# Patient Record
Sex: FEMALE | Race: BLACK OR AFRICAN AMERICAN | NOT HISPANIC OR LATINO
[De-identification: names, ages, dates, MRNs, and addresses within clinical notes are randomized per-mention and may not be internally consistent; named-entity substitution may affect disease eponyms.]

---

## 2017-02-23 ENCOUNTER — RESULT REVIEW (OUTPATIENT)
Age: 47
End: 2017-02-23

## 2017-02-24 ENCOUNTER — APPOINTMENT (OUTPATIENT)
Dept: DERMATOLOGY | Facility: CLINIC | Age: 47
End: 2017-02-24

## 2017-03-03 ENCOUNTER — APPOINTMENT (OUTPATIENT)
Dept: DERMATOLOGY | Facility: CLINIC | Age: 47
End: 2017-03-03

## 2017-03-10 ENCOUNTER — APPOINTMENT (OUTPATIENT)
Dept: INTERNAL MEDICINE | Facility: CLINIC | Age: 47
End: 2017-03-10

## 2017-04-03 ENCOUNTER — APPOINTMENT (OUTPATIENT)
Dept: DERMATOLOGY | Facility: CLINIC | Age: 47
End: 2017-04-03

## 2017-04-05 ENCOUNTER — LABORATORY RESULT (OUTPATIENT)
Age: 47
End: 2017-04-05

## 2017-04-06 ENCOUNTER — INPATIENT (INPATIENT)
Facility: HOSPITAL | Age: 47
LOS: 10 days | Discharge: HOME | End: 2017-04-17
Attending: HOSPITALIST | Admitting: HOSPITALIST
Payer: COMMERCIAL

## 2017-04-06 ENCOUNTER — APPOINTMENT (OUTPATIENT)
Dept: DERMATOLOGY | Facility: CLINIC | Age: 47
End: 2017-04-06

## 2017-04-06 ENCOUNTER — LABORATORY RESULT (OUTPATIENT)
Age: 47
End: 2017-04-06

## 2017-04-06 VITALS
RESPIRATION RATE: 18 BRPM | TEMPERATURE: 98 F | SYSTOLIC BLOOD PRESSURE: 125 MMHG | DIASTOLIC BLOOD PRESSURE: 85 MMHG | OXYGEN SATURATION: 97 % | HEART RATE: 100 BPM

## 2017-04-06 VITALS
BODY MASS INDEX: 31.82 KG/M2 | DIASTOLIC BLOOD PRESSURE: 80 MMHG | SYSTOLIC BLOOD PRESSURE: 122 MMHG | WEIGHT: 198 LBS | HEIGHT: 66 IN

## 2017-04-06 DIAGNOSIS — D72.1 EOSINOPHILIA: ICD-10-CM

## 2017-04-06 DIAGNOSIS — R45.851 SUICIDAL IDEATIONS: ICD-10-CM

## 2017-04-06 DIAGNOSIS — R21 RASH AND OTHER NONSPECIFIC SKIN ERUPTION: ICD-10-CM

## 2017-04-06 DIAGNOSIS — F41.9 ANXIETY DISORDER, UNSPECIFIED: ICD-10-CM

## 2017-04-06 DIAGNOSIS — R65.10 SYSTEMIC INFLAMMATORY RESPONSE SYNDROME (SIRS) OF NON-INFECTIOUS ORIGIN WITHOUT ACUTE ORGAN DYSFUNCTION: ICD-10-CM

## 2017-04-06 DIAGNOSIS — D25.9 LEIOMYOMA OF UTERUS, UNSPECIFIED: Chronic | ICD-10-CM

## 2017-04-06 DIAGNOSIS — D50.9 IRON DEFICIENCY ANEMIA, UNSPECIFIED: ICD-10-CM

## 2017-04-06 DIAGNOSIS — F33.2 MAJOR DEPRESSIVE DISORDER, RECURRENT SEVERE WITHOUT PSYCHOTIC FEATURES: ICD-10-CM

## 2017-04-06 DIAGNOSIS — R45.89 OTHER SYMPTOMS AND SIGNS INVOLVING EMOTIONAL STATE: ICD-10-CM

## 2017-04-06 LAB
ALBUMIN SERPL ELPH-MCNC: 3.7 G/DL — SIGNIFICANT CHANGE UP (ref 3.3–5)
ALP SERPL-CCNC: 87 U/L — SIGNIFICANT CHANGE UP (ref 40–120)
ALT FLD-CCNC: 20 U/L — SIGNIFICANT CHANGE UP (ref 4–33)
ANISOCYTOSIS BLD QL: SLIGHT — SIGNIFICANT CHANGE UP
AST SERPL-CCNC: 45 U/L — HIGH (ref 4–32)
BASE EXCESS BLDV CALC-SCNC: 3 MMOL/L — SIGNIFICANT CHANGE UP
BASOPHILS NFR SPEC: 0 % — SIGNIFICANT CHANGE UP (ref 0–2)
BILIRUB SERPL-MCNC: 0.4 MG/DL — SIGNIFICANT CHANGE UP (ref 0.2–1.2)
BLOOD GAS VENOUS - CREATININE: 0.84 MG/DL — SIGNIFICANT CHANGE UP (ref 0.5–1.3)
BUN SERPL-MCNC: 7 MG/DL — SIGNIFICANT CHANGE UP (ref 7–23)
CALCIUM SERPL-MCNC: 9 MG/DL — SIGNIFICANT CHANGE UP (ref 8.4–10.5)
CHLORIDE BLDV-SCNC: 107 MMOL/L — SIGNIFICANT CHANGE UP (ref 96–108)
CHLORIDE SERPL-SCNC: 102 MMOL/L — SIGNIFICANT CHANGE UP (ref 98–107)
CO2 SERPL-SCNC: 21 MMOL/L — LOW (ref 22–31)
CREAT SERPL-MCNC: 0.85 MG/DL — SIGNIFICANT CHANGE UP (ref 0.5–1.3)
EOSINOPHIL NFR FLD: 97 % — HIGH (ref 0–6)
GAS PNL BLDV: 137 MMOL/L — SIGNIFICANT CHANGE UP (ref 136–146)
GLUCOSE BLDV-MCNC: 91 — SIGNIFICANT CHANGE UP (ref 70–99)
GLUCOSE SERPL-MCNC: 82 MG/DL — SIGNIFICANT CHANGE UP (ref 70–99)
HCO3 BLDV-SCNC: 25 MMOL/L — SIGNIFICANT CHANGE UP (ref 20–27)
HCT VFR BLD CALC: 35.3 % — SIGNIFICANT CHANGE UP (ref 34.5–45)
HCT VFR BLDV CALC: 39.3 % — SIGNIFICANT CHANGE UP (ref 34.5–45)
HGB BLD-MCNC: 11.1 G/DL — LOW (ref 11.5–15.5)
HGB BLDV-MCNC: 12.8 G/DL — SIGNIFICANT CHANGE UP (ref 11.5–15.5)
LACTATE BLDV-MCNC: 1.5 MMOL/L — SIGNIFICANT CHANGE UP (ref 0.5–2)
LYMPHOCYTES NFR SPEC AUTO: 2 % — LOW (ref 13–44)
MANUAL SMEAR VERIFICATION: SIGNIFICANT CHANGE UP
MCHC RBC-ENTMCNC: 23.8 PG — LOW (ref 27–34)
MCHC RBC-ENTMCNC: 31.4 % — LOW (ref 32–36)
MCV RBC AUTO: 75.6 FL — LOW (ref 80–100)
MONOCYTES NFR BLD: 1 % — LOW (ref 2–9)
NEUTROPHIL AB SER-ACNC: 0 % — LOW (ref 43–77)
PCO2 BLDV: 47 MMHG — SIGNIFICANT CHANGE UP (ref 41–51)
PH BLDV: 7.38 PH — SIGNIFICANT CHANGE UP (ref 7.32–7.43)
PLATELET # BLD AUTO: 266 K/UL — SIGNIFICANT CHANGE UP (ref 150–400)
PLATELET COUNT - ESTIMATE: NORMAL — SIGNIFICANT CHANGE UP
PMV BLD: SIGNIFICANT CHANGE UP FL (ref 7–13)
PO2 BLDV: 30 MMHG — LOW (ref 35–40)
POTASSIUM BLDV-SCNC: 3.1 MMOL/L — LOW (ref 3.4–4.5)
POTASSIUM SERPL-MCNC: 3.5 MMOL/L — SIGNIFICANT CHANGE UP (ref 3.5–5.3)
POTASSIUM SERPL-SCNC: 3.5 MMOL/L — SIGNIFICANT CHANGE UP (ref 3.5–5.3)
PROT SERPL-MCNC: 8.6 G/DL — HIGH (ref 6–8.3)
RBC # BLD: 4.67 M/UL — SIGNIFICANT CHANGE UP (ref 3.8–5.2)
RBC # FLD: 18.4 % — HIGH (ref 10.3–14.5)
SAO2 % BLDV: 43.3 % — LOW (ref 60–85)
SODIUM SERPL-SCNC: 139 MMOL/L — SIGNIFICANT CHANGE UP (ref 135–145)
WBC # BLD: 28.15 K/UL — HIGH (ref 3.8–10.5)
WBC # FLD AUTO: 28.15 K/UL — HIGH (ref 3.8–10.5)

## 2017-04-06 PROCEDURE — 90792 PSYCH DIAG EVAL W/MED SRVCS: CPT

## 2017-04-06 PROCEDURE — 99223 1ST HOSP IP/OBS HIGH 75: CPT | Mod: GC

## 2017-04-06 RX ORDER — ENOXAPARIN SODIUM 100 MG/ML
40 INJECTION SUBCUTANEOUS EVERY 24 HOURS
Qty: 0 | Refills: 0 | Status: DISCONTINUED | OUTPATIENT
Start: 2017-04-06 | End: 2017-04-17

## 2017-04-06 RX ORDER — SODIUM CHLORIDE 9 MG/ML
1000 INJECTION INTRAMUSCULAR; INTRAVENOUS; SUBCUTANEOUS ONCE
Qty: 0 | Refills: 0 | Status: COMPLETED | OUTPATIENT
Start: 2017-04-06 | End: 2017-04-06

## 2017-04-06 RX ORDER — ACETAMINOPHEN 500 MG
650 TABLET ORAL EVERY 6 HOURS
Qty: 0 | Refills: 0 | Status: DISCONTINUED | OUTPATIENT
Start: 2017-04-06 | End: 2017-04-07

## 2017-04-06 RX ORDER — SODIUM CHLORIDE 9 MG/ML
1000 INJECTION INTRAMUSCULAR; INTRAVENOUS; SUBCUTANEOUS
Qty: 0 | Refills: 0 | Status: DISCONTINUED | OUTPATIENT
Start: 2017-04-06 | End: 2017-04-08

## 2017-04-06 RX ORDER — MUPIROCIN 20 MG/G
1 OINTMENT TOPICAL EVERY 8 HOURS
Qty: 0 | Refills: 0 | Status: DISCONTINUED | OUTPATIENT
Start: 2017-04-06 | End: 2017-04-17

## 2017-04-06 RX ORDER — LIDOCAINE 4 G/100G
15 CREAM TOPICAL
Qty: 0 | Refills: 0 | Status: DISCONTINUED | OUTPATIENT
Start: 2017-04-06 | End: 2017-04-17

## 2017-04-06 RX ORDER — MORPHINE SULFATE 50 MG/1
4 CAPSULE, EXTENDED RELEASE ORAL ONCE
Qty: 0 | Refills: 0 | Status: DISCONTINUED | OUTPATIENT
Start: 2017-04-06 | End: 2017-04-06

## 2017-04-06 RX ADMIN — SODIUM CHLORIDE 75 MILLILITER(S): 9 INJECTION INTRAMUSCULAR; INTRAVENOUS; SUBCUTANEOUS at 22:35

## 2017-04-06 RX ADMIN — MORPHINE SULFATE 4 MILLIGRAM(S): 50 CAPSULE, EXTENDED RELEASE ORAL at 19:46

## 2017-04-06 RX ADMIN — MORPHINE SULFATE 4 MILLIGRAM(S): 50 CAPSULE, EXTENDED RELEASE ORAL at 20:15

## 2017-04-06 RX ADMIN — ENOXAPARIN SODIUM 40 MILLIGRAM(S): 100 INJECTION SUBCUTANEOUS at 22:35

## 2017-04-06 RX ADMIN — Medication 40 MILLIGRAM(S): at 19:46

## 2017-04-06 RX ADMIN — SODIUM CHLORIDE 1000 MILLILITER(S): 9 INJECTION INTRAMUSCULAR; INTRAVENOUS; SUBCUTANEOUS at 19:46

## 2017-04-06 NOTE — H&P ADULT. - PROBLEM SELECTOR PLAN 2
-Constant observation  -Psychiatry consult  -Anxiolytics PRN Chronic rash of unclear etiology.  Recent punch biopsy reveal intersitial dermatitis w/ high eosinophils.   -Per dermatology, start prednisone, mupirocin  -Lidocaine swish & swallow for oral cavity pain  -C3, C4, CRP, KHADRA, EMILY, IgE sent by outpatient dermatology Chronic panful rash of unclear etiology.  Recent punch biopsy reveal intersitial dermatitis w/ high eosinophils.   -Per dermatology, start prednisone, mupirocin, broad DDx  -Lidocaine swish & swallow for oral cavity pain  -C3, C4, CRP, KHADRA, EMILY, IgE sent by outpatient dermatology

## 2017-04-06 NOTE — ED PROVIDER NOTE - PROGRESS NOTE DETAILS
Initial laboratory results with leukocytosis 2/2 steroids. Will admit to medicine. Discussed with Dr. Bentley who accepted pt. Also called psychiatry who will come and see patient. Derm skin recs:  1mg/kg prednisone daily  [place] vasoline over extremities  Muporcin to open wounds

## 2017-04-06 NOTE — H&P ADULT. - OCCUPATION
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

## 2017-04-06 NOTE — ED BEHAVIORAL HEALTH ASSESSMENT NOTE - RISK ASSESSMENT
Protective factors include no suicide attempts, no violence history, no access to guns, no global insomnia, no substance abuse, no active suicidal ideation, no homicidal ideation, hopefulness for future. Risk factors include depressed mood, passive SI, hopelessness, serious medical condition, many losses in the past 4 years, social isolation.

## 2017-04-06 NOTE — PATIENT PROFILE ADULT. - PRO MENTAL HEALTH SX RECENT
feeling down/suicidal ideation/sad/patient states suicidal ideation came from not being able to take the pain

## 2017-04-06 NOTE — ED ADULT NURSE NOTE - CHIEF COMPLAINT QUOTE
From the Dermatologist office ( [facility] [employer])  for R/O flare of Rey Wilson's symptoms started since August interm. on Antibx, c/o sarah. legs rash/swelling lips. sent for Psych eval. for Depression./SI due her pain./pt also have cold like symptoms.

## 2017-04-06 NOTE — H&P ADULT. - PROBLEM SELECTOR PLAN 3
WBC of 28.15 which is 97% eosinophils, likely related to rash but ddx includes drug reactions, parasitic infections, vasculitis processes  -C3, C4, CRP, KHADRA, EMILY, IgE sent by outpatient dermatology  -Trend CBC Hypereosinophilia of unclear etiology. Ddx includes hypereosinophilia syndrome, hematological malignancy,  rheumatological process, parasitic infection.  Though Strongyloides has been negative in past, this does not r/o parasitic infections.  -C3, C4, CRP, KHADRA, EMILY, IgE sent by outpatient dermatology  -Hematology consult re: myeloproliferative process  -Stool ova & parasites  -VitB12, may be elevated in some hematological disorders  -CE, to assess for myocardial injury Hypereosinophilia of unclear etiology. Broad Ddx includes hypereosinophilia syndrome, hematological malignancy,  rheumatological process, parasitic infection.  Though Strongyloides has been negative in past, this does not r/o parasitic infections.  -C3, C4, CRP, KHADRA, EMILY, IgE sent by outpatient dermatology  -Hematology consult re: myeloproliferative process  -Stool ova & parasites  -VitB12, may be elevated in some hematological disorders  -CE, to assess for myocardial injury Hypereosinophilia of unclear etiology. Broad Ddx includes hypereosinophilia syndrome, hematological malignancy,  rheumatological process, parasitic infection.  Though Strongyloides has been negative in past, this does not r/o parasitic infections.  -C3, C4, CRP, KHADRA, EMILY, IgE sent by outpatient dermatology  -Hematology consult re: myeloproliferative process  -Stool ova & parasites  -VitB12, may be elevated in some hematological disorders  -CE, to assess for myocardial injury  -consider Rheumatology c/s

## 2017-04-06 NOTE — H&P ADULT. - PROBLEM SELECTOR PLAN 1
Chronic rash of unclear etiology.  Recent punch biopsy reveal intersitial dermatitis w/ high eosinophils.  -Per dermatology, prednisone, mupirocin, vasoline  -Lidocaine swish & swallow for oral cavity pain Chronic rash of unclear etiology.  Recent punch biopsy reveal intersitial dermatitis w/ high eosinophils. Could be related to a hypereosinophilia syndrome, rheumatological process or parasitic infection though Strongyloides has been negative in the past.  -Per dermatology, prednisone, mupirocin, vasoline  -Lidocaine swish & swallow for oral cavity pain  -C3, C4, CRP, KHADRA, MEILY, IgE sent by outpatient dermatology Meets SIRS criteria given tachycardia & leukocytosis. Leukocytosis is primarily eosinophils. Suspect inflammatory process related to ongoing rash & high eosinophils.  Pt reporting subjective fevers, was on amoxicillin as outpatient but no documented fever in hospital, or bandemia or PMNs to suggest bacterial process.   -Monitor off abx  -Work up for hypereosinophilia as below Meets SIRS criteria given tachycardia & leukocytosis. Leukocytosis is primarily eosinophils. Suspect inflammatory process related to ongoing rash & high eosinophils.  Pt reporting subjective fevers, was on amoxicillin as outpatient but no documented fever in hospital, or bandemia or PMNs to suggest bacterial process.   -Monitor off abx  -Work up for hypereosinophilia as below  -BCx if spikes fever

## 2017-04-06 NOTE — ED PROVIDER NOTE - OBJECTIVE STATEMENT
46F w/ hx of kidney stones and herniated lumbar discs presents with diffuse maculopapular rash and suicidal ideation. Pt has had a persistent confluent rash across her legs, arms, and oral mucosa since August of last year. Pt is being followed by house dermatology for condition and was seen in clinic today for further testing. Initial concern was for verdugo steven, however dermatology now believes this is a different condition. Skin biopies were taken today in clinic. Sores in oral mucosa are so painful that pt has not been able to eat or drink for several days. pt is in severe pain all over body. Pt expressed to dermatologist desire to harm herself and fear of being home alone, so MD recommended coming to the ED for further eval. Derm is recommending 1mg/kg steroids for 5 days while inpatient for psychiatric eval.

## 2017-04-06 NOTE — H&P ADULT. - ASSESSMENT
46F hx nephrolithiasis herniated lumbar discs w/ chronic rash since 2016 of unclear etiology, also w/ oral lesions, sent from dermatology office after endorsing SI 46F hx nephrolithiasis, herniated lumbar discs p/w chronic diffuse rash, now w/ facial rash & oral lesions, sent from dermatology office after endorsing SI, found to leukocytosis (WBC 28) & hypereosinophilia (97%) 45yo F hx nephrolithiasis, herniated lumbar discs p/w chronic diffuse rash, now w/ facial rash & oral lesions, sent from dermatology office after endorsing SI, found to leukocytosis (WBC 28) & hypereosinophilia (97%); Satisfied criteria for SIRS;

## 2017-04-06 NOTE — H&P ADULT. - MUSCULOSKELETAL
details… detailed exam no joint erythema/ROM intact/no joint swelling/no calf tenderness/no joint warmth/normal strength

## 2017-04-06 NOTE — H&P ADULT. - PROBLEM SELECTOR PLAN 5
Hgb 11.1 w/ MCV of 75.6. Elevated RDW suggest Fe defiicnecy anemia. Pt w/ significant FHx of colon CA  -Iron studies in AM  -Should have GI eval as outpatient Hgb 11.1 w/ MCV of 75.6. Thalassemia vs  Fe defiicnecy anemia. Pt w/ significant FHx of colon CA  -Iron studies in AM  -outpt f/u with GI

## 2017-04-06 NOTE — H&P ADULT. - FAMILY HISTORY
Sibling  Still living? Unknown  Family history of colon cancer, Age at diagnosis: Age Unknown  Family history of cerebrovascular accident (CVA), Age at diagnosis: Age Unknown     Father  Still living? Unknown  Family history of cerebrovascular accident (CVA), Age at diagnosis: Age Unknown Sibling  Still living? No  Family history of colon cancer, Age at diagnosis: Age Unknown  Family history of cerebrovascular accident (CVA), Age at diagnosis: Age Unknown     Father  Still living? No  Family history of cerebrovascular accident (CVA), Age at diagnosis: Age Unknown     Mother  Still living? Unknown  Family history of aneurysm, Age at diagnosis: Age Unknown

## 2017-04-06 NOTE — ED PROVIDER NOTE - ATTENDING CONTRIBUTION TO CARE
CHARLI Attending Note - Dr. Del Cid  46F w/ hx of kidney stones and herniated lumbar discs presents with diffuse maculopapular rash and suicidal ideation for several weeks followed by dermatology.  Rash now affects oral mucosa and lips.  PE: pt is alert and oriented, perrl, ent lips are red and swollen.  airway is patent, neck supple. no lymphadenopathy or thyroid enlargement, No JVD.  Chest clear to P&A, Heart- reg rhythm without murmur, rubs or gallops, radial pulses equal bilaterally.  Abd is soft, non-tender, Bowel sounds are active. no mass or organomegaly. : No CVA tenderness. Neuro:  Pt alert and oriented x 3. Perrl    Distal neurosensory is intact. Motor function is 5/5 strength bilaterally.  No focal deficits. Extremities:  No edema.  Skin: macular papular rash

## 2017-04-06 NOTE — ED BEHAVIORAL HEALTH ASSESSMENT NOTE - HPI (INCLUDE ILLNESS QUALITY, SEVERITY, DURATION, TIMING, CONTEXT, MODIFYING FACTORS, ASSOCIATED SIGNS AND SYMPTOMS)
47 y/o single [employer] American female, domiciled alone, disabled since January, 2 adult children, history of Depressive Disorder, no prior psychiatric hospitalizations, took Zoloft 200mg daily for depression ~10 years ago, no suicide attempts, no violence, no legal issues, no drugs, no ETOH, no DTs, PMH of rash of unknown origin, BIB EMS for evaluation of rash and psychiatric consult requested for suicidal statement.    In ED, patient is with a 1:1 for suicide precautions. She is acutely depressed, tearful, anxious and overwhelmed & states she is passively suicidal. She states she has been feeling like life may not be worth living and thinks there may be no hope that she will ever recover from her medical condition. She reports she is in severe pain due to this rash, which includes sores inside of her mouth & her throat, "I have trouble eating or even swallowing". Patient states that the rash began in 2016 after patient attended a  in [place] for the death of her beloved sister whom she felt "was just like a mother to me". Patient reports that on return from [place], she had a small rash, which over the past several months has spread to her entire body and has now caused swelling/ulceration. Patient reports this, along with coping with the death of her 2 sisters (2016), the death of her father & fiancee 4 years ago, has led to her being very sad, isolative & she is no longer able to function. Patient states she has no one to talk to about her grief & is very lonely. She does have contact with her 2 children but does not confide in them much.   Patient reports she has had other depressive episodes in the past which responded well to Zoloft. She states she took it for years with good effect but stopped it around 10 years ago for unclear reasons. Patient denies any active plan to hurt herself, has never had intent nor plan nor motivation. She denies any manic sx by history and denies any psychotic sx (nor does she present with any). Patient denies any drug use, ETOH use. She denies any homicidality.

## 2017-04-06 NOTE — H&P ADULT. - SKIN COMMENTS
Facial erythema & swelling, not sparing nasolabial folds, diffuse maculopapular rash, areas of hyperpigmentation of LE Facial erythema & swelling, not sparing nasolabial folds, whole -body diffuse maculopapular rash, scattered whole-body areas of hyperpigmentation of LE

## 2017-04-06 NOTE — ED BEHAVIORAL HEALTH ASSESSMENT NOTE - SUMMARY
45 y/o single [place] female, domiciled alone, disabled since January, 2 adult children, history of Depressive Disorder, no prior psychiatric hospitalizations, took Zoloft 200mg daily for depression ~10 years ago, no suicide attempts, no violence, no legal issues, no drugs, no ETOH, no DTs, PMH of rash of unknown origin, BIB EMS for evaluation of rash and psychiatric consult requested for suicidal statement.  In ED, patient is acutely depressed, passively suicidal, feeling hopeless & overwhelmed. She is tearful, dysphoric and will benefit from a medical hospitalization for further workup of skin rash, as this is one of her primary stressors (pain, fear of never recovering). While on the inpatient unit, recommend 1:1 given pt's suicidality and marked emotionality. 47 y/o single [place] female, domiciled alone, disabled since January, 2 adult children, history of Depressive Disorder, no prior psychiatric hospitalizations, took Zoloft 200mg daily for depression ~10 years ago, no suicide attempts, no violence, no legal issues, no drugs, no ETOH, no DTs, PMH of rash of unknown origin, BIB EMS for evaluation of rash and psychiatric consult requested for suicidal statement.  In ED, patient is acutely depressed, passively suicidal, feeling hopeless & overwhelmed; . She is tearful, dysphoric and will benefit from a medical hospitalization for further workup of skin rash, as this is one of her primary stressors (pain, fear of never recovering). While on the inpatient unit, recommend 1:1 given pt's suicidality and marked emotionality.

## 2017-04-06 NOTE — ED ADULT NURSE NOTE - OBJECTIVE STATEMENT
Patient received into room 7 AA&Ox3 sent from dermatologists' office for r/o Rey Steve's syndrome, that have progressively worsened since August, and suicidal ideation - relating to pain. VSS on RA. Patient denies chest pain, N/V, SOB, fever, chills. Patient ambulatory at baseline. Healing scabs noted b/l LE & UE - pt. states that biopsies were performed to areas with scabs. 20g PIV in place to left AC, labs drawn, medications administered, 1L bolus NS given. Patient on constant observation for suicidal ideation - will continue to monitor.

## 2017-04-06 NOTE — H&P ADULT. - HISTORY OF PRESENT ILLNESS
46F hx nephrolithiasis herniated lumbar discs, p/w diffuse body rash & SI.  Pt was at a dermatology appt earlier today for evaluation of a chronic rash.  During the visit, she expressed thoughts of self harm & not feeling safe to go home & was brought to the ED for mental health eval.  She has had a diffuse body rash consisting of of painful & itchy lesions since summer 2016 after a trip to [place]. During that stay, she said she had been bitten by insects including horseflies.  She has had skin biopsies in the past. Initial biopsies reportedly showed changes c/w urticaria but repeat biopsy showed perivascular reaction & intersitial dermatitis w/ eosinophils concerning for hypereosinophilia syndrome, drug or arthropod reactions. She recently developed facial erythema & swelling as well as oral lesions that has prevented her from eating & drinking normally.  Repeat skin biopsies were taken in derm office today & she was started on prednisone.    ED course: T 98.1, , /85, RR 18, O2 97%. Labs significant for WBC 28.15, eosinophils 97%, Hgb 11.1, MCV 75.6. Received 46F hx nephrolithiasis, herniated lumbar discs, p/w diffuse body rash & SI.  Pt was at a dermatology appt earlier today for evaluation of a chronic rash.  During the visit, she expressed thoughts of self harm & not feeling safe to go home & was brought to the ED for further eval.  She has had a diffuse body rash of chest, abd, extremities consisting of red, itchy & painful lesions since summer 2016 after a trip to [place]. During that stay, she said she had been bitten by insects.  The rash has self-resolved in the past before but would return in a few day. She recently developed facial rash, swelling as well as oral lesions on her lips. Pt now also endorsing diplopia. She has eval from multiple dermatologists & does not have established diagnosis, but urticaria, parasitic infections & SLE have been considered.  Strongyloides has been negative. Initial biopsies reportedly showed changes c/w urticaria but repeat biopsy on 2/2017 showed perivascular reaction & intersitial dermatitis w/ eosinophils concerning for hypereosinophilia syndrome, drug or arthropod reactions.  Repeat skin biopsies were taken in derm office today & she was started on prednisone. 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     ED course: T 98.1, , /85, RR 18, O2 97%. Labs significant for WBC 28.15, eosinophils 97%, Hgb 11.1, MCV 75.6. Received 46F hx nephrolithiasis, herniated lumbar discs, p/w diffuse body rash & SI.  Pt was at a dermatology appt earlier today for evaluation of a chronic rash.  During the visit, she expressed thoughts of self harm & not feeling safe to go home & was brought to the ED for further eval.  She has had a diffuse body rash of chest, abd, extremities consisting of red, itchy & painful lesions since summer 2016 that started after a trip to [place]. During that stay, she said she had been bitten by insects.  The rash has self-resolved in the past before but would return in a few days, worse than before. She recently developed facial rash & swelling as well as oral lesions on her lips. Pt now also reporting subjective fevers, diplopia, b/l ankle & foot swelling. She denies any HA, CP, cough, abd pain, N, V, changes in BM or urination.  She has been feeling overwhelmed & passively suicidal b/c of the stress of dealing w/ the rash & its associated symptoms & recently losing her job.  She was previously employed as RN at [employer]. 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     She has eval from multiple dermatologists & does not have established diagnosis, but urticaria, parasitic infections & SLE have been considered.  Strongyloides has been negative. 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 arthropod reactions.  Repeat skin biopsies were taken in derm office today & she was started on prednisone. She was recently prescribed amoxicillin x 7 days (started on 4/1) & given diflucan x1 by an outside doctor due to her throat pain & b/c of some purulent drainage from some of her skin lesion.     ED course: T 98.1, , /85, RR 18, O2 97%. Labs significant for WBC 28.15, eosinophils 97%, Hgb 11.1, MCV 75.6. Received prednisone 40mg & morphine. 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  Pt was at a dermatology appt earlier today for evaluation of a chronic rash.  During the visit, she expressed thoughts of self harm & not feeling safe to go home & was brought to the ED for further eval.  She has had a diffuse body rash of chest, abd, extremities consisting of red, itchy & painful lesions since summer 2016 that started after a trip to [place]. During that stay, she said she had been bitten by insects.  The rash has self-resolved in the past before but would return in a few days, worse than before. She recently developed facial rash & swelling as well as oral lesions on her lips. Pt now also reporting subjective fevers, diplopia, b/l ankle & foot swelling. She denies any HA, CP, cough, abd pain, N, V, changes in BM or urination.  She has been feeling overwhelmed & passively suicidal b/c of the stress of dealing w/ the rash & its associated symptoms & recently losing her job.  She was previously employed as RN at [employer]. 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     She has eval from multiple dermatologists & does not have established diagnosis, but urticaria, parasitic infections & SLE have been considered.  Strongyloides has been negative. 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 arthropod reactions.  Repeat skin biopsies were taken in derm office today & she was started on prednisone. She was recently prescribed amoxicillin x 7 days (started on 4/1) & given diflucan x1 by an outside doctor due to her throat pain & b/c of some purulent drainage from some of her skin lesion.     ED course: T 98.1, , /85, RR 18, O2 97%. Labs significant for WBC 28.15, eosinophils 97%, Hgb 11.1, MCV 75.6. Received prednisone 40mg & morphine.

## 2017-04-06 NOTE — PATIENT PROFILE ADULT. - NS TRANSFER PATIENT BELONGINGS
Wrist Watch/Other belongings/2 pairs of earrings, 3 necklaces, 2 bracelets 1 gold and 1 copper, silver pair of scissors taken from patient/Cell Phone/PDA (specify)/Clothing

## 2017-04-06 NOTE — ED ADULT TRIAGE NOTE - CHIEF COMPLAINT QUOTE
From the Dermatologist office ( [facility] [employer])  for R/O flare of Rey Wilson's symptoms started since August interm. on Antibx, c/o sarah. legs rash/swelling lips. sent for Psych eval. for Depression./SI due her pain./pt also have cold like symptoms.

## 2017-04-06 NOTE — ED BEHAVIORAL HEALTH ASSESSMENT NOTE - REASON FOR REFERRAL
BIB EMS for severe skin rash of unknown origin; reported suicidality in ED & psychiatry was consulted

## 2017-04-06 NOTE — H&P ADULT. - PROBLEM SELECTOR PLAN 4
Hgb 11.1 w/ MCV of 75.6  -Iron studies in AM Pt under significant stress due to morbidity of rash & loss of job.   -Constant observation  -Psychiatry consult in AM  -Anxiolytics PRN

## 2017-04-06 NOTE — H&P ADULT. - NEGATIVE GASTROINTESTINAL SYMPTOMS
no abdominal pain/no change in bowel habits/no nausea/no vomiting no abdominal pain/no change in bowel habits/no diarrhea/no nausea/no vomiting

## 2017-04-06 NOTE — H&P ADULT. - LYMPHATIC
posterior cervical L/supraclavicular R/anterior cervical L/supraclavicular L/posterior cervical R/anterior cervical R

## 2017-04-06 NOTE — ED BEHAVIORAL HEALTH ASSESSMENT NOTE - OTHER PAST PSYCHIATRIC HISTORY (INCLUDE DETAILS REGARDING ONSET, COURSE OF ILLNESS, INPATIENT/OUTPATIENT TREATMENT)
History of taking Zoloft 15 years ago with good effect. No prior psychiatric admissions. No suicide attempts. No violence hx.

## 2017-04-06 NOTE — H&P ADULT. - NEGATIVE OPHTHALMOLOGIC SYMPTOMS
no blurred vision R/no blurred vision L no blurred vision L/no photophobia/no blurred vision R/no diplopia

## 2017-04-06 NOTE — ED PROVIDER NOTE - MEDICAL DECISION MAKING DETAILS
46F w/ hx of diffuse rash, severe pain, and suicidal ideation. CBC/CMP/VBG. Admit to medicine with psychiatric evaluation. Will give dose of prednisone, IVF, and morphine.

## 2017-04-07 LAB
APPEARANCE UR: CLEAR — SIGNIFICANT CHANGE UP
BILIRUB UR-MCNC: NEGATIVE — SIGNIFICANT CHANGE UP
BLOOD UR QL VISUAL: NEGATIVE — SIGNIFICANT CHANGE UP
COLOR SPEC: SIGNIFICANT CHANGE UP
GLUCOSE UR-MCNC: NEGATIVE — SIGNIFICANT CHANGE UP
KETONES UR-MCNC: SIGNIFICANT CHANGE UP
LEUKOCYTE ESTERASE UR-ACNC: NEGATIVE — SIGNIFICANT CHANGE UP
MUCOUS THREADS # UR AUTO: SIGNIFICANT CHANGE UP
NITRITE UR-MCNC: NEGATIVE — SIGNIFICANT CHANGE UP
PH UR: 6.5 — SIGNIFICANT CHANGE UP (ref 4.6–8)
PROT UR-MCNC: NEGATIVE — SIGNIFICANT CHANGE UP
RBC CASTS # UR COMP ASSIST: SIGNIFICANT CHANGE UP (ref 0–?)
SP GR SPEC: 1.01 — SIGNIFICANT CHANGE UP (ref 1–1.03)
SQUAMOUS # UR AUTO: SIGNIFICANT CHANGE UP
UROBILINOGEN FLD QL: NORMAL E.U. — SIGNIFICANT CHANGE UP (ref 0.1–0.2)
WBC UR QL: SIGNIFICANT CHANGE UP (ref 0–?)

## 2017-04-07 PROCEDURE — 99233 SBSQ HOSP IP/OBS HIGH 50: CPT | Mod: GC

## 2017-04-07 PROCEDURE — 99254 IP/OBS CNSLTJ NEW/EST MOD 60: CPT | Mod: GC

## 2017-04-07 PROCEDURE — 71010: CPT | Mod: 26

## 2017-04-07 PROCEDURE — 99233 SBSQ HOSP IP/OBS HIGH 50: CPT

## 2017-04-07 PROCEDURE — 99223 1ST HOSP IP/OBS HIGH 75: CPT | Mod: GC

## 2017-04-07 RX ORDER — FLUCONAZOLE 150 MG/1
200 TABLET ORAL DAILY
Qty: 0 | Refills: 0 | Status: DISCONTINUED | OUTPATIENT
Start: 2017-04-07 | End: 2017-04-17

## 2017-04-07 RX ORDER — HYDROXYZINE HCL 10 MG
25 TABLET ORAL
Qty: 0 | Refills: 0 | Status: DISCONTINUED | OUTPATIENT
Start: 2017-04-07 | End: 2017-04-17

## 2017-04-07 RX ORDER — MORPHINE SULFATE 50 MG/1
4 CAPSULE, EXTENDED RELEASE ORAL ONCE
Qty: 0 | Refills: 0 | Status: DISCONTINUED | OUTPATIENT
Start: 2017-04-07 | End: 2017-04-07

## 2017-04-07 RX ORDER — ACETAMINOPHEN 500 MG
650 TABLET ORAL EVERY 6 HOURS
Qty: 0 | Refills: 0 | Status: DISCONTINUED | OUTPATIENT
Start: 2017-04-07 | End: 2017-04-17

## 2017-04-07 RX ORDER — DIPHENHYDRAMINE HCL 50 MG
25 CAPSULE ORAL EVERY 4 HOURS
Qty: 0 | Refills: 0 | Status: DISCONTINUED | OUTPATIENT
Start: 2017-04-07 | End: 2017-04-17

## 2017-04-07 RX ORDER — SERTRALINE 25 MG/1
25 TABLET, FILM COATED ORAL DAILY
Qty: 0 | Refills: 0 | Status: DISCONTINUED | OUTPATIENT
Start: 2017-04-07 | End: 2017-04-17

## 2017-04-07 RX ADMIN — FLUCONAZOLE 200 MILLIGRAM(S): 150 TABLET ORAL at 19:47

## 2017-04-07 RX ADMIN — MORPHINE SULFATE 4 MILLIGRAM(S): 50 CAPSULE, EXTENDED RELEASE ORAL at 04:41

## 2017-04-07 RX ADMIN — Medication 40 MILLIGRAM(S): at 06:01

## 2017-04-07 RX ADMIN — Medication 25 MILLIGRAM(S): at 13:51

## 2017-04-07 RX ADMIN — MORPHINE SULFATE 4 MILLIGRAM(S): 50 CAPSULE, EXTENDED RELEASE ORAL at 05:20

## 2017-04-07 RX ADMIN — ENOXAPARIN SODIUM 40 MILLIGRAM(S): 100 INJECTION SUBCUTANEOUS at 22:01

## 2017-04-07 RX ADMIN — SERTRALINE 25 MILLIGRAM(S): 25 TABLET, FILM COATED ORAL at 19:47

## 2017-04-08 LAB
ALBUMIN SERPL ELPH-MCNC: 3.1 G/DL — LOW (ref 3.3–5)
ALP SERPL-CCNC: 65 U/L — SIGNIFICANT CHANGE UP (ref 40–120)
ALT FLD-CCNC: 13 U/L — SIGNIFICANT CHANGE UP (ref 4–33)
AST SERPL-CCNC: 25 U/L — SIGNIFICANT CHANGE UP (ref 4–32)
BASOPHILS NFR SPEC: 0 % — SIGNIFICANT CHANGE UP (ref 0–2)
BILIRUB SERPL-MCNC: 0.2 MG/DL — SIGNIFICANT CHANGE UP (ref 0.2–1.2)
BUN SERPL-MCNC: 9 MG/DL — SIGNIFICANT CHANGE UP (ref 7–23)
CALCIUM SERPL-MCNC: 8.5 MG/DL — SIGNIFICANT CHANGE UP (ref 8.4–10.5)
CHLORIDE SERPL-SCNC: 108 MMOL/L — HIGH (ref 98–107)
CK MB BLD-MCNC: 1.33 NG/ML — SIGNIFICANT CHANGE UP (ref 1–4.7)
CK SERPL-CCNC: 76 U/L — SIGNIFICANT CHANGE UP (ref 25–170)
CO2 SERPL-SCNC: 21 MMOL/L — LOW (ref 22–31)
CREAT SERPL-MCNC: 0.79 MG/DL — SIGNIFICANT CHANGE UP (ref 0.5–1.3)
EOSINOPHIL NFR FLD: 89.3 % — HIGH (ref 0–6)
GLUCOSE SERPL-MCNC: 105 MG/DL — HIGH (ref 70–99)
HCG SERPL-ACNC: < 5 MIU/ML — SIGNIFICANT CHANGE UP
HCG UR-SCNC: NEGATIVE — SIGNIFICANT CHANGE UP
HCT VFR BLD CALC: 31.8 % — LOW (ref 34.5–45)
HGB BLD-MCNC: 9.8 G/DL — LOW (ref 11.5–15.5)
HIV1 AG SER QL: SIGNIFICANT CHANGE UP
HIV1+2 AB SPEC QL: SIGNIFICANT CHANGE UP
IGA FLD-MCNC: 458 MG/DL — HIGH (ref 70–400)
IGE SERPL-ACNC: 33.83 IU/ML — SIGNIFICANT CHANGE UP (ref 0–100)
IGG FLD-MCNC: 2089 MG/DL — HIGH (ref 700–1600)
IGM SERPL-MCNC: 90 MG/DL — SIGNIFICANT CHANGE UP (ref 40–230)
LYMPHOCYTES NFR SPEC AUTO: 6.8 % — LOW (ref 13–44)
MAGNESIUM SERPL-MCNC: 2 MG/DL — SIGNIFICANT CHANGE UP (ref 1.6–2.6)
MCHC RBC-ENTMCNC: 23.5 PG — LOW (ref 27–34)
MCHC RBC-ENTMCNC: 30.8 % — LOW (ref 32–36)
MCV RBC AUTO: 76.3 FL — LOW (ref 80–100)
MONOCYTES NFR BLD: 1 % — LOW (ref 2–9)
NEUTROPHIL AB SER-ACNC: 1.9 % — LOW (ref 43–77)
PHOSPHATE SERPL-MCNC: 3.2 MG/DL — SIGNIFICANT CHANGE UP (ref 2.5–4.5)
PLATELET # BLD AUTO: 245 K/UL — SIGNIFICANT CHANGE UP (ref 150–400)
PMV BLD: SIGNIFICANT CHANGE UP FL (ref 7–13)
POTASSIUM SERPL-MCNC: 4.1 MMOL/L — SIGNIFICANT CHANGE UP (ref 3.5–5.3)
POTASSIUM SERPL-SCNC: 4.1 MMOL/L — SIGNIFICANT CHANGE UP (ref 3.5–5.3)
PROT SERPL-MCNC: 7.1 G/DL — SIGNIFICANT CHANGE UP (ref 6–8.3)
RBC # BLD: 4.17 M/UL — SIGNIFICANT CHANGE UP (ref 3.8–5.2)
RBC # FLD: 19 % — HIGH (ref 10.3–14.5)
SODIUM SERPL-SCNC: 144 MMOL/L — SIGNIFICANT CHANGE UP (ref 135–145)
SP GR UR: 1.01 — SIGNIFICANT CHANGE UP (ref 1–1.03)
TROPONIN T SERPL-MCNC: < 0.06 NG/ML — SIGNIFICANT CHANGE UP (ref 0–0.06)
TROPONIN T SERPL-MCNC: < 0.06 NG/ML — SIGNIFICANT CHANGE UP (ref 0–0.06)
VARIANT LYMPHS # BLD: 1 % — SIGNIFICANT CHANGE UP
VIT B12 SERPL-MCNC: 719 PG/ML — SIGNIFICANT CHANGE UP (ref 200–900)
WBC # BLD: 24.6 K/UL — HIGH (ref 3.8–10.5)
WBC # FLD AUTO: 24.6 K/UL — HIGH (ref 3.8–10.5)

## 2017-04-08 PROCEDURE — 74177 CT ABD & PELVIS W/CONTRAST: CPT | Mod: 26

## 2017-04-08 PROCEDURE — 99223 1ST HOSP IP/OBS HIGH 75: CPT | Mod: GC

## 2017-04-08 PROCEDURE — 99233 SBSQ HOSP IP/OBS HIGH 50: CPT | Mod: GC

## 2017-04-08 PROCEDURE — 93306 TTE W/DOPPLER COMPLETE: CPT | Mod: 26

## 2017-04-08 PROCEDURE — 71260 CT THORAX DX C+: CPT | Mod: 26

## 2017-04-08 PROCEDURE — 93010 ELECTROCARDIOGRAM REPORT: CPT

## 2017-04-08 RX ORDER — SIMETHICONE 80 MG/1
80 TABLET, CHEWABLE ORAL ONCE
Qty: 0 | Refills: 0 | Status: COMPLETED | OUTPATIENT
Start: 2017-04-08 | End: 2017-04-08

## 2017-04-08 RX ORDER — MORPHINE SULFATE 50 MG/1
2 CAPSULE, EXTENDED RELEASE ORAL ONCE
Qty: 0 | Refills: 0 | Status: DISCONTINUED | OUTPATIENT
Start: 2017-04-08 | End: 2017-04-08

## 2017-04-08 RX ADMIN — SERTRALINE 25 MILLIGRAM(S): 25 TABLET, FILM COATED ORAL at 11:50

## 2017-04-08 RX ADMIN — MUPIROCIN 1 APPLICATION(S): 20 OINTMENT TOPICAL at 14:59

## 2017-04-08 RX ADMIN — MORPHINE SULFATE 2 MILLIGRAM(S): 50 CAPSULE, EXTENDED RELEASE ORAL at 11:00

## 2017-04-08 RX ADMIN — MUPIROCIN 1 APPLICATION(S): 20 OINTMENT TOPICAL at 21:31

## 2017-04-08 RX ADMIN — SIMETHICONE 80 MILLIGRAM(S): 80 TABLET, CHEWABLE ORAL at 10:42

## 2017-04-08 RX ADMIN — Medication 25 MILLIGRAM(S): at 09:01

## 2017-04-08 RX ADMIN — FLUCONAZOLE 200 MILLIGRAM(S): 150 TABLET ORAL at 11:50

## 2017-04-08 RX ADMIN — Medication 40 MILLIGRAM(S): at 05:24

## 2017-04-08 RX ADMIN — MORPHINE SULFATE 2 MILLIGRAM(S): 50 CAPSULE, EXTENDED RELEASE ORAL at 10:42

## 2017-04-09 LAB
APTT BLD: 28.5 SEC — SIGNIFICANT CHANGE UP (ref 27.5–37.4)
BASOPHILS NFR SPEC: 0 % — SIGNIFICANT CHANGE UP (ref 0–2)
BUN SERPL-MCNC: 8 MG/DL — SIGNIFICANT CHANGE UP (ref 7–23)
CALCIUM SERPL-MCNC: 8.9 MG/DL — SIGNIFICANT CHANGE UP (ref 8.4–10.5)
CHLORIDE SERPL-SCNC: 109 MMOL/L — HIGH (ref 98–107)
CO2 SERPL-SCNC: 21 MMOL/L — LOW (ref 22–31)
CREAT SERPL-MCNC: 0.89 MG/DL — SIGNIFICANT CHANGE UP (ref 0.5–1.3)
EOSINOPHIL NFR FLD: 90 % — HIGH (ref 0–6)
GLUCOSE SERPL-MCNC: 89 MG/DL — SIGNIFICANT CHANGE UP (ref 70–99)
HCT VFR BLD CALC: 33.4 % — LOW (ref 34.5–45)
HGB BLD-MCNC: 10.5 G/DL — LOW (ref 11.5–15.5)
INR BLD: 0.96 — SIGNIFICANT CHANGE UP (ref 0.88–1.17)
LYMPHOCYTES NFR SPEC AUTO: 3 % — LOW (ref 13–44)
MAGNESIUM SERPL-MCNC: 2.1 MG/DL — SIGNIFICANT CHANGE UP (ref 1.6–2.6)
MANUAL SMEAR VERIFICATION: SIGNIFICANT CHANGE UP
MCHC RBC-ENTMCNC: 24.1 PG — LOW (ref 27–34)
MCHC RBC-ENTMCNC: 31.4 % — LOW (ref 32–36)
MCV RBC AUTO: 76.6 FL — LOW (ref 80–100)
MONOCYTES NFR BLD: 1 % — LOW (ref 2–9)
NEUTROPHIL AB SER-ACNC: 5 % — LOW (ref 43–77)
NEUTS BAND # BLD: 1 % — SIGNIFICANT CHANGE UP (ref 0–6)
PHOSPHATE SERPL-MCNC: 3.4 MG/DL — SIGNIFICANT CHANGE UP (ref 2.5–4.5)
PLATELET # BLD AUTO: 244 K/UL — SIGNIFICANT CHANGE UP (ref 150–400)
PMV BLD: SIGNIFICANT CHANGE UP FL (ref 7–13)
POTASSIUM SERPL-MCNC: 3.8 MMOL/L — SIGNIFICANT CHANGE UP (ref 3.5–5.3)
POTASSIUM SERPL-SCNC: 3.8 MMOL/L — SIGNIFICANT CHANGE UP (ref 3.5–5.3)
PROTHROM AB SERPL-ACNC: 10.7 SEC — SIGNIFICANT CHANGE UP (ref 9.8–13.1)
RBC # BLD: 4.36 M/UL — SIGNIFICANT CHANGE UP (ref 3.8–5.2)
RBC # FLD: 19.2 % — HIGH (ref 10.3–14.5)
SODIUM SERPL-SCNC: 143 MMOL/L — SIGNIFICANT CHANGE UP (ref 135–145)
THROMBIN TIME: 29.1 SEC — HIGH (ref 17–26)
WBC # BLD: 23.23 K/UL — HIGH (ref 3.8–10.5)
WBC # FLD AUTO: 23.23 K/UL — HIGH (ref 3.8–10.5)

## 2017-04-09 PROCEDURE — 99233 SBSQ HOSP IP/OBS HIGH 50: CPT | Mod: GC

## 2017-04-09 PROCEDURE — 70553 MRI BRAIN STEM W/O & W/DYE: CPT | Mod: 26

## 2017-04-09 RX ORDER — OXYCODONE HYDROCHLORIDE 5 MG/1
10 TABLET ORAL EVERY 4 HOURS
Qty: 0 | Refills: 0 | Status: DISCONTINUED | OUTPATIENT
Start: 2017-04-09 | End: 2017-04-16

## 2017-04-09 RX ORDER — SERTRALINE 25 MG/1
25 TABLET, FILM COATED ORAL ONCE
Qty: 0 | Refills: 0 | Status: DISCONTINUED | OUTPATIENT
Start: 2017-04-09 | End: 2017-04-09

## 2017-04-09 RX ORDER — DIPHENHYDRAMINE HYDROCHLORIDE AND LIDOCAINE HYDROCHLORIDE AND ALUMINUM HYDROXIDE AND MAGNESIUM HYDRO
15 KIT EVERY 8 HOURS
Qty: 0 | Refills: 0 | Status: DISCONTINUED | OUTPATIENT
Start: 2017-04-09 | End: 2017-04-17

## 2017-04-09 RX ORDER — KETOROLAC TROMETHAMINE 30 MG/ML
30 SYRINGE (ML) INJECTION ONCE
Qty: 0 | Refills: 0 | Status: DISCONTINUED | OUTPATIENT
Start: 2017-04-09 | End: 2017-04-09

## 2017-04-09 RX ADMIN — MUPIROCIN 1 APPLICATION(S): 20 OINTMENT TOPICAL at 06:34

## 2017-04-09 RX ADMIN — Medication 25 MILLIGRAM(S): at 13:24

## 2017-04-09 RX ADMIN — MUPIROCIN 1 APPLICATION(S): 20 OINTMENT TOPICAL at 13:21

## 2017-04-09 RX ADMIN — Medication 30 MILLIGRAM(S): at 09:17

## 2017-04-09 RX ADMIN — SERTRALINE 25 MILLIGRAM(S): 25 TABLET, FILM COATED ORAL at 06:38

## 2017-04-09 RX ADMIN — OXYCODONE HYDROCHLORIDE 10 MILLIGRAM(S): 5 TABLET ORAL at 23:00

## 2017-04-09 RX ADMIN — Medication 30 MILLIGRAM(S): at 07:51

## 2017-04-09 RX ADMIN — Medication 40 MILLIGRAM(S): at 06:32

## 2017-04-09 RX ADMIN — DIPHENHYDRAMINE HYDROCHLORIDE AND LIDOCAINE HYDROCHLORIDE AND ALUMINUM HYDROXIDE AND MAGNESIUM HYDRO 15 MILLILITER(S): KIT at 13:21

## 2017-04-09 RX ADMIN — OXYCODONE HYDROCHLORIDE 10 MILLIGRAM(S): 5 TABLET ORAL at 22:46

## 2017-04-09 RX ADMIN — MUPIROCIN 1 APPLICATION(S): 20 OINTMENT TOPICAL at 21:31

## 2017-04-09 RX ADMIN — DIPHENHYDRAMINE HYDROCHLORIDE AND LIDOCAINE HYDROCHLORIDE AND ALUMINUM HYDROXIDE AND MAGNESIUM HYDRO 15 MILLILITER(S): KIT at 21:29

## 2017-04-09 RX ADMIN — FLUCONAZOLE 200 MILLIGRAM(S): 150 TABLET ORAL at 13:22

## 2017-04-09 NOTE — PHYSICAL THERAPY INITIAL EVALUATION ADULT - ADDITIONAL COMMENTS
Pt lives in private house with 3 steps to enter, 10 steps to basement, ambulates independently without assistive devices.

## 2017-04-09 NOTE — PHYSICAL THERAPY INITIAL EVALUATION ADULT - PERTINENT HX OF CURRENT PROBLEM, REHAB EVAL
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  Pt was at a dermatology appt earlier today for evaluation of a chronic rash.  During the visit, she expressed thoughts of self harm & not feeling safe to go home & was brought to the ED for further eval.

## 2017-04-10 LAB
AT III ACT/NOR PPP CHRO: 95 % — SIGNIFICANT CHANGE UP (ref 76–140)
BUN SERPL-MCNC: 11 MG/DL — SIGNIFICANT CHANGE UP (ref 7–23)
CALCIUM SERPL-MCNC: 8.5 MG/DL — SIGNIFICANT CHANGE UP (ref 8.4–10.5)
CHLORIDE SERPL-SCNC: 107 MMOL/L — SIGNIFICANT CHANGE UP (ref 98–107)
CO2 SERPL-SCNC: 25 MMOL/L — SIGNIFICANT CHANGE UP (ref 22–31)
CREAT SERPL-MCNC: 0.83 MG/DL — SIGNIFICANT CHANGE UP (ref 0.5–1.3)
DRVVT CONFIRM: 28.6 SEC — SIGNIFICANT CHANGE UP (ref 27–41)
DRVVT INTERPRETATION.: NEGATIVE — SIGNIFICANT CHANGE UP
DRVVT SCREEN TO CONFIRM RATIO: 1.02 — SIGNIFICANT CHANGE UP (ref 0–1.2)
GLUCOSE SERPL-MCNC: 97 MG/DL — SIGNIFICANT CHANGE UP (ref 70–99)
HCT VFR BLD CALC: 32.3 % — LOW (ref 34.5–45)
HGB BLD-MCNC: 10.1 G/DL — LOW (ref 11.5–15.5)
MAGNESIUM SERPL-MCNC: 2.1 MG/DL — SIGNIFICANT CHANGE UP (ref 1.6–2.6)
MANUAL SMEAR VERIFICATION: SIGNIFICANT CHANGE UP
MCHC RBC-ENTMCNC: 24 PG — LOW (ref 27–34)
MCHC RBC-ENTMCNC: 31.3 % — LOW (ref 32–36)
MCV RBC AUTO: 76.7 FL — LOW (ref 80–100)
NORMALIZED SCT PPP-RTO: 0.79 — LOW (ref 0.81–1.17)
NORMALIZED SCT PPP-RTO: 35.5 SEC — SIGNIFICANT CHANGE UP (ref 33.7–49.5)
NORMALIZED SCT PPP-RTO: NEGATIVE — SIGNIFICANT CHANGE UP
PHOSPHATE SERPL-MCNC: 3.9 MG/DL — SIGNIFICANT CHANGE UP (ref 2.5–4.5)
PLATELET # BLD AUTO: 225 K/UL — SIGNIFICANT CHANGE UP (ref 150–400)
PMV BLD: SIGNIFICANT CHANGE UP FL (ref 7–13)
POTASSIUM SERPL-MCNC: 3.8 MMOL/L — SIGNIFICANT CHANGE UP (ref 3.5–5.3)
POTASSIUM SERPL-SCNC: 3.8 MMOL/L — SIGNIFICANT CHANGE UP (ref 3.5–5.3)
PROT C ACT/NOR PPP: 84 % — SIGNIFICANT CHANGE UP (ref 74–150)
PROT S FREE AG PPP IA-ACNC: 34.2 % — LOW (ref 60–131)
PROT UR-MCNC: 37.1 MG/DL — SIGNIFICANT CHANGE UP
RBC # BLD: 4.21 M/UL — SIGNIFICANT CHANGE UP (ref 3.8–5.2)
RBC # FLD: 19.3 % — HIGH (ref 10.3–14.5)
SODIUM SERPL-SCNC: 143 MMOL/L — SIGNIFICANT CHANGE UP (ref 135–145)
TRYPTASE SERPL-MCNC: 7.2 NG/ML — SIGNIFICANT CHANGE UP
WBC # BLD: 23.17 K/UL — HIGH (ref 3.8–10.5)
WBC # FLD AUTO: 23.17 K/UL — HIGH (ref 3.8–10.5)

## 2017-04-10 PROCEDURE — 84166 PROTEIN E-PHORESIS/URINE/CSF: CPT | Mod: 26

## 2017-04-10 PROCEDURE — 99233 SBSQ HOSP IP/OBS HIGH 50: CPT | Mod: GC

## 2017-04-10 PROCEDURE — 93880 EXTRACRANIAL BILAT STUDY: CPT | Mod: 26

## 2017-04-10 PROCEDURE — 99232 SBSQ HOSP IP/OBS MODERATE 35: CPT

## 2017-04-10 RX ORDER — OXYCODONE HYDROCHLORIDE 5 MG/1
10 TABLET ORAL ONCE
Qty: 0 | Refills: 0 | Status: DISCONTINUED | OUTPATIENT
Start: 2017-04-10 | End: 2017-04-10

## 2017-04-10 RX ORDER — OXYCODONE HYDROCHLORIDE 5 MG/1
10 TABLET ORAL ONCE
Qty: 0 | Refills: 0 | Status: DISCONTINUED | OUTPATIENT
Start: 2017-04-11 | End: 2017-04-14

## 2017-04-10 RX ADMIN — SERTRALINE 25 MILLIGRAM(S): 25 TABLET, FILM COATED ORAL at 06:19

## 2017-04-10 RX ADMIN — DIPHENHYDRAMINE HYDROCHLORIDE AND LIDOCAINE HYDROCHLORIDE AND ALUMINUM HYDROXIDE AND MAGNESIUM HYDRO 15 MILLILITER(S): KIT at 23:06

## 2017-04-10 RX ADMIN — FLUCONAZOLE 200 MILLIGRAM(S): 150 TABLET ORAL at 12:40

## 2017-04-10 RX ADMIN — DIPHENHYDRAMINE HYDROCHLORIDE AND LIDOCAINE HYDROCHLORIDE AND ALUMINUM HYDROXIDE AND MAGNESIUM HYDRO 15 MILLILITER(S): KIT at 06:17

## 2017-04-10 RX ADMIN — MUPIROCIN 1 APPLICATION(S): 20 OINTMENT TOPICAL at 13:07

## 2017-04-10 RX ADMIN — DIPHENHYDRAMINE HYDROCHLORIDE AND LIDOCAINE HYDROCHLORIDE AND ALUMINUM HYDROXIDE AND MAGNESIUM HYDRO 15 MILLILITER(S): KIT at 13:06

## 2017-04-10 RX ADMIN — MUPIROCIN 1 APPLICATION(S): 20 OINTMENT TOPICAL at 23:06

## 2017-04-10 RX ADMIN — Medication 40 MILLIGRAM(S): at 06:18

## 2017-04-10 RX ADMIN — MUPIROCIN 1 APPLICATION(S): 20 OINTMENT TOPICAL at 06:18

## 2017-04-11 ENCOUNTER — OUTPATIENT (OUTPATIENT)
Dept: OUTPATIENT SERVICES | Facility: HOSPITAL | Age: 47
LOS: 1 days | End: 2017-04-11
Payer: COMMERCIAL

## 2017-04-11 ENCOUNTER — LABORATORY RESULT (OUTPATIENT)
Age: 47
End: 2017-04-11

## 2017-04-11 DIAGNOSIS — D25.9 LEIOMYOMA OF UTERUS, UNSPECIFIED: Chronic | ICD-10-CM

## 2017-04-11 LAB
ALBUMIN SERPL ELPH-MCNC: 3.3 G/DL — SIGNIFICANT CHANGE UP (ref 3.3–5)
ALBUMIN SERPL ELPH-MCNC: 3.6 G/DL
ALP BLD-CCNC: 85 U/L
ALP SERPL-CCNC: 67 U/L — SIGNIFICANT CHANGE UP (ref 40–120)
ALT FLD-CCNC: 16 U/L — SIGNIFICANT CHANGE UP (ref 4–33)
ALT SERPL-CCNC: 19 U/L
ANA SER IF-ACNC: NEGATIVE
ANION GAP SERPL CALC-SCNC: 14 MMOL/L
AST SERPL-CCNC: 25 U/L — SIGNIFICANT CHANGE UP (ref 4–32)
AST SERPL-CCNC: 39 U/L
BASOPHILS # BLD AUTO: 0 K/UL
BASOPHILS NFR BLD AUTO: 0 %
BILIRUB SERPL-MCNC: 0.3 MG/DL
BILIRUB SERPL-MCNC: 0.3 MG/DL — SIGNIFICANT CHANGE UP (ref 0.2–1.2)
BUN SERPL-MCNC: 12 MG/DL — SIGNIFICANT CHANGE UP (ref 7–23)
BUN SERPL-MCNC: 5 MG/DL
C3 SERPL-MCNC: 90 MG/DL
C4 SERPL-MCNC: 7 MG/DL
CALCIUM SERPL-MCNC: 8.4 MG/DL — SIGNIFICANT CHANGE UP (ref 8.4–10.5)
CALCIUM SERPL-MCNC: 9 MG/DL
CHLORIDE SERPL-SCNC: 100 MMOL/L
CHLORIDE SERPL-SCNC: 105 MMOL/L — SIGNIFICANT CHANGE UP (ref 98–107)
CO2 SERPL-SCNC: 25 MMOL/L
CO2 SERPL-SCNC: 25 MMOL/L — SIGNIFICANT CHANGE UP (ref 22–31)
CREAT SERPL-MCNC: 0.8 MG/DL — SIGNIFICANT CHANGE UP (ref 0.5–1.3)
CREAT SERPL-MCNC: 0.9 MG/DL
CRP SERPL-MCNC: 0.23 MG/DL
ENA RNP AB SER IA-ACNC: 0.2 AL
ENA SM AB SER IA-ACNC: <0.2 AL
EOSINOPHIL # BLD AUTO: 20.85 K/UL
EOSINOPHIL NFR BLD AUTO: 87 %
FERRITIN SERPL-MCNC: 104.7 NG/ML — SIGNIFICANT CHANGE UP (ref 15–150)
GLUCOSE SERPL-MCNC: 101 MG/DL
GLUCOSE SERPL-MCNC: 98 MG/DL — SIGNIFICANT CHANGE UP (ref 70–99)
HCT VFR BLD CALC: 32.9 % — LOW (ref 34.5–45)
HCT VFR BLD CALC: 36.8 %
HGB BLD-MCNC: 10.5 G/DL — LOW (ref 11.5–15.5)
HGB BLD-MCNC: 11.5 G/DL
IGE SER-MCNC: 42 IU/ML
LYMPHOCYTES # BLD AUTO: 1.68 K/UL
LYMPHOCYTES NFR BLD AUTO: 7 %
MAGNESIUM SERPL-MCNC: 2.1 MG/DL — SIGNIFICANT CHANGE UP (ref 1.6–2.6)
MAN DIFF?: NORMAL
MCHC RBC-ENTMCNC: 23.8 PG
MCHC RBC-ENTMCNC: 24.1 PG — LOW (ref 27–34)
MCHC RBC-ENTMCNC: 31.3 GM/DL
MCHC RBC-ENTMCNC: 31.9 % — LOW (ref 32–36)
MCV RBC AUTO: 75.5 FL — LOW (ref 80–100)
MCV RBC AUTO: 76.2 FL
MONOCYTES # BLD AUTO: 1.2 K/UL
MONOCYTES NFR BLD AUTO: 5 %
MYELOPEROXIDASE AB SER-ACNC: <5 UNITS — SIGNIFICANT CHANGE UP
NEUTROPHILS # BLD AUTO: 0.24 K/UL
NEUTROPHILS NFR BLD AUTO: 1 %
PHOSPHATE SERPL-MCNC: 3.7 MG/DL — SIGNIFICANT CHANGE UP (ref 2.5–4.5)
PLATELET # BLD AUTO: 228 K/UL — SIGNIFICANT CHANGE UP (ref 150–400)
PLATELET # BLD AUTO: 233 K/UL
PMV BLD: SIGNIFICANT CHANGE UP FL (ref 7–13)
POTASSIUM SERPL-MCNC: 3.8 MMOL/L — SIGNIFICANT CHANGE UP (ref 3.5–5.3)
POTASSIUM SERPL-SCNC: 3.7 MMOL/L
POTASSIUM SERPL-SCNC: 3.8 MMOL/L — SIGNIFICANT CHANGE UP (ref 3.5–5.3)
PROT SERPL-MCNC: 7.6 G/DL — SIGNIFICANT CHANGE UP (ref 6–8.3)
PROT SERPL-MCNC: 8.1 G/DL
PROTEINASE3 AB FLD-ACNC: <5 UNITS — SIGNIFICANT CHANGE UP
RBC # BLD: 4.36 M/UL — SIGNIFICANT CHANGE UP (ref 3.8–5.2)
RBC # BLD: 4.83 M/UL
RBC # FLD: 18.4 %
RBC # FLD: 19 % — HIGH (ref 10.3–14.5)
SODIUM SERPL-SCNC: 139 MMOL/L
SODIUM SERPL-SCNC: 142 MMOL/L — SIGNIFICANT CHANGE UP (ref 135–145)
WBC # BLD: 23.83 K/UL — HIGH (ref 3.8–10.5)
WBC # FLD AUTO: 23.83 K/UL — HIGH (ref 3.8–10.5)
WBC # FLD AUTO: 23.97 K/UL

## 2017-04-11 PROCEDURE — 99233 SBSQ HOSP IP/OBS HIGH 50: CPT | Mod: GC

## 2017-04-11 PROCEDURE — 81405 MOPATH PROCEDURE LEVEL 6: CPT

## 2017-04-11 PROCEDURE — 81210 BRAF GENE: CPT

## 2017-04-11 PROCEDURE — 81403 MOPATH PROCEDURE LEVEL 4: CPT

## 2017-04-11 PROCEDURE — 81246 FLT3 GENE ANALYSIS: CPT

## 2017-04-11 PROCEDURE — 81275 KRAS GENE VARIANTS EXON 2: CPT

## 2017-04-11 PROCEDURE — 81270 JAK2 GENE: CPT

## 2017-04-11 PROCEDURE — 81272 KIT GENE TARGETED SEQ ANALYS: CPT

## 2017-04-11 PROCEDURE — 81219 CALR GENE COM VARIANTS: CPT

## 2017-04-11 PROCEDURE — 81310 NPM1 GENE: CPT

## 2017-04-11 PROCEDURE — 84165 PROTEIN E-PHORESIS SERUM: CPT | Mod: 26

## 2017-04-11 PROCEDURE — 81170 ABL1 GENE: CPT

## 2017-04-11 PROCEDURE — 81402 MOPATH PROCEDURE LEVEL 3: CPT

## 2017-04-11 PROCEDURE — 81311 NRAS GENE VARIANTS EXON 2&3: CPT

## 2017-04-11 PROCEDURE — 81276 KRAS GENE ADDL VARIANTS: CPT

## 2017-04-11 PROCEDURE — 99221 1ST HOSP IP/OBS SF/LOW 40: CPT | Mod: GC

## 2017-04-11 PROCEDURE — 88189 FLOWCYTOMETRY/READ 16 & >: CPT

## 2017-04-11 RX ORDER — LIDOCAINE HCL 20 MG/ML
10 VIAL (ML) INJECTION ONCE
Qty: 0 | Refills: 0 | Status: DISCONTINUED | OUTPATIENT
Start: 2017-04-11 | End: 2017-04-14

## 2017-04-11 RX ORDER — HEPARIN SODIUM 5000 [USP'U]/ML
5000 INJECTION INTRAVENOUS; SUBCUTANEOUS ONCE
Qty: 0 | Refills: 0 | Status: DISCONTINUED | OUTPATIENT
Start: 2017-04-11 | End: 2017-04-12

## 2017-04-11 RX ORDER — OXYCODONE HYDROCHLORIDE 5 MG/1
10 TABLET ORAL ONCE
Qty: 0 | Refills: 0 | Status: DISCONTINUED | OUTPATIENT
Start: 2017-04-11 | End: 2017-04-11

## 2017-04-11 RX ADMIN — DIPHENHYDRAMINE HYDROCHLORIDE AND LIDOCAINE HYDROCHLORIDE AND ALUMINUM HYDROXIDE AND MAGNESIUM HYDRO 15 MILLILITER(S): KIT at 06:11

## 2017-04-11 RX ADMIN — SERTRALINE 25 MILLIGRAM(S): 25 TABLET, FILM COATED ORAL at 06:17

## 2017-04-11 RX ADMIN — OXYCODONE HYDROCHLORIDE 10 MILLIGRAM(S): 5 TABLET ORAL at 19:01

## 2017-04-11 RX ADMIN — OXYCODONE HYDROCHLORIDE 10 MILLIGRAM(S): 5 TABLET ORAL at 17:42

## 2017-04-11 RX ADMIN — DIPHENHYDRAMINE HYDROCHLORIDE AND LIDOCAINE HYDROCHLORIDE AND ALUMINUM HYDROXIDE AND MAGNESIUM HYDRO 15 MILLILITER(S): KIT at 22:42

## 2017-04-11 RX ADMIN — MUPIROCIN 1 APPLICATION(S): 20 OINTMENT TOPICAL at 22:43

## 2017-04-11 RX ADMIN — OXYCODONE HYDROCHLORIDE 10 MILLIGRAM(S): 5 TABLET ORAL at 18:46

## 2017-04-11 RX ADMIN — MUPIROCIN 1 APPLICATION(S): 20 OINTMENT TOPICAL at 13:18

## 2017-04-11 RX ADMIN — OXYCODONE HYDROCHLORIDE 10 MILLIGRAM(S): 5 TABLET ORAL at 09:40

## 2017-04-11 RX ADMIN — Medication 0.5 MILLIGRAM(S): at 17:42

## 2017-04-11 RX ADMIN — OXYCODONE HYDROCHLORIDE 10 MILLIGRAM(S): 5 TABLET ORAL at 19:25

## 2017-04-11 RX ADMIN — FLUCONAZOLE 200 MILLIGRAM(S): 150 TABLET ORAL at 12:46

## 2017-04-11 RX ADMIN — MUPIROCIN 1 APPLICATION(S): 20 OINTMENT TOPICAL at 06:11

## 2017-04-11 RX ADMIN — Medication 40 MILLIGRAM(S): at 06:11

## 2017-04-11 RX ADMIN — DIPHENHYDRAMINE HYDROCHLORIDE AND LIDOCAINE HYDROCHLORIDE AND ALUMINUM HYDROXIDE AND MAGNESIUM HYDRO 15 MILLILITER(S): KIT at 13:18

## 2017-04-11 RX ADMIN — OXYCODONE HYDROCHLORIDE 10 MILLIGRAM(S): 5 TABLET ORAL at 08:55

## 2017-04-12 LAB
BUN SERPL-MCNC: 15 MG/DL — SIGNIFICANT CHANGE UP (ref 7–23)
C-ANCA SER-ACNC: NEGATIVE — SIGNIFICANT CHANGE UP
C-ANCA SER-ACNC: NEGATIVE — SIGNIFICANT CHANGE UP
CALCIUM SERPL-MCNC: 8.9 MG/DL — SIGNIFICANT CHANGE UP (ref 8.4–10.5)
CARDIOLIPIN IGA SER-MCNC: SIGNIFICANT CHANGE UP
CHLORIDE SERPL-SCNC: 106 MMOL/L — SIGNIFICANT CHANGE UP (ref 98–107)
CO2 SERPL-SCNC: 23 MMOL/L — SIGNIFICANT CHANGE UP (ref 22–31)
CREAT SERPL-MCNC: 0.97 MG/DL — SIGNIFICANT CHANGE UP (ref 0.5–1.3)
GAS PNL BLDMV: SIGNIFICANT CHANGE UP
GAS PNL BLDMV: SIGNIFICANT CHANGE UP
GLUCOSE SERPL-MCNC: 95 MG/DL — SIGNIFICANT CHANGE UP (ref 70–99)
HCT VFR BLD CALC: 34.8 % — SIGNIFICANT CHANGE UP (ref 34.5–45)
HGB BLD-MCNC: 10.9 G/DL — LOW (ref 11.5–15.5)
MAGNESIUM SERPL-MCNC: 2.2 MG/DL — SIGNIFICANT CHANGE UP (ref 1.6–2.6)
MANUAL SMEAR VERIFICATION: SIGNIFICANT CHANGE UP
MCHC RBC-ENTMCNC: 24.1 PG — LOW (ref 27–34)
MCHC RBC-ENTMCNC: 31.3 % — LOW (ref 32–36)
MCV RBC AUTO: 76.8 FL — LOW (ref 80–100)
P-ANCA SER-ACNC: NEGATIVE — SIGNIFICANT CHANGE UP
P-ANCA SER-ACNC: NEGATIVE — SIGNIFICANT CHANGE UP
PHOSPHATE SERPL-MCNC: 4.2 MG/DL — SIGNIFICANT CHANGE UP (ref 2.5–4.5)
PLATELET # BLD AUTO: 275 K/UL — SIGNIFICANT CHANGE UP (ref 150–400)
PMV BLD: SIGNIFICANT CHANGE UP FL (ref 7–13)
POTASSIUM SERPL-MCNC: 4.1 MMOL/L — SIGNIFICANT CHANGE UP (ref 3.5–5.3)
POTASSIUM SERPL-SCNC: 4.1 MMOL/L — SIGNIFICANT CHANGE UP (ref 3.5–5.3)
RBC # BLD: 4.53 M/UL — SIGNIFICANT CHANGE UP (ref 3.8–5.2)
RBC # FLD: 19.5 % — HIGH (ref 10.3–14.5)
SODIUM SERPL-SCNC: 144 MMOL/L — SIGNIFICANT CHANGE UP (ref 135–145)
WBC # BLD: 25.79 K/UL — HIGH (ref 3.8–10.5)
WBC # FLD AUTO: 25.79 K/UL — HIGH (ref 3.8–10.5)

## 2017-04-12 PROCEDURE — 99232 SBSQ HOSP IP/OBS MODERATE 35: CPT

## 2017-04-12 PROCEDURE — 99233 SBSQ HOSP IP/OBS HIGH 50: CPT | Mod: GC

## 2017-04-12 PROCEDURE — 99222 1ST HOSP IP/OBS MODERATE 55: CPT

## 2017-04-12 RX ORDER — HYDROXYUREA 500 MG/1
500 CAPSULE ORAL DAILY
Qty: 0 | Refills: 0 | Status: DISCONTINUED | OUTPATIENT
Start: 2017-04-12 | End: 2017-04-12

## 2017-04-12 RX ORDER — HYDROXYUREA 500 MG/1
500 CAPSULE ORAL
Qty: 0 | Refills: 0 | Status: DISCONTINUED | OUTPATIENT
Start: 2017-04-12 | End: 2017-04-14

## 2017-04-12 RX ADMIN — HYDROXYUREA 500 MILLIGRAM(S): 500 CAPSULE ORAL at 13:22

## 2017-04-12 RX ADMIN — DIPHENHYDRAMINE HYDROCHLORIDE AND LIDOCAINE HYDROCHLORIDE AND ALUMINUM HYDROXIDE AND MAGNESIUM HYDRO 15 MILLILITER(S): KIT at 13:21

## 2017-04-12 RX ADMIN — HYDROXYUREA 500 MILLIGRAM(S): 500 CAPSULE ORAL at 17:54

## 2017-04-12 RX ADMIN — SERTRALINE 25 MILLIGRAM(S): 25 TABLET, FILM COATED ORAL at 06:37

## 2017-04-12 RX ADMIN — FLUCONAZOLE 200 MILLIGRAM(S): 150 TABLET ORAL at 13:22

## 2017-04-12 RX ADMIN — DIPHENHYDRAMINE HYDROCHLORIDE AND LIDOCAINE HYDROCHLORIDE AND ALUMINUM HYDROXIDE AND MAGNESIUM HYDRO 15 MILLILITER(S): KIT at 06:38

## 2017-04-12 RX ADMIN — OXYCODONE HYDROCHLORIDE 10 MILLIGRAM(S): 5 TABLET ORAL at 22:27

## 2017-04-12 RX ADMIN — MUPIROCIN 1 APPLICATION(S): 20 OINTMENT TOPICAL at 21:05

## 2017-04-12 RX ADMIN — MUPIROCIN 1 APPLICATION(S): 20 OINTMENT TOPICAL at 13:21

## 2017-04-12 RX ADMIN — Medication 25 MILLIGRAM(S): at 17:57

## 2017-04-12 RX ADMIN — OXYCODONE HYDROCHLORIDE 10 MILLIGRAM(S): 5 TABLET ORAL at 23:00

## 2017-04-12 RX ADMIN — MUPIROCIN 1 APPLICATION(S): 20 OINTMENT TOPICAL at 06:38

## 2017-04-12 RX ADMIN — OXYCODONE HYDROCHLORIDE 10 MILLIGRAM(S): 5 TABLET ORAL at 07:10

## 2017-04-12 RX ADMIN — Medication 40 MILLIGRAM(S): at 06:38

## 2017-04-12 RX ADMIN — DIPHENHYDRAMINE HYDROCHLORIDE AND LIDOCAINE HYDROCHLORIDE AND ALUMINUM HYDROXIDE AND MAGNESIUM HYDRO 15 MILLILITER(S): KIT at 21:06

## 2017-04-12 RX ADMIN — OXYCODONE HYDROCHLORIDE 10 MILLIGRAM(S): 5 TABLET ORAL at 06:38

## 2017-04-13 ENCOUNTER — APPOINTMENT (OUTPATIENT)
Dept: DERMATOLOGY | Facility: CLINIC | Age: 47
End: 2017-04-13

## 2017-04-13 LAB
B2 GLYCOPROT1 AB SER QL: NEGATIVE — SIGNIFICANT CHANGE UP
BASOPHILS NFR SPEC: 0.8 % — SIGNIFICANT CHANGE UP (ref 0–2)
BUN SERPL-MCNC: 16 MG/DL — SIGNIFICANT CHANGE UP (ref 7–23)
CALCIUM SERPL-MCNC: 9.2 MG/DL — SIGNIFICANT CHANGE UP (ref 8.4–10.5)
CHLORIDE SERPL-SCNC: 105 MMOL/L — SIGNIFICANT CHANGE UP (ref 98–107)
CO2 SERPL-SCNC: 24 MMOL/L — SIGNIFICANT CHANGE UP (ref 22–31)
CREAT SERPL-MCNC: 0.96 MG/DL — SIGNIFICANT CHANGE UP (ref 0.5–1.3)
DSDNA AB FLD-ACNC: 0.2 ZZ — SIGNIFICANT CHANGE UP
ENA SS-A AB FLD IA-ACNC: >8 ZZ — HIGH
EOSINOPHIL NFR FLD: 86.1 % — HIGH (ref 0–6)
GLUCOSE SERPL-MCNC: 101 MG/DL — HIGH (ref 70–99)
HCT VFR BLD CALC: 35.5 % — SIGNIFICANT CHANGE UP (ref 34.5–45)
HGB BLD-MCNC: 11.2 G/DL — LOW (ref 11.5–15.5)
LYMPHOCYTES NFR SPEC AUTO: 7.4 % — LOW (ref 13–44)
MCHC RBC-ENTMCNC: 24.1 PG — LOW (ref 27–34)
MCHC RBC-ENTMCNC: 31.5 % — LOW (ref 32–36)
MCV RBC AUTO: 76.5 FL — LOW (ref 80–100)
MONOCYTES NFR BLD: 0.8 % — LOW (ref 2–9)
NEUTROPHIL AB SER-ACNC: 3.3 % — LOW (ref 43–77)
PLATELET # BLD AUTO: 253 K/UL — SIGNIFICANT CHANGE UP (ref 150–400)
PMV BLD: SIGNIFICANT CHANGE UP FL (ref 7–13)
POTASSIUM SERPL-MCNC: 4.1 MMOL/L — SIGNIFICANT CHANGE UP (ref 3.5–5.3)
POTASSIUM SERPL-SCNC: 4.1 MMOL/L — SIGNIFICANT CHANGE UP (ref 3.5–5.3)
RBC # BLD: 4.64 M/UL — SIGNIFICANT CHANGE UP (ref 3.8–5.2)
RBC # FLD: 19.6 % — HIGH (ref 10.3–14.5)
SODIUM SERPL-SCNC: 143 MMOL/L — SIGNIFICANT CHANGE UP (ref 135–145)
VARIANT LYMPHS # BLD: 1.6 % — SIGNIFICANT CHANGE UP
WBC # BLD: 27.51 K/UL — HIGH (ref 3.8–10.5)
WBC # FLD AUTO: 27.51 K/UL — HIGH (ref 3.8–10.5)

## 2017-04-13 PROCEDURE — 99233 SBSQ HOSP IP/OBS HIGH 50: CPT | Mod: GC

## 2017-04-13 RX ADMIN — MUPIROCIN 1 APPLICATION(S): 20 OINTMENT TOPICAL at 14:30

## 2017-04-13 RX ADMIN — FLUCONAZOLE 200 MILLIGRAM(S): 150 TABLET ORAL at 12:41

## 2017-04-13 RX ADMIN — OXYCODONE HYDROCHLORIDE 10 MILLIGRAM(S): 5 TABLET ORAL at 22:30

## 2017-04-13 RX ADMIN — OXYCODONE HYDROCHLORIDE 10 MILLIGRAM(S): 5 TABLET ORAL at 06:22

## 2017-04-13 RX ADMIN — DIPHENHYDRAMINE HYDROCHLORIDE AND LIDOCAINE HYDROCHLORIDE AND ALUMINUM HYDROXIDE AND MAGNESIUM HYDRO 15 MILLILITER(S): KIT at 22:02

## 2017-04-13 RX ADMIN — OXYCODONE HYDROCHLORIDE 10 MILLIGRAM(S): 5 TABLET ORAL at 06:50

## 2017-04-13 RX ADMIN — SERTRALINE 25 MILLIGRAM(S): 25 TABLET, FILM COATED ORAL at 06:13

## 2017-04-13 RX ADMIN — HYDROXYUREA 500 MILLIGRAM(S): 500 CAPSULE ORAL at 06:13

## 2017-04-13 RX ADMIN — OXYCODONE HYDROCHLORIDE 10 MILLIGRAM(S): 5 TABLET ORAL at 22:07

## 2017-04-13 RX ADMIN — Medication 40 MILLIGRAM(S): at 06:13

## 2017-04-13 RX ADMIN — DIPHENHYDRAMINE HYDROCHLORIDE AND LIDOCAINE HYDROCHLORIDE AND ALUMINUM HYDROXIDE AND MAGNESIUM HYDRO 15 MILLILITER(S): KIT at 14:30

## 2017-04-13 RX ADMIN — MUPIROCIN 1 APPLICATION(S): 20 OINTMENT TOPICAL at 06:12

## 2017-04-13 RX ADMIN — MUPIROCIN 1 APPLICATION(S): 20 OINTMENT TOPICAL at 22:02

## 2017-04-13 RX ADMIN — HYDROXYUREA 500 MILLIGRAM(S): 500 CAPSULE ORAL at 18:01

## 2017-04-13 RX ADMIN — DIPHENHYDRAMINE HYDROCHLORIDE AND LIDOCAINE HYDROCHLORIDE AND ALUMINUM HYDROXIDE AND MAGNESIUM HYDRO 15 MILLILITER(S): KIT at 06:12

## 2017-04-14 LAB
ANISOCYTOSIS BLD QL: SLIGHT — SIGNIFICANT CHANGE UP
CARDIOLIPIN IGM SER-MCNC: 45.96 GPL — HIGH (ref 0–23)
CARDIOLIPIN IGM SER-MCNC: 45.96 GPL — HIGH (ref 0–23)
CARDIOLIPIN IGM SER-MCNC: 5.36 MPL — SIGNIFICANT CHANGE UP (ref 0–11)
CARDIOLIPIN IGM SER-MCNC: 5.36 MPL — SIGNIFICANT CHANGE UP (ref 0–11)
ELLIPTOCYTES BLD QL SMEAR: SLIGHT — SIGNIFICANT CHANGE UP
GIANT PLATELETS BLD QL SMEAR: PRESENT — SIGNIFICANT CHANGE UP
HCT VFR BLD CALC: 33.9 % — LOW (ref 34.5–45)
HGB BLD-MCNC: 10.6 G/DL — LOW (ref 11.5–15.5)
HYPOCHROMIA BLD QL: SLIGHT — SIGNIFICANT CHANGE UP
MACROCYTES BLD QL: SLIGHT — SIGNIFICANT CHANGE UP
MANUAL SMEAR VERIFICATION: SIGNIFICANT CHANGE UP
MCHC RBC-ENTMCNC: 23.8 PG — LOW (ref 27–34)
MCHC RBC-ENTMCNC: 31.3 % — LOW (ref 32–36)
MCV RBC AUTO: 76.2 FL — LOW (ref 80–100)
PLATELET # BLD AUTO: 281 K/UL — SIGNIFICANT CHANGE UP (ref 150–400)
PLATELET COUNT - ESTIMATE: NORMAL — SIGNIFICANT CHANGE UP
PMV BLD: SIGNIFICANT CHANGE UP FL (ref 7–13)
POIKILOCYTOSIS BLD QL AUTO: SLIGHT — SIGNIFICANT CHANGE UP
POLYCHROMASIA BLD QL SMEAR: SLIGHT — SIGNIFICANT CHANGE UP
RBC # BLD: 4.45 M/UL — SIGNIFICANT CHANGE UP (ref 3.8–5.2)
RBC # FLD: 19.4 % — HIGH (ref 10.3–14.5)
SCHISTOCYTES BLD QL AUTO: SLIGHT — SIGNIFICANT CHANGE UP
TARGETS BLD QL SMEAR: SLIGHT — SIGNIFICANT CHANGE UP
WBC # BLD: 30.04 K/UL — HIGH (ref 3.8–10.5)
WBC # FLD AUTO: 30.04 K/UL — HIGH (ref 3.8–10.5)

## 2017-04-14 PROCEDURE — 99233 SBSQ HOSP IP/OBS HIGH 50: CPT | Mod: GC

## 2017-04-14 PROCEDURE — 99232 SBSQ HOSP IP/OBS MODERATE 35: CPT | Mod: GC

## 2017-04-14 PROCEDURE — 86334 IMMUNOFIX E-PHORESIS SERUM: CPT | Mod: 26

## 2017-04-14 PROCEDURE — 99232 SBSQ HOSP IP/OBS MODERATE 35: CPT

## 2017-04-14 RX ORDER — PANTOPRAZOLE SODIUM 20 MG/1
40 TABLET, DELAYED RELEASE ORAL
Qty: 0 | Refills: 0 | Status: DISCONTINUED | OUTPATIENT
Start: 2017-04-14 | End: 2017-04-17

## 2017-04-14 RX ORDER — HYDROXYUREA 500 MG/1
1000 CAPSULE ORAL
Qty: 0 | Refills: 0 | Status: DISCONTINUED | OUTPATIENT
Start: 2017-04-14 | End: 2017-04-17

## 2017-04-14 RX ADMIN — FLUCONAZOLE 200 MILLIGRAM(S): 150 TABLET ORAL at 13:01

## 2017-04-14 RX ADMIN — DIPHENHYDRAMINE HYDROCHLORIDE AND LIDOCAINE HYDROCHLORIDE AND ALUMINUM HYDROXIDE AND MAGNESIUM HYDRO 15 MILLILITER(S): KIT at 13:02

## 2017-04-14 RX ADMIN — DIPHENHYDRAMINE HYDROCHLORIDE AND LIDOCAINE HYDROCHLORIDE AND ALUMINUM HYDROXIDE AND MAGNESIUM HYDRO 15 MILLILITER(S): KIT at 05:59

## 2017-04-14 RX ADMIN — HYDROXYUREA 1000 MILLIGRAM(S): 500 CAPSULE ORAL at 18:06

## 2017-04-14 RX ADMIN — HYDROXYUREA 500 MILLIGRAM(S): 500 CAPSULE ORAL at 05:59

## 2017-04-14 RX ADMIN — Medication 40 MILLIGRAM(S): at 05:59

## 2017-04-14 RX ADMIN — DIPHENHYDRAMINE HYDROCHLORIDE AND LIDOCAINE HYDROCHLORIDE AND ALUMINUM HYDROXIDE AND MAGNESIUM HYDRO 15 MILLILITER(S): KIT at 21:45

## 2017-04-14 RX ADMIN — MUPIROCIN 1 APPLICATION(S): 20 OINTMENT TOPICAL at 05:58

## 2017-04-14 RX ADMIN — MUPIROCIN 1 APPLICATION(S): 20 OINTMENT TOPICAL at 21:45

## 2017-04-14 RX ADMIN — MUPIROCIN 1 APPLICATION(S): 20 OINTMENT TOPICAL at 13:02

## 2017-04-14 RX ADMIN — SERTRALINE 25 MILLIGRAM(S): 25 TABLET, FILM COATED ORAL at 05:59

## 2017-04-14 RX ADMIN — PANTOPRAZOLE SODIUM 40 MILLIGRAM(S): 20 TABLET, DELAYED RELEASE ORAL at 18:06

## 2017-04-15 LAB
BUN SERPL-MCNC: 19 MG/DL — SIGNIFICANT CHANGE UP (ref 7–23)
C3 SERPL-MCNC: 86.7 MG/DL — LOW (ref 90–180)
C4 SERPL-MCNC: 6.2 MG/DL — LOW (ref 10–40)
CALCIUM SERPL-MCNC: 8.7 MG/DL — SIGNIFICANT CHANGE UP (ref 8.4–10.5)
CHLORIDE SERPL-SCNC: 104 MMOL/L — SIGNIFICANT CHANGE UP (ref 98–107)
CO2 SERPL-SCNC: 21 MMOL/L — LOW (ref 22–31)
CREAT SERPL-MCNC: 0.79 MG/DL — SIGNIFICANT CHANGE UP (ref 0.5–1.3)
DSDNA AB SER-ACNC: 45 IU/ML — HIGH
ENA RNP IGG SER-ACNC: 0.3 ZZ — SIGNIFICANT CHANGE UP
ENA SM AB TITR SER: < 0.2 ZZ — SIGNIFICANT CHANGE UP
GLUCOSE SERPL-MCNC: 96 MG/DL — SIGNIFICANT CHANGE UP (ref 70–99)
HCT VFR BLD CALC: 35.7 % — SIGNIFICANT CHANGE UP (ref 34.5–45)
HGB BLD-MCNC: 11.3 G/DL — LOW (ref 11.5–15.5)
MANUAL SMEAR VERIFICATION: SIGNIFICANT CHANGE UP
MCHC RBC-ENTMCNC: 24.2 PG — LOW (ref 27–34)
MCHC RBC-ENTMCNC: 31.7 % — LOW (ref 32–36)
MCV RBC AUTO: 76.4 FL — LOW (ref 80–100)
PLATELET # BLD AUTO: 244 K/UL — SIGNIFICANT CHANGE UP (ref 150–400)
PMV BLD: SIGNIFICANT CHANGE UP FL (ref 7–13)
POTASSIUM SERPL-MCNC: 4.3 MMOL/L — SIGNIFICANT CHANGE UP (ref 3.5–5.3)
POTASSIUM SERPL-SCNC: 4.3 MMOL/L — SIGNIFICANT CHANGE UP (ref 3.5–5.3)
RBC # BLD: 4.67 M/UL — SIGNIFICANT CHANGE UP (ref 3.8–5.2)
RBC # FLD: 19.6 % — HIGH (ref 10.3–14.5)
SODIUM SERPL-SCNC: 139 MMOL/L — SIGNIFICANT CHANGE UP (ref 135–145)
WBC # BLD: 31.36 K/UL — HIGH (ref 3.8–10.5)
WBC # FLD AUTO: 31.36 K/UL — HIGH (ref 3.8–10.5)

## 2017-04-15 PROCEDURE — 99233 SBSQ HOSP IP/OBS HIGH 50: CPT

## 2017-04-15 RX ADMIN — Medication 40 MILLIGRAM(S): at 05:46

## 2017-04-15 RX ADMIN — Medication 25 MILLIGRAM(S): at 05:46

## 2017-04-15 RX ADMIN — DIPHENHYDRAMINE HYDROCHLORIDE AND LIDOCAINE HYDROCHLORIDE AND ALUMINUM HYDROXIDE AND MAGNESIUM HYDRO 15 MILLILITER(S): KIT at 13:23

## 2017-04-15 RX ADMIN — OXYCODONE HYDROCHLORIDE 10 MILLIGRAM(S): 5 TABLET ORAL at 23:00

## 2017-04-15 RX ADMIN — OXYCODONE HYDROCHLORIDE 10 MILLIGRAM(S): 5 TABLET ORAL at 06:08

## 2017-04-15 RX ADMIN — FLUCONAZOLE 200 MILLIGRAM(S): 150 TABLET ORAL at 13:22

## 2017-04-15 RX ADMIN — HYDROXYUREA 1000 MILLIGRAM(S): 500 CAPSULE ORAL at 17:37

## 2017-04-15 RX ADMIN — OXYCODONE HYDROCHLORIDE 10 MILLIGRAM(S): 5 TABLET ORAL at 23:30

## 2017-04-15 RX ADMIN — Medication 25 MILLIGRAM(S): at 05:50

## 2017-04-15 RX ADMIN — MUPIROCIN 1 APPLICATION(S): 20 OINTMENT TOPICAL at 05:50

## 2017-04-15 RX ADMIN — HYDROXYUREA 1000 MILLIGRAM(S): 500 CAPSULE ORAL at 05:53

## 2017-04-15 RX ADMIN — DIPHENHYDRAMINE HYDROCHLORIDE AND LIDOCAINE HYDROCHLORIDE AND ALUMINUM HYDROXIDE AND MAGNESIUM HYDRO 15 MILLILITER(S): KIT at 05:50

## 2017-04-15 RX ADMIN — PANTOPRAZOLE SODIUM 40 MILLIGRAM(S): 20 TABLET, DELAYED RELEASE ORAL at 06:07

## 2017-04-15 RX ADMIN — SERTRALINE 25 MILLIGRAM(S): 25 TABLET, FILM COATED ORAL at 05:57

## 2017-04-15 RX ADMIN — DIPHENHYDRAMINE HYDROCHLORIDE AND LIDOCAINE HYDROCHLORIDE AND ALUMINUM HYDROXIDE AND MAGNESIUM HYDRO 15 MILLILITER(S): KIT at 21:47

## 2017-04-15 RX ADMIN — MUPIROCIN 1 APPLICATION(S): 20 OINTMENT TOPICAL at 21:47

## 2017-04-15 RX ADMIN — MUPIROCIN 1 APPLICATION(S): 20 OINTMENT TOPICAL at 13:23

## 2017-04-15 RX ADMIN — OXYCODONE HYDROCHLORIDE 10 MILLIGRAM(S): 5 TABLET ORAL at 06:38

## 2017-04-16 LAB
BUN SERPL-MCNC: 17 MG/DL — SIGNIFICANT CHANGE UP (ref 7–23)
CALCIUM SERPL-MCNC: 8.8 MG/DL — SIGNIFICANT CHANGE UP (ref 8.4–10.5)
CHLORIDE SERPL-SCNC: 104 MMOL/L — SIGNIFICANT CHANGE UP (ref 98–107)
CO2 SERPL-SCNC: 22 MMOL/L — SIGNIFICANT CHANGE UP (ref 22–31)
CREAT SERPL-MCNC: 0.81 MG/DL — SIGNIFICANT CHANGE UP (ref 0.5–1.3)
GLUCOSE SERPL-MCNC: 81 MG/DL — SIGNIFICANT CHANGE UP (ref 70–99)
HCT VFR BLD CALC: 34.3 % — LOW (ref 34.5–45)
HGB BLD-MCNC: 10.7 G/DL — LOW (ref 11.5–15.5)
IRON SATN MFR SERPL: 282 UG/DL — SIGNIFICANT CHANGE UP (ref 140–530)
IRON SATN MFR SERPL: 47 UG/DL — SIGNIFICANT CHANGE UP (ref 30–160)
MAGNESIUM SERPL-MCNC: 2.2 MG/DL — SIGNIFICANT CHANGE UP (ref 1.6–2.6)
MANUAL SMEAR VERIFICATION: SIGNIFICANT CHANGE UP
MCHC RBC-ENTMCNC: 23.9 PG — LOW (ref 27–34)
MCHC RBC-ENTMCNC: 31.2 % — LOW (ref 32–36)
MCV RBC AUTO: 76.7 FL — LOW (ref 80–100)
PHOSPHATE SERPL-MCNC: 3.9 MG/DL — SIGNIFICANT CHANGE UP (ref 2.5–4.5)
PLATELET # BLD AUTO: 245 K/UL — SIGNIFICANT CHANGE UP (ref 150–400)
PMV BLD: SIGNIFICANT CHANGE UP FL (ref 7–13)
POTASSIUM SERPL-MCNC: 3.6 MMOL/L — SIGNIFICANT CHANGE UP (ref 3.5–5.3)
POTASSIUM SERPL-SCNC: 3.6 MMOL/L — SIGNIFICANT CHANGE UP (ref 3.5–5.3)
RBC # BLD: 4.47 M/UL — SIGNIFICANT CHANGE UP (ref 3.8–5.2)
RBC # FLD: 19.7 % — HIGH (ref 10.3–14.5)
SODIUM SERPL-SCNC: 140 MMOL/L — SIGNIFICANT CHANGE UP (ref 135–145)
UIBC SERPL-MCNC: 235 UG/DL — SIGNIFICANT CHANGE UP (ref 110–370)
WBC # BLD: 32.78 K/UL — HIGH (ref 3.8–10.5)
WBC # FLD AUTO: 32.78 K/UL — HIGH (ref 3.8–10.5)

## 2017-04-16 PROCEDURE — 99233 SBSQ HOSP IP/OBS HIGH 50: CPT

## 2017-04-16 RX ORDER — OXYCODONE HYDROCHLORIDE 5 MG/1
10 TABLET ORAL EVERY 4 HOURS
Qty: 0 | Refills: 0 | Status: DISCONTINUED | OUTPATIENT
Start: 2017-04-16 | End: 2017-04-17

## 2017-04-16 RX ADMIN — HYDROXYUREA 1000 MILLIGRAM(S): 500 CAPSULE ORAL at 17:17

## 2017-04-16 RX ADMIN — MUPIROCIN 1 APPLICATION(S): 20 OINTMENT TOPICAL at 13:16

## 2017-04-16 RX ADMIN — FLUCONAZOLE 200 MILLIGRAM(S): 150 TABLET ORAL at 13:16

## 2017-04-16 RX ADMIN — MUPIROCIN 1 APPLICATION(S): 20 OINTMENT TOPICAL at 06:18

## 2017-04-16 RX ADMIN — DIPHENHYDRAMINE HYDROCHLORIDE AND LIDOCAINE HYDROCHLORIDE AND ALUMINUM HYDROXIDE AND MAGNESIUM HYDRO 15 MILLILITER(S): KIT at 06:18

## 2017-04-16 RX ADMIN — Medication 40 MILLIGRAM(S): at 06:17

## 2017-04-16 RX ADMIN — DIPHENHYDRAMINE HYDROCHLORIDE AND LIDOCAINE HYDROCHLORIDE AND ALUMINUM HYDROXIDE AND MAGNESIUM HYDRO 15 MILLILITER(S): KIT at 21:46

## 2017-04-16 RX ADMIN — MUPIROCIN 1 APPLICATION(S): 20 OINTMENT TOPICAL at 21:46

## 2017-04-16 RX ADMIN — PANTOPRAZOLE SODIUM 40 MILLIGRAM(S): 20 TABLET, DELAYED RELEASE ORAL at 06:17

## 2017-04-16 RX ADMIN — DIPHENHYDRAMINE HYDROCHLORIDE AND LIDOCAINE HYDROCHLORIDE AND ALUMINUM HYDROXIDE AND MAGNESIUM HYDRO 15 MILLILITER(S): KIT at 13:16

## 2017-04-16 RX ADMIN — HYDROXYUREA 1000 MILLIGRAM(S): 500 CAPSULE ORAL at 06:18

## 2017-04-16 RX ADMIN — SERTRALINE 25 MILLIGRAM(S): 25 TABLET, FILM COATED ORAL at 06:23

## 2017-04-17 ENCOUNTER — TRANSCRIPTION ENCOUNTER (OUTPATIENT)
Age: 47
End: 2017-04-17

## 2017-04-17 VITALS — WEIGHT: 199.96 LBS

## 2017-04-17 PROBLEM — D25.9 LEIOMYOMA OF UTERUS, UNSPECIFIED: Chronic | Status: ACTIVE | Noted: 2017-04-06

## 2017-04-17 LAB
BASOPHILS NFR SPEC: 4.6 % — HIGH (ref 0–2)
BUN SERPL-MCNC: 16 MG/DL — SIGNIFICANT CHANGE UP (ref 7–23)
CALCIUM SERPL-MCNC: 8.6 MG/DL — SIGNIFICANT CHANGE UP (ref 8.4–10.5)
CHLORIDE SERPL-SCNC: 105 MMOL/L — SIGNIFICANT CHANGE UP (ref 98–107)
CO2 SERPL-SCNC: 22 MMOL/L — SIGNIFICANT CHANGE UP (ref 22–31)
CREAT SERPL-MCNC: 0.8 MG/DL — SIGNIFICANT CHANGE UP (ref 0.5–1.3)
EOSINOPHIL NFR FLD: 76.1 % — HIGH (ref 0–6)
GLUCOSE SERPL-MCNC: 96 MG/DL — SIGNIFICANT CHANGE UP (ref 70–99)
HCT VFR BLD CALC: 34.4 % — LOW (ref 34.5–45)
HGB BLD-MCNC: 10.8 G/DL — LOW (ref 11.5–15.5)
LYMPHOCYTES NFR SPEC AUTO: 11.9 % — LOW (ref 13–44)
MAGNESIUM SERPL-MCNC: 2.2 MG/DL — SIGNIFICANT CHANGE UP (ref 1.6–2.6)
MCHC RBC-ENTMCNC: 24.2 PG — LOW (ref 27–34)
MCHC RBC-ENTMCNC: 31.4 % — LOW (ref 32–36)
MCV RBC AUTO: 77.1 FL — LOW (ref 80–100)
MONOCYTES NFR BLD: 2.8 % — SIGNIFICANT CHANGE UP (ref 2–9)
NEUTROPHIL AB SER-ACNC: 4.6 % — LOW (ref 43–77)
PHOSPHATE SERPL-MCNC: 3.4 MG/DL — SIGNIFICANT CHANGE UP (ref 2.5–4.5)
PLATELET # BLD AUTO: 264 K/UL — SIGNIFICANT CHANGE UP (ref 150–400)
PMV BLD: SIGNIFICANT CHANGE UP FL (ref 7–13)
POTASSIUM SERPL-MCNC: 3.9 MMOL/L — SIGNIFICANT CHANGE UP (ref 3.5–5.3)
POTASSIUM SERPL-SCNC: 3.9 MMOL/L — SIGNIFICANT CHANGE UP (ref 3.5–5.3)
RBC # BLD: 4.46 M/UL — SIGNIFICANT CHANGE UP (ref 3.8–5.2)
RBC # FLD: 19.6 % — HIGH (ref 10.3–14.5)
SODIUM SERPL-SCNC: 141 MMOL/L — SIGNIFICANT CHANGE UP (ref 135–145)
WBC # BLD: 32.95 K/UL — HIGH (ref 3.8–10.5)
WBC # FLD AUTO: 32.95 K/UL — HIGH (ref 3.8–10.5)

## 2017-04-17 PROCEDURE — 99239 HOSP IP/OBS DSCHRG MGMT >30: CPT

## 2017-04-17 RX ORDER — HYDROXYUREA 500 MG/1
1500 CAPSULE ORAL
Qty: 0 | Refills: 0 | Status: DISCONTINUED | OUTPATIENT
Start: 2017-04-17 | End: 2017-04-17

## 2017-04-17 RX ORDER — DIPHENHYDRAMINE HCL 50 MG
1 CAPSULE ORAL
Qty: 180 | Refills: 0 | OUTPATIENT
Start: 2017-04-17 | End: 2017-05-17

## 2017-04-17 RX ORDER — AMOXICILLIN 250 MG/5ML
1 SUSPENSION, RECONSTITUTED, ORAL (ML) ORAL
Qty: 0 | Refills: 0 | COMMUNITY

## 2017-04-17 RX ORDER — HYDROXYUREA 500 MG/1
3 CAPSULE ORAL
Qty: 180 | Refills: 0 | OUTPATIENT
Start: 2017-04-17 | End: 2017-05-17

## 2017-04-17 RX ORDER — DIPHENHYDRAMINE HYDROCHLORIDE AND LIDOCAINE HYDROCHLORIDE AND ALUMINUM HYDROXIDE AND MAGNESIUM HYDRO
15 KIT
Qty: 1 | Refills: 0 | OUTPATIENT
Start: 2017-04-17 | End: 2017-05-17

## 2017-04-17 RX ORDER — PANTOPRAZOLE SODIUM 20 MG/1
1 TABLET, DELAYED RELEASE ORAL
Qty: 30 | Refills: 0 | OUTPATIENT
Start: 2017-04-17 | End: 2017-05-17

## 2017-04-17 RX ORDER — MUPIROCIN 20 MG/G
1 OINTMENT TOPICAL
Qty: 1 | Refills: 0 | OUTPATIENT
Start: 2017-04-17

## 2017-04-17 RX ORDER — SERTRALINE 25 MG/1
1 TABLET, FILM COATED ORAL
Qty: 30 | Refills: 0 | OUTPATIENT
Start: 2017-04-17 | End: 2017-05-17

## 2017-04-17 RX ADMIN — MUPIROCIN 1 APPLICATION(S): 20 OINTMENT TOPICAL at 06:02

## 2017-04-17 RX ADMIN — SERTRALINE 25 MILLIGRAM(S): 25 TABLET, FILM COATED ORAL at 06:09

## 2017-04-17 RX ADMIN — OXYCODONE HYDROCHLORIDE 10 MILLIGRAM(S): 5 TABLET ORAL at 06:10

## 2017-04-17 RX ADMIN — FLUCONAZOLE 200 MILLIGRAM(S): 150 TABLET ORAL at 13:34

## 2017-04-17 RX ADMIN — OXYCODONE HYDROCHLORIDE 10 MILLIGRAM(S): 5 TABLET ORAL at 06:40

## 2017-04-17 RX ADMIN — DIPHENHYDRAMINE HYDROCHLORIDE AND LIDOCAINE HYDROCHLORIDE AND ALUMINUM HYDROXIDE AND MAGNESIUM HYDRO 15 MILLILITER(S): KIT at 06:02

## 2017-04-17 RX ADMIN — DIPHENHYDRAMINE HYDROCHLORIDE AND LIDOCAINE HYDROCHLORIDE AND ALUMINUM HYDROXIDE AND MAGNESIUM HYDRO 15 MILLILITER(S): KIT at 13:34

## 2017-04-17 RX ADMIN — MUPIROCIN 1 APPLICATION(S): 20 OINTMENT TOPICAL at 13:35

## 2017-04-17 RX ADMIN — HYDROXYUREA 1000 MILLIGRAM(S): 500 CAPSULE ORAL at 06:03

## 2017-04-17 RX ADMIN — Medication 40 MILLIGRAM(S): at 06:03

## 2017-04-17 RX ADMIN — Medication 25 MILLIGRAM(S): at 06:02

## 2017-04-17 RX ADMIN — PANTOPRAZOLE SODIUM 40 MILLIGRAM(S): 20 TABLET, DELAYED RELEASE ORAL at 06:09

## 2017-04-17 NOTE — DISCHARGE NOTE ADULT - MEDICATION SUMMARY - MEDICATIONS TO STOP TAKING
I will STOP taking the medications listed below when I get home from the hospital:    amoxicillin 500 mg oral capsule  -- 1 cap(s) by mouth 2 times a day

## 2017-04-17 NOTE — DISCHARGE NOTE ADULT - PATIENT PORTAL LINK FT
“You can access the [facility] Patient Portal, offered by [facility], by registering with the following website: [email or web address]”

## 2017-04-17 NOTE — DIETITIAN INITIAL EVALUATION ADULT. - PROBLEM SELECTOR PLAN 4
Pt under significant stress due to morbidity of rash & loss of job.   -Constant observation  -Psychiatry consult in AM  -Anxiolytics PRN

## 2017-04-17 NOTE — DIETITIAN INITIAL EVALUATION ADULT. - PROBLEM SELECTOR PLAN 1
Meets SIRS criteria given tachycardia & leukocytosis. Leukocytosis is primarily eosinophils. Suspect inflammatory process related to ongoing rash & high eosinophils.  Pt reporting subjective fevers, was on amoxicillin as outpatient but no documented fever in hospital, or bandemia or PMNs to suggest bacterial process.   -Monitor off abx  -Work up for hypereosinophilia as below  -BCx if spikes fever

## 2017-04-17 NOTE — DISCHARGE NOTE ADULT - PROVIDER TOKENS
TOKEN:'[id number]',TOKEN:'[id number]' TOKEN:'[id number]:'[id number]',FREE:[LAST:[Ophthalmology],PHONE:[phone]],FAX:[(   )    -]],TOKEN:'[id number]'

## 2017-04-17 NOTE — DISCHARGE NOTE ADULT - MEDICATION SUMMARY - MEDICATIONS TO TAKE
I will START or STAY ON the medications listed below when I get home from the hospital:    predniSONE 10 mg oral tablet  -- 1 tab(s) by mouth once a day  -- It is very important that you take or use this exactly as directed.  Do not skip doses or discontinue unless directed by your doctor.  Obtain medical advice before taking any non-prescription drugs as some may affect the action of this medication.  Take with food or milk.    -- Indication: For Rash    predniSONE 10 mg oral tablet  -- Take 4 tablets once a day for 7 days (see taper instructions).  -- It is very important that you take or use this exactly as directed.  Do not skip doses or discontinue unless directed by your doctor.  Obtain medical advice before taking any non-prescription drugs as some may affect the action of this medication.  Take with food or milk.    4 tab(s) orally once a day x 7 days 3 tab(s) orally once a day x 7 days 2 tab(s) orally once a day x 7 days 1 tab(s) orally once a day x 7 days  -- Indication: For Rash    Tylenol 325 mg oral capsule  -- 1 cap(s) by mouth 4 times a day, As Needed  -- Indication: For pain    sertraline 25 mg oral tablet  -- 1 tab(s) by mouth once a day  -- Indication: For depression    diphenhydrAMINE 25 mg oral capsule  -- 1 cap(s) by mouth every 4 hours, As needed, Rash and/or Itching  -- Indication: For Rash    hydroxyurea 500 mg oral capsule  -- 3 cap(s) by mouth 2 times a day  -- Indication: For Eosinophilia    mupirocin 2% topical ointment  -- 1 application on skin every 8 hours  -- Indication: For Rash    triamcinolone 0.1% topical ointment  -- 1 application on skin 3 times a day, As needed, Itch  -- Indication: For Rash    guaiFENesin 100 mg/5 mL oral liquid  -- 10 milliliter(s) by mouth every 6 hours, As needed, Cough  -- Indication: For congestion    pantoprazole 40 mg oral delayed release tablet  -- 1 tab(s) by mouth once a day (before a meal)  -- Indication: For Ulcer prophylaxis while on steriods I will START or STAY ON the medications listed below when I get home from the hospital:    predniSONE 10 mg oral tablet  -- Take 4 tablets once a day for 7 days (see taper instructions).  -- It is very important that you take or use this exactly as directed.  Do not skip doses or discontinue unless directed by your doctor.  Obtain medical advice before taking any non-prescription drugs as some may affect the action of this medication.  Take with food or milk.    4 tab(s) orally once a day x 7 days 3 tab(s) orally once a day x 7 days 2 tab(s) orally once a day x 7 days. Then continue with 1 tablet daily.   -- Indication: For Rash    Tylenol 325 mg oral capsule  -- 1 cap(s) by mouth 4 times a day, As Needed  -- Indication: For pain    sertraline 25 mg oral tablet  -- 1 tab(s) by mouth once a day  -- Indication: For depression    diphenhydrAMINE 25 mg oral capsule  -- 1 cap(s) by mouth every 4 hours, As needed, Rash and/or Itching  -- Indication: For Rash    hydroxyurea 500 mg oral capsule  -- 3 cap(s) by mouth 2 times a day  -- Indication: For Eosinophilia    mupirocin 2% topical ointment  -- 1 application on skin every 8 hours  -- Indication: For Rash    triamcinolone 0.1% topical ointment  -- 1 application on skin 3 times a day, As needed, Itch  -- Indication: For Rash    guaiFENesin 100 mg/5 mL oral liquid  -- 10 milliliter(s) by mouth every 6 hours, As needed, Cough  -- Indication: For congestion    aluminum hydroxide/diphenhydrAMINE/lidocaine/MgOH/simethicone mucous membrane suspension  -- 15 milliliter(s) swish and spit, every 8 hours as needed for mouth/throat pain  -- Indication: For Mouth/throat pain    pantoprazole 40 mg oral delayed release tablet  -- 1 tab(s) by mouth once a day (before a meal)  -- Indication: For Ulcer prophylaxis while on steriods

## 2017-04-17 NOTE — DISCHARGE NOTE ADULT - CARE PROVIDERS DIRECT ADDRESSES
,[email or web address]_Unknown ,[email or web address],DirectAddress_Unknown,[email or web address],DirectAddress_Unknown

## 2017-04-17 NOTE — DISCHARGE NOTE ADULT - HOSPITAL COURSE
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  Pt was at a dermatology appt earlier today for evaluation of a chronic rash.  During the visit, she expressed thoughts of self harm & not feeling safe to go home & was brought to the ED for further eval.  She has had a diffuse body rash of chest, abd, extremities consisting of red, itchy & painful lesions since summer 2016 that started after a trip to [place]. During that stay, she said she had been bitten by insects.  The rash has self-resolved in the past before but would return in a few days, worse than before. She recently developed facial rash & swelling as well as oral lesions on her lips. Pt now also reporting subjective fevers, diplopia, b/l ankle & foot swelling. She denies any HA, CP, cough, abd pain, N, V, changes in BM or urination.  She has been feeling overwhelmed & passively suicidal b/c of the stress of dealing w/ the rash & its associated symptoms & recently losing her job.  She was previously employed as RN at [employer]. 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     She has eval from multiple dermatologists & does not have established diagnosis, but urticaria, parasitic infections & SLE have been considered.  Strongyloides has been negative. 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 arthropod reactions.  Repeat skin biopsies were taken in derm office today & she was started on prednisone. She was recently prescribed amoxicillin x 7 days (started on 4/1) & given diflucan x1 by an outside doctor due to her throat pain & b/c of some purulent drainage from some of her skin lesion.     ED course: T 98.1, , /85, RR 18, O2 97%. Labs significant for WBC 28.15, eosinophils 97%, Hgb 11.1, MCV 75.6. Received prednisone 40mg & morphine.     Patient was admitted, started on prednisone 40 mg daily for her rash. Also started on atarax/benadryl as needed.  Hematology was consulted for WBC 28 w/ 98% eosinophils, bone marrow biopsy was done (results not available at the time of discharge).  Patient was started on hydroxyurea 1000 bid, which was increased to 1500 bid as her WBC was still elevated.  She will follow up with hematology as an outpatient.      She was also evaluated by rheumatology.  Had + SSA which was positive, C3/C4 decreased, DsDNA borderline at 45.  P-ANCA, C-ANCA negative.  She will follow up with rheumatology as an outpatient for further workup.    She was also evaluated by opthamology for complaint of blurry vision in her right eye.  Was found to have cotton wool spots off both optic nerves.  Had MRI brain which showed microvascular changes, neurology was consulted and said likely 2/2 patient's history of migraines.  Also had carotid dopplers which were negative and TTE which showed no thrombus.  She will follow up with opthamology as an outpatient.    Dermatology also saw patient, recommended prednisone 40 mg daily. Had skin biopsy done as an outpatient (results not in [place] - written in dermatology note) -findings appear to be two processes - dermatitis favoring lupus erythematosus, as well as dermatitis with eosinophilic infiltrate.  Punch biopsy of R arm showing interface dermatitis with prominent dermal eosinophils.  2nd punch biopsy of right arm suggestive of lupus erythematosus w/ granular linear signals of igA and IgM and patchy granular signal of C3 at basement membrane.  IgG negative.  Per dermatology, will taper steriods over the next few weeks. 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  Pt was at a dermatology appt earlier today for evaluation of a chronic rash.  During the visit, she expressed thoughts of self harm & not feeling safe to go home & was brought to the ED for further eval.  She has had a diffuse body rash of chest, abd, extremities consisting of red, itchy & painful lesions since summer 2016 that started after a trip to [place]. During that stay, she said she had been bitten by insects.  The rash has self-resolved in the past before but would return in a few days, worse than before. She recently developed facial rash & swelling as well as oral lesions on her lips. Pt now also reporting subjective fevers, diplopia, b/l ankle & foot swelling. She denies any HA, CP, cough, abd pain, N, V, changes in BM or urination.  She has been feeling overwhelmed & passively suicidal b/c of the stress of dealing w/ the rash & its associated symptoms & recently losing her job.  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     She has eval from multiple dermatologists & does not have established diagnosis, but urticaria, parasitic infections & SLE have been considered.  Strongyloides has been negative. 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 arthropod reactions.  Repeat skin biopsies were taken in derm office today & she was started on prednisone. She was recently prescribed amoxicillin x 7 days (started on 4/1) & given diflucan x1 by an outside doctor due to her throat pain & b/c of some purulent drainage from some of her skin lesion.     ED course: T 98.1, , /85, RR 18, O2 97%. Labs significant for WBC 28.15, eosinophils 97%, Hgb 11.1, MCV 75.6. Received prednisone 40mg & morphine.     Patient was admitted, started on prednisone 40 mg daily for her rash. Also started on atarax/benadryl as needed.  Hematology was consulted for WBC 28 w/ 98% eosinophils, bone marrow biopsy was done (results not available at the time of discharge).  Patient was started on hydroxyurea 1000 bid, which was increased to 1500 bid as her WBC was still elevated.  She will follow up with hematology as an outpatient.      Psychiatry saw patient for passive SI, patient was started on zoloft 25 mg daily.  No need for a 1:1 as was not having any SI since admission.  Can follow up as outpatient.      She was also evaluated by rheumatology.  Had + SSA which was positive, C3/C4 decreased, DsDNA borderline at 45.  P-ANCA, C-ANCA negative.  She will follow up with rheumatology as an outpatient for further workup.    She was also evaluated by opthamology for complaint of blurry vision in her right eye.  Was found to have cotton wool spots off both optic nerves.  Had MRI brain which showed microvascular changes, neurology was consulted and said likely 2/2 patient's history of migraines.  Also had carotid dopplers which were negative and TTE which showed no thrombus.  She will follow up with opthamology as an outpatient.    Dermatology also saw patient, recommended prednisone 40 mg daily. Had skin biopsy done as an outpatient (results not in [place] - written in dermatology note) -findings appear to be two processes - dermatitis favoring lupus erythematosus, as well as dermatitis with eosinophilic infiltrate.  Punch biopsy of R arm showing interface dermatitis with prominent dermal eosinophils.  2nd punch biopsy of right arm suggestive of lupus erythematosus w/ granular linear signals of igA and IgM and patchy granular signal of C3 at basement membrane.  IgG negative.  Per dermatology, will taper steriods over the next few weeks.     Patient was discharged 4/17/17 - will follow up with derm, heme-onc, rheum, optho, and psych. 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  Pt was at a dermatology appt earlier today for evaluation of a chronic rash.  During the visit, she expressed thoughts of self harm & not feeling safe to go home & was brought to the ED for further eval.  She has had a diffuse body rash of chest, abd, extremities consisting of red, itchy & painful lesions since summer 2016 that started after a trip to [place]. During that stay, she said she had been bitten by insects.  The rash has self-resolved in the past before but would return in a few days, worse than before. She recently developed facial rash & swelling as well as oral lesions on her lips. Pt now also reporting subjective fevers, diplopia, b/l ankle & foot swelling. She denies any HA, CP, cough, abd pain, N, V, changes in BM or urination.  She has been feeling overwhelmed & passively suicidal b/c of the stress of dealing w/ the rash & its associated symptoms & recently losing her job.  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     She has eval from multiple dermatologists & does not have established diagnosis, but urticaria, parasitic infections & SLE have been considered.  Strongyloides has been negative. 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 arthropod reactions.  Repeat skin biopsies were taken in derm office today & she was started on prednisone. She was recently prescribed amoxicillin x 7 days (started on 4/1) & given diflucan x1 by an outside doctor due to her throat pain & b/c of some purulent drainage from some of her skin lesion.     ED course: T 98.1, , /85, RR 18, O2 97%. Labs significant for WBC 28.15, eosinophils 97%, Hgb 11.1, MCV 75.6. Received prednisone 40mg & morphine.     Patient was admitted, started on prednisone 40 mg daily for her rash. Also started on atarax/benadryl as needed.  Hematology was consulted for WBC 28 w/ 98% eosinophils, bone marrow biopsy was done (results not available at the time of discharge).  Patient was started on hydroxyurea 1000 bid, which was increased to 1500 bid as her WBC was still elevated.  She will follow up with hematology as an outpatient.      Psychiatry saw patient for passive SI, patient was started on zoloft 25 mg daily.  No need for a 1:1 as was not having any SI since admission.  Can follow up as outpatient.      She was also evaluated by rheumatology.  Had + SSA which was positive, C3/C4 decreased, DsDNA borderline at 45.  P-ANCA, C-ANCA negative.  She will follow up with rheumatology as an outpatient for further workup.    She was also evaluated by opthamology for complaint of blurry vision in her right eye.  Was found to have cotton wool spots off both optic nerves.  Had MRI brain which showed microvascular changes, neurology was consulted and said likely 2/2 patient's history of migraines.  Also had carotid dopplers which were negative and TTE which showed no thrombus.  She will follow up with opthamology as an outpatient.    Dermatology also saw patient, recommended prednisone 40 mg daily. Had skin biopsy done as an outpatient (results not in [place] - written in dermatology note) -findings appear to be two processes - dermatitis favoring lupus erythematosus, as well as dermatitis with eosinophilic infiltrate.  Punch biopsy of R arm showing interface dermatitis with prominent dermal eosinophils.  2nd punch biopsy of right arm suggestive of lupus erythematosus w/ granular linear signals of igA and IgM and patchy granular signal of C3 at basement membrane.  IgG negative.  Per dermatology, will taper steriods over the next few weeks - will continue at 10 mg daily until she follows up with heme/rheum/derm.     Patient was discharged 4/17/17 - will follow up with derm, heme-onc, rheum, optho, and psych. 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  Pt was at a dermatology appt earlier today for evaluation of a chronic rash.  During the visit, she expressed thoughts of self harm & not feeling safe to go home & was brought to the ED for further eval.  She has had a diffuse body rash of chest, abd, extremities consisting of red, itchy & painful lesions since summer 2016 that started after a trip to [place]. During that stay, she said she had been bitten by insects.  The rash has self-resolved in the past before but would return in a few days, worse than before. She recently developed facial rash & swelling as well as oral lesions on her lips. Pt now also reporting subjective fevers, diplopia, b/l ankle & foot swelling. She denies any HA, CP, cough, abd pain, N, V, changes in BM or urination.  She has been feeling overwhelmed & passively suicidal b/c of the stress of dealing w/ the rash & its associated symptoms & recently losing her job.  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     She has eval from multiple dermatologists & does not have established diagnosis, but urticaria, parasitic infections & SLE have been considered.  Strongyloides has been negative. 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 arthropod reactions.  Repeat skin biopsies were taken in derm office today & she was started on prednisone. She was recently prescribed amoxicillin x 7 days (started on 4/1) & given diflucan x1 by an outside doctor due to her throat pain & b/c of some purulent drainage from some of her skin lesion.     ED course: T 98.1, , /85, RR 18, O2 97%. Labs significant for WBC 28.15, eosinophils 97%, Hgb 11.1, MCV 75.6. Received prednisone 40mg & morphine.     Patient was admitted, started on prednisone 40 mg daily for her rash. Also started on atarax/benadryl as needed.  Hematology was consulted for WBC 28 w/ 98% eosinophils, bone marrow biopsy was done (results not available at the time of discharge).  Patient was started on hydroxyurea 1000 bid, which was increased to 1500 bid as her WBC was still elevated.  She will follow up with hematology as an outpatient.      Psychiatry saw patient for passive SI, patient was started on zoloft 25 mg daily.  No need for a 1:1 as was not having any SI since admission.  Can follow up as outpatient.      She was also evaluated by rheumatology.  Had + SSA which was positive, C3/C4 decreased, DsDNA borderline at 45.  P-ANCA, C-ANCA negative.  She will follow up with rheumatology as an outpatient for further workup.    She was also evaluated by opthamology for complaint of blurry vision in her right eye.  Was found to have cotton wool spots off both optic nerves.  Had MRI brain which showed microvascular changes, neurology was consulted and said likely 2/2 patient's history of migraines.  Also had carotid dopplers which were negative and TTE which showed no thrombus.  She will follow up with opthamology as an outpatient.    Dermatology also saw patient, recommended prednisone 40 mg daily. Had skin biopsy done as an outpatient (results not in [place] - written in dermatology note) -findings appear to be two processes - dermatitis favoring lupus erythematosus, as well as dermatitis with eosinophilic infiltrate.  Punch biopsy of R arm showing interface dermatitis with prominent dermal eosinophils.  2nd punch biopsy of right arm suggestive of lupus erythematosus w/ granular linear signals of igA and IgM and patchy granular signal of C3 at basement membrane.  IgG negative.  Per dermatology, will taper steriods over the next few weeks, taper by 10 mg every week until prednisone 10mg and will continue at 10 mg daily until she follows up with heme/rheum/derm.     Patient was discharged 4/17/17 - will follow up with derm, heme-onc, rheum, optho, and psych.

## 2017-04-17 NOTE — DISCHARGE NOTE ADULT - CARE PROVIDER_API CALL
Elana Knowles), Internal Medicine; Rheumatology  [address] [place] [address]  Phone: [phone]  Fax: [phone]    Micah Doe), Internal Medicine  [address] [place] [address]  Phone: [phone]  Fax: [phone] Elana Knowles), Internal Medicine; Rheumatology  [address] [place] [address]  Phone: [phone]  Fax: [phone]    Micah Doe), Internal Medicine  [address] [place] [address]  Phone: [phone]  Fax: [phone]    Ophthalmology,   Phone: [phone]  Fax: (   )    Inocencio Damian), Dermatology  [address] [place] [address]  Phone: [phone]  Fax: [phone]

## 2017-04-17 NOTE — DIETITIAN INITIAL EVALUATION ADULT. - PROBLEM SELECTOR PLAN 2
Chronic panful rash of unclear etiology.  Recent punch biopsy reveal intersitial dermatitis w/ high eosinophils.   -Per dermatology, start prednisone, mupirocin, broad DDx  -Lidocaine swish & swallow for oral cavity pain  -C3, C4, CRP, KHADRA, EMILY, IgE sent by outpatient dermatology

## 2017-04-17 NOTE — DISCHARGE NOTE ADULT - CARE PLAN
Principal Discharge DX:	Eosinophilia  Goal:	to treat your eosinophilia  Instructions for follow-up, activity and diet:	You were found to have a large amount of eosinophils in your blood.  You were evaluated by hematology who did a bone marrow biopsy.  Please follow up with hematology (phone number listed below). You were started on a medication called hydroxyurea to help decrease your WBC count.  Please continue your medications as prescribed.  Secondary Diagnosis:	Rash  Instructions for follow-up, activity and diet:	You were seen by dermatology who recommended steroids for your rash. Please continue your steriods as prescribed.  Secondary Diagnosis:	Suicidal ideation  Instructions for follow-up, activity and diet:	You were evaluated by psychiatry who started you on zoloft for depression. Principal Discharge DX:	Eosinophilia  Goal:	to treat your eosinophilia  Instructions for follow-up, activity and diet:	You were found to have a large amount of eosinophils in your blood.  You were evaluated by hematology who did a bone marrow biopsy.  Please follow up with hematology (phone number listed below). You were started on a medication called hydroxyurea to help decrease your WBC count.  Please continue your medications as prescribed.  Secondary Diagnosis:	Rash  Instructions for follow-up, activity and diet:	You were seen by dermatology who recommended steroids for your rash.  Please continue your steriods as prescribed.  Secondary Diagnosis:	Suicidal ideation  Instructions for follow-up, activity and diet:	You were evaluated by psychiatry who started you on zoloft for depression.  If you would like to follow up with a psychiatrist, you can call [phone] for an appointment.  Secondary Diagnosis:	Retinal infarct  Instructions for follow-up, activity and diet:	You were found to have retinal infarcts on your eye exam.  Please follow up with opthomology in 1 week.  Phone number listed below. Principal Discharge DX:	Eosinophilia  Goal:	to treat your eosinophilia  Instructions for follow-up, activity and diet:	You were found to have a large amount of eosinophils in your blood.  You were evaluated by hematology who did a bone marrow biopsy.  Please follow up with hematology (phone number listed below). You were started on a medication called hydroxyurea to help decrease your WBC count.  Please continue your medications as prescribed.  Secondary Diagnosis:	Rash  Instructions for follow-up, activity and diet:	You were seen by dermatology who recommended steroids for your rash.  Please continue your steriods as prescribed.  Secondary Diagnosis:	Suicidal ideation  Instructions for follow-up, activity and diet:	You were evaluated by psychiatry who started you on zoloft for depression.  If you would like to follow up with a psychiatrist, you can call [phone] for an appointment.  Secondary Diagnosis:	Retinal infarct  Instructions for follow-up, activity and diet:	You were found to have retinal infarcts on your eye exam.  Please follow up with opthomology in 1 week.  Phone number listed below. Principal Discharge DX:	Eosinophilia  Goal:	to treat your eosinophilia  Instructions for follow-up, activity and diet:	You were found to have a large amount of eosinophils in your blood.  You were evaluated by hematology who did a bone marrow biopsy.  Please follow up with hematology (phone number listed below). You were started on a medication called hydroxyurea to help decrease your WBC count.  Please continue your medications as prescribed.  Secondary Diagnosis:	Rash  Instructions for follow-up, activity and diet:	You were seen by dermatology who recommended steroids for your rash.  Please continue your steriods as prescribed. Please take 4 tablets for 1 week, then 3 tablets for 1 week, then 2 tablets for 1 week, then continue on 1 tablet daily until you follow up with hematology/rheumatology/dermatology.  Secondary Diagnosis:	Suicidal ideation  Instructions for follow-up, activity and diet:	You were evaluated by psychiatry who started you on zoloft for depression.  If you would like to follow up with a psychiatrist, you can call [phone] for an appointment.  Secondary Diagnosis:	Retinal infarct  Instructions for follow-up, activity and diet:	You were found to have retinal infarcts on your eye exam.  Please follow up with opthomology in 1 week.  Phone number listed below. Principal Discharge DX:	Eosinophilia  Goal:	to treat your eosinophilia  Instructions for follow-up, activity and diet:	You were found to have a large amount of eosinophils in your blood.  You were evaluated by hematology who did a bone marrow biopsy.  Please follow up with hematology (phone number listed below). You were started on a medication called hydroxyurea to help decrease your WBC count.  Please continue your medications as prescribed.  Secondary Diagnosis:	Rash  Instructions for follow-up, activity and diet:	You were seen by dermatology who recommended steroids for your rash.  Please continue your steriods as prescribed. Please take 4 tablets for 1 week, then 3 tablets for 1 week, then 2 tablets for 1 week, then continue on 1 tablet daily until you follow up with hematology/rheumatology/dermatology.  Secondary Diagnosis:	Suicidal ideation  Instructions for follow-up, activity and diet:	You were evaluated by psychiatry who started you on zoloft for depression.  If you would like to follow up with a psychiatrist, you can call [phone] for an appointment.  Secondary Diagnosis:	Retinal infarct  Instructions for follow-up, activity and diet:	You were found to have retinal infarcts on your eye exam.  Please follow up with opthomology in 1 week.  Phone number listed below. Principal Discharge DX:	Eosinophilia  Goal:	to treat your eosinophilia  Instructions for follow-up, activity and diet:	You were found to have a large amount of eosinophils in your blood.  You were evaluated by hematology who did a bone marrow biopsy.  Please follow up with hematology (phone number listed below). You were started on a medication called hydroxyurea to help decrease your WBC count.  Please continue your medications as prescribed.  Secondary Diagnosis:	Rash  Instructions for follow-up, activity and diet:	You were seen by dermatology who recommended steroids for your rash.  Please continue your steriods as prescribed. Please take 4 tablets for 1 week, then 3 tablets for 1 week, then 2 tablets for 1 week, then continue on 1 tablet daily until you follow up with hematology/rheumatology/dermatology.  Secondary Diagnosis:	Suicidal ideation  Instructions for follow-up, activity and diet:	You were evaluated by psychiatry who started you on zoloft for depression.  If you would like to follow up with a psychiatrist, you can call [phone] for an appointment.  Secondary Diagnosis:	Retinal infarct  Instructions for follow-up, activity and diet:	You were found to have retinal infarcts on your eye exam.  Please follow up with opthomology in 1 week.  Phone number listed below. Principal Discharge DX:	Eosinophilia  Goal:	to treat your eosinophilia  Instructions for follow-up, activity and diet:	You were found to have a large amount of eosinophils in your blood.  You were evaluated by hematology who did a bone marrow biopsy.  Please follow up with hematology (phone number listed below). You were started on a medication called hydroxyurea to help decrease your WBC count.  Please continue your medications as prescribed.  Secondary Diagnosis:	Rash  Instructions for follow-up, activity and diet:	You were seen by dermatology who recommended steroids for your rash.  Please continue your steriods as prescribed. Please take 4 tablets for 1 week, then 3 tablets for 1 week, then 2 tablets for 1 week, then continue on 1 tablet daily until you follow up with hematology/rheumatology/dermatology.  Secondary Diagnosis:	Suicidal ideation  Instructions for follow-up, activity and diet:	You were evaluated by psychiatry who started you on zoloft for depression.  If you would like to follow up with a psychiatrist, you can call [phone] for an appointment.  Secondary Diagnosis:	Retinal infarct  Instructions for follow-up, activity and diet:	You were found to have retinal infarcts on your eye exam.  Please follow up with opthomology in 1 week.  Phone number listed below. Principal Discharge DX:	Eosinophilia  Goal:	to treat your eosinophilia  Instructions for follow-up, activity and diet:	You were found to have a large amount of eosinophils in your blood.  You were evaluated by hematology who did a bone marrow biopsy.  Please follow up with hematology (phone number listed below). You were started on a medication called hydroxyurea to help decrease your WBC count.  Please continue your medications as prescribed.  Secondary Diagnosis:	Rash  Instructions for follow-up, activity and diet:	You were seen by dermatology who recommended steroids for your rash.  Please continue your steriods as prescribed. Please take 4 tablets for 1 week, then 3 tablets for 1 week, then 2 tablets for 1 week, then continue on 1 tablet daily until you follow up with hematology/rheumatology/dermatology.  Secondary Diagnosis:	Suicidal ideation  Instructions for follow-up, activity and diet:	You were evaluated by psychiatry who started you on zoloft for depression.  If you would like to follow up with a psychiatrist, you can call [phone] for an appointment.  Secondary Diagnosis:	Retinal infarct  Instructions for follow-up, activity and diet:	You were found to have retinal infarcts on your eye exam.  Please follow up with opthomology in 1 week.  Phone number listed below.

## 2017-04-17 NOTE — DISCHARGE NOTE ADULT - ADDITIONAL INSTRUCTIONS
Please follow up with hematology, rheumatology Please follow up with hematology, rheumatology, and dermatology.  Please take your medications as prescribed. Please follow up with hematology, rheumatology, dermatology, and opthomology.  Please also follow up with your PCP.  If you would like, you can follow up with psychiatry as an outpatient (phone number listed above).  Please take your medications as prescribed.

## 2017-04-17 NOTE — DISCHARGE NOTE ADULT - PLAN OF CARE
to treat your eosinophilia You were found to have a large amount of eosinophils in your blood.  You were evaluated by hematology who did a bone marrow biopsy.  Please follow up with hematology (phone number listed below). You were started on a medication called hydroxyurea to help decrease your WBC count.  Please continue your medications as prescribed. You were seen by dermatology who recommended steroids for your rash. Please continue your steriods as prescribed. You were evaluated by psychiatry who started you on zoloft for depression. You were found to have retinal infarcts on your eye exam.  Please follow up with opthomology in 1 week.  Phone number listed below. You were seen by dermatology who recommended steroids for your rash.  Please continue your steriods as prescribed. You were evaluated by psychiatry who started you on zoloft for depression.  If you would like to follow up with a psychiatrist, you can call [phone] for an appointment. You were seen by dermatology who recommended steroids for your rash.  Please continue your steriods as prescribed. Please take 4 tablets for 1 week, then 3 tablets for 1 week, then 2 tablets for 1 week, then continue on 1 tablet daily until you follow up with hematology/rheumatology/dermatology.

## 2017-04-17 NOTE — DIETITIAN INITIAL EVALUATION ADULT. - OTHER INFO
Nutrition assessment completed for length of stay; admitted for rash and suicidal ideation. Met with patient, reports appetite is good but wishes for other variety of menus at the hospital. Food preferences requested obtained and to be fulfilled as available. Denies food allergies, GI distress (nausea/vomiting/constipation/diarrhea), or issues with chewing/swallowing. She denies any significant weight changes but with possible weight gain. Importance of healthful, balanced diet encouraged.

## 2017-04-18 ENCOUNTER — OUTPATIENT (OUTPATIENT)
Dept: OUTPATIENT SERVICES | Facility: HOSPITAL | Age: 47
LOS: 1 days | Discharge: HOME | End: 2017-04-18

## 2017-04-18 DIAGNOSIS — D25.9 LEIOMYOMA OF UTERUS, UNSPECIFIED: Chronic | ICD-10-CM

## 2017-04-18 DIAGNOSIS — D72.1 EOSINOPHILIA: ICD-10-CM

## 2017-04-18 LAB
4/8 RATIO: 2.24 CELLS/UL — SIGNIFICANT CHANGE UP (ref 1.69–2.84)
ABS CD8: 373 CELLS/UL — SIGNIFICANT CHANGE UP (ref 291–576)
ACE SERPL-CCNC: 49 U/L — SIGNIFICANT CHANGE UP (ref 14–82)
ANA TITR SER: NEGATIVE — SIGNIFICANT CHANGE UP
C-ANCA SER-ACNC: NEGATIVE — SIGNIFICANT CHANGE UP
CD16+CD56+ CELLS NFR BLD: 2 % — LOW (ref 6–22)
CD16+CD56+ CELLS NFR SPEC: 30 CELL/UL — LOW (ref 59–453)
CD19 BLASTS SPEC-ACNC: 26 % — HIGH (ref 8–22)
CD19 BLASTS SPEC-ACNC: 493 CELL/UL — HIGH (ref 105–430)
CD3 BLASTS SPEC-ACNC: 1322 CELLS/UL — SIGNIFICANT CHANGE UP (ref 678–2144)
CD3 BLASTS SPEC-ACNC: 69 % — SIGNIFICANT CHANGE UP (ref 62–83)
CD4 %: 44 % — SIGNIFICANT CHANGE UP (ref 43–52)
CD8 %: 20 % — SIGNIFICANT CHANGE UP (ref 17–28)
GAS PNL BLDMV: SIGNIFICANT CHANGE UP
HTLV I+II AB PATRN SER RIPA-IMP: SIGNIFICANT CHANGE UP
P-ANCA SER-ACNC: NEGATIVE — SIGNIFICANT CHANGE UP
T-CELL CD4 SUBSET PNL BLD: 836 CELL/UL — SIGNIFICANT CHANGE UP (ref 431–1434)

## 2017-04-19 ENCOUNTER — RESULT REVIEW (OUTPATIENT)
Age: 47
End: 2017-04-19

## 2017-04-20 ENCOUNTER — APPOINTMENT (OUTPATIENT)
Dept: DERMATOLOGY | Facility: CLINIC | Age: 47
End: 2017-04-20

## 2017-04-20 ENCOUNTER — APPOINTMENT (OUTPATIENT)
Dept: HEMATOLOGY ONCOLOGY | Facility: CLINIC | Age: 47
End: 2017-04-20

## 2017-04-20 VITALS
OXYGEN SATURATION: 100 % | HEART RATE: 117 BPM | BODY MASS INDEX: 31.82 KG/M2 | SYSTOLIC BLOOD PRESSURE: 129 MMHG | DIASTOLIC BLOOD PRESSURE: 86 MMHG | RESPIRATION RATE: 17 BRPM | TEMPERATURE: 98.3 F | WEIGHT: 198 LBS | HEIGHT: 66 IN

## 2017-04-20 VITALS
BODY MASS INDEX: 31.82 KG/M2 | TEMPERATURE: 98.3 F | HEIGHT: 66 IN | RESPIRATION RATE: 17 BRPM | HEART RATE: 117 BPM | OXYGEN SATURATION: 100 % | SYSTOLIC BLOOD PRESSURE: 136 MMHG | WEIGHT: 198 LBS | DIASTOLIC BLOOD PRESSURE: 86 MMHG

## 2017-04-20 LAB
ALBUMIN SERPL ELPH-MCNC: 3.6 G/DL
ALP BLD-CCNC: 74 U/L
ALT SERPL-CCNC: 16 U/L
ANION GAP SERPL CALC-SCNC: 14 MMOL/L
ANISOCYTOSIS BLD QL: SLIGHT — SIGNIFICANT CHANGE UP
AST SERPL-CCNC: 17 U/L
BASOPHILS # BLD AUTO: 0.3 K/UL — HIGH (ref 0–0.2)
BASOPHILS NFR BLD AUTO: 1 % — SIGNIFICANT CHANGE UP (ref 0–2)
BILIRUB SERPL-MCNC: 0.3 MG/DL
BUN SERPL-MCNC: 15 MG/DL
CALCIUM SERPL-MCNC: 9.8 MG/DL
CHLORIDE SERPL-SCNC: 105 MMOL/L
CO2 SERPL-SCNC: 23 MMOL/L
CREAT SERPL-MCNC: 1.02 MG/DL
CRYOGLOB SERPL-MCNC: 0 — SIGNIFICANT CHANGE UP
DACRYOCYTES BLD QL SMEAR: SLIGHT — SIGNIFICANT CHANGE UP
ELLIPTOCYTES BLD QL SMEAR: SLIGHT — SIGNIFICANT CHANGE UP
EOSINOPHIL # BLD AUTO: 29.4 K/UL — HIGH (ref 0–0.5)
EOSINOPHIL NFR BLD AUTO: 85 % — HIGH (ref 0–6)
FERRITIN SERPL-MCNC: 91.6 NG/ML
GLUCOSE SERPL-MCNC: 121 MG/DL
HCT VFR BLD CALC: 33.9 % — LOW (ref 34.5–45)
HGB BLD-MCNC: 11.3 G/DL — LOW (ref 11.5–15.5)
HYPOCHROMIA BLD QL: SLIGHT — SIGNIFICANT CHANGE UP
IRON SATN MFR SERPL: 19 %
IRON SERPL-MCNC: 51 UG/DL
LDH SERPL-CCNC: 221 U/L
LYMPHOCYTES # BLD AUTO: 2.5 K/UL — SIGNIFICANT CHANGE UP (ref 1–3.3)
LYMPHOCYTES # BLD AUTO: 8 % — LOW (ref 13–44)
MACROCYTES BLD QL: SLIGHT — SIGNIFICANT CHANGE UP
MCHC RBC-ENTMCNC: 26.3 PG — LOW (ref 27–34)
MCHC RBC-ENTMCNC: 33.2 G/DL — SIGNIFICANT CHANGE UP (ref 32–36)
MCV RBC AUTO: 79.1 FL — LOW (ref 80–100)
MICROCYTES BLD QL: SLIGHT — SIGNIFICANT CHANGE UP
MISCELLANEOUS - CHEM: SIGNIFICANT CHANGE UP
MONOCYTES # BLD AUTO: 0.5 K/UL — SIGNIFICANT CHANGE UP (ref 0–0.9)
MONOCYTES NFR BLD AUTO: 1 % — LOW (ref 2–14)
NEUTROPHILS # BLD AUTO: 0.9 K/UL — LOW (ref 1.8–7.4)
NEUTROPHILS NFR BLD AUTO: 5 % — LOW (ref 43–77)
O+P SPEC CONC: SIGNIFICANT CHANGE UP
PLAT MORPH BLD: NORMAL — SIGNIFICANT CHANGE UP
PLATELET # BLD AUTO: 224 K/UL — SIGNIFICANT CHANGE UP (ref 150–400)
POIKILOCYTOSIS BLD QL AUTO: SLIGHT — SIGNIFICANT CHANGE UP
POLYCHROMASIA BLD QL SMEAR: SLIGHT — SIGNIFICANT CHANGE UP
POTASSIUM SERPL-SCNC: 4.2 MMOL/L
PROT SERPL-MCNC: 7.6 G/DL
RBC # BLD: 4.28 M/UL — SIGNIFICANT CHANGE UP (ref 3.8–5.2)
RBC # FLD: 19.2 % — HIGH (ref 10.3–14.5)
RBC BLD AUTO: ABNORMAL
SCHISTOCYTES BLD QL AUTO: SLIGHT — SIGNIFICANT CHANGE UP
SODIUM SERPL-SCNC: 142 MMOL/L
SPECIMEN SOURCE: SIGNIFICANT CHANGE UP
TIBC SERPL-MCNC: 272 UG/DL
UIBC SERPL-MCNC: 221 UG/DL
WBC # BLD: 33.5 K/UL — HIGH (ref 3.8–10.5)
WBC # FLD AUTO: 33.5 K/UL — HIGH (ref 3.8–10.5)

## 2017-04-23 LAB — TRI STN SPEC: SIGNIFICANT CHANGE UP

## 2017-04-25 ENCOUNTER — RESULT REVIEW (OUTPATIENT)
Age: 47
End: 2017-04-25

## 2017-04-25 LAB — ANA SER IF-ACNC: NEGATIVE

## 2017-04-26 ENCOUNTER — APPOINTMENT (OUTPATIENT)
Dept: HEMATOLOGY ONCOLOGY | Facility: CLINIC | Age: 47
End: 2017-04-26

## 2017-04-26 VITALS
SYSTOLIC BLOOD PRESSURE: 130 MMHG | HEART RATE: 102 BPM | WEIGHT: 192.9 LBS | DIASTOLIC BLOOD PRESSURE: 80 MMHG | BODY MASS INDEX: 31.14 KG/M2 | RESPIRATION RATE: 16 BRPM | TEMPERATURE: 98.3 F

## 2017-04-26 DIAGNOSIS — L03.90 CELLULITIS, UNSPECIFIED: ICD-10-CM

## 2017-04-26 LAB
BASOPHILS # BLD AUTO: 0.1 K/UL — SIGNIFICANT CHANGE UP (ref 0–0.2)
BASOPHILS NFR BLD AUTO: 0.7 % — SIGNIFICANT CHANGE UP (ref 0–2)
EOSINOPHIL # BLD AUTO: 19.4 K/UL — HIGH (ref 0–0.5)
EOSINOPHIL NFR BLD AUTO: 88.2 % — HIGH (ref 0–6)
HCT VFR BLD CALC: 30.9 % — LOW (ref 34.5–45)
HGB BLD-MCNC: 10.4 G/DL — LOW (ref 11.5–15.5)
LYMPHOCYTES # BLD AUTO: 1.2 K/UL — SIGNIFICANT CHANGE UP (ref 1–3.3)
LYMPHOCYTES # BLD AUTO: 5.2 % — LOW (ref 13–44)
MCHC RBC-ENTMCNC: 26.6 PG — LOW (ref 27–34)
MCHC RBC-ENTMCNC: 33.8 G/DL — SIGNIFICANT CHANGE UP (ref 32–36)
MCV RBC AUTO: 78.7 FL — LOW (ref 80–100)
MONOCYTES # BLD AUTO: 0.2 K/UL — SIGNIFICANT CHANGE UP (ref 0–0.9)
MONOCYTES NFR BLD AUTO: 0.7 % — LOW (ref 2–14)
NEUTROPHILS # BLD AUTO: 1.2 K/UL — LOW (ref 1.8–7.4)
NEUTROPHILS NFR BLD AUTO: 5.2 % — LOW (ref 43–77)
PLATELET # BLD AUTO: 197 K/UL — SIGNIFICANT CHANGE UP (ref 150–400)
RBC # BLD: 3.92 M/UL — SIGNIFICANT CHANGE UP (ref 3.8–5.2)
RBC # FLD: 18.8 % — HIGH (ref 10.3–14.5)
WBC # BLD: 22 K/UL — HIGH (ref 3.8–10.5)
WBC # FLD AUTO: 22 K/UL — HIGH (ref 3.8–10.5)

## 2017-04-27 ENCOUNTER — APPOINTMENT (OUTPATIENT)
Dept: HEMATOLOGY ONCOLOGY | Facility: CLINIC | Age: 47
End: 2017-04-27

## 2017-04-27 ENCOUNTER — OTHER (OUTPATIENT)
Age: 47
End: 2017-04-27

## 2017-04-27 ENCOUNTER — LABORATORY RESULT (OUTPATIENT)
Age: 47
End: 2017-04-27

## 2017-04-27 ENCOUNTER — APPOINTMENT (OUTPATIENT)
Dept: DERMATOLOGY | Facility: CLINIC | Age: 47
End: 2017-04-27

## 2017-04-27 DIAGNOSIS — Z87.442 PERSONAL HISTORY OF URINARY CALCULI: ICD-10-CM

## 2017-04-27 DIAGNOSIS — Z91.89 OTHER SPECIFIED PERSONAL RISK FACTORS, NOT ELSEWHERE CLASSIFIED: ICD-10-CM

## 2017-04-27 DIAGNOSIS — Z86.69 PERSONAL HISTORY OF OTHER DISEASES OF THE NERVOUS SYSTEM AND SENSE ORGANS: ICD-10-CM

## 2017-04-27 LAB — MISCELLANEOUS - CHEM: SIGNIFICANT CHANGE UP

## 2017-05-02 ENCOUNTER — RESULT REVIEW (OUTPATIENT)
Age: 47
End: 2017-05-02

## 2017-05-03 ENCOUNTER — APPOINTMENT (OUTPATIENT)
Dept: HEMATOLOGY ONCOLOGY | Facility: CLINIC | Age: 47
End: 2017-05-03

## 2017-05-03 LAB
ALBUMIN SERPL ELPH-MCNC: 4 G/DL
ALP BLD-CCNC: 70 U/L
ALT SERPL-CCNC: 9 U/L
ANION GAP SERPL CALC-SCNC: 12 MMOL/L
ANISOCYTOSIS BLD QL: SIGNIFICANT CHANGE UP
AST SERPL-CCNC: 13 U/L
BASOPHILS # BLD AUTO: 0.1 K/UL — SIGNIFICANT CHANGE UP (ref 0–0.2)
BILIRUB SERPL-MCNC: 0.5 MG/DL
BUN SERPL-MCNC: 11 MG/DL
CALCIUM SERPL-MCNC: 8.9 MG/DL
CHLORIDE SERPL-SCNC: 104 MMOL/L
CO2 SERPL-SCNC: 25 MMOL/L
CREAT SERPL-MCNC: 0.74 MG/DL
DACRYOCYTES BLD QL SMEAR: SLIGHT — SIGNIFICANT CHANGE UP
ELLIPTOCYTES BLD QL SMEAR: SLIGHT — SIGNIFICANT CHANGE UP
EOSINOPHIL # BLD AUTO: 11.4 K/UL — HIGH (ref 0–0.5)
EOSINOPHIL NFR BLD AUTO: 83 % — HIGH (ref 0–6)
GLUCOSE SERPL-MCNC: 131 MG/DL
HBV CORE IGG+IGM SER QL: REACTIVE
HBV SURFACE AB SER QL: REACTIVE
HBV SURFACE AG SER QL: NONREACTIVE
HCT VFR BLD CALC: 29.9 % — LOW (ref 34.5–45)
HCV AB SER QL: NONREACTIVE
HCV S/CO RATIO: 0.78 S/CO
HGB BLD-MCNC: 10.4 G/DL — LOW (ref 11.5–15.5)
HYPOCHROMIA BLD QL: SLIGHT — SIGNIFICANT CHANGE UP
LDH SERPL-CCNC: 223 U/L
LG PLATELETS BLD QL AUTO: SLIGHT — SIGNIFICANT CHANGE UP
LYMPHOCYTES # BLD AUTO: 1.1 K/UL — SIGNIFICANT CHANGE UP (ref 1–3.3)
LYMPHOCYTES # BLD AUTO: 9 % — LOW (ref 13–44)
MACROCYTES BLD QL: SLIGHT — SIGNIFICANT CHANGE UP
MCHC RBC-ENTMCNC: 27.4 PG — SIGNIFICANT CHANGE UP (ref 27–34)
MCHC RBC-ENTMCNC: 34.6 G/DL — SIGNIFICANT CHANGE UP (ref 32–36)
MCV RBC AUTO: 79.2 FL — LOW (ref 80–100)
MICROCYTES BLD QL: SLIGHT — SIGNIFICANT CHANGE UP
MONOCYTES # BLD AUTO: 0.2 K/UL — SIGNIFICANT CHANGE UP (ref 0–0.9)
MONOCYTES NFR BLD AUTO: 1 % — LOW (ref 2–14)
NEUTROPHILS # BLD AUTO: 0.7 K/UL — LOW (ref 1.8–7.4)
NEUTROPHILS NFR BLD AUTO: 7 % — LOW (ref 43–77)
PLAT MORPH BLD: NORMAL — SIGNIFICANT CHANGE UP
PLATELET # BLD AUTO: 129 K/UL — LOW (ref 150–400)
POIKILOCYTOSIS BLD QL AUTO: SLIGHT — SIGNIFICANT CHANGE UP
POLYCHROMASIA BLD QL SMEAR: SLIGHT — SIGNIFICANT CHANGE UP
POTASSIUM SERPL-SCNC: 4.2 MMOL/L
PROT SERPL-MCNC: 7.8 G/DL
RBC # BLD: 3.78 M/UL — LOW (ref 3.8–5.2)
RBC # FLD: 18.6 % — HIGH (ref 10.3–14.5)
RBC BLD AUTO: ABNORMAL
SCHISTOCYTES BLD QL AUTO: SLIGHT — SIGNIFICANT CHANGE UP
SODIUM SERPL-SCNC: 141 MMOL/L
TARGETS BLD QL SMEAR: SLIGHT — SIGNIFICANT CHANGE UP
WBC # BLD: 13.6 K/UL — HIGH (ref 3.8–10.5)
WBC # FLD AUTO: 13.6 K/UL — HIGH (ref 3.8–10.5)

## 2017-05-05 LAB — ZINC SERPL-MCNC: 61 UG/DL

## 2017-05-10 ENCOUNTER — RESULT REVIEW (OUTPATIENT)
Age: 47
End: 2017-05-10

## 2017-05-10 ENCOUNTER — APPOINTMENT (OUTPATIENT)
Dept: HEMATOLOGY ONCOLOGY | Facility: CLINIC | Age: 47
End: 2017-05-10

## 2017-05-10 VITALS
SYSTOLIC BLOOD PRESSURE: 130 MMHG | BODY MASS INDEX: 31.31 KG/M2 | HEART RATE: 92 BPM | WEIGHT: 194.01 LBS | RESPIRATION RATE: 16 BRPM | DIASTOLIC BLOOD PRESSURE: 89 MMHG | OXYGEN SATURATION: 100 % | TEMPERATURE: 98.5 F

## 2017-05-10 LAB
BASOPHILS # BLD AUTO: 0.1 K/UL — SIGNIFICANT CHANGE UP (ref 0–0.2)
BASOPHILS NFR BLD AUTO: 1.2 % — SIGNIFICANT CHANGE UP (ref 0–2)
EOSINOPHIL # BLD AUTO: 7.2 K/UL — HIGH (ref 0–0.5)
EOSINOPHIL NFR BLD AUTO: 70.3 % — HIGH (ref 0–6)
HCT VFR BLD CALC: 33.2 % — LOW (ref 34.5–45)
HGB BLD-MCNC: 10.6 G/DL — LOW (ref 11.5–15.5)
LYMPHOCYTES # BLD AUTO: 1.8 K/UL — SIGNIFICANT CHANGE UP (ref 1–3.3)
LYMPHOCYTES # BLD AUTO: 17.6 % — SIGNIFICANT CHANGE UP (ref 13–44)
MCHC RBC-ENTMCNC: 27.8 PG — SIGNIFICANT CHANGE UP (ref 27–34)
MCHC RBC-ENTMCNC: 31.9 G/DL — LOW (ref 32–36)
MCV RBC AUTO: 87.3 FL — SIGNIFICANT CHANGE UP (ref 80–100)
MONOCYTES # BLD AUTO: 0 K/UL — SIGNIFICANT CHANGE UP (ref 0–0.9)
MONOCYTES NFR BLD AUTO: 0.2 % — LOW (ref 2–14)
NEUTROPHILS # BLD AUTO: 1.1 K/UL — LOW (ref 1.8–7.4)
NEUTROPHILS NFR BLD AUTO: 10.7 % — LOW (ref 43–77)
PLATELET # BLD AUTO: 153 K/UL — SIGNIFICANT CHANGE UP (ref 150–400)
RBC # BLD: 3.8 M/UL — SIGNIFICANT CHANGE UP (ref 3.8–5.2)
RBC # FLD: 29.7 % — HIGH (ref 10.3–14.5)
WBC # BLD: 10.2 K/UL — SIGNIFICANT CHANGE UP (ref 3.8–10.5)
WBC # FLD AUTO: 10.2 K/UL — SIGNIFICANT CHANGE UP (ref 3.8–10.5)

## 2017-05-11 ENCOUNTER — APPOINTMENT (OUTPATIENT)
Dept: HEMATOLOGY ONCOLOGY | Facility: CLINIC | Age: 47
End: 2017-05-11

## 2017-05-17 ENCOUNTER — APPOINTMENT (OUTPATIENT)
Dept: HEMATOLOGY ONCOLOGY | Facility: CLINIC | Age: 47
End: 2017-05-17

## 2017-05-17 ENCOUNTER — APPOINTMENT (OUTPATIENT)
Dept: ULTRASOUND IMAGING | Facility: IMAGING CENTER | Age: 47
End: 2017-05-17

## 2017-05-17 ENCOUNTER — RESULT REVIEW (OUTPATIENT)
Age: 47
End: 2017-05-17

## 2017-05-17 ENCOUNTER — APPOINTMENT (OUTPATIENT)
Dept: RHEUMATOLOGY | Facility: CLINIC | Age: 47
End: 2017-05-17

## 2017-05-17 ENCOUNTER — OUTPATIENT (OUTPATIENT)
Dept: OUTPATIENT SERVICES | Facility: HOSPITAL | Age: 47
LOS: 1 days | Discharge: HOME | End: 2017-05-17

## 2017-05-17 ENCOUNTER — OUTPATIENT (OUTPATIENT)
Dept: OUTPATIENT SERVICES | Facility: HOSPITAL | Age: 47
LOS: 1 days | End: 2017-05-17
Payer: COMMERCIAL

## 2017-05-17 VITALS
OXYGEN SATURATION: 100 % | RESPIRATION RATE: 16 BRPM | DIASTOLIC BLOOD PRESSURE: 77 MMHG | SYSTOLIC BLOOD PRESSURE: 121 MMHG | BODY MASS INDEX: 31.6 KG/M2 | TEMPERATURE: 97.6 F | WEIGHT: 195.77 LBS | HEART RATE: 100 BPM

## 2017-05-17 VITALS
HEIGHT: 66 IN | TEMPERATURE: 98.2 F | BODY MASS INDEX: 31.6 KG/M2 | SYSTOLIC BLOOD PRESSURE: 132 MMHG | OXYGEN SATURATION: 100 % | DIASTOLIC BLOOD PRESSURE: 88 MMHG | HEART RATE: 106 BPM

## 2017-05-17 DIAGNOSIS — D25.9 LEIOMYOMA OF UTERUS, UNSPECIFIED: Chronic | ICD-10-CM

## 2017-05-17 DIAGNOSIS — D72.1 EOSINOPHILIA: ICD-10-CM

## 2017-05-17 DIAGNOSIS — M79.604 PAIN IN RIGHT LEG: ICD-10-CM

## 2017-05-17 LAB
BASOPHILS # BLD AUTO: 0.2 K/UL — SIGNIFICANT CHANGE UP (ref 0–0.2)
BASOPHILS NFR BLD AUTO: 1.8 % — SIGNIFICANT CHANGE UP (ref 0–2)
EOSINOPHIL # BLD AUTO: 3.9 K/UL — HIGH (ref 0–0.5)
EOSINOPHIL NFR BLD AUTO: 42.3 % — HIGH (ref 0–6)
HCT VFR BLD CALC: 33 % — LOW (ref 34.5–45)
HGB BLD-MCNC: 11 G/DL — LOW (ref 11.5–15.5)
LYMPHOCYTES # BLD AUTO: 1.2 K/UL — SIGNIFICANT CHANGE UP (ref 1–3.3)
LYMPHOCYTES # BLD AUTO: 12.6 % — LOW (ref 13–44)
MCHC RBC-ENTMCNC: 29.2 PG — SIGNIFICANT CHANGE UP (ref 27–34)
MCHC RBC-ENTMCNC: 33.2 G/DL — SIGNIFICANT CHANGE UP (ref 32–36)
MCV RBC AUTO: 88.2 FL — SIGNIFICANT CHANGE UP (ref 80–100)
MONOCYTES # BLD AUTO: 0.7 K/UL — SIGNIFICANT CHANGE UP (ref 0–0.9)
MONOCYTES NFR BLD AUTO: 7.4 % — SIGNIFICANT CHANGE UP (ref 2–14)
NEUTROPHILS # BLD AUTO: 3.3 K/UL — SIGNIFICANT CHANGE UP (ref 1.8–7.4)
NEUTROPHILS NFR BLD AUTO: 35.8 % — LOW (ref 43–77)
PLATELET # BLD AUTO: 227 K/UL — SIGNIFICANT CHANGE UP (ref 150–400)
RBC # BLD: 3.74 M/UL — LOW (ref 3.8–5.2)
RBC # FLD: 28.4 % — HIGH (ref 10.3–14.5)
WBC # BLD: 9.2 K/UL — SIGNIFICANT CHANGE UP (ref 3.8–10.5)
WBC # FLD AUTO: 9.2 K/UL — SIGNIFICANT CHANGE UP (ref 3.8–10.5)

## 2017-05-17 PROCEDURE — 93971 EXTREMITY STUDY: CPT

## 2017-05-26 ENCOUNTER — RESULT REVIEW (OUTPATIENT)
Age: 47
End: 2017-05-26

## 2017-05-26 ENCOUNTER — APPOINTMENT (OUTPATIENT)
Dept: HEMATOLOGY ONCOLOGY | Facility: CLINIC | Age: 47
End: 2017-05-26

## 2017-05-26 ENCOUNTER — LABORATORY RESULT (OUTPATIENT)
Age: 47
End: 2017-05-26

## 2017-05-26 VITALS
RESPIRATION RATE: 16 BRPM | HEART RATE: 97 BPM | WEIGHT: 196.21 LBS | OXYGEN SATURATION: 98 % | TEMPERATURE: 98.2 F | DIASTOLIC BLOOD PRESSURE: 80 MMHG | SYSTOLIC BLOOD PRESSURE: 122 MMHG | BODY MASS INDEX: 31.67 KG/M2

## 2017-05-26 LAB
ALBUMIN SERPL ELPH-MCNC: 4.2 G/DL
ALP BLD-CCNC: 81 U/L
ALT SERPL-CCNC: 12 U/L
ANION GAP SERPL CALC-SCNC: 16 MMOL/L
APPEARANCE: CLEAR
AST SERPL-CCNC: 13 U/L
BASOPHILS # BLD AUTO: 0.3 K/UL — HIGH (ref 0–0.2)
BASOPHILS NFR BLD AUTO: 2.4 % — HIGH (ref 0–2)
BILIRUB SERPL-MCNC: 0.2 MG/DL
BILIRUBIN URINE: NEGATIVE
BLOOD URINE: NEGATIVE
BUN SERPL-MCNC: 12 MG/DL
C3 SERPL-MCNC: 103 MG/DL
C4 SERPL-MCNC: 12 MG/DL
CALCIUM SERPL-MCNC: 9.5 MG/DL
CHLORIDE SERPL-SCNC: 102 MMOL/L
CO2 SERPL-SCNC: 22 MMOL/L
COLOR: YELLOW
CREAT SERPL-MCNC: 0.87 MG/DL
CREAT SPEC-SCNC: 95 MG/DL
CREAT/PROT UR: 0.1 RATIO
ENA RNP AB SER IA-ACNC: 0.2 AL
ENA SM AB SER IA-ACNC: <0.2 AL
ENA SS-A AB SER IA-ACNC: >8 AL
ENA SS-B AB SER IA-ACNC: 0.2 AL
EOSINOPHIL # BLD AUTO: 4.8 K/UL — HIGH (ref 0–0.5)
EOSINOPHIL NFR BLD AUTO: 34 % — HIGH (ref 0–6)
GLUCOSE QUALITATIVE U: NORMAL MG/DL
GLUCOSE SERPL-MCNC: 110 MG/DL
HCT VFR BLD CALC: 36.1 % — SIGNIFICANT CHANGE UP (ref 34.5–45)
HGB BLD-MCNC: 11.5 G/DL — SIGNIFICANT CHANGE UP (ref 11.5–15.5)
KETONES URINE: NEGATIVE
LDH SERPL-CCNC: 277 U/L
LEUKOCYTE ESTERASE URINE: NEGATIVE
LYMPHOCYTES # BLD AUTO: 1.7 K/UL — SIGNIFICANT CHANGE UP (ref 1–3.3)
LYMPHOCYTES # BLD AUTO: 12.1 % — LOW (ref 13–44)
MCHC RBC-ENTMCNC: 28 PG — SIGNIFICANT CHANGE UP (ref 27–34)
MCHC RBC-ENTMCNC: 31.9 G/DL — LOW (ref 32–36)
MCV RBC AUTO: 87.8 FL — SIGNIFICANT CHANGE UP (ref 80–100)
MONOCYTES # BLD AUTO: 0.8 K/UL — SIGNIFICANT CHANGE UP (ref 0–0.9)
MONOCYTES NFR BLD AUTO: 5.4 % — SIGNIFICANT CHANGE UP (ref 2–14)
NEUTROPHILS # BLD AUTO: 6.6 K/UL — SIGNIFICANT CHANGE UP (ref 1.8–7.4)
NEUTROPHILS NFR BLD AUTO: 46.1 % — SIGNIFICANT CHANGE UP (ref 43–77)
NITRITE URINE: NEGATIVE
PH URINE: 7
PLATELET # BLD AUTO: 303 K/UL — SIGNIFICANT CHANGE UP (ref 150–400)
POTASSIUM SERPL-SCNC: 4.7 MMOL/L
PROT SERPL-MCNC: 8.1 G/DL
PROT UR-MCNC: 9 MG/DL
PROTEIN URINE: NEGATIVE MG/DL
RBC # BLD: 4.11 M/UL — SIGNIFICANT CHANGE UP (ref 3.8–5.2)
RBC # FLD: 26.9 % — HIGH (ref 10.3–14.5)
SODIUM SERPL-SCNC: 140 MMOL/L
SPECIFIC GRAVITY URINE: 1.02
UROBILINOGEN URINE: NORMAL MG/DL
WBC # BLD: 14.2 K/UL — HIGH (ref 3.8–10.5)
WBC # FLD AUTO: 14.2 K/UL — HIGH (ref 3.8–10.5)

## 2017-05-28 LAB — DSDNA AB SER-ACNC: 21 IU/ML

## 2017-05-29 LAB
MPO AB + PR3 PNL SER: NORMAL
PS IGA SER QL: <20 U/ML
PS IGG SER QL: 10 U/ML
PS IGM SER QL: <25 U/ML

## 2017-05-30 LAB
CARDIOLIPIN IGM SER-MCNC: 10.6 GPL
CARDIOLIPIN IGM SER-MCNC: 8.5 MPL
DEPRECATED CARDIOLIPIN IGA SER: <5 APL

## 2017-05-31 ENCOUNTER — LABORATORY RESULT (OUTPATIENT)
Age: 47
End: 2017-05-31

## 2017-05-31 LAB
B2 GLYCOPROT1 IGA SERPL IA-ACNC: 9.6 SAU
B2 GLYCOPROT1 IGG SER-ACNC: <5 SGU
B2 GLYCOPROT1 IGM SER-ACNC: <5 SMU

## 2017-06-02 ENCOUNTER — APPOINTMENT (OUTPATIENT)
Dept: HEMATOLOGY ONCOLOGY | Facility: CLINIC | Age: 47
End: 2017-06-02

## 2017-06-02 ENCOUNTER — RESULT REVIEW (OUTPATIENT)
Age: 47
End: 2017-06-02

## 2017-06-02 LAB
ALBUMIN SERPL ELPH-MCNC: 3.7 G/DL
ALP BLD-CCNC: 72 U/L
ALT SERPL-CCNC: 12 U/L
ANION GAP SERPL CALC-SCNC: 17 MMOL/L
ANISOCYTOSIS BLD QL: SIGNIFICANT CHANGE UP
AST SERPL-CCNC: 10 U/L
BILIRUB SERPL-MCNC: <0.2 MG/DL
BUN SERPL-MCNC: 8 MG/DL
CALCIUM SERPL-MCNC: 9.1 MG/DL
CHLORIDE SERPL-SCNC: 103 MMOL/L
CO2 SERPL-SCNC: 20 MMOL/L
CREAT SERPL-MCNC: 0.83 MG/DL
DACRYOCYTES BLD QL SMEAR: SLIGHT — SIGNIFICANT CHANGE UP
ELLIPTOCYTES BLD QL SMEAR: SLIGHT — SIGNIFICANT CHANGE UP
EOSINOPHIL # BLD AUTO: 8.6 K/UL — HIGH (ref 0–0.5)
EOSINOPHIL NFR BLD AUTO: 57 % — HIGH (ref 0–6)
GLUCOSE SERPL-MCNC: 163 MG/DL
HCT VFR BLD CALC: 29.5 % — LOW (ref 34.5–45)
HGB BLD-MCNC: 9.6 G/DL — LOW (ref 11.5–15.5)
HYPOCHROMIA BLD QL: SLIGHT — SIGNIFICANT CHANGE UP
LDH SERPL-CCNC: 232 U/L
LG PLATELETS BLD QL AUTO: SLIGHT — SIGNIFICANT CHANGE UP
LYMPHOCYTES # BLD AUTO: 1.4 K/UL — SIGNIFICANT CHANGE UP (ref 1–3.3)
LYMPHOCYTES # BLD AUTO: 10 % — LOW (ref 13–44)
MACROCYTES BLD QL: SLIGHT — SIGNIFICANT CHANGE UP
MCHC RBC-ENTMCNC: 28.5 PG — SIGNIFICANT CHANGE UP (ref 27–34)
MCHC RBC-ENTMCNC: 32.6 G/DL — SIGNIFICANT CHANGE UP (ref 32–36)
MCV RBC AUTO: 87.5 FL — SIGNIFICANT CHANGE UP (ref 80–100)
MICROCYTES BLD QL: SLIGHT — SIGNIFICANT CHANGE UP
MONOCYTES # BLD AUTO: 0.5 K/UL — SIGNIFICANT CHANGE UP (ref 0–0.9)
MONOCYTES NFR BLD AUTO: 6 % — SIGNIFICANT CHANGE UP (ref 2–14)
NEUTROPHILS # BLD AUTO: 3.9 K/UL — SIGNIFICANT CHANGE UP (ref 1.8–7.4)
NEUTROPHILS NFR BLD AUTO: 27 % — LOW (ref 43–77)
PLAT MORPH BLD: NORMAL — SIGNIFICANT CHANGE UP
PLATELET # BLD AUTO: 329 K/UL — SIGNIFICANT CHANGE UP (ref 150–400)
POIKILOCYTOSIS BLD QL AUTO: SLIGHT — SIGNIFICANT CHANGE UP
POLYCHROMASIA BLD QL SMEAR: SLIGHT — SIGNIFICANT CHANGE UP
POTASSIUM SERPL-SCNC: 4.3 MMOL/L
PROT SERPL-MCNC: 7.2 G/DL
RBC # BLD: 3.38 M/UL — LOW (ref 3.8–5.2)
RBC # FLD: 25.2 % — HIGH (ref 10.3–14.5)
RBC BLD AUTO: ABNORMAL
SCHISTOCYTES BLD QL AUTO: SLIGHT — SIGNIFICANT CHANGE UP
SODIUM SERPL-SCNC: 140 MMOL/L
WBC # BLD: 14.3 K/UL — HIGH (ref 3.8–10.5)
WBC # FLD AUTO: 14.3 K/UL — HIGH (ref 3.8–10.5)

## 2017-06-07 ENCOUNTER — RESULT REVIEW (OUTPATIENT)
Age: 47
End: 2017-06-07

## 2017-06-07 ENCOUNTER — RX RENEWAL (OUTPATIENT)
Age: 47
End: 2017-06-07

## 2017-06-07 ENCOUNTER — APPOINTMENT (OUTPATIENT)
Dept: HEMATOLOGY ONCOLOGY | Facility: CLINIC | Age: 47
End: 2017-06-07

## 2017-06-07 LAB
BASOPHILS # BLD AUTO: 0.3 K/UL — HIGH (ref 0–0.2)
BASOPHILS NFR BLD AUTO: 1.4 % — SIGNIFICANT CHANGE UP (ref 0–2)
EOSINOPHIL # BLD AUTO: 10.9 K/UL — HIGH (ref 0–0.5)
EOSINOPHIL NFR BLD AUTO: 60.5 % — HIGH (ref 0–6)
HCT VFR BLD CALC: 32.7 % — LOW (ref 34.5–45)
HGB BLD-MCNC: 10.6 G/DL — LOW (ref 11.5–15.5)
LYMPHOCYTES # BLD AUTO: 1.9 K/UL — SIGNIFICANT CHANGE UP (ref 1–3.3)
LYMPHOCYTES # BLD AUTO: 10.7 % — LOW (ref 13–44)
MCHC RBC-ENTMCNC: 27.7 PG — SIGNIFICANT CHANGE UP (ref 27–34)
MCHC RBC-ENTMCNC: 32.6 G/DL — SIGNIFICANT CHANGE UP (ref 32–36)
MCV RBC AUTO: 84.9 FL — SIGNIFICANT CHANGE UP (ref 80–100)
MONOCYTES # BLD AUTO: 0.5 K/UL — SIGNIFICANT CHANGE UP (ref 0–0.9)
MONOCYTES NFR BLD AUTO: 2.6 % — SIGNIFICANT CHANGE UP (ref 2–14)
NEUTROPHILS # BLD AUTO: 4.4 K/UL — SIGNIFICANT CHANGE UP (ref 1.8–7.4)
NEUTROPHILS NFR BLD AUTO: 24.8 % — LOW (ref 43–77)
PLATELET # BLD AUTO: 384 K/UL — SIGNIFICANT CHANGE UP (ref 150–400)
RBC # BLD: 3.85 M/UL — SIGNIFICANT CHANGE UP (ref 3.8–5.2)
RBC # FLD: 27.2 % — HIGH (ref 10.3–14.5)
WBC # BLD: 18 K/UL — HIGH (ref 3.8–10.5)
WBC # FLD AUTO: 18 K/UL — HIGH (ref 3.8–10.5)

## 2017-06-15 ENCOUNTER — RESULT REVIEW (OUTPATIENT)
Age: 47
End: 2017-06-15

## 2017-06-15 ENCOUNTER — APPOINTMENT (OUTPATIENT)
Dept: HEMATOLOGY ONCOLOGY | Facility: CLINIC | Age: 47
End: 2017-06-15

## 2017-06-15 VITALS
BODY MASS INDEX: 32.81 KG/M2 | OXYGEN SATURATION: 100 % | SYSTOLIC BLOOD PRESSURE: 135 MMHG | TEMPERATURE: 98.6 F | DIASTOLIC BLOOD PRESSURE: 87 MMHG | HEART RATE: 86 BPM | RESPIRATION RATE: 16 BRPM | WEIGHT: 203.27 LBS

## 2017-06-15 LAB
BASOPHILS # BLD AUTO: 0.3 K/UL — HIGH (ref 0–0.2)
BASOPHILS NFR BLD AUTO: 2.2 % — HIGH (ref 0–2)
EOSINOPHIL # BLD AUTO: 7.8 K/UL — HIGH (ref 0–0.5)
EOSINOPHIL NFR BLD AUTO: 50.6 % — HIGH (ref 0–6)
HCT VFR BLD CALC: 30.7 % — LOW (ref 34.5–45)
HGB BLD-MCNC: 10 G/DL — LOW (ref 11.5–15.5)
LYMPHOCYTES # BLD AUTO: 1.6 K/UL — SIGNIFICANT CHANGE UP (ref 1–3.3)
LYMPHOCYTES # BLD AUTO: 10.3 % — LOW (ref 13–44)
MCHC RBC-ENTMCNC: 27.2 PG — SIGNIFICANT CHANGE UP (ref 27–34)
MCHC RBC-ENTMCNC: 32.4 G/DL — SIGNIFICANT CHANGE UP (ref 32–36)
MCV RBC AUTO: 84 FL — SIGNIFICANT CHANGE UP (ref 80–100)
MONOCYTES # BLD AUTO: 0.4 K/UL — SIGNIFICANT CHANGE UP (ref 0–0.9)
MONOCYTES NFR BLD AUTO: 2.8 % — SIGNIFICANT CHANGE UP (ref 2–14)
NEUTROPHILS # BLD AUTO: 5.2 K/UL — SIGNIFICANT CHANGE UP (ref 1.8–7.4)
NEUTROPHILS NFR BLD AUTO: 34.1 % — LOW (ref 43–77)
PLATELET # BLD AUTO: 274 K/UL — SIGNIFICANT CHANGE UP (ref 150–400)
RBC # BLD: 3.66 M/UL — LOW (ref 3.8–5.2)
RBC # FLD: 24.5 % — HIGH (ref 10.3–14.5)
WBC # BLD: 15.4 K/UL — HIGH (ref 3.8–10.5)
WBC # FLD AUTO: 15.4 K/UL — HIGH (ref 3.8–10.5)

## 2017-06-16 ENCOUNTER — OUTPATIENT (OUTPATIENT)
Dept: OUTPATIENT SERVICES | Facility: HOSPITAL | Age: 47
LOS: 1 days | Discharge: HOME | End: 2017-06-16

## 2017-06-16 DIAGNOSIS — D72.1 EOSINOPHILIA: ICD-10-CM

## 2017-06-16 DIAGNOSIS — D25.9 LEIOMYOMA OF UTERUS, UNSPECIFIED: Chronic | ICD-10-CM

## 2017-06-16 LAB
ALBUMIN SERPL ELPH-MCNC: 3.7 G/DL
ALP BLD-CCNC: 67 U/L
ALT SERPL-CCNC: 10 U/L
ANION GAP SERPL CALC-SCNC: 15 MMOL/L
AST SERPL-CCNC: 15 U/L
BILIRUB SERPL-MCNC: 0.2 MG/DL
BUN SERPL-MCNC: 9 MG/DL
CALCIUM SERPL-MCNC: 8.2 MG/DL
CHLORIDE SERPL-SCNC: 101 MMOL/L
CO2 SERPL-SCNC: 23 MMOL/L
CREAT SERPL-MCNC: 0.74 MG/DL
GLUCOSE SERPL-MCNC: 139 MG/DL
POTASSIUM SERPL-SCNC: 3.9 MMOL/L
PROT SERPL-MCNC: 7.5 G/DL
SODIUM SERPL-SCNC: 139 MMOL/L

## 2017-06-21 ENCOUNTER — APPOINTMENT (OUTPATIENT)
Dept: HEMATOLOGY ONCOLOGY | Facility: CLINIC | Age: 47
End: 2017-06-21

## 2017-06-22 ENCOUNTER — APPOINTMENT (OUTPATIENT)
Dept: DERMATOLOGY | Facility: CLINIC | Age: 47
End: 2017-06-22

## 2017-06-22 VITALS — DIASTOLIC BLOOD PRESSURE: 92 MMHG | SYSTOLIC BLOOD PRESSURE: 140 MMHG

## 2017-06-27 RX ORDER — DIPHENHYDRAMINE HCL 25 MG/1
25 CAPSULE ORAL
Qty: 100 | Refills: 0 | Status: ACTIVE | COMMUNITY
Start: 2017-04-17

## 2017-06-30 ENCOUNTER — RESULT REVIEW (OUTPATIENT)
Age: 47
End: 2017-06-30

## 2017-06-30 ENCOUNTER — APPOINTMENT (OUTPATIENT)
Dept: HEMATOLOGY ONCOLOGY | Facility: CLINIC | Age: 47
End: 2017-06-30

## 2017-06-30 VITALS
DIASTOLIC BLOOD PRESSURE: 84 MMHG | HEART RATE: 121 BPM | WEIGHT: 212.08 LBS | SYSTOLIC BLOOD PRESSURE: 140 MMHG | OXYGEN SATURATION: 100 % | RESPIRATION RATE: 16 BRPM | TEMPERATURE: 98.3 F | BODY MASS INDEX: 34.23 KG/M2

## 2017-06-30 LAB
ALBUMIN SERPL ELPH-MCNC: 3.9 G/DL
ALP BLD-CCNC: 66 U/L
ALT SERPL-CCNC: 14 U/L
ANION GAP SERPL CALC-SCNC: 18 MMOL/L
ANISOCYTOSIS BLD QL: SIGNIFICANT CHANGE UP
AST SERPL-CCNC: 15 U/L
BASOPHILS NFR BLD AUTO: 6 % — HIGH (ref 0–2)
BILIRUB SERPL-MCNC: 0.2 MG/DL
BUN SERPL-MCNC: 13 MG/DL
CALCIUM SERPL-MCNC: 8.7 MG/DL
CHLORIDE SERPL-SCNC: 101 MMOL/L
CO2 SERPL-SCNC: 21 MMOL/L
CREAT SERPL-MCNC: 0.81 MG/DL
DACRYOCYTES BLD QL SMEAR: SLIGHT — SIGNIFICANT CHANGE UP
ELLIPTOCYTES BLD QL SMEAR: SLIGHT — SIGNIFICANT CHANGE UP
EOSINOPHIL NFR BLD AUTO: 29 % — HIGH (ref 0–6)
GLUCOSE SERPL-MCNC: 184 MG/DL
HCT VFR BLD CALC: 28.5 % — LOW (ref 34.5–45)
HGB BLD-MCNC: 9.3 G/DL — LOW (ref 11.5–15.5)
HYPOCHROMIA BLD QL: SLIGHT — SIGNIFICANT CHANGE UP
LDH SERPL-CCNC: 256 U/L
LYMPHOCYTES # BLD AUTO: 7 % — LOW (ref 13–44)
LYMPHOCYTES # BLD AUTO: SIGNIFICANT CHANGE UP (ref 1–3.3)
MACROCYTES BLD QL: SLIGHT — SIGNIFICANT CHANGE UP
MCHC RBC-ENTMCNC: 27.2 PG — SIGNIFICANT CHANGE UP (ref 27–34)
MCHC RBC-ENTMCNC: 32.7 G/DL — SIGNIFICANT CHANGE UP (ref 32–36)
MCV RBC AUTO: 83.3 FL — SIGNIFICANT CHANGE UP (ref 80–100)
MICROCYTES BLD QL: SLIGHT — SIGNIFICANT CHANGE UP
MONOCYTES NFR BLD AUTO: 1 % — LOW (ref 2–14)
NEUTROPHILS # BLD AUTO: SIGNIFICANT CHANGE UP (ref 1.8–7.4)
NEUTROPHILS NFR BLD AUTO: 57 % — SIGNIFICANT CHANGE UP (ref 43–77)
PLAT MORPH BLD: NORMAL — SIGNIFICANT CHANGE UP
PLATELET # BLD AUTO: 290 K/UL — SIGNIFICANT CHANGE UP (ref 150–400)
POIKILOCYTOSIS BLD QL AUTO: SLIGHT — SIGNIFICANT CHANGE UP
POLYCHROMASIA BLD QL SMEAR: SLIGHT — SIGNIFICANT CHANGE UP
POTASSIUM SERPL-SCNC: 3.6 MMOL/L
PROT SERPL-MCNC: 7.1 G/DL
RBC # BLD: 3.42 M/UL — LOW (ref 3.8–5.2)
RBC # FLD: 24.5 % — HIGH (ref 10.3–14.5)
RBC BLD AUTO: ABNORMAL
SCHISTOCYTES BLD QL AUTO: SLIGHT — SIGNIFICANT CHANGE UP
SODIUM SERPL-SCNC: 140 MMOL/L
WBC # BLD: 10.8 K/UL — HIGH (ref 3.8–10.5)
WBC # FLD AUTO: 10.8 K/UL — HIGH (ref 3.8–10.5)

## 2017-07-05 ENCOUNTER — RESULT REVIEW (OUTPATIENT)
Age: 47
End: 2017-07-05

## 2017-07-05 ENCOUNTER — APPOINTMENT (OUTPATIENT)
Dept: HEMATOLOGY ONCOLOGY | Facility: CLINIC | Age: 47
End: 2017-07-05

## 2017-07-12 ENCOUNTER — RESULT REVIEW (OUTPATIENT)
Age: 47
End: 2017-07-12

## 2017-07-12 ENCOUNTER — APPOINTMENT (OUTPATIENT)
Dept: HEMATOLOGY ONCOLOGY | Facility: CLINIC | Age: 47
End: 2017-07-12

## 2017-07-14 ENCOUNTER — OUTPATIENT (OUTPATIENT)
Dept: OUTPATIENT SERVICES | Facility: HOSPITAL | Age: 47
LOS: 1 days | Discharge: HOME | End: 2017-07-14

## 2017-07-14 DIAGNOSIS — D25.9 LEIOMYOMA OF UTERUS, UNSPECIFIED: Chronic | ICD-10-CM

## 2017-07-14 DIAGNOSIS — D72.1 EOSINOPHILIA: ICD-10-CM

## 2017-07-19 ENCOUNTER — RESULT REVIEW (OUTPATIENT)
Age: 47
End: 2017-07-19

## 2017-07-19 ENCOUNTER — APPOINTMENT (OUTPATIENT)
Dept: HEMATOLOGY ONCOLOGY | Facility: CLINIC | Age: 47
End: 2017-07-19

## 2017-07-19 LAB
BASOPHILS # BLD AUTO: 0.3 K/UL — HIGH (ref 0–0.2)
BASOPHILS NFR BLD AUTO: 2.6 % — HIGH (ref 0–2)
EOSINOPHIL # BLD AUTO: 3.2 K/UL — HIGH (ref 0–0.5)
EOSINOPHIL NFR BLD AUTO: 32.6 % — HIGH (ref 0–6)
HCT VFR BLD CALC: 30.6 % — LOW (ref 34.5–45)
HGB BLD-MCNC: 9.7 G/DL — LOW (ref 11.5–15.5)
LYMPHOCYTES # BLD AUTO: 2.1 K/UL — SIGNIFICANT CHANGE UP (ref 1–3.3)
LYMPHOCYTES # BLD AUTO: 21.3 % — SIGNIFICANT CHANGE UP (ref 13–44)
MCHC RBC-ENTMCNC: 26.3 PG — LOW (ref 27–34)
MCHC RBC-ENTMCNC: 31.8 G/DL — LOW (ref 32–36)
MCV RBC AUTO: 82.6 FL — SIGNIFICANT CHANGE UP (ref 80–100)
MONOCYTES # BLD AUTO: 0.3 K/UL — SIGNIFICANT CHANGE UP (ref 0–0.9)
MONOCYTES NFR BLD AUTO: 3.4 % — SIGNIFICANT CHANGE UP (ref 2–14)
NEUTROPHILS # BLD AUTO: 4 K/UL — SIGNIFICANT CHANGE UP (ref 1.8–7.4)
NEUTROPHILS NFR BLD AUTO: 40.1 % — LOW (ref 43–77)
PLATELET # BLD AUTO: 301 K/UL — SIGNIFICANT CHANGE UP (ref 150–400)
RBC # BLD: 3.7 M/UL — LOW (ref 3.8–5.2)
RBC # FLD: 23.7 % — HIGH (ref 10.3–14.5)
WBC # BLD: 9.8 K/UL — SIGNIFICANT CHANGE UP (ref 3.8–10.5)
WBC # FLD AUTO: 9.8 K/UL — SIGNIFICANT CHANGE UP (ref 3.8–10.5)

## 2017-07-25 ENCOUNTER — APPOINTMENT (OUTPATIENT)
Dept: HEMATOLOGY ONCOLOGY | Facility: CLINIC | Age: 47
End: 2017-07-25

## 2017-07-26 ENCOUNTER — APPOINTMENT (OUTPATIENT)
Dept: HEMATOLOGY ONCOLOGY | Facility: CLINIC | Age: 47
End: 2017-07-26

## 2017-07-26 ENCOUNTER — RESULT REVIEW (OUTPATIENT)
Age: 47
End: 2017-07-26

## 2017-07-26 VITALS
HEIGHT: 65.98 IN | WEIGHT: 217.6 LBS | OXYGEN SATURATION: 100 % | SYSTOLIC BLOOD PRESSURE: 162 MMHG | TEMPERATURE: 98.5 F | HEART RATE: 96 BPM | DIASTOLIC BLOOD PRESSURE: 94 MMHG | BODY MASS INDEX: 34.97 KG/M2 | RESPIRATION RATE: 16 BRPM

## 2017-07-26 LAB
BASOPHILS # BLD AUTO: 0.3 K/UL — HIGH (ref 0–0.2)
BASOPHILS NFR BLD AUTO: 3 % — HIGH (ref 0–2)
EOSINOPHIL # BLD AUTO: 2.6 K/UL — HIGH (ref 0–0.5)
EOSINOPHIL NFR BLD AUTO: 29.8 % — HIGH (ref 0–6)
HCT VFR BLD CALC: 31.5 % — LOW (ref 34.5–45)
HGB BLD-MCNC: 9.8 G/DL — LOW (ref 11.5–15.5)
LYMPHOCYTES # BLD AUTO: 1.9 K/UL — SIGNIFICANT CHANGE UP (ref 1–3.3)
LYMPHOCYTES # BLD AUTO: 22 % — SIGNIFICANT CHANGE UP (ref 13–44)
MCHC RBC-ENTMCNC: 25.7 PG — LOW (ref 27–34)
MCHC RBC-ENTMCNC: 31.2 G/DL — LOW (ref 32–36)
MCV RBC AUTO: 82.4 FL — SIGNIFICANT CHANGE UP (ref 80–100)
MONOCYTES # BLD AUTO: 0.3 K/UL — SIGNIFICANT CHANGE UP (ref 0–0.9)
MONOCYTES NFR BLD AUTO: 3.7 % — SIGNIFICANT CHANGE UP (ref 2–14)
NEUTROPHILS # BLD AUTO: 3.5 K/UL — SIGNIFICANT CHANGE UP (ref 1.8–7.4)
NEUTROPHILS NFR BLD AUTO: 41.4 % — LOW (ref 43–77)
PLATELET # BLD AUTO: 355 K/UL — SIGNIFICANT CHANGE UP (ref 150–400)
RBC # BLD: 3.82 M/UL — SIGNIFICANT CHANGE UP (ref 3.8–5.2)
RBC # FLD: 22.8 % — HIGH (ref 10.3–14.5)
WBC # BLD: 8.5 K/UL — SIGNIFICANT CHANGE UP (ref 3.8–10.5)
WBC # FLD AUTO: 8.5 K/UL — SIGNIFICANT CHANGE UP (ref 3.8–10.5)

## 2017-07-26 RX ORDER — HYDROXYUREA 500 MG/1
500 CAPSULE ORAL
Qty: 180 | Refills: 1 | Status: DISCONTINUED | COMMUNITY
Start: 2017-07-14 | End: 2017-07-26

## 2017-07-27 LAB
ALBUMIN SERPL ELPH-MCNC: 4.1 G/DL
ALP BLD-CCNC: 71 U/L
ALT SERPL-CCNC: 12 U/L
ANION GAP SERPL CALC-SCNC: 18 MMOL/L
AST SERPL-CCNC: 15 U/L
BILIRUB SERPL-MCNC: 0.2 MG/DL
BUN SERPL-MCNC: 14 MG/DL
CALCIUM SERPL-MCNC: 9.6 MG/DL
CHLORIDE SERPL-SCNC: 104 MMOL/L
CO2 SERPL-SCNC: 24 MMOL/L
CREAT SERPL-MCNC: 0.87 MG/DL
GLUCOSE SERPL-MCNC: 125 MG/DL
LDH SERPL-CCNC: 317 U/L
POTASSIUM SERPL-SCNC: 4.7 MMOL/L
PROT SERPL-MCNC: 7.7 G/DL
SODIUM SERPL-SCNC: 146 MMOL/L

## 2017-08-02 ENCOUNTER — APPOINTMENT (OUTPATIENT)
Dept: HEMATOLOGY ONCOLOGY | Facility: CLINIC | Age: 47
End: 2017-08-02

## 2017-08-02 ENCOUNTER — RESULT REVIEW (OUTPATIENT)
Age: 47
End: 2017-08-02

## 2017-08-02 LAB
BASOPHILS # BLD AUTO: 0.2 K/UL — SIGNIFICANT CHANGE UP (ref 0–0.2)
BASOPHILS NFR BLD AUTO: 2 % — SIGNIFICANT CHANGE UP (ref 0–2)
EOSINOPHIL # BLD AUTO: 2.8 K/UL — HIGH (ref 0–0.5)
EOSINOPHIL NFR BLD AUTO: 30.5 % — HIGH (ref 0–6)
HCT VFR BLD CALC: 31.9 % — LOW (ref 34.5–45)
HGB BLD-MCNC: 10.3 G/DL — LOW (ref 11.5–15.5)
LYMPHOCYTES # BLD AUTO: 3.3 K/UL — SIGNIFICANT CHANGE UP (ref 1–3.3)
LYMPHOCYTES # BLD AUTO: 35.7 % — SIGNIFICANT CHANGE UP (ref 13–44)
MCHC RBC-ENTMCNC: 25.9 PG — LOW (ref 27–34)
MCHC RBC-ENTMCNC: 32.2 G/DL — SIGNIFICANT CHANGE UP (ref 32–36)
MCV RBC AUTO: 80.4 FL — SIGNIFICANT CHANGE UP (ref 80–100)
MONOCYTES # BLD AUTO: 0.7 K/UL — SIGNIFICANT CHANGE UP (ref 0–0.9)
MONOCYTES NFR BLD AUTO: 7.4 % — SIGNIFICANT CHANGE UP (ref 2–14)
NEUTROPHILS # BLD AUTO: 2.2 K/UL — SIGNIFICANT CHANGE UP (ref 1.8–7.4)
NEUTROPHILS NFR BLD AUTO: 24.4 % — LOW (ref 43–77)
PLATELET # BLD AUTO: 232 K/UL — SIGNIFICANT CHANGE UP (ref 150–400)
RBC # BLD: 3.97 M/UL — SIGNIFICANT CHANGE UP (ref 3.8–5.2)
RBC # FLD: 22.7 % — HIGH (ref 10.3–14.5)
WBC # BLD: 9.2 K/UL — SIGNIFICANT CHANGE UP (ref 3.8–10.5)
WBC # FLD AUTO: 9.2 K/UL — SIGNIFICANT CHANGE UP (ref 3.8–10.5)

## 2017-08-08 ENCOUNTER — RESULT REVIEW (OUTPATIENT)
Age: 47
End: 2017-08-08

## 2017-08-08 ENCOUNTER — APPOINTMENT (OUTPATIENT)
Dept: HEMATOLOGY ONCOLOGY | Facility: CLINIC | Age: 47
End: 2017-08-08
Payer: COMMERCIAL

## 2017-08-08 VITALS
HEIGHT: 65.98 IN | WEIGHT: 222.67 LBS | HEART RATE: 98 BPM | DIASTOLIC BLOOD PRESSURE: 92 MMHG | SYSTOLIC BLOOD PRESSURE: 153 MMHG | RESPIRATION RATE: 16 BRPM | OXYGEN SATURATION: 100 % | TEMPERATURE: 98.2 F | BODY MASS INDEX: 35.79 KG/M2

## 2017-08-08 LAB
BASOPHILS # BLD AUTO: 0.2 K/UL — SIGNIFICANT CHANGE UP (ref 0–0.2)
BASOPHILS NFR BLD AUTO: 2.2 % — HIGH (ref 0–2)
EOSINOPHIL # BLD AUTO: 2.7 K/UL — HIGH (ref 0–0.5)
EOSINOPHIL NFR BLD AUTO: 30.4 % — HIGH (ref 0–6)
HCT VFR BLD CALC: 34.4 % — LOW (ref 34.5–45)
HGB BLD-MCNC: 10 G/DL — LOW (ref 11.5–15.5)
LYMPHOCYTES # BLD AUTO: 1.3 K/UL — SIGNIFICANT CHANGE UP (ref 1–3.3)
LYMPHOCYTES # BLD AUTO: 15 % — SIGNIFICANT CHANGE UP (ref 13–44)
MCHC RBC-ENTMCNC: 23.9 PG — LOW (ref 27–34)
MCHC RBC-ENTMCNC: 29.2 G/DL — LOW (ref 32–36)
MCV RBC AUTO: 81.8 FL — SIGNIFICANT CHANGE UP (ref 80–100)
MONOCYTES # BLD AUTO: 0.3 K/UL — SIGNIFICANT CHANGE UP (ref 0–0.9)
MONOCYTES NFR BLD AUTO: 3.9 % — SIGNIFICANT CHANGE UP (ref 2–14)
NEUTROPHILS # BLD AUTO: 4.4 K/UL — SIGNIFICANT CHANGE UP (ref 1.8–7.4)
NEUTROPHILS NFR BLD AUTO: 48.6 % — SIGNIFICANT CHANGE UP (ref 43–77)
PLATELET # BLD AUTO: 173 K/UL — SIGNIFICANT CHANGE UP (ref 150–400)
RBC # BLD: 4.2 M/UL — SIGNIFICANT CHANGE UP (ref 3.8–5.2)
RBC # FLD: 24.1 % — HIGH (ref 10.3–14.5)
WBC # BLD: 9 K/UL — SIGNIFICANT CHANGE UP (ref 3.8–10.5)
WBC # FLD AUTO: 9 K/UL — SIGNIFICANT CHANGE UP (ref 3.8–10.5)

## 2017-08-08 PROCEDURE — 99214 OFFICE O/P EST MOD 30 MIN: CPT

## 2017-08-09 LAB
ALBUMIN SERPL ELPH-MCNC: 3.8 G/DL
ALP BLD-CCNC: 74 U/L
ALT SERPL-CCNC: 18 U/L
ANION GAP SERPL CALC-SCNC: 16 MMOL/L
AST SERPL-CCNC: 18 U/L
BILIRUB SERPL-MCNC: 0.2 MG/DL
BUN SERPL-MCNC: 9 MG/DL
CALCIUM SERPL-MCNC: 8.9 MG/DL
CHLORIDE SERPL-SCNC: 102 MMOL/L
CO2 SERPL-SCNC: 23 MMOL/L
CREAT SERPL-MCNC: 0.92 MG/DL
GLUCOSE SERPL-MCNC: 179 MG/DL
POTASSIUM SERPL-SCNC: 3.7 MMOL/L
PROT SERPL-MCNC: 7.4 G/DL
SODIUM SERPL-SCNC: 141 MMOL/L

## 2017-08-15 ENCOUNTER — OTHER (OUTPATIENT)
Age: 47
End: 2017-08-15

## 2017-08-17 ENCOUNTER — OUTPATIENT (OUTPATIENT)
Dept: OUTPATIENT SERVICES | Facility: HOSPITAL | Age: 47
LOS: 1 days | Discharge: HOME | End: 2017-08-17

## 2017-08-17 DIAGNOSIS — D25.9 LEIOMYOMA OF UTERUS, UNSPECIFIED: Chronic | ICD-10-CM

## 2017-08-17 DIAGNOSIS — D72.1 EOSINOPHILIA: ICD-10-CM

## 2017-08-22 ENCOUNTER — APPOINTMENT (OUTPATIENT)
Dept: HEMATOLOGY ONCOLOGY | Facility: CLINIC | Age: 47
End: 2017-08-22

## 2017-08-29 ENCOUNTER — APPOINTMENT (OUTPATIENT)
Dept: HEMATOLOGY ONCOLOGY | Facility: CLINIC | Age: 47
End: 2017-08-29
Payer: MEDICAID

## 2017-08-29 ENCOUNTER — RESULT REVIEW (OUTPATIENT)
Age: 47
End: 2017-08-29

## 2017-08-29 VITALS
TEMPERATURE: 97.7 F | RESPIRATION RATE: 18 BRPM | SYSTOLIC BLOOD PRESSURE: 128 MMHG | HEART RATE: 139 BPM | BODY MASS INDEX: 34.53 KG/M2 | WEIGHT: 213.85 LBS | OXYGEN SATURATION: 100 % | DIASTOLIC BLOOD PRESSURE: 86 MMHG

## 2017-08-29 VITALS — HEART RATE: 115 BPM

## 2017-08-29 LAB
BASOPHILS # BLD AUTO: 0.2 K/UL — SIGNIFICANT CHANGE UP (ref 0–0.2)
BASOPHILS NFR BLD AUTO: 1.6 % — SIGNIFICANT CHANGE UP (ref 0–2)
EOSINOPHIL # BLD AUTO: 4.1 K/UL — HIGH (ref 0–0.5)
EOSINOPHIL NFR BLD AUTO: 31.7 % — HIGH (ref 0–6)
HCT VFR BLD CALC: 39.3 % — SIGNIFICANT CHANGE UP (ref 34.5–45)
HGB BLD-MCNC: 12.6 G/DL — SIGNIFICANT CHANGE UP (ref 11.5–15.5)
LYMPHOCYTES # BLD AUTO: 34.3 % — SIGNIFICANT CHANGE UP (ref 13–44)
LYMPHOCYTES # BLD AUTO: 4.4 K/UL — HIGH (ref 1–3.3)
MCHC RBC-ENTMCNC: 25.6 PG — LOW (ref 27–34)
MCHC RBC-ENTMCNC: 31.9 G/DL — LOW (ref 32–36)
MCV RBC AUTO: 80.3 FL — SIGNIFICANT CHANGE UP (ref 80–100)
MONOCYTES # BLD AUTO: 0.7 K/UL — SIGNIFICANT CHANGE UP (ref 0–0.9)
MONOCYTES NFR BLD AUTO: 5.3 % — SIGNIFICANT CHANGE UP (ref 2–14)
NEUTROPHILS # BLD AUTO: 3.5 K/UL — SIGNIFICANT CHANGE UP (ref 1.8–7.4)
NEUTROPHILS NFR BLD AUTO: 27.1 % — LOW (ref 43–77)
PLATELET # BLD AUTO: 207 K/UL — SIGNIFICANT CHANGE UP (ref 150–400)
RBC # BLD: 4.9 M/UL — SIGNIFICANT CHANGE UP (ref 3.8–5.2)
RBC # FLD: 24 % — HIGH (ref 10.3–14.5)
WBC # BLD: 12.8 K/UL — HIGH (ref 3.8–10.5)
WBC # FLD AUTO: 12.8 K/UL — HIGH (ref 3.8–10.5)

## 2017-08-29 PROCEDURE — 99214 OFFICE O/P EST MOD 30 MIN: CPT

## 2017-08-31 ENCOUNTER — APPOINTMENT (OUTPATIENT)
Dept: DERMATOLOGY | Facility: CLINIC | Age: 47
End: 2017-08-31
Payer: MEDICAID

## 2017-08-31 VITALS — DIASTOLIC BLOOD PRESSURE: 84 MMHG | SYSTOLIC BLOOD PRESSURE: 130 MMHG

## 2017-08-31 PROCEDURE — 99214 OFFICE O/P EST MOD 30 MIN: CPT

## 2017-08-31 RX ORDER — CLOBETASOL PROPIONATE 0.5 MG/G
0.05 OINTMENT TOPICAL
Qty: 120 | Refills: 0 | Status: DISCONTINUED | COMMUNITY
Start: 2016-11-16 | End: 2017-08-31

## 2017-08-31 RX ORDER — HYDROCORTISONE 10 MG/G
1 CREAM TOPICAL
Qty: 85 | Refills: 0 | Status: DISCONTINUED | COMMUNITY
Start: 2017-03-20 | End: 2017-08-31

## 2017-09-06 ENCOUNTER — RESULT REVIEW (OUTPATIENT)
Age: 47
End: 2017-09-06

## 2017-09-06 ENCOUNTER — APPOINTMENT (OUTPATIENT)
Dept: ULTRASOUND IMAGING | Facility: IMAGING CENTER | Age: 47
End: 2017-09-06
Payer: MEDICAID

## 2017-09-06 ENCOUNTER — OUTPATIENT (OUTPATIENT)
Dept: OUTPATIENT SERVICES | Facility: HOSPITAL | Age: 47
LOS: 1 days | Discharge: HOME | End: 2017-09-06

## 2017-09-06 ENCOUNTER — APPOINTMENT (OUTPATIENT)
Dept: HEMATOLOGY ONCOLOGY | Facility: CLINIC | Age: 47
End: 2017-09-06

## 2017-09-06 ENCOUNTER — OUTPATIENT (OUTPATIENT)
Dept: OUTPATIENT SERVICES | Facility: HOSPITAL | Age: 47
LOS: 1 days | End: 2017-09-06
Payer: MEDICAID

## 2017-09-06 DIAGNOSIS — D72.1 EOSINOPHILIA: ICD-10-CM

## 2017-09-06 DIAGNOSIS — M79.604 PAIN IN RIGHT LEG: ICD-10-CM

## 2017-09-06 DIAGNOSIS — D25.9 LEIOMYOMA OF UTERUS, UNSPECIFIED: Chronic | ICD-10-CM

## 2017-09-06 LAB
BASOPHILS # BLD AUTO: 0.2 K/UL — SIGNIFICANT CHANGE UP (ref 0–0.2)
BASOPHILS NFR BLD AUTO: 1.9 % — SIGNIFICANT CHANGE UP (ref 0–2)
EOSINOPHIL # BLD AUTO: 3.2 K/UL — HIGH (ref 0–0.5)
EOSINOPHIL NFR BLD AUTO: 34.5 % — HIGH (ref 0–6)
HCT VFR BLD CALC: 37 % — SIGNIFICANT CHANGE UP (ref 34.5–45)
HGB BLD-MCNC: 11.6 G/DL — SIGNIFICANT CHANGE UP (ref 11.5–15.5)
LYMPHOCYTES # BLD AUTO: 1.7 K/UL — SIGNIFICANT CHANGE UP (ref 1–3.3)
LYMPHOCYTES # BLD AUTO: 18.2 % — SIGNIFICANT CHANGE UP (ref 13–44)
MCHC RBC-ENTMCNC: 25.8 PG — LOW (ref 27–34)
MCHC RBC-ENTMCNC: 31.3 G/DL — LOW (ref 32–36)
MCV RBC AUTO: 82.4 FL — SIGNIFICANT CHANGE UP (ref 80–100)
MONOCYTES # BLD AUTO: 0.4 K/UL — SIGNIFICANT CHANGE UP (ref 0–0.9)
MONOCYTES NFR BLD AUTO: 4.6 % — SIGNIFICANT CHANGE UP (ref 2–14)
NEUTROPHILS # BLD AUTO: 3.8 K/UL — SIGNIFICANT CHANGE UP (ref 1.8–7.4)
NEUTROPHILS NFR BLD AUTO: 40.7 % — LOW (ref 43–77)
PLATELET # BLD AUTO: 399 K/UL — SIGNIFICANT CHANGE UP (ref 150–400)
RBC # BLD: 4.49 M/UL — SIGNIFICANT CHANGE UP (ref 3.8–5.2)
RBC # FLD: 23.7 % — HIGH (ref 10.3–14.5)
WBC # BLD: 9.3 K/UL — SIGNIFICANT CHANGE UP (ref 3.8–10.5)
WBC # FLD AUTO: 9.3 K/UL — SIGNIFICANT CHANGE UP (ref 3.8–10.5)

## 2017-09-06 PROCEDURE — 93971 EXTREMITY STUDY: CPT | Mod: 26,LT

## 2017-09-06 PROCEDURE — 93971 EXTREMITY STUDY: CPT

## 2017-09-07 LAB
ALBUMIN SERPL ELPH-MCNC: 4.1 G/DL
ALP BLD-CCNC: 64 U/L
ALT SERPL-CCNC: 23 U/L
ANION GAP SERPL CALC-SCNC: 18 MMOL/L
AST SERPL-CCNC: 19 U/L
BILIRUB SERPL-MCNC: 0.2 MG/DL
BUN SERPL-MCNC: 19 MG/DL
CALCIUM SERPL-MCNC: 10.1 MG/DL
CHLORIDE SERPL-SCNC: 95 MMOL/L
CO2 SERPL-SCNC: 26 MMOL/L
CREAT SERPL-MCNC: 1 MG/DL
GLUCOSE SERPL-MCNC: 154 MG/DL
POTASSIUM SERPL-SCNC: 3.5 MMOL/L
PROT SERPL-MCNC: 7.6 G/DL
SODIUM SERPL-SCNC: 139 MMOL/L

## 2017-09-12 ENCOUNTER — APPOINTMENT (OUTPATIENT)
Dept: HEMATOLOGY ONCOLOGY | Facility: CLINIC | Age: 47
End: 2017-09-12
Payer: MEDICAID

## 2017-09-19 ENCOUNTER — LABORATORY RESULT (OUTPATIENT)
Age: 47
End: 2017-09-19

## 2017-09-19 ENCOUNTER — RESULT REVIEW (OUTPATIENT)
Age: 47
End: 2017-09-19

## 2017-09-19 ENCOUNTER — APPOINTMENT (OUTPATIENT)
Dept: HEMATOLOGY ONCOLOGY | Facility: CLINIC | Age: 47
End: 2017-09-19
Payer: MEDICAID

## 2017-09-19 VITALS
BODY MASS INDEX: 35.64 KG/M2 | SYSTOLIC BLOOD PRESSURE: 114 MMHG | RESPIRATION RATE: 18 BRPM | DIASTOLIC BLOOD PRESSURE: 79 MMHG | TEMPERATURE: 98.9 F | OXYGEN SATURATION: 100 % | HEART RATE: 119 BPM | WEIGHT: 220.68 LBS

## 2017-09-19 LAB
BASOPHILS # BLD AUTO: 0.1 K/UL — SIGNIFICANT CHANGE UP (ref 0–0.2)
BASOPHILS NFR BLD AUTO: 1.3 % — SIGNIFICANT CHANGE UP (ref 0–2)
EOSINOPHIL # BLD AUTO: 1.7 K/UL — HIGH (ref 0–0.5)
EOSINOPHIL NFR BLD AUTO: 20.6 % — HIGH (ref 0–6)
HCT VFR BLD CALC: 37.6 % — SIGNIFICANT CHANGE UP (ref 34.5–45)
HGB BLD-MCNC: 11.6 G/DL — SIGNIFICANT CHANGE UP (ref 11.5–15.5)
LYMPHOCYTES # BLD AUTO: 1.9 K/UL — SIGNIFICANT CHANGE UP (ref 1–3.3)
LYMPHOCYTES # BLD AUTO: 23.2 % — SIGNIFICANT CHANGE UP (ref 13–44)
MCHC RBC-ENTMCNC: 25.5 PG — LOW (ref 27–34)
MCHC RBC-ENTMCNC: 30.7 G/DL — LOW (ref 32–36)
MCV RBC AUTO: 83.1 FL — SIGNIFICANT CHANGE UP (ref 80–100)
MONOCYTES # BLD AUTO: 0.2 K/UL — SIGNIFICANT CHANGE UP (ref 0–0.9)
MONOCYTES NFR BLD AUTO: 2.7 % — SIGNIFICANT CHANGE UP (ref 2–14)
NEUTROPHILS # BLD AUTO: 4.3 K/UL — SIGNIFICANT CHANGE UP (ref 1.8–7.4)
NEUTROPHILS NFR BLD AUTO: 52.2 % — SIGNIFICANT CHANGE UP (ref 43–77)
PLATELET # BLD AUTO: 283 K/UL — SIGNIFICANT CHANGE UP (ref 150–400)
RBC # BLD: 4.53 M/UL — SIGNIFICANT CHANGE UP (ref 3.8–5.2)
RBC # FLD: 22.6 % — HIGH (ref 10.3–14.5)
WBC # BLD: 8.2 K/UL — SIGNIFICANT CHANGE UP (ref 3.8–10.5)
WBC # FLD AUTO: 8.2 K/UL — SIGNIFICANT CHANGE UP (ref 3.8–10.5)

## 2017-09-19 PROCEDURE — 99214 OFFICE O/P EST MOD 30 MIN: CPT

## 2017-09-19 RX ORDER — PREDNISONE 10 MG/1
10 TABLET ORAL DAILY
Qty: 150 | Refills: 0 | Status: DISCONTINUED | COMMUNITY
Start: 2017-06-27 | End: 2017-09-19

## 2017-09-20 LAB
25(OH)D3 SERPL-MCNC: 9.8 NG/ML
ALBUMIN SERPL ELPH-MCNC: 4.3 G/DL
ALP BLD-CCNC: 70 U/L
ALT SERPL-CCNC: 27 U/L
ANION GAP SERPL CALC-SCNC: 20 MMOL/L
AST SERPL-CCNC: 23 U/L
BILIRUB SERPL-MCNC: 0.2 MG/DL
BUN SERPL-MCNC: 15 MG/DL
CALCIUM SERPL-MCNC: 10.4 MG/DL
CHLORIDE SERPL-SCNC: 95 MMOL/L
CO2 SERPL-SCNC: 26 MMOL/L
CREAT SERPL-MCNC: 1.07 MG/DL
GLUCOSE SERPL-MCNC: 154 MG/DL
POTASSIUM SERPL-SCNC: 3.7 MMOL/L
PROT SERPL-MCNC: 8.1 G/DL
SODIUM SERPL-SCNC: 141 MMOL/L
TSH SERPL-ACNC: 0.16 UIU/ML

## 2017-10-02 ENCOUNTER — OUTPATIENT (OUTPATIENT)
Dept: OUTPATIENT SERVICES | Facility: HOSPITAL | Age: 47
LOS: 1 days | Discharge: HOME | End: 2017-10-02

## 2017-10-02 DIAGNOSIS — D25.9 LEIOMYOMA OF UTERUS, UNSPECIFIED: Chronic | ICD-10-CM

## 2017-10-02 DIAGNOSIS — D72.1 EOSINOPHILIA: ICD-10-CM

## 2017-10-03 ENCOUNTER — RESULT REVIEW (OUTPATIENT)
Age: 47
End: 2017-10-03

## 2017-10-03 ENCOUNTER — APPOINTMENT (OUTPATIENT)
Dept: HEMATOLOGY ONCOLOGY | Facility: CLINIC | Age: 47
End: 2017-10-03
Payer: MEDICAID

## 2017-10-03 VITALS
BODY MASS INDEX: 35.96 KG/M2 | DIASTOLIC BLOOD PRESSURE: 78 MMHG | TEMPERATURE: 96.9 F | WEIGHT: 222.67 LBS | RESPIRATION RATE: 18 BRPM | SYSTOLIC BLOOD PRESSURE: 128 MMHG | HEART RATE: 112 BPM | OXYGEN SATURATION: 100 %

## 2017-10-03 DIAGNOSIS — E87.6 HYPOKALEMIA: ICD-10-CM

## 2017-10-03 LAB
BASOPHILS # BLD AUTO: 0.1 K/UL — SIGNIFICANT CHANGE UP (ref 0–0.2)
BASOPHILS NFR BLD AUTO: 0.9 % — SIGNIFICANT CHANGE UP (ref 0–2)
EOSINOPHIL # BLD AUTO: 1.8 K/UL — HIGH (ref 0–0.5)
EOSINOPHIL NFR BLD AUTO: 23.4 % — HIGH (ref 0–6)
HCT VFR BLD CALC: 38.3 % — SIGNIFICANT CHANGE UP (ref 34.5–45)
HGB BLD-MCNC: 12 G/DL — SIGNIFICANT CHANGE UP (ref 11.5–15.5)
LYMPHOCYTES # BLD AUTO: 2.5 K/UL — SIGNIFICANT CHANGE UP (ref 1–3.3)
LYMPHOCYTES # BLD AUTO: 32.9 % — SIGNIFICANT CHANGE UP (ref 13–44)
MCHC RBC-ENTMCNC: 27 PG — SIGNIFICANT CHANGE UP (ref 27–34)
MCHC RBC-ENTMCNC: 31.4 G/DL — LOW (ref 32–36)
MCV RBC AUTO: 86.1 FL — SIGNIFICANT CHANGE UP (ref 80–100)
MONOCYTES # BLD AUTO: 0.3 K/UL — SIGNIFICANT CHANGE UP (ref 0–0.9)
MONOCYTES NFR BLD AUTO: 3.4 % — SIGNIFICANT CHANGE UP (ref 2–14)
NEUTROPHILS # BLD AUTO: 3 K/UL — SIGNIFICANT CHANGE UP (ref 1.8–7.4)
NEUTROPHILS NFR BLD AUTO: 39.5 % — LOW (ref 43–77)
PLATELET # BLD AUTO: 379 K/UL — SIGNIFICANT CHANGE UP (ref 150–400)
RBC # BLD: 4.45 M/UL — SIGNIFICANT CHANGE UP (ref 3.8–5.2)
RBC # FLD: 23.8 % — HIGH (ref 10.3–14.5)
WBC # BLD: 7.6 K/UL — SIGNIFICANT CHANGE UP (ref 3.8–10.5)
WBC # FLD AUTO: 7.6 K/UL — SIGNIFICANT CHANGE UP (ref 3.8–10.5)

## 2017-10-03 PROCEDURE — 99214 OFFICE O/P EST MOD 30 MIN: CPT

## 2017-10-04 PROBLEM — E87.6 HYPOKALEMIA: Status: ACTIVE | Noted: 2017-10-04

## 2017-10-05 LAB
ALBUMIN SERPL ELPH-MCNC: 4 G/DL
ALP BLD-CCNC: 60 U/L
ALT SERPL-CCNC: 33 U/L
ANION GAP SERPL CALC-SCNC: 17 MMOL/L
AST SERPL-CCNC: 29 U/L
BILIRUB SERPL-MCNC: 0.3 MG/DL
BUN SERPL-MCNC: 17 MG/DL
CALCIUM SERPL-MCNC: 10.1 MG/DL
CHLORIDE SERPL-SCNC: 92 MMOL/L
CO2 SERPL-SCNC: 27 MMOL/L
CREAT SERPL-MCNC: 1.44 MG/DL
GLUCOSE SERPL-MCNC: 129 MG/DL
POTASSIUM SERPL-SCNC: 2.9 MMOL/L
PROT SERPL-MCNC: 7.3 G/DL
SODIUM SERPL-SCNC: 136 MMOL/L

## 2017-10-06 ENCOUNTER — APPOINTMENT (OUTPATIENT)
Dept: HEMATOLOGY ONCOLOGY | Facility: CLINIC | Age: 47
End: 2017-10-06

## 2017-10-06 ENCOUNTER — RESULT REVIEW (OUTPATIENT)
Age: 47
End: 2017-10-06

## 2017-10-06 LAB
BASOPHILS # BLD AUTO: 0.1 K/UL — SIGNIFICANT CHANGE UP (ref 0–0.2)
BASOPHILS NFR BLD AUTO: 1.4 % — SIGNIFICANT CHANGE UP (ref 0–2)
BUN SERPL-MCNC: 18 MG/DL — SIGNIFICANT CHANGE UP (ref 7–23)
CA-I BLDA-SCNC: 1.26 MMOL/L — SIGNIFICANT CHANGE UP (ref 1.12–1.3)
CHLORIDE SERPL-SCNC: 97 MMOL/L — SIGNIFICANT CHANGE UP (ref 96–108)
CO2 SERPL-SCNC: 26 MMOL/L — SIGNIFICANT CHANGE UP (ref 22–31)
CREAT SERPL-MCNC: 2.6 MG/DL — HIGH (ref 0.5–1.3)
EOSINOPHIL # BLD AUTO: 1.6 K/UL — HIGH (ref 0–0.5)
EOSINOPHIL NFR BLD AUTO: 23.2 % — HIGH (ref 0–6)
GLUCOSE SERPL-MCNC: 178 MG/DL — HIGH (ref 70–99)
HCT VFR BLD CALC: 39.6 % — SIGNIFICANT CHANGE UP (ref 34.5–45)
HGB BLD-MCNC: 12.4 G/DL — SIGNIFICANT CHANGE UP (ref 11.5–15.5)
LYMPHOCYTES # BLD AUTO: 1.6 K/UL — SIGNIFICANT CHANGE UP (ref 1–3.3)
LYMPHOCYTES # BLD AUTO: 22.5 % — SIGNIFICANT CHANGE UP (ref 13–44)
MCHC RBC-ENTMCNC: 27.4 PG — SIGNIFICANT CHANGE UP (ref 27–34)
MCHC RBC-ENTMCNC: 31.4 G/DL — LOW (ref 32–36)
MCV RBC AUTO: 87.2 FL — SIGNIFICANT CHANGE UP (ref 80–100)
MONOCYTES # BLD AUTO: 0.3 K/UL — SIGNIFICANT CHANGE UP (ref 0–0.9)
MONOCYTES NFR BLD AUTO: 4.4 % — SIGNIFICANT CHANGE UP (ref 2–14)
NEUTROPHILS # BLD AUTO: 3.4 K/UL — SIGNIFICANT CHANGE UP (ref 1.8–7.4)
NEUTROPHILS NFR BLD AUTO: 48.5 % — SIGNIFICANT CHANGE UP (ref 43–77)
PLATELET # BLD AUTO: 410 K/UL — HIGH (ref 150–400)
POTASSIUM SERPL-MCNC: 3.3 MMOL/L — LOW (ref 3.5–5.3)
POTASSIUM SERPL-SCNC: 3.3 MMOL/L — LOW (ref 3.5–5.3)
RBC # BLD: 4.54 M/UL — SIGNIFICANT CHANGE UP (ref 3.8–5.2)
RBC # FLD: 23.6 % — HIGH (ref 10.3–14.5)
SODIUM SERPL-SCNC: 138 MMOL/L — SIGNIFICANT CHANGE UP (ref 135–145)
WBC # BLD: 7 K/UL — SIGNIFICANT CHANGE UP (ref 3.8–10.5)
WBC # FLD AUTO: 7 K/UL — SIGNIFICANT CHANGE UP (ref 3.8–10.5)

## 2017-10-09 ENCOUNTER — RESULT REVIEW (OUTPATIENT)
Age: 47
End: 2017-10-09

## 2017-10-09 ENCOUNTER — APPOINTMENT (OUTPATIENT)
Dept: HEMATOLOGY ONCOLOGY | Facility: CLINIC | Age: 47
End: 2017-10-09

## 2017-10-09 LAB
BASOPHILS # BLD AUTO: 0.2 K/UL — SIGNIFICANT CHANGE UP (ref 0–0.2)
BASOPHILS NFR BLD AUTO: 2.8 % — HIGH (ref 0–2)
BUN SERPL-MCNC: 15 MG/DL — SIGNIFICANT CHANGE UP (ref 7–23)
CA-I BLDA-SCNC: 1.2 MMOL/L — SIGNIFICANT CHANGE UP (ref 1.12–1.3)
CHLORIDE SERPL-SCNC: 96 MMOL/L — SIGNIFICANT CHANGE UP (ref 96–108)
CO2 SERPL-SCNC: 29 MMOL/L — SIGNIFICANT CHANGE UP (ref 22–31)
CREAT SERPL-MCNC: 1.7 MG/DL — HIGH (ref 0.5–1.3)
EOSINOPHIL # BLD AUTO: 1.2 K/UL — HIGH (ref 0–0.5)
EOSINOPHIL NFR BLD AUTO: 17.2 % — HIGH (ref 0–6)
GLUCOSE SERPL-MCNC: 168 MG/DL — HIGH (ref 70–99)
HCT VFR BLD CALC: 38 % — SIGNIFICANT CHANGE UP (ref 34.5–45)
HGB BLD-MCNC: 11.7 G/DL — SIGNIFICANT CHANGE UP (ref 11.5–15.5)
LYMPHOCYTES # BLD AUTO: 1.7 K/UL — SIGNIFICANT CHANGE UP (ref 1–3.3)
LYMPHOCYTES # BLD AUTO: 25.4 % — SIGNIFICANT CHANGE UP (ref 13–44)
MCHC RBC-ENTMCNC: 27.4 PG — SIGNIFICANT CHANGE UP (ref 27–34)
MCHC RBC-ENTMCNC: 30.9 G/DL — LOW (ref 32–36)
MCV RBC AUTO: 88.7 FL — SIGNIFICANT CHANGE UP (ref 80–100)
MONOCYTES # BLD AUTO: 0.3 K/UL — SIGNIFICANT CHANGE UP (ref 0–0.9)
MONOCYTES NFR BLD AUTO: 4.3 % — SIGNIFICANT CHANGE UP (ref 2–14)
NEUTROPHILS # BLD AUTO: 3.4 K/UL — SIGNIFICANT CHANGE UP (ref 1.8–7.4)
NEUTROPHILS NFR BLD AUTO: 50.4 % — SIGNIFICANT CHANGE UP (ref 43–77)
PLATELET # BLD AUTO: 382 K/UL — SIGNIFICANT CHANGE UP (ref 150–400)
POTASSIUM SERPL-MCNC: 3.2 MMOL/L — LOW (ref 3.5–5.3)
POTASSIUM SERPL-SCNC: 3.2 MMOL/L — LOW (ref 3.5–5.3)
RBC # BLD: 4.29 M/UL — SIGNIFICANT CHANGE UP (ref 3.8–5.2)
RBC # FLD: 23.4 % — HIGH (ref 10.3–14.5)
SODIUM SERPL-SCNC: 137 MMOL/L — SIGNIFICANT CHANGE UP (ref 135–145)
WBC # BLD: 6.7 K/UL — SIGNIFICANT CHANGE UP (ref 3.8–10.5)
WBC # FLD AUTO: 6.7 K/UL — SIGNIFICANT CHANGE UP (ref 3.8–10.5)

## 2017-10-10 ENCOUNTER — APPOINTMENT (OUTPATIENT)
Dept: HEMATOLOGY ONCOLOGY | Facility: CLINIC | Age: 47
End: 2017-10-10

## 2017-10-17 ENCOUNTER — RESULT REVIEW (OUTPATIENT)
Age: 47
End: 2017-10-17

## 2017-10-17 ENCOUNTER — APPOINTMENT (OUTPATIENT)
Dept: HEMATOLOGY ONCOLOGY | Facility: CLINIC | Age: 47
End: 2017-10-17
Payer: MEDICAID

## 2017-10-17 VITALS
WEIGHT: 227.3 LBS | RESPIRATION RATE: 18 BRPM | SYSTOLIC BLOOD PRESSURE: 123 MMHG | OXYGEN SATURATION: 100 % | DIASTOLIC BLOOD PRESSURE: 78 MMHG | BODY MASS INDEX: 36.7 KG/M2 | HEART RATE: 108 BPM | TEMPERATURE: 98.3 F

## 2017-10-17 DIAGNOSIS — N17.9 ACUTE KIDNEY FAILURE, UNSPECIFIED: ICD-10-CM

## 2017-10-17 LAB
BASOPHILS # BLD AUTO: 0.1 K/UL — SIGNIFICANT CHANGE UP (ref 0–0.2)
BASOPHILS NFR BLD AUTO: 1.8 % — SIGNIFICANT CHANGE UP (ref 0–2)
BUN SERPL-MCNC: 9 MG/DL — SIGNIFICANT CHANGE UP (ref 7–23)
CA-I BLDA-SCNC: 1.18 MMOL/L — SIGNIFICANT CHANGE UP (ref 1.12–1.3)
CHLORIDE SERPL-SCNC: 97 MMOL/L — SIGNIFICANT CHANGE UP (ref 96–108)
CO2 SERPL-SCNC: 27 MMOL/L — SIGNIFICANT CHANGE UP (ref 22–31)
CREAT SERPL-MCNC: 5 MG/DL — HIGH (ref 0.5–1.3)
EOSINOPHIL # BLD AUTO: 1.1 K/UL — HIGH (ref 0–0.5)
EOSINOPHIL NFR BLD AUTO: 16 % — HIGH (ref 0–6)
GLUCOSE SERPL-MCNC: 159 MG/DL — HIGH (ref 70–99)
HCT VFR BLD CALC: 37.3 % — SIGNIFICANT CHANGE UP (ref 34.5–45)
HGB BLD-MCNC: 11.5 G/DL — SIGNIFICANT CHANGE UP (ref 11.5–15.5)
LYMPHOCYTES # BLD AUTO: 1.8 K/UL — SIGNIFICANT CHANGE UP (ref 1–3.3)
LYMPHOCYTES # BLD AUTO: 26.6 % — SIGNIFICANT CHANGE UP (ref 13–44)
MCHC RBC-ENTMCNC: 28.2 PG — SIGNIFICANT CHANGE UP (ref 27–34)
MCHC RBC-ENTMCNC: 30.8 G/DL — LOW (ref 32–36)
MCV RBC AUTO: 91.8 FL — SIGNIFICANT CHANGE UP (ref 80–100)
MONOCYTES # BLD AUTO: 0.4 K/UL — SIGNIFICANT CHANGE UP (ref 0–0.9)
MONOCYTES NFR BLD AUTO: 5.5 % — SIGNIFICANT CHANGE UP (ref 2–14)
NEUTROPHILS # BLD AUTO: 3.4 K/UL — SIGNIFICANT CHANGE UP (ref 1.8–7.4)
NEUTROPHILS NFR BLD AUTO: 50.1 % — SIGNIFICANT CHANGE UP (ref 43–77)
PLATELET # BLD AUTO: 205 K/UL — SIGNIFICANT CHANGE UP (ref 150–400)
POTASSIUM SERPL-MCNC: 3.4 MMOL/L — LOW (ref 3.5–5.3)
POTASSIUM SERPL-SCNC: 3.4 MMOL/L — LOW (ref 3.5–5.3)
RBC # BLD: 4.06 M/UL — SIGNIFICANT CHANGE UP (ref 3.8–5.2)
RBC # FLD: 23.2 % — HIGH (ref 10.3–14.5)
SODIUM SERPL-SCNC: 136 MMOL/L — SIGNIFICANT CHANGE UP (ref 135–145)
WBC # BLD: 6.7 K/UL — SIGNIFICANT CHANGE UP (ref 3.8–10.5)
WBC # FLD AUTO: 6.7 K/UL — SIGNIFICANT CHANGE UP (ref 3.8–10.5)

## 2017-10-17 PROCEDURE — 99214 OFFICE O/P EST MOD 30 MIN: CPT

## 2017-10-19 LAB
ANION GAP SERPL CALC-SCNC: 26 MMOL/L — HIGH (ref 5–17)
BUN SERPL-MCNC: 11 MG/DL — SIGNIFICANT CHANGE UP (ref 7–23)
CALCIUM SERPL-MCNC: 10.1 MG/DL — SIGNIFICANT CHANGE UP (ref 8.4–10.5)
CHLORIDE SERPL-SCNC: 95 MMOL/L — LOW (ref 96–108)
CO2 SERPL-SCNC: 16 MMOL/L — LOW (ref 22–31)
CREAT SERPL-MCNC: 1.09 MG/DL — SIGNIFICANT CHANGE UP (ref 0.5–1.3)
GLUCOSE SERPL-MCNC: 125 MG/DL — HIGH (ref 70–99)
POTASSIUM SERPL-MCNC: 4.3 MMOL/L — SIGNIFICANT CHANGE UP (ref 3.5–5.3)
POTASSIUM SERPL-SCNC: 4.3 MMOL/L — SIGNIFICANT CHANGE UP (ref 3.5–5.3)
SODIUM SERPL-SCNC: 137 MMOL/L — SIGNIFICANT CHANGE UP (ref 135–145)

## 2017-10-24 ENCOUNTER — APPOINTMENT (OUTPATIENT)
Dept: HEMATOLOGY ONCOLOGY | Facility: CLINIC | Age: 47
End: 2017-10-24
Payer: MEDICAID

## 2017-10-24 ENCOUNTER — RESULT REVIEW (OUTPATIENT)
Age: 47
End: 2017-10-24

## 2017-10-24 VITALS
HEART RATE: 114 BPM | TEMPERATURE: 97.9 F | OXYGEN SATURATION: 100 % | SYSTOLIC BLOOD PRESSURE: 131 MMHG | HEIGHT: 65.98 IN | DIASTOLIC BLOOD PRESSURE: 83 MMHG | RESPIRATION RATE: 18 BRPM | BODY MASS INDEX: 36.88 KG/M2 | WEIGHT: 229.5 LBS

## 2017-10-24 LAB
BASOPHILS # BLD AUTO: 0.1 K/UL — SIGNIFICANT CHANGE UP (ref 0–0.2)
BASOPHILS NFR BLD AUTO: 1.9 % — SIGNIFICANT CHANGE UP (ref 0–2)
EOSINOPHIL # BLD AUTO: 1.2 K/UL — HIGH (ref 0–0.5)
EOSINOPHIL NFR BLD AUTO: 17.9 % — HIGH (ref 0–6)
HCT VFR BLD CALC: 32.5 % — LOW (ref 34.5–45)
HGB BLD-MCNC: 10.8 G/DL — LOW (ref 11.5–15.5)
LYMPHOCYTES # BLD AUTO: 1.8 K/UL — SIGNIFICANT CHANGE UP (ref 1–3.3)
LYMPHOCYTES # BLD AUTO: 27 % — SIGNIFICANT CHANGE UP (ref 13–44)
MCHC RBC-ENTMCNC: 30.1 PG — SIGNIFICANT CHANGE UP (ref 27–34)
MCHC RBC-ENTMCNC: 33.2 G/DL — SIGNIFICANT CHANGE UP (ref 32–36)
MCV RBC AUTO: 90.6 FL — SIGNIFICANT CHANGE UP (ref 80–100)
MONOCYTES # BLD AUTO: 0.5 K/UL — SIGNIFICANT CHANGE UP (ref 0–0.9)
MONOCYTES NFR BLD AUTO: 6.8 % — SIGNIFICANT CHANGE UP (ref 2–14)
NEUTROPHILS # BLD AUTO: 3.1 K/UL — SIGNIFICANT CHANGE UP (ref 1.8–7.4)
NEUTROPHILS NFR BLD AUTO: 46.3 % — SIGNIFICANT CHANGE UP (ref 43–77)
PLATELET # BLD AUTO: 216 K/UL — SIGNIFICANT CHANGE UP (ref 150–400)
RBC # BLD: 3.59 M/UL — LOW (ref 3.8–5.2)
RBC # FLD: 22.1 % — HIGH (ref 10.3–14.5)
WBC # BLD: 6.7 K/UL — SIGNIFICANT CHANGE UP (ref 3.8–10.5)
WBC # FLD AUTO: 6.7 K/UL — SIGNIFICANT CHANGE UP (ref 3.8–10.5)

## 2017-10-24 PROCEDURE — 99214 OFFICE O/P EST MOD 30 MIN: CPT

## 2017-10-25 LAB
ALBUMIN SERPL ELPH-MCNC: 4 G/DL
ALP BLD-CCNC: 64 U/L
ALT SERPL-CCNC: 32 U/L
ANION GAP SERPL CALC-SCNC: 13 MMOL/L
AST SERPL-CCNC: 22 U/L
BILIRUB SERPL-MCNC: 0.2 MG/DL
BUN SERPL-MCNC: 11 MG/DL
CALCIUM SERPL-MCNC: 9 MG/DL
CHLORIDE SERPL-SCNC: 105 MMOL/L
CO2 SERPL-SCNC: 23 MMOL/L
CREAT SERPL-MCNC: 1.06 MG/DL
GLUCOSE SERPL-MCNC: 98 MG/DL
POTASSIUM SERPL-SCNC: 4.2 MMOL/L
PROT SERPL-MCNC: 6.9 G/DL
SODIUM SERPL-SCNC: 141 MMOL/L

## 2017-10-30 ENCOUNTER — MEDICATION RENEWAL (OUTPATIENT)
Age: 47
End: 2017-10-30

## 2017-10-31 ENCOUNTER — MEDICATION RENEWAL (OUTPATIENT)
Age: 47
End: 2017-10-31

## 2017-11-01 ENCOUNTER — OUTPATIENT (OUTPATIENT)
Dept: OUTPATIENT SERVICES | Facility: HOSPITAL | Age: 47
LOS: 1 days | End: 2017-11-01
Payer: COMMERCIAL

## 2017-11-01 DIAGNOSIS — D25.9 LEIOMYOMA OF UTERUS, UNSPECIFIED: Chronic | ICD-10-CM

## 2017-11-02 DIAGNOSIS — M79.604 PAIN IN RIGHT LEG: ICD-10-CM

## 2017-11-09 ENCOUNTER — OUTPATIENT (OUTPATIENT)
Dept: OUTPATIENT SERVICES | Facility: HOSPITAL | Age: 47
LOS: 1 days | Discharge: HOME | End: 2017-11-09

## 2017-11-09 DIAGNOSIS — D25.9 LEIOMYOMA OF UTERUS, UNSPECIFIED: Chronic | ICD-10-CM

## 2017-11-09 DIAGNOSIS — D72.1 EOSINOPHILIA: ICD-10-CM

## 2017-11-13 ENCOUNTER — APPOINTMENT (OUTPATIENT)
Dept: HEMATOLOGY ONCOLOGY | Facility: CLINIC | Age: 47
End: 2017-11-13
Payer: MEDICAID

## 2017-11-13 ENCOUNTER — RESULT REVIEW (OUTPATIENT)
Age: 47
End: 2017-11-13

## 2017-11-13 ENCOUNTER — APPOINTMENT (OUTPATIENT)
Dept: HEMATOLOGY ONCOLOGY | Facility: CLINIC | Age: 47
End: 2017-11-13

## 2017-11-13 ENCOUNTER — LABORATORY RESULT (OUTPATIENT)
Age: 47
End: 2017-11-13

## 2017-11-13 VITALS
RESPIRATION RATE: 16 BRPM | OXYGEN SATURATION: 100 % | HEART RATE: 112 BPM | SYSTOLIC BLOOD PRESSURE: 95 MMHG | WEIGHT: 229.72 LBS | BODY MASS INDEX: 37.1 KG/M2 | DIASTOLIC BLOOD PRESSURE: 63 MMHG | TEMPERATURE: 98 F

## 2017-11-13 LAB
BASOPHILS # BLD AUTO: 0.1 K/UL — SIGNIFICANT CHANGE UP (ref 0–0.2)
BASOPHILS NFR BLD AUTO: 1.2 % — SIGNIFICANT CHANGE UP (ref 0–2)
EOSINOPHIL # BLD AUTO: 1 K/UL — HIGH (ref 0–0.5)
EOSINOPHIL NFR BLD AUTO: 14.5 % — HIGH (ref 0–6)
HCT VFR BLD CALC: 32.4 % — LOW (ref 34.5–45)
HGB BLD-MCNC: 10.2 G/DL — LOW (ref 11.5–15.5)
LYMPHOCYTES # BLD AUTO: 1 K/UL — SIGNIFICANT CHANGE UP (ref 1–3.3)
LYMPHOCYTES # BLD AUTO: 14.7 % — SIGNIFICANT CHANGE UP (ref 13–44)
MCHC RBC-ENTMCNC: 30.2 PG — SIGNIFICANT CHANGE UP (ref 27–34)
MCHC RBC-ENTMCNC: 31.6 G/DL — LOW (ref 32–36)
MCV RBC AUTO: 95.6 FL — SIGNIFICANT CHANGE UP (ref 80–100)
MONOCYTES # BLD AUTO: 0.3 K/UL — SIGNIFICANT CHANGE UP (ref 0–0.9)
MONOCYTES NFR BLD AUTO: 3.8 % — SIGNIFICANT CHANGE UP (ref 2–14)
NEUTROPHILS # BLD AUTO: 4.6 K/UL — SIGNIFICANT CHANGE UP (ref 1.8–7.4)
NEUTROPHILS NFR BLD AUTO: 65.8 % — SIGNIFICANT CHANGE UP (ref 43–77)
PLATELET # BLD AUTO: 217 K/UL — SIGNIFICANT CHANGE UP (ref 150–400)
RBC # BLD: 3.39 M/UL — LOW (ref 3.8–5.2)
RBC # FLD: 20.7 % — HIGH (ref 10.3–14.5)
WBC # BLD: 7 K/UL — SIGNIFICANT CHANGE UP (ref 3.8–10.5)
WBC # FLD AUTO: 7 K/UL — SIGNIFICANT CHANGE UP (ref 3.8–10.5)

## 2017-11-13 PROCEDURE — 99214 OFFICE O/P EST MOD 30 MIN: CPT

## 2017-11-20 ENCOUNTER — RX RENEWAL (OUTPATIENT)
Age: 47
End: 2017-11-20

## 2017-11-23 ENCOUNTER — FORM ENCOUNTER (OUTPATIENT)
Age: 47
End: 2017-11-23

## 2017-11-24 ENCOUNTER — OUTPATIENT (OUTPATIENT)
Dept: OUTPATIENT SERVICES | Facility: HOSPITAL | Age: 47
LOS: 1 days | End: 2017-11-24
Payer: MEDICAID

## 2017-11-24 ENCOUNTER — APPOINTMENT (OUTPATIENT)
Dept: CT IMAGING | Facility: IMAGING CENTER | Age: 47
End: 2017-11-24
Payer: COMMERCIAL

## 2017-11-24 ENCOUNTER — INPATIENT (INPATIENT)
Facility: HOSPITAL | Age: 47
LOS: 3 days | Discharge: HOME | DRG: 176 | End: 2017-11-28
Attending: INTERNAL MEDICINE | Admitting: INTERNAL MEDICINE
Payer: MEDICAID

## 2017-11-24 ENCOUNTER — EMERGENCY (EMERGENCY)
Facility: HOSPITAL | Age: 47
LOS: 1 days | Discharge: HOME | End: 2017-11-24
Attending: EMERGENCY MEDICINE | Admitting: EMERGENCY MEDICINE
Payer: MEDICAID

## 2017-11-24 VITALS
SYSTOLIC BLOOD PRESSURE: 137 MMHG | OXYGEN SATURATION: 99 % | DIASTOLIC BLOOD PRESSURE: 73 MMHG | HEART RATE: 128 BPM | HEIGHT: 66 IN

## 2017-11-24 VITALS
OXYGEN SATURATION: 99 % | TEMPERATURE: 98 F | HEART RATE: 111 BPM | DIASTOLIC BLOOD PRESSURE: 87 MMHG | SYSTOLIC BLOOD PRESSURE: 122 MMHG | RESPIRATION RATE: 24 BRPM

## 2017-11-24 VITALS
DIASTOLIC BLOOD PRESSURE: 86 MMHG | RESPIRATION RATE: 26 BRPM | HEIGHT: 66 IN | OXYGEN SATURATION: 99 % | SYSTOLIC BLOOD PRESSURE: 119 MMHG | HEART RATE: 116 BPM

## 2017-11-24 DIAGNOSIS — Z98.890 OTHER SPECIFIED POSTPROCEDURAL STATES: Chronic | ICD-10-CM

## 2017-11-24 DIAGNOSIS — N20.0 CALCULUS OF KIDNEY: Chronic | ICD-10-CM

## 2017-11-24 DIAGNOSIS — I26.99 OTHER PULMONARY EMBOLISM WITHOUT ACUTE COR PULMONALE: ICD-10-CM

## 2017-11-24 DIAGNOSIS — D25.9 LEIOMYOMA OF UTERUS, UNSPECIFIED: Chronic | ICD-10-CM

## 2017-11-24 DIAGNOSIS — R60.9 EDEMA, UNSPECIFIED: ICD-10-CM

## 2017-11-24 DIAGNOSIS — D72.1 EOSINOPHILIA: ICD-10-CM

## 2017-11-24 LAB
ALBUMIN SERPL ELPH-MCNC: 4 G/DL — SIGNIFICANT CHANGE UP (ref 3.3–5)
ALP SERPL-CCNC: 80 U/L — SIGNIFICANT CHANGE UP (ref 40–120)
ALT FLD-CCNC: 23 U/L RC — SIGNIFICANT CHANGE UP (ref 10–45)
ANION GAP SERPL CALC-SCNC: 16 MMOL/L — SIGNIFICANT CHANGE UP (ref 5–17)
APTT BLD: 23.7 SEC — LOW (ref 27.5–37.4)
AST SERPL-CCNC: 27 U/L — SIGNIFICANT CHANGE UP (ref 10–40)
BASE EXCESS BLDV CALC-SCNC: 0 MMOL/L — SIGNIFICANT CHANGE UP (ref -2–2)
BASOPHILS # BLD AUTO: 0.1 K/UL — SIGNIFICANT CHANGE UP (ref 0–0.2)
BASOPHILS NFR BLD AUTO: 2.3 % — HIGH (ref 0–2)
BILIRUB SERPL-MCNC: 0.2 MG/DL — SIGNIFICANT CHANGE UP (ref 0.2–1.2)
BUN SERPL-MCNC: 9 MG/DL — SIGNIFICANT CHANGE UP (ref 7–23)
CA-I SERPL-SCNC: 1.18 MMOL/L — SIGNIFICANT CHANGE UP (ref 1.12–1.3)
CALCIUM SERPL-MCNC: 9.1 MG/DL — SIGNIFICANT CHANGE UP (ref 8.4–10.5)
CHLORIDE BLDV-SCNC: 109 MMOL/L — HIGH (ref 96–108)
CHLORIDE SERPL-SCNC: 104 MMOL/L — SIGNIFICANT CHANGE UP (ref 96–108)
CO2 BLDV-SCNC: 26 MMOL/L — SIGNIFICANT CHANGE UP (ref 22–30)
CO2 SERPL-SCNC: 21 MMOL/L — LOW (ref 22–31)
CREAT SERPL-MCNC: 1.17 MG/DL — SIGNIFICANT CHANGE UP (ref 0.5–1.3)
EOSINOPHIL # BLD AUTO: 1 K/UL — HIGH (ref 0–0.5)
EOSINOPHIL NFR BLD AUTO: 17.4 % — HIGH (ref 0–6)
GAS PNL BLDV: 138 MMOL/L — SIGNIFICANT CHANGE UP (ref 136–145)
GAS PNL BLDV: SIGNIFICANT CHANGE UP
GAS PNL BLDV: SIGNIFICANT CHANGE UP
GLUCOSE BLDV-MCNC: 113 MG/DL — HIGH (ref 70–99)
GLUCOSE SERPL-MCNC: 129 MG/DL — HIGH (ref 70–99)
HCO3 BLDV-SCNC: 25 MMOL/L — SIGNIFICANT CHANGE UP (ref 21–29)
HCT VFR BLD CALC: 32.6 % — LOW (ref 34.5–45)
HCT VFR BLDA CALC: 32 % — LOW (ref 39–50)
HGB BLD CALC-MCNC: 10.4 G/DL — LOW (ref 11.5–15.5)
HGB BLD-MCNC: 10.7 G/DL — LOW (ref 11.5–15.5)
INR BLD: 0.93 RATIO — SIGNIFICANT CHANGE UP (ref 0.88–1.16)
LACTATE BLDV-MCNC: 2.1 MMOL/L — HIGH (ref 0.7–2)
LYMPHOCYTES # BLD AUTO: 1.3 K/UL — SIGNIFICANT CHANGE UP (ref 1–3.3)
LYMPHOCYTES # BLD AUTO: 22.4 % — SIGNIFICANT CHANGE UP (ref 13–44)
MCHC RBC-ENTMCNC: 32 PG — SIGNIFICANT CHANGE UP (ref 27–34)
MCHC RBC-ENTMCNC: 32.8 GM/DL — SIGNIFICANT CHANGE UP (ref 32–36)
MCV RBC AUTO: 97.6 FL — SIGNIFICANT CHANGE UP (ref 80–100)
MONOCYTES # BLD AUTO: 0.3 K/UL — SIGNIFICANT CHANGE UP (ref 0–0.9)
MONOCYTES NFR BLD AUTO: 4.9 % — SIGNIFICANT CHANGE UP (ref 2–14)
NEUTROPHILS # BLD AUTO: 3 K/UL — SIGNIFICANT CHANGE UP (ref 1.8–7.4)
NEUTROPHILS NFR BLD AUTO: 52.9 % — SIGNIFICANT CHANGE UP (ref 43–77)
OTHER CELLS CSF MANUAL: 6 ML/DL — LOW (ref 18–22)
PCO2 BLDV: 44 MMHG — SIGNIFICANT CHANGE UP (ref 35–50)
PH BLDV: 7.37 — SIGNIFICANT CHANGE UP (ref 7.35–7.45)
PLATELET # BLD AUTO: 158 K/UL — SIGNIFICANT CHANGE UP (ref 150–400)
PO2 BLDV: 30 MMHG — SIGNIFICANT CHANGE UP (ref 25–45)
POTASSIUM BLDV-SCNC: 5.4 MMOL/L — HIGH (ref 3.5–5)
POTASSIUM SERPL-MCNC: 4.7 MMOL/L — SIGNIFICANT CHANGE UP (ref 3.5–5.3)
POTASSIUM SERPL-SCNC: 4.7 MMOL/L — SIGNIFICANT CHANGE UP (ref 3.5–5.3)
PROT SERPL-MCNC: 7.7 G/DL — SIGNIFICANT CHANGE UP (ref 6–8.3)
PROTHROM AB SERPL-ACNC: 10.1 SEC — SIGNIFICANT CHANGE UP (ref 9.8–12.7)
RBC # BLD: 3.35 M/UL — LOW (ref 3.8–5.2)
RBC # FLD: 19.1 % — HIGH (ref 10.3–14.5)
SAO2 % BLDV: 46 % — LOW (ref 67–88)
SODIUM SERPL-SCNC: 141 MMOL/L — SIGNIFICANT CHANGE UP (ref 135–145)
WBC # BLD: 5.6 K/UL — SIGNIFICANT CHANGE UP (ref 3.8–10.5)
WBC # FLD AUTO: 5.6 K/UL — SIGNIFICANT CHANGE UP (ref 3.8–10.5)

## 2017-11-24 PROCEDURE — 85027 COMPLETE CBC AUTOMATED: CPT

## 2017-11-24 PROCEDURE — 96372 THER/PROPH/DIAG INJ SC/IM: CPT

## 2017-11-24 PROCEDURE — 93005 ELECTROCARDIOGRAM TRACING: CPT

## 2017-11-24 PROCEDURE — 82553 CREATINE MB FRACTION: CPT

## 2017-11-24 PROCEDURE — 80053 COMPREHEN METABOLIC PANEL: CPT

## 2017-11-24 PROCEDURE — 82550 ASSAY OF CK (CPK): CPT

## 2017-11-24 PROCEDURE — 84295 ASSAY OF SERUM SODIUM: CPT

## 2017-11-24 PROCEDURE — 99285 EMERGENCY DEPT VISIT HI MDM: CPT | Mod: 25

## 2017-11-24 PROCEDURE — 82330 ASSAY OF CALCIUM: CPT

## 2017-11-24 PROCEDURE — 84484 ASSAY OF TROPONIN QUANT: CPT

## 2017-11-24 PROCEDURE — 85730 THROMBOPLASTIN TIME PARTIAL: CPT

## 2017-11-24 PROCEDURE — 85610 PROTHROMBIN TIME: CPT

## 2017-11-24 PROCEDURE — 82803 BLOOD GASES ANY COMBINATION: CPT

## 2017-11-24 PROCEDURE — 84132 ASSAY OF SERUM POTASSIUM: CPT

## 2017-11-24 PROCEDURE — 83605 ASSAY OF LACTIC ACID: CPT

## 2017-11-24 PROCEDURE — 74177 CT ABD & PELVIS W/CONTRAST: CPT | Mod: 26

## 2017-11-24 PROCEDURE — 83880 ASSAY OF NATRIURETIC PEPTIDE: CPT

## 2017-11-24 PROCEDURE — 82947 ASSAY GLUCOSE BLOOD QUANT: CPT

## 2017-11-24 PROCEDURE — 99283 EMERGENCY DEPT VISIT LOW MDM: CPT | Mod: 25

## 2017-11-24 PROCEDURE — 99284 EMERGENCY DEPT VISIT MOD MDM: CPT

## 2017-11-24 PROCEDURE — 82435 ASSAY OF BLOOD CHLORIDE: CPT

## 2017-11-24 PROCEDURE — 74177 CT ABD & PELVIS W/CONTRAST: CPT

## 2017-11-24 PROCEDURE — 85014 HEMATOCRIT: CPT

## 2017-11-24 RX ORDER — RIVAROXABAN 15 MG-20MG
1 KIT ORAL
Qty: 14 | Refills: 0 | OUTPATIENT
Start: 2017-11-24 | End: 2017-12-01

## 2017-11-24 RX ORDER — ENOXAPARIN SODIUM 100 MG/ML
1 INJECTION SUBCUTANEOUS
Qty: 10 | Refills: 0 | OUTPATIENT
Start: 2017-11-24 | End: 2017-11-29

## 2017-11-24 RX ORDER — ENOXAPARIN SODIUM 100 MG/ML
100 INJECTION SUBCUTANEOUS ONCE
Qty: 0 | Refills: 0 | Status: COMPLETED | OUTPATIENT
Start: 2017-11-24 | End: 2017-11-24

## 2017-11-24 RX ADMIN — ENOXAPARIN SODIUM 100 MILLIGRAM(S): 100 INJECTION SUBCUTANEOUS at 20:12

## 2017-11-24 NOTE — ED PROVIDER NOTE - OBJECTIVE STATEMENT
47 year old female w/ h/o HES, HTN presents from [facility] w/ CT abdomen and pelvis concerning for right lower lobe PE. Patient describes history of tachycardia and sob for the past month as well as abdominal pain which has been present for multiple months. To evaluate abdominal pain and distention pt was scheduled for abdo ct. She states that she had a previous diagnosis of DVT and was on xeralto for 6 months, since stopped. She denies recent travel, hormonal therapy, chest pain, cough, nausea, vomiting, fevers.

## 2017-11-24 NOTE — ED PROVIDER NOTE - MEDICAL DECISION MAKING DETAILS
Nemes - 46yo F w incidental finding of PE in abdominal CT today. Will anticoagulate, admit for further w/u, CTA in a few days (given load of dye today) vs VQ scan and further w/u. Monitor VS/ tachycardia

## 2017-11-24 NOTE — ED PROVIDER NOTE - MEDICAL DECISION MAKING DETAILS
Nemes - 48yo F w HES sdr, on steroids in the ER after an incidental finding of RLL PE on a abdom CT today. Pt c/o abdominal distension for weeks but also SOB w exertion much worse in the past month. No CP/palpitations/dizziness. Asymptomatic in the ER, VS wnl except HR-116. Exam wnl. Will get labs, Xrays, anticoagulate, admit fo rfurther w/u

## 2017-11-24 NOTE — ED ADULT TRIAGE NOTE - CHIEF COMPLAINT QUOTE
returns for adm (seen in midway earlier for dx: PE on CT chest earlier today, left AMA), +tachycardic and tachypneic

## 2017-11-24 NOTE — ED PROVIDER NOTE - OBJECTIVE STATEMENT
46yo F w Hypereosinophilic sdr, hx of DVT (finished xarelto regimen last month)on hydroxyurea and steroids, in the ER for incidental finding of PE on abdominal CT today. Had been complaining of abdominal bloating/discomfort. Also, SOB worse w exertion, no CP/dizziness, no other stx

## 2017-11-24 NOTE — ED PROVIDER NOTE - PROGRESS NOTE DETAILS
Patient very stressed that her car in unlocked and needs  The patient has decided to leave against medical advice.  The patient is AAOx3, not intoxicated, and displays normal decision making ability. We discussed all risks, benefits, and alternatives to the progression of treatment and the potential dangers of leaving including but not limited to permanent disability, injury, and death.  The patient was instructed that they are welcome to change their decision to leave against medical advice and return to the emergency department at any time and for any reason in order to allow us to render care. Patient very stressed that her car in unlocked and states she needs to leave now.   The patient has decided to leave against medical advice.  The patient is AAOx3, not intoxicated, and displays normal decision making ability. We discussed all risks, benefits, and alternatives to the progression of treatment and the potential dangers of leaving including but not limited to permanent disability, injury, and death.  The patient was instructed that they are welcome to change their decision to leave against medical advice and return to the emergency department at any time and for any reason in order to allow us to render care.

## 2017-11-24 NOTE — ED PROVIDER NOTE - CONSTITUTIONAL, MLM
normal... Patient obeses, well nourished, awake, alert, oriented to person, place, time/situation and in no apparent distress.

## 2017-11-25 DIAGNOSIS — I26.99 OTHER PULMONARY EMBOLISM WITHOUT ACUTE COR PULMONALE: ICD-10-CM

## 2017-11-25 DIAGNOSIS — D72.1 EOSINOPHILIA: ICD-10-CM

## 2017-11-25 DIAGNOSIS — D64.9 ANEMIA, UNSPECIFIED: ICD-10-CM

## 2017-11-25 DIAGNOSIS — I10 ESSENTIAL (PRIMARY) HYPERTENSION: ICD-10-CM

## 2017-11-25 DIAGNOSIS — R10.9 UNSPECIFIED ABDOMINAL PAIN: ICD-10-CM

## 2017-11-25 DIAGNOSIS — F32.9 MAJOR DEPRESSIVE DISORDER, SINGLE EPISODE, UNSPECIFIED: ICD-10-CM

## 2017-11-25 LAB
ANION GAP SERPL CALC-SCNC: 14 MMOL/L — SIGNIFICANT CHANGE UP (ref 5–17)
APPEARANCE UR: ABNORMAL
APTT BLD: 28.6 SEC — SIGNIFICANT CHANGE UP (ref 27.5–37.4)
BACTERIA # UR AUTO: ABNORMAL
BASOPHILS # BLD AUTO: 0.02 K/UL — SIGNIFICANT CHANGE UP (ref 0–0.2)
BASOPHILS NFR BLD AUTO: 0.4 % — SIGNIFICANT CHANGE UP (ref 0–2)
BILIRUB UR-MCNC: NEGATIVE — SIGNIFICANT CHANGE UP
BUN SERPL-MCNC: 9 MG/DL — SIGNIFICANT CHANGE UP (ref 7–23)
CALCIUM SERPL-MCNC: 9 MG/DL — SIGNIFICANT CHANGE UP (ref 8.4–10.5)
CHLORIDE SERPL-SCNC: 106 MMOL/L — SIGNIFICANT CHANGE UP (ref 96–108)
CK MB BLD-MCNC: 2.3 % — SIGNIFICANT CHANGE UP (ref 0–3.5)
CK MB BLD-MCNC: 2.5 % — SIGNIFICANT CHANGE UP (ref 0–3.5)
CK MB CFR SERPL CALC: 2.2 NG/ML — SIGNIFICANT CHANGE UP (ref 0–3.8)
CK MB CFR SERPL CALC: 2.5 NG/ML — SIGNIFICANT CHANGE UP (ref 0–3.8)
CK SERPL-CCNC: 99 U/L — SIGNIFICANT CHANGE UP (ref 25–170)
CO2 SERPL-SCNC: 21 MMOL/L — LOW (ref 22–31)
COD CRY URNS QL: ABNORMAL
COLOR SPEC: YELLOW — SIGNIFICANT CHANGE UP
CREAT SERPL-MCNC: 0.99 MG/DL — SIGNIFICANT CHANGE UP (ref 0.5–1.3)
DIFF PNL FLD: NEGATIVE — SIGNIFICANT CHANGE UP
EOSINOPHIL # BLD AUTO: 0.74 K/UL — HIGH (ref 0–0.5)
EOSINOPHIL NFR BLD AUTO: 15.3 % — HIGH (ref 0–6)
EPI CELLS # UR: 21 /HPF — HIGH (ref 0–5)
GLUCOSE SERPL-MCNC: 99 MG/DL — SIGNIFICANT CHANGE UP (ref 70–99)
GLUCOSE UR QL: NEGATIVE MG/DL — SIGNIFICANT CHANGE UP
HCT VFR BLD CALC: 31.7 % — LOW (ref 34.5–45)
HGB BLD-MCNC: 9.8 G/DL — LOW (ref 11.5–15.5)
HYALINE CASTS # UR AUTO: 0 /LPF — SIGNIFICANT CHANGE UP (ref 0–7)
IMM GRANULOCYTES NFR BLD AUTO: 0 % — SIGNIFICANT CHANGE UP (ref 0–1.5)
INR BLD: 0.91 RATIO — SIGNIFICANT CHANGE UP (ref 0.88–1.16)
KETONES UR-MCNC: NEGATIVE — SIGNIFICANT CHANGE UP
LEUKOCYTE ESTERASE UR-ACNC: NEGATIVE — SIGNIFICANT CHANGE UP
LYMPHOCYTES # BLD AUTO: 2.15 K/UL — SIGNIFICANT CHANGE UP (ref 1–3.3)
LYMPHOCYTES # BLD AUTO: 44.3 % — HIGH (ref 13–44)
MCHC RBC-ENTMCNC: 29.2 PG — SIGNIFICANT CHANGE UP (ref 27–34)
MCHC RBC-ENTMCNC: 30.9 GM/DL — LOW (ref 32–36)
MCV RBC AUTO: 94.3 FL — SIGNIFICANT CHANGE UP (ref 80–100)
MONOCYTES # BLD AUTO: 0.41 K/UL — SIGNIFICANT CHANGE UP (ref 0–0.9)
MONOCYTES NFR BLD AUTO: 8.5 % — SIGNIFICANT CHANGE UP (ref 2–14)
NEUTROPHILS # BLD AUTO: 1.53 K/UL — LOW (ref 1.8–7.4)
NEUTROPHILS NFR BLD AUTO: 31.5 % — LOW (ref 43–77)
NITRITE UR-MCNC: NEGATIVE — SIGNIFICANT CHANGE UP
PH UR: 5 — SIGNIFICANT CHANGE UP (ref 5–8)
PLATELET # BLD AUTO: 181 K/UL — SIGNIFICANT CHANGE UP (ref 150–400)
POTASSIUM SERPL-MCNC: 3.9 MMOL/L — SIGNIFICANT CHANGE UP (ref 3.5–5.3)
POTASSIUM SERPL-SCNC: 3.9 MMOL/L — SIGNIFICANT CHANGE UP (ref 3.5–5.3)
PROT UR-MCNC: ABNORMAL MG/DL
PROTHROM AB SERPL-ACNC: 10.3 SEC — SIGNIFICANT CHANGE UP (ref 10–13.1)
RBC # BLD: 3.36 M/UL — LOW (ref 3.8–5.2)
RBC # FLD: 19.5 % — HIGH (ref 10.3–14.5)
RBC CASTS # UR COMP ASSIST: 1 /HPF — SIGNIFICANT CHANGE UP (ref 0–4)
SODIUM SERPL-SCNC: 141 MMOL/L — SIGNIFICANT CHANGE UP (ref 135–145)
SP GR SPEC: 1.03 — HIGH (ref 1.01–1.02)
TROPONIN T SERPL-MCNC: <0.01 NG/ML — SIGNIFICANT CHANGE UP (ref 0–0.06)
TROPONIN T SERPL-MCNC: <0.01 NG/ML — SIGNIFICANT CHANGE UP (ref 0–0.06)
UROBILINOGEN FLD QL: NEGATIVE MG/DL — SIGNIFICANT CHANGE UP
WBC # BLD: 4.85 K/UL — SIGNIFICANT CHANGE UP (ref 3.8–10.5)
WBC # FLD AUTO: 4.85 K/UL — SIGNIFICANT CHANGE UP (ref 3.8–10.5)
WBC UR QL: 5 /HPF — SIGNIFICANT CHANGE UP (ref 0–5)

## 2017-11-25 PROCEDURE — 99223 1ST HOSP IP/OBS HIGH 75: CPT

## 2017-11-25 RX ORDER — INFLUENZA VIRUS VACCINE 15; 15; 15; 15 UG/.5ML; UG/.5ML; UG/.5ML; UG/.5ML
0.5 SUSPENSION INTRAMUSCULAR ONCE
Qty: 0 | Refills: 0 | Status: DISCONTINUED | OUTPATIENT
Start: 2017-11-25 | End: 2017-11-28

## 2017-11-25 RX ORDER — ENOXAPARIN SODIUM 100 MG/ML
100 INJECTION SUBCUTANEOUS EVERY 12 HOURS
Qty: 0 | Refills: 0 | Status: DISCONTINUED | OUTPATIENT
Start: 2017-11-25 | End: 2017-11-25

## 2017-11-25 RX ORDER — CHOLECALCIFEROL (VITAMIN D3) 125 MCG
1000 CAPSULE ORAL DAILY
Qty: 0 | Refills: 0 | Status: DISCONTINUED | OUTPATIENT
Start: 2017-11-25 | End: 2017-11-28

## 2017-11-25 RX ORDER — HYDROXYUREA 500 MG/1
1500 CAPSULE ORAL
Qty: 0 | Refills: 0 | Status: DISCONTINUED | OUTPATIENT
Start: 2017-11-25 | End: 2017-11-25

## 2017-11-25 RX ORDER — SERTRALINE 25 MG/1
25 TABLET, FILM COATED ORAL DAILY
Qty: 0 | Refills: 0 | Status: DISCONTINUED | OUTPATIENT
Start: 2017-11-25 | End: 2017-11-28

## 2017-11-25 RX ORDER — ONDANSETRON 8 MG/1
4 TABLET, FILM COATED ORAL EVERY 12 HOURS
Qty: 0 | Refills: 0 | Status: DISCONTINUED | OUTPATIENT
Start: 2017-11-25 | End: 2017-11-28

## 2017-11-25 RX ORDER — IMATINIB MESYLATE 400 MG/1
400 TABLET, FILM COATED ORAL DAILY
Qty: 0 | Refills: 0 | Status: DISCONTINUED | OUTPATIENT
Start: 2017-11-25 | End: 2017-11-25

## 2017-11-25 RX ORDER — HYDROXYUREA 500 MG/1
500 CAPSULE ORAL ONCE
Qty: 0 | Refills: 0 | Status: COMPLETED | OUTPATIENT
Start: 2017-11-25 | End: 2017-11-25

## 2017-11-25 RX ORDER — PANTOPRAZOLE SODIUM 20 MG/1
40 TABLET, DELAYED RELEASE ORAL
Qty: 0 | Refills: 0 | Status: DISCONTINUED | OUTPATIENT
Start: 2017-11-25 | End: 2017-11-28

## 2017-11-25 RX ORDER — RIVAROXABAN 15 MG-20MG
15 KIT ORAL
Qty: 0 | Refills: 0 | Status: DISCONTINUED | OUTPATIENT
Start: 2017-11-25 | End: 2017-11-28

## 2017-11-25 RX ORDER — ATOVAQUONE 750 MG/5ML
750 SUSPENSION ORAL DAILY
Qty: 0 | Refills: 0 | Status: DISCONTINUED | OUTPATIENT
Start: 2017-11-25 | End: 2017-11-28

## 2017-11-25 RX ORDER — IMATINIB MESYLATE 400 MG/1
0 TABLET, FILM COATED ORAL
Qty: 0 | Refills: 0 | COMMUNITY

## 2017-11-25 RX ORDER — FLUCONAZOLE 150 MG/1
400 TABLET ORAL DAILY
Qty: 0 | Refills: 0 | Status: DISCONTINUED | OUTPATIENT
Start: 2017-11-25 | End: 2017-11-28

## 2017-11-25 RX ORDER — FUROSEMIDE 40 MG
20 TABLET ORAL DAILY
Qty: 0 | Refills: 0 | Status: DISCONTINUED | OUTPATIENT
Start: 2017-11-25 | End: 2017-11-27

## 2017-11-25 RX ORDER — ACETAMINOPHEN 500 MG
1 TABLET ORAL
Qty: 0 | Refills: 0 | COMMUNITY

## 2017-11-25 RX ORDER — IMATINIB MESYLATE 400 MG/1
400 TABLET, FILM COATED ORAL DAILY
Qty: 0 | Refills: 0 | Status: DISCONTINUED | OUTPATIENT
Start: 2017-11-25 | End: 2017-11-28

## 2017-11-25 RX ORDER — HYDROXYUREA 500 MG/1
500 CAPSULE ORAL
Qty: 0 | Refills: 0 | Status: DISCONTINUED | OUTPATIENT
Start: 2017-11-25 | End: 2017-11-25

## 2017-11-25 RX ORDER — LOSARTAN POTASSIUM 100 MG/1
50 TABLET, FILM COATED ORAL DAILY
Qty: 0 | Refills: 0 | Status: DISCONTINUED | OUTPATIENT
Start: 2017-11-25 | End: 2017-11-28

## 2017-11-25 RX ORDER — HYDROXYUREA 500 MG/1
500 CAPSULE ORAL DAILY
Qty: 0 | Refills: 0 | Status: DISCONTINUED | OUTPATIENT
Start: 2017-11-25 | End: 2017-11-28

## 2017-11-25 RX ORDER — ACETAMINOPHEN 500 MG
650 TABLET ORAL EVERY 6 HOURS
Qty: 0 | Refills: 0 | Status: DISCONTINUED | OUTPATIENT
Start: 2017-11-25 | End: 2017-11-28

## 2017-11-25 RX ADMIN — FLUCONAZOLE 400 MILLIGRAM(S): 150 TABLET ORAL at 11:29

## 2017-11-25 RX ADMIN — PANTOPRAZOLE SODIUM 40 MILLIGRAM(S): 20 TABLET, DELAYED RELEASE ORAL at 05:52

## 2017-11-25 RX ADMIN — RIVAROXABAN 15 MILLIGRAM(S): KIT at 21:15

## 2017-11-25 RX ADMIN — Medication 20 MILLIGRAM(S): at 05:52

## 2017-11-25 RX ADMIN — Medication 1000 UNIT(S): at 11:31

## 2017-11-25 RX ADMIN — IMATINIB MESYLATE 400 MILLIGRAM(S): 400 TABLET, FILM COATED ORAL at 20:52

## 2017-11-25 RX ADMIN — SERTRALINE 25 MILLIGRAM(S): 25 TABLET, FILM COATED ORAL at 11:31

## 2017-11-25 RX ADMIN — HYDROXYUREA 500 MILLIGRAM(S): 500 CAPSULE ORAL at 06:53

## 2017-11-25 RX ADMIN — LOSARTAN POTASSIUM 50 MILLIGRAM(S): 100 TABLET, FILM COATED ORAL at 20:53

## 2017-11-25 RX ADMIN — ENOXAPARIN SODIUM 100 MILLIGRAM(S): 100 INJECTION SUBCUTANEOUS at 11:31

## 2017-11-25 RX ADMIN — Medication 10 MILLIGRAM(S): at 05:52

## 2017-11-25 NOTE — PROGRESS NOTE ADULT - SUBJECTIVE AND OBJECTIVE BOX
chief concern: abdominal distention.     No overnight events noted.   this AM patient is pruritic diffusely as she always is, otherwise is resting well without SOB.     REVIEW OF SYSTEMS:  CONSTITUTIONAL: No fever, weight loss, +fatigue.   EYES: No eye pain, visual disturbances, or discharge  ENMT:  No difficulty hearing, tinnitus, vertigo; No sinus or throat pain  NECK: No pain or stiffness  RESPIRATORY: No cough, wheezing, chills or hemoptysis; No shortness of breath. No pleuritic CP.   CARDIOVASCULAR: No chest pain, palpitations, dizziness, + leg swelling.   GASTROINTESTINAL: + abdominal distention and pain. No nausea, vomiting, or hematemesis; no diarrhea or constipation. No melena or hematochezia.  GENITOURINARY: No dysuria, frequency, hematuria, or incontinence  NEUROLOGICAL: No headaches, memory loss, loss of strength, numbness, or tremors  SKIN: No itching, burning, rashes, or lesions   LYMPH NODES: No enlarged glands  ENDOCRINE: No heat or cold intolerance; No hair loss  MUSCULOSKELETAL: No joint pain or swelling; No muscle, back, or extremity pain  PSYCHIATRIC: No depression, anxiety, mood swings, or difficulty sleeping  HEME/LYMPH: No easy bruising, or bleeding gums  ALLERGY AND IMMUNOLOGIC: No hives or eczema    T(C): 36.7 (17 @ 12:22), Max: 36.8 (17 @ 04:22)  HR: 117 (17 @ 12:22) (101 - 128)  BP: 120/79 (17 @ 12:22) (115/80 - 137/73)  RR: 18 (17 @ 12:22) (18 - 26)  SpO2: 99% (17 @ 12:22) (99% - 100%)  Wt(kg): --Vital Signs Last 24 Hrs  T(C): 36.7 (2017 12:22), Max: 36.8 (2017 04:22)  T(F): 98.1 (2017 12:22), Max: 98.2 (2017 04:22)  HR: 117 (2017 12:22) (101 - 128)  BP: 120/79 (2017 12:22) (115/80 - 137/73)  BP(mean): --  RR: 18 (2017 12:22) (18 - 26)  SpO2: 99% (2017 12:22) (99% - 100%)    PHYSICAL EXAM:  GENERAL: Comfortable. Cushingoid face.   HEAD:  Atraumatic, Normocephalic  EYES: EOMI, PERRLA, conjunctiva and sclera clear  ENMT: No tonsillar erythema, exudates, or enlargement; Moist mucous membranes, poor dentition, No lesions  NECK: Supple, No JVD  NERVOUS SYSTEM:  Alert & Oriented X3, Good concentration; Motor Strength 5/5 B/L upper and lower extremities; DTRs 2+ intact and symmetric  CHEST/LUNG: Clear to percussion bilaterally; No rales, rhonchi, wheezing, or rubs  HEART: Regular rate and rhythm; No murmurs, rubs, or gallops  ABDOMEN: firm Nontender, distended; Bowel sounds present  back: + pain at former marrow biopsy site.   EXTREMITIES:  2+ Peripheral Pulses, No clubbing, cyanosis, 1+ pitting edema b/l.   SKIN: multiple small pruritic lesions scattered diffusely.     Consultant(s) Notes Reviewed:  [x ] YES  [ ] NO    LABS:                        9.8    4.85  )-----------( 181      ( 2017 07:56 )             31.7     11-    141  |  106  |  9   ----------------------------<  99  3.9   |  21<L>  |  0.99    Ca    9.0      2017 08:14    TPro  7.7  /  Alb  4.0  /  TBili  0.2  /  DBili  x   /  AST  27  /  ALT  23  /  AlkPhos  80  11-24    PT/INR - ( 2017 07:55 )   PT: 10.3 sec;   INR: 0.91 ratio         PTT - ( 2017 07:55 )  PTT:28.6 sec  Urinalysis Basic - ( 2017 07:05 )    Color: Yellow / Appearance: Slightly Turbid / S.028 / pH: x  Gluc: x / Ketone: Negative  / Bili: Negative / Urobili: Negative mg/dL   Blood: x / Protein: Trace mg/dL / Nitrite: Negative   Leuk Esterase: Negative / RBC: 1 /HPF / WBC 5 /HPF   Sq Epi: x / Non Sq Epi: 21 /HPF / Bacteria: Moderate      CAPILLARY BLOOD GLUCOSE    RADIOLOGY & ADDITIONAL TESTS:    Imaging Personally Reviewed:  yes    CT ab/p:  FINDINGS:    LOWER CHEST: Suggestion of filling defect in the right lower lobe   pulmonary artery.    LIVER: Hepatic steatosis.  BILE DUCTS: Normal caliber.  GALLBLADDER: Within normal limits.  SPLEEN: Within normal limits.  PANCREAS: Within normal limits.  ADRENALS: Within normal limits.  KIDNEYS/URETERS: Within normal limits except for punctate right renal   calculus..    BLADDER: Within normal limits.  REPRODUCTIVE ORGANS: Degenerating 2.7 cm posterior intramural fibroid.  BOWEL: No bowel obstruction. Appendix is normal.  PERITONEUM: No ascites.  VESSELS:  Within normal limits.  RETROPERITONEUM: Multiple subcentimeter nodes unchanged.    ABDOMINAL WALL: Within normal limits.  BONES: Within normal limits.    IMPRESSION:   Suspicious for right lower lobe pulmonary embolus.  Degenerating fibroid.

## 2017-11-25 NOTE — H&P ADULT - NSHPSOCIALHISTORY_GEN_ALL_CORE
NO tobacco or ethanol.  Single, 2 adult children--patient did not wish children notified at present.  RN.

## 2017-11-25 NOTE — CONSULT NOTE ADULT - PROBLEM SELECTOR RECOMMENDATION 2
Agree with confirming with CT angio and LE dopplers.  2D echo pending for right heart strain.  2 unprovoked thromboembolisms, likely will need indefinite anticoagulation.  Continue Lovenox FD BID for now and can switch to NOAC upon dc.  Previously did well on Xarelto.

## 2017-11-25 NOTE — H&P ADULT - FAMILY HISTORY
Family history of aneurysm, Mother-  at 45, Sister-  at 72     Sibling  Still living? No  Family history of colon cancer, Age at diagnosis: Age Unknown  Family history of cerebrovascular accident (CVA), Age at diagnosis: Age Unknown

## 2017-11-25 NOTE — PATIENT PROFILE ADULT. - NS TRANSFER PATIENT BELONGINGS
Clothing/2 pairs of earrings, 3 necklaces, 2 bracelets 1 gold and 1 copper, silver pair of scissors taken from patient/Other belongings/Cell Phone/PDA (specify)/Wrist Watch

## 2017-11-25 NOTE — H&P ADULT - ASSESSMENT
NIGHT HOSPITALIST:  Presentation of patient with symptoms dating for a month appears to be coincident with discontinuation of Xarelto following completion of 6 month course for DVT in the setting of a complicated course of HES on treatment>>patient agrees to stay for treatment but wishes for oral options for anticoagulation>>patient agrees to continue with Lovenox for now (received dose at 8 PM last night prior to returning to the ER following initial AMA) and will have the DAY PROVIDER contact Dr. Zepeda's service for consultation by hematology>>per pharmacy Hematology needs to release patient's Gleevec for use in the hospital.  Presently with no evidence of infection.  Will follow cardiac enzymes, and obtain echo.  Patient aware of the risks/benefits of full anticoagulation and agrees to continue as such.

## 2017-11-25 NOTE — CONSULT NOTE ADULT - PROBLEM SELECTOR RECOMMENDATION 3
Downtrending, please check iron, TIBC, ferritin, reticulocyte count, Vitamin B12, folate.    Hematology will continue to follow.  Please call us with any questions.

## 2017-11-25 NOTE — H&P ADULT - ATTENDING COMMENTS
NIGHT HOSPITALIST:  Patient aware of course and agree with plan/care as above.  Long term prognosis is guarded.  Emotional support provided to patient.  Care reviewed with covering NP.  Care assumed by the DAY HOSPITALIST.

## 2017-11-25 NOTE — H&P ADULT - HISTORY OF PRESENT ILLNESS
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  Patient was seen twice at the [facility] ER last night apparently following leaving AMA from the ER earlier in the evening, but had received one dose of Lovenox at 2000 for findings for a PE on CTT angiogram.  Patient presently denies chest pain/pressure.  No dyspnea.  No HA, no focal weakness.  No abdominal pain, no red blood per rectum or melena.  NO back pain, no tearing back pain.  No hematuria, no dysuria.  No fever, no chills, no rigors.  NO suicidal or homicidal ideation.  Remaining review of systems not contributory.

## 2017-11-25 NOTE — H&P ADULT - NSHPLABSRESULTS_GEN_ALL_CORE
WBC 5.6.  Hgb 10.7.  Platelets of 158K.  17% eosinophils.  INR 0.9.  K+ 4.7.  Random glucose of 129.  Cr 1.1.  Troponin nil @ 1800.  CTT angiogram with RIGHT lower lobe PE.  EKG tracing reviewed with sinus tachycardia at 108. WBC 5.6.  Hgb 10.7.  Platelets of 158K.  17% eosinophils.  INR 0.9.  K+ 4.7.  Random glucose of 129.  Cr 1.1.  Troponin nil @ 1800.  CTT angiogram with RIGHT lower lobe PE from 10 AM (patient referred to the ER the first time).  EKG tracing reviewed with sinus tachycardia at 108.  Patient declined chest radiograph.

## 2017-11-25 NOTE — H&P ADULT - PROBLEM SELECTOR PLAN 4
On ARB and Lasix, although the latter more for the oedema with chronic Prednisone requirements for the HES.

## 2017-11-25 NOTE — CONSULT NOTE ADULT - ATTENDING COMMENTS
11/26/17 13:00  Patient interviewed/examined.  Agree with history, ROS (in H&P too), PE, A/P as above.    Patient with PE's discovered on outpatient CT A/P noted due to increase in abdominal girth.    Now on anticoagulation with xarelto.    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   Await LE dopplers, CT Chest Angio, and Echo.      Eduardo Braga MD   (Weekend coverage)  [phone]

## 2017-11-25 NOTE — CHART NOTE - NSCHARTNOTEFT_GEN_A_CORE
MEDICINE NP    Notified by RN patient refusing Lovenox 100mg . Seen and examined patient at bedside. Patient is alert, NAD. Denies HA, CP, SOB, cough, N/V, or abd pain. Patient reports she was on Xarelto for 6 months and was compliant. Patient states that Lovenox makes her bleed at the site for numerous hours. Spoke with the hospitalist Dr. Sosa, he made recommendations to order xarelto bid for 21 days to be followed by daily doses    Porfirio Ruggiero [phone]

## 2017-11-25 NOTE — PROGRESS NOTE ADULT - PROBLEM SELECTOR PLAN 1
- lovenox 100 q12 now, she hates taking it and needs an oral option.   - xarelto seems like a good option for her, tolerated well in past, I would not call this failure of therapy. Could always switch to alternative xa inhibitor too like eliquis. No coumadin w/ frequent finger sticks.  - repeat U/S duplex LE as above.  - no physical exam e/o RH strain, no O2 requirement.

## 2017-11-25 NOTE — H&P ADULT - NSHPPHYSICALEXAM_GEN_ALL_CORE
Physical exam with a Cushingoid appearing middle aged F appears older than stated age. Physical exam with a Cushingoid appearing middle aged F appears older than stated age.  BP  134/86  HR  115.  RR 14.  Afebrile.  100% on NC.  HEENT< PERRL, EOMI, oropharynx clear.  Neck supple, Cushingoid features.  Chest clear, cor s1 s2 tachycardia.  Declined breast exam.  Abdomen soft, obese, nontender, no rebound.  skin scattered healed hyperpigmented areas from prior rash.  Neurologic exam AxOx3. speech fluent.  Cognition intact.  Affect neutral.  Calm.  UE/LE 5/5.

## 2017-11-25 NOTE — CONSULT NOTE ADULT - SUBJECTIVE AND OBJECTIVE BOX
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  She was sent to the ER after CT A/P showed evidence of filling defect suspicious for PE.  Patient started on FD Lovenox BID inpatient.  Awaiting CT chest angio, LE dopplers and 2D echo.    Patient reports SOB on rest and exertion for ~2 months and thought it was due to growing abdominal girth.  She also reports palpitations but denies LE swelling.  Denies recent travel, immobilization, OCP use, surgery.  Denies chest pain/pressure, abd pain, HA or focal weakness.  No red blood per rectum or melena, hematuria or dysuria.  No fever, no chills, no rigors.  Diffuse rash from hypereosinophilic syndrome with scarring and at baseline.      Hematology consulted for Gleevec order.     Allergies  NKDA, ?Bactrim    MEDICATIONS  (STANDING):  atovaquone Suspension 750 milliGRAM(s) Oral daily  cholecalciferol 1000 Unit(s) Oral daily  enoxaparin Injectable 100 milliGRAM(s) SubCutaneous every 12 hours  fluconAZOLE   Tablet 400 milliGRAM(s) Oral daily  furosemide    Tablet 20 milliGRAM(s) Oral daily  hydroxyurea 500 milliGRAM(s) Oral two times a day  imatinib 400 milliGRAM(s) Oral daily  influenza   Vaccine 0.5 milliLiter(s) IntraMuscular once  losartan 50 milliGRAM(s) Oral daily  pantoprazole    Tablet 40 milliGRAM(s) Oral before breakfast  predniSONE   Tablet 10 milliGRAM(s) Oral daily  sertraline 25 milliGRAM(s) Oral daily    MEDICATIONS  (PRN):  acetaminophen   Tablet. 650 milliGRAM(s) Oral every 6 hours PRN Mild Pain (1 - 3)  ondansetron Injectable 4 milliGRAM(s) IV Push every 12 hours PRN Nausea    PAST MEDICAL & SURGICAL HISTORY:  Depression  Hypereosinophilic syndrome  Renal stones  Lumbar herniated disc  Nephrolithiasis  Uterine leiomyoma, unspecified location  Renal stone  H/O oral surgery: wisdom tooth extraction  Fibroid uterus: s/p fibroid removal    FAMILY HISTORY:  Family history of aneurysm: Mother-  at 45, Sister-  at 72  Family history of cerebrovascular accident (CVA) (Sibling): Father-  at 97, Brother-  at 54  Family history of colon cancer (Sibling): 2 sisters- diagnosed &amp;  in 60s    SOCIAL HISTORY: No EtOH, no tobacco    REVIEW OF SYSTEMS:   All other review of systems is negative unless indicated above.    Height (cm): 167.64 ( @ 02:08), 167.64 ( @ 16:47)  Weight (kg): 103.6 ( @ 02:08), 103 ( @ 18:30)  BMI (kg/m2): 36.9 ( 02:08), 36.7 ( 18:30)  BSA (m2): 2.12 ( @ 02:08), 2.11 ( @ 18:30)    T(F): 98.1 (17 @ 12:22), Max: 98.2 (17 @ 04:22)  HR: 117 (17 @ 12:22)  BP: 120/79 (17 @ 12:22)  RR: 18 (17 @ 12:22)  SpO2: 99% (17 @ 12:22)  Wt(kg): --    PHYSICAL EXAM:  GENERAL: NAD, cushingoid stature  HEENT: PERRL, EOMI, round facies  CHEST/LUNG: Clear to auscultation bilaterally; No wheeze  HEART: tachycardic, no m/g/r  ABDOMEN: Soft, Nontender, Nondistended; Bowel sounds present, obese belly  EXTREMITIES:  no LE edema b/l  NEUROLOGY: non-focal  SKIN: diffuse dark rash all over back, chest abdomen, face, arms and LE extremities per pt unchanged    LABS:                          9.8    4.85  )-----------( 181      ( 2017 07:56 )             31.7           141  |  106  |  9   ----------------------------<  99  3.9   |  21<L>  |  0.99    Ca    9.0      2017 08:14    TPro  7.7  /  Alb  4.0  /  TBili  0.2  /  DBili  x   /  AST  27  /  ALT  23  /  AlkPhos  80  11-24

## 2017-11-25 NOTE — H&P ADULT - PROBLEM SELECTOR PLAN 2
See above.  Needs to be evaluated by her hematologist for release of Gleevec Rx and for duration (likely potentially life long) and options for further anticoagulation.

## 2017-11-25 NOTE — CONSULT NOTE ADULT - PROBLEM SELECTOR RECOMMENDATION 9
Continue Gleevec 400mg daily, Hydrea 500mg daily (recently dose reduced), Prednisone 10mg daily.  Last eosinophil count 1000 and downtrending.

## 2017-11-25 NOTE — CONSULT NOTE ADULT - ASSESSMENT
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  Hematology consulted for Gleevec order.

## 2017-11-25 NOTE — H&P ADULT - PMH
Depression    Hypereosinophilic syndrome    Lumbar herniated disc    Nephrolithiasis    Renal stones    Uterine leiomyoma, unspecified location

## 2017-11-26 LAB
FERRITIN SERPL-MCNC: 41 NG/ML — SIGNIFICANT CHANGE UP (ref 15–150)
FOLATE SERPL-MCNC: 13.9 NG/ML — SIGNIFICANT CHANGE UP (ref 4.8–24.2)
IRON SATN MFR SERPL: 15 % — SIGNIFICANT CHANGE UP (ref 14–50)
IRON SATN MFR SERPL: 40 UG/DL — SIGNIFICANT CHANGE UP (ref 30–160)
RBC # BLD: 3.35 M/UL — LOW (ref 3.8–5.2)
RETICS #: 85.8 K/UL — SIGNIFICANT CHANGE UP (ref 25–125)
RETICS/RBC NFR: 2.6 % — HIGH (ref 0.5–2.5)
TIBC SERPL-MCNC: 264 UG/DL — SIGNIFICANT CHANGE UP (ref 220–430)
UIBC SERPL-MCNC: 224 UG/DL — SIGNIFICANT CHANGE UP (ref 110–370)
VIT B12 SERPL-MCNC: 264 PG/ML — SIGNIFICANT CHANGE UP (ref 243–894)

## 2017-11-26 PROCEDURE — 99233 SBSQ HOSP IP/OBS HIGH 50: CPT

## 2017-11-26 PROCEDURE — 93970 EXTREMITY STUDY: CPT | Mod: 26

## 2017-11-26 PROCEDURE — 99223 1ST HOSP IP/OBS HIGH 75: CPT | Mod: GC

## 2017-11-26 RX ADMIN — SERTRALINE 25 MILLIGRAM(S): 25 TABLET, FILM COATED ORAL at 09:16

## 2017-11-26 RX ADMIN — IMATINIB MESYLATE 400 MILLIGRAM(S): 400 TABLET, FILM COATED ORAL at 09:15

## 2017-11-26 RX ADMIN — RIVAROXABAN 15 MILLIGRAM(S): KIT at 21:22

## 2017-11-26 RX ADMIN — HYDROXYUREA 500 MILLIGRAM(S): 500 CAPSULE ORAL at 09:17

## 2017-11-26 RX ADMIN — PANTOPRAZOLE SODIUM 40 MILLIGRAM(S): 20 TABLET, DELAYED RELEASE ORAL at 09:12

## 2017-11-26 RX ADMIN — Medication 20 MILLIGRAM(S): at 09:13

## 2017-11-26 RX ADMIN — RIVAROXABAN 15 MILLIGRAM(S): KIT at 09:12

## 2017-11-26 RX ADMIN — FLUCONAZOLE 400 MILLIGRAM(S): 150 TABLET ORAL at 09:17

## 2017-11-26 RX ADMIN — Medication 10 MILLIGRAM(S): at 09:12

## 2017-11-26 RX ADMIN — Medication 1000 UNIT(S): at 09:16

## 2017-11-26 NOTE — DIETITIAN INITIAL EVALUATION ADULT. - NS AS NUTRI INTERV ED CONTENT
Recommended modifications/Other (specify)/Discussed importance of portion control. Behavioral tips to avoid excess consumption at meals/snacks. Healthy alternatives for CHO rich foods, tips for healthy eating and wt loss. RD to remain available for further nutritional interventions as indicated/requested by medical team/pt./Purpose of the nutrition education/Priority modifications

## 2017-11-26 NOTE — DIETITIAN INITIAL EVALUATION ADULT. - ORAL INTAKE PTA
Breakfast: egg whites, bagel, coffee; Lunch/Dinner: carbohydrate rich and admits to poor portion control, attributed to steroid use; reports eating proteins on occasion (typically chicken). Does not like pork. Limited raw vegetables due to food preferences and tolerance per pt. Includes fresh fruits. Drinks: water, juice on occasion. Snacks: rodrick cookies; Consumed Vitamin D3, MVI, Zinc supplements PTA./good

## 2017-11-26 NOTE — DIETITIAN INITIAL EVALUATION ADULT. - ENERGY NEEDS
Height: 66 inches, Weight: 228.3 pounds (dosing), BMI: 36.9 kg/m2 IBW: 130 (+/-10%), %IBW: 175%  Pertinent Information: Pt admitted with dyspnea, palpitations for one month, abdominal bloating. Noted with hx of depression; hypereosinophilic syndrome- addressed with Prednisone, Gleevec, and Hydrea;  PE on CTT angiogram per chart review.  No edema, no pressure injuries noted at present.

## 2017-11-26 NOTE — DIETITIAN INITIAL EVALUATION ADULT. - OTHER INFO
RD consulted for "steroid dependent hypereosinophilic syndrome". Pt reports good appetite/intake during current admission (% at mealtime). Denies N+V. Reports ~4 loose/semi-solid BMs/day. Denies difficulty chewing/swallowing. Pt with slightly limited reception to nutrition education. Discussed ways to manage CHO cravings while on steroid treatment. Diet tips reviewed and noted below, pt verbalizes understanding and is amenable to suggestions.

## 2017-11-26 NOTE — PROGRESS NOTE ADULT - SUBJECTIVE AND OBJECTIVE BOX
chief concern: abdominal distention.     Overnight Ms. Russ refused lovenox, transitioned to xarelto. Otherwise no overnight events noted.   This AM is resting withotu concern or SOB.     REVIEW OF SYSTEMS:  CONSTITUTIONAL: No fever, weight loss, +fatigue.   EYES: No eye pain, visual disturbances, or discharge  ENMT:  No difficulty hearing, tinnitus, vertigo; No sinus or throat pain  NECK: No pain or stiffness  RESPIRATORY: No cough, wheezing, chills or hemoptysis; No shortness of breath. No pleuritic CP.   CARDIOVASCULAR: No chest pain, palpitations, dizziness, + leg swelling.   GASTROINTESTINAL: + abdominal distention and pain. No nausea, vomiting, or hematemesis; no diarrhea or constipation. No melena or hematochezia.  GENITOURINARY: No dysuria, frequency, hematuria, or incontinence  NEUROLOGICAL: No headaches, memory loss, loss of strength, numbness, or tremors  SKIN: minimal itching, diffuse lesions on skin.   LYMPH NODES: No enlarged glands  ENDOCRINE: No heat or cold intolerance; No hair loss  MUSCULOSKELETAL: No joint pain or swelling; No muscle, back, or extremity pain  PSYCHIATRIC: No depression, anxiety, mood swings, or difficulty sleeping  HEME/LYMPH: No easy bruising, or bleeding gums  ALLERGY AND IMMUNOLOGIC: No hives or eczema    Vital Signs Last 24 Hrs  T(C): 36.3 (2017 09:07), Max: 36.8 (2017 05:45)  T(F): 97.4 (2017 09:07), Max: 98.3 (2017 05:45)  HR: 75 (2017 09:07) (75 - 99)  BP: 122/79 (2017 09:07) (107/71 - 136/83)  BP(mean): --  RR: 18 (2017 09:07) (18 - 18)  SpO2: 98% (2017 09:07) (96% - 98%)    PHYSICAL EXAM:  GENERAL: Comfortable. Cushingoid face.   HEAD:  Atraumatic, Normocephalic  EYES: EOMI, PERRLA, conjunctiva and sclera clear  ENMT: No tonsillar erythema, exudates, or enlargement; Moist mucous membranes, poor dentition, No lesions  NECK: Supple, No JVD  NERVOUS SYSTEM:  Alert & Oriented X3, Good concentration; Motor Strength 5/5 B/L upper and lower extremities; DTRs 2+ intact and symmetric  CHEST/LUNG: Clear to percussion bilaterally; No rales, rhonchi, wheezing, or rubs  HEART: Regular rate and rhythm; No murmurs, rubs, or gallops  ABDOMEN: firm Nontender, distended; Bowel sounds present  back: + pain at former marrow biopsy site.   EXTREMITIES:  2+ Peripheral Pulses, No clubbing, cyanosis, 1+ pitting edema b/l.   SKIN: multiple small pruritic lesions scattered diffusely.     Consultant(s) Notes Reviewed:  [x ] YES  [ ] NO    LABS:                            9.8    4.85  )-----------( 181      ( 2017 07:56 )             31.7     -    141  |  106  |  9   ----------------------------<  99  3.9   |  21<L>  |  0.99    Ca    9.0      2017 08:14    TPro  7.7  /  Alb  4.0  /  TBili  0.2  /  DBili  x   /  AST  27  /  ALT  23  /  AlkPhos  80  11-24    PT/INR - ( 2017 07:55 )   PT: 10.3 sec;   INR: 0.91 ratio         PTT - ( 2017 07:55 )  PTT:28.6 sec  Urinalysis Basic - ( 2017 07:05 )    Color: Yellow / Appearance: Slightly Turbid / S.028 / pH: x  Gluc: x / Ketone: Negative  / Bili: Negative / Urobili: Negative mg/dL   Blood: x / Protein: Trace mg/dL / Nitrite: Negative   Leuk Esterase: Negative / RBC: 1 /HPF / WBC 5 /HPF   Sq Epi: x / Non Sq Epi: 21 /HPF / Bacteria: Moderate      CAPILLARY BLOOD GLUCOSE    RADIOLOGY & ADDITIONAL TESTS:    Imaging Personally Reviewed:  yes    CT ab/p:  FINDINGS:    LOWER CHEST: Suggestion of filling defect in the right lower lobe   pulmonary artery.    LIVER: Hepatic steatosis.  BILE DUCTS: Normal caliber.  GALLBLADDER: Within normal limits.  SPLEEN: Within normal limits.  PANCREAS: Within normal limits.  ADRENALS: Within normal limits.  KIDNEYS/URETERS: Within normal limits except for punctate right renal   calculus..    BLADDER: Within normal limits.  REPRODUCTIVE ORGANS: Degenerating 2.7 cm posterior intramural fibroid.  BOWEL: No bowel obstruction. Appendix is normal.  PERITONEUM: No ascites.  VESSELS:  Within normal limits.  RETROPERITONEUM: Multiple subcentimeter nodes unchanged.    ABDOMINAL WALL: Within normal limits.  BONES: Within normal limits.    IMPRESSION:   Suspicious for right lower lobe pulmonary embolus.  Degenerating fibroid.

## 2017-11-26 NOTE — PROGRESS NOTE ADULT - PROBLEM SELECTOR PLAN 1
- continue xarelto.   - repeat U/S duplex LE as above.  - await echo   - no physical exam e/o RH strain, no O2 requirement.

## 2017-11-27 ENCOUNTER — APPOINTMENT (OUTPATIENT)
Dept: HEMATOLOGY ONCOLOGY | Facility: CLINIC | Age: 47
End: 2017-11-27

## 2017-11-27 DIAGNOSIS — R00.0 TACHYCARDIA, UNSPECIFIED: ICD-10-CM

## 2017-11-27 LAB
ANION GAP SERPL CALC-SCNC: 12 MMOL/L — SIGNIFICANT CHANGE UP (ref 5–17)
ANION GAP SERPL CALC-SCNC: 16 MMOL/L — SIGNIFICANT CHANGE UP (ref 5–17)
BUN SERPL-MCNC: 10 MG/DL — SIGNIFICANT CHANGE UP (ref 7–23)
BUN SERPL-MCNC: 9 MG/DL — SIGNIFICANT CHANGE UP (ref 7–23)
CALCIUM SERPL-MCNC: 8.3 MG/DL — LOW (ref 8.4–10.5)
CALCIUM SERPL-MCNC: 9 MG/DL — SIGNIFICANT CHANGE UP (ref 8.4–10.5)
CHLORIDE SERPL-SCNC: 103 MMOL/L — SIGNIFICANT CHANGE UP (ref 96–108)
CHLORIDE SERPL-SCNC: 106 MMOL/L — SIGNIFICANT CHANGE UP (ref 96–108)
CO2 SERPL-SCNC: 21 MMOL/L — LOW (ref 22–31)
CO2 SERPL-SCNC: 23 MMOL/L — SIGNIFICANT CHANGE UP (ref 22–31)
CREAT SERPL-MCNC: 1.15 MG/DL — SIGNIFICANT CHANGE UP (ref 0.5–1.3)
CREAT SERPL-MCNC: 1.2 MG/DL — SIGNIFICANT CHANGE UP (ref 0.5–1.3)
GLUCOSE SERPL-MCNC: 107 MG/DL — HIGH (ref 70–99)
GLUCOSE SERPL-MCNC: 116 MG/DL — HIGH (ref 70–99)
HCT VFR BLD CALC: 36 % — SIGNIFICANT CHANGE UP (ref 34.5–45)
HGB BLD-MCNC: 11 G/DL — LOW (ref 11.5–15.5)
MAGNESIUM SERPL-MCNC: 2 MG/DL — SIGNIFICANT CHANGE UP (ref 1.6–2.6)
MCHC RBC-ENTMCNC: 28.8 PG — SIGNIFICANT CHANGE UP (ref 27–34)
MCHC RBC-ENTMCNC: 30.6 GM/DL — LOW (ref 32–36)
MCV RBC AUTO: 94.2 FL — SIGNIFICANT CHANGE UP (ref 80–100)
PHOSPHATE SERPL-MCNC: 2.9 MG/DL — SIGNIFICANT CHANGE UP (ref 2.5–4.5)
PLATELET # BLD AUTO: 199 K/UL — SIGNIFICANT CHANGE UP (ref 150–400)
POTASSIUM SERPL-MCNC: 3.8 MMOL/L — SIGNIFICANT CHANGE UP (ref 3.5–5.3)
POTASSIUM SERPL-MCNC: 4 MMOL/L — SIGNIFICANT CHANGE UP (ref 3.5–5.3)
POTASSIUM SERPL-SCNC: 3.8 MMOL/L — SIGNIFICANT CHANGE UP (ref 3.5–5.3)
POTASSIUM SERPL-SCNC: 4 MMOL/L — SIGNIFICANT CHANGE UP (ref 3.5–5.3)
RBC # BLD: 3.82 M/UL — SIGNIFICANT CHANGE UP (ref 3.8–5.2)
RBC # FLD: 19.2 % — HIGH (ref 10.3–14.5)
SODIUM SERPL-SCNC: 140 MMOL/L — SIGNIFICANT CHANGE UP (ref 135–145)
SODIUM SERPL-SCNC: 141 MMOL/L — SIGNIFICANT CHANGE UP (ref 135–145)
WBC # BLD: 4.35 K/UL — SIGNIFICANT CHANGE UP (ref 3.8–10.5)
WBC # FLD AUTO: 4.35 K/UL — SIGNIFICANT CHANGE UP (ref 3.8–10.5)

## 2017-11-27 PROCEDURE — 93306 TTE W/DOPPLER COMPLETE: CPT | Mod: 26

## 2017-11-27 PROCEDURE — 99233 SBSQ HOSP IP/OBS HIGH 50: CPT

## 2017-11-27 PROCEDURE — 93010 ELECTROCARDIOGRAM REPORT: CPT

## 2017-11-27 RX ORDER — SODIUM CHLORIDE 9 MG/ML
1000 INJECTION INTRAMUSCULAR; INTRAVENOUS; SUBCUTANEOUS
Qty: 0 | Refills: 0 | Status: DISCONTINUED | OUTPATIENT
Start: 2017-11-27 | End: 2017-11-28

## 2017-11-27 RX ADMIN — SERTRALINE 25 MILLIGRAM(S): 25 TABLET, FILM COATED ORAL at 10:19

## 2017-11-27 RX ADMIN — Medication 10 MILLIGRAM(S): at 10:08

## 2017-11-27 RX ADMIN — RIVAROXABAN 15 MILLIGRAM(S): KIT at 10:08

## 2017-11-27 RX ADMIN — HYDROXYUREA 500 MILLIGRAM(S): 500 CAPSULE ORAL at 10:23

## 2017-11-27 RX ADMIN — PANTOPRAZOLE SODIUM 40 MILLIGRAM(S): 20 TABLET, DELAYED RELEASE ORAL at 10:08

## 2017-11-27 RX ADMIN — RIVAROXABAN 15 MILLIGRAM(S): KIT at 21:19

## 2017-11-27 RX ADMIN — FLUCONAZOLE 400 MILLIGRAM(S): 150 TABLET ORAL at 10:18

## 2017-11-27 RX ADMIN — Medication 1000 UNIT(S): at 10:14

## 2017-11-27 RX ADMIN — SODIUM CHLORIDE 75 MILLILITER(S): 9 INJECTION INTRAMUSCULAR; INTRAVENOUS; SUBCUTANEOUS at 14:57

## 2017-11-27 RX ADMIN — IMATINIB MESYLATE 400 MILLIGRAM(S): 400 TABLET, FILM COATED ORAL at 10:23

## 2017-11-27 NOTE — PROGRESS NOTE ADULT - SUBJECTIVE AND OBJECTIVE BOX
chief concern: abdominal distention.     HPI: No acute events overnight.  Patient states +dizziness when got up from bed this AM.  No other complaints.  no fever, chills, chest pain, SOB, DAMON, abdomianl pain, n/v/d/c.        Vital Signs Last 24 Hrs  T(C): 36.5 (27 Nov 2017 13:35), Max: 36.7 (27 Nov 2017 09:49)  T(F): 97.7 (27 Nov 2017 13:35), Max: 98 (27 Nov 2017 09:49)  HR: 110 (27 Nov 2017 13:35) (107 - 110)  BP: 128/79 (27 Nov 2017 13:35) (110/76 - 128/79)  BP(mean): --  RR: 18 (27 Nov 2017 13:35) (18 - 18)  SpO2: 99% (27 Nov 2017 13:35) (98% - 100%)      PHYSICAL EXAM:  GENERAL: Comfortable. Cushingoid face.   HEAD:  Atraumatic, Normocephalic  EYES: EOMI, PERRLA, conjunctiva and sclera clear  NECK: Supple, No JVD  NERVOUS SYSTEM:  Alert & Oriented X3, Good concentration; Motor Strength 5/5 B/L upper and lower extremities;   CHEST/LUNG: Clear to percussion bilaterally; No rales, rhonchi, wheezing, or rubs  HEART: +tachycardia, regular @ 120BMP auscultated No murmurs, rubs, or gallops  ABDOMEN: Nontender, Bowel sounds present  EXTREMITIES:  2+ Peripheral Pulses, No clubbing, cyanosis, R>L trace edema.  SKIN: multiple small pruritic lesions scattered diffusely, per patient improved from prior to gleevac    Consultant(s) Notes Reviewed:  [x ] YES  [ ] NO    LABS:                          11.0   4.35  )-----------( 199      ( 27 Nov 2017 13:04 )             36.0                 CAPILLARY BLOOD GLUCOSE    RADIOLOGY & ADDITIONAL TESTS:    Imaging Personally Reviewed:  yes    CT ab/p:  FINDINGS:    LOWER CHEST: Suggestion of filling defect in the right lower lobe   pulmonary artery.    LIVER: Hepatic steatosis.  BILE DUCTS: Normal caliber.  GALLBLADDER: Within normal limits.  SPLEEN: Within normal limits.  PANCREAS: Within normal limits.  ADRENALS: Within normal limits.  KIDNEYS/URETERS: Within normal limits except for punctate right renal   calculus..    BLADDER: Within normal limits.  REPRODUCTIVE ORGANS: Degenerating 2.7 cm posterior intramural fibroid.  BOWEL: No bowel obstruction. Appendix is normal.  PERITONEUM: No ascites.  VESSELS:  Within normal limits.  RETROPERITONEUM: Multiple subcentimeter nodes unchanged.    ABDOMINAL WALL: Within normal limits.  BONES: Within normal limits.    IMPRESSION:   Suspicious for right lower lobe pulmonary embolus.  Degenerating fibroid.    < from: [facility] Duplex Lower Ext Vein Scan, Bilat (11.26.17 @ 14:25) >  IMPRESSION:     Left leg deep vein thrombosis.    Acute deep venous thrombosis: below the knee.    < end of copied text >

## 2017-11-27 NOTE — PROGRESS NOTE ADULT - PROBLEM SELECTOR PLAN 1
-patient sent in for CTA, however unable to obtain on admission due to recent cotnrast.    -at this time, patient CE negaitve, BNP negative  -has doppler evidence of new acute DVT.  -would at this time hold off on CTA as unless TTE shows RHS, would not , and would like to avoid further radiation if would not .  -discussed with heme fellow who will discuss with heme attending to weigh in.  -await TTE  -if TTE normal, not submassive PE, no need for discussion re: thrombectomy.    -continue Xarelto, needs total 21 day load, then 20mg dialy indefinitely

## 2017-11-27 NOTE — PROVIDER CONTACT NOTE (OTHER) - ASSESSMENT
BX=523/83, NP=082 temp=97.8 F, O2 sat=99%, respirations=18, sinus tach on tele, pt asymptomatic, denies chest pain, has felt dizzy today

## 2017-11-27 NOTE — PROGRESS NOTE ADULT - PROBLEM SELECTOR PLAN 6
- On ARB and Lasix, although the latter more for the oedema with chronic Prednisone requirements for the HES.  - pressures well controlled here.

## 2017-11-27 NOTE — PROGRESS NOTE ADULT - PROBLEM SELECTOR PLAN 2
-at this time, would check orthostatics given patient on lasix, intermittent dizziness when standing.  -D/C lasix  -if +orthostatics would give back gentle fluids.  -likely multifactorial 2/2 fluid status and PE

## 2017-11-28 ENCOUNTER — TRANSCRIPTION ENCOUNTER (OUTPATIENT)
Age: 47
End: 2017-11-28

## 2017-11-28 VITALS
DIASTOLIC BLOOD PRESSURE: 85 MMHG | RESPIRATION RATE: 19 BRPM | SYSTOLIC BLOOD PRESSURE: 128 MMHG | HEART RATE: 107 BPM | OXYGEN SATURATION: 100 % | TEMPERATURE: 98 F

## 2017-11-28 DIAGNOSIS — R69 ILLNESS, UNSPECIFIED: ICD-10-CM

## 2017-11-28 LAB
ANION GAP SERPL CALC-SCNC: 14 MMOL/L — SIGNIFICANT CHANGE UP (ref 5–17)
BUN SERPL-MCNC: 10 MG/DL — SIGNIFICANT CHANGE UP (ref 7–23)
CALCIUM SERPL-MCNC: 8.5 MG/DL — SIGNIFICANT CHANGE UP (ref 8.4–10.5)
CHLORIDE SERPL-SCNC: 104 MMOL/L — SIGNIFICANT CHANGE UP (ref 96–108)
CO2 SERPL-SCNC: 23 MMOL/L — SIGNIFICANT CHANGE UP (ref 22–31)
CREAT SERPL-MCNC: 0.98 MG/DL — SIGNIFICANT CHANGE UP (ref 0.5–1.3)
GLUCOSE SERPL-MCNC: 83 MG/DL — SIGNIFICANT CHANGE UP (ref 70–99)
HCT VFR BLD CALC: 31.9 % — LOW (ref 34.5–45)
HGB BLD-MCNC: 10.1 G/DL — LOW (ref 11.5–15.5)
MCHC RBC-ENTMCNC: 29.7 PG — SIGNIFICANT CHANGE UP (ref 27–34)
MCHC RBC-ENTMCNC: 31.7 GM/DL — LOW (ref 32–36)
MCV RBC AUTO: 93.8 FL — SIGNIFICANT CHANGE UP (ref 80–100)
PLATELET # BLD AUTO: 195 K/UL — SIGNIFICANT CHANGE UP (ref 150–400)
POTASSIUM SERPL-MCNC: 4.1 MMOL/L — SIGNIFICANT CHANGE UP (ref 3.5–5.3)
POTASSIUM SERPL-SCNC: 4.1 MMOL/L — SIGNIFICANT CHANGE UP (ref 3.5–5.3)
RBC # BLD: 3.4 M/UL — LOW (ref 3.8–5.2)
RBC # FLD: 19.2 % — HIGH (ref 10.3–14.5)
SODIUM SERPL-SCNC: 141 MMOL/L — SIGNIFICANT CHANGE UP (ref 135–145)
WBC # BLD: 4.42 K/UL — SIGNIFICANT CHANGE UP (ref 3.8–10.5)
WBC # FLD AUTO: 4.42 K/UL — SIGNIFICANT CHANGE UP (ref 3.8–10.5)

## 2017-11-28 PROCEDURE — 82607 VITAMIN B-12: CPT

## 2017-11-28 PROCEDURE — 93306 TTE W/DOPPLER COMPLETE: CPT

## 2017-11-28 PROCEDURE — 99223 1ST HOSP IP/OBS HIGH 75: CPT

## 2017-11-28 PROCEDURE — 93005 ELECTROCARDIOGRAM TRACING: CPT

## 2017-11-28 PROCEDURE — 81001 URINALYSIS AUTO W/SCOPE: CPT

## 2017-11-28 PROCEDURE — 83550 IRON BINDING TEST: CPT

## 2017-11-28 PROCEDURE — 83735 ASSAY OF MAGNESIUM: CPT

## 2017-11-28 PROCEDURE — 99285 EMERGENCY DEPT VISIT HI MDM: CPT

## 2017-11-28 PROCEDURE — 82728 ASSAY OF FERRITIN: CPT

## 2017-11-28 PROCEDURE — 85730 THROMBOPLASTIN TIME PARTIAL: CPT

## 2017-11-28 PROCEDURE — 85027 COMPLETE CBC AUTOMATED: CPT

## 2017-11-28 PROCEDURE — 82553 CREATINE MB FRACTION: CPT

## 2017-11-28 PROCEDURE — 85045 AUTOMATED RETICULOCYTE COUNT: CPT

## 2017-11-28 PROCEDURE — 82746 ASSAY OF FOLIC ACID SERUM: CPT

## 2017-11-28 PROCEDURE — 82550 ASSAY OF CK (CPK): CPT

## 2017-11-28 PROCEDURE — 99239 HOSP IP/OBS DSCHRG MGMT >30: CPT

## 2017-11-28 PROCEDURE — 93970 EXTREMITY STUDY: CPT

## 2017-11-28 PROCEDURE — 84100 ASSAY OF PHOSPHORUS: CPT

## 2017-11-28 PROCEDURE — 85610 PROTHROMBIN TIME: CPT

## 2017-11-28 PROCEDURE — 84484 ASSAY OF TROPONIN QUANT: CPT

## 2017-11-28 PROCEDURE — 80048 BASIC METABOLIC PNL TOTAL CA: CPT

## 2017-11-28 RX ORDER — ONDANSETRON 8 MG/1
1 TABLET, FILM COATED ORAL
Qty: 28 | Refills: 0 | OUTPATIENT
Start: 2017-11-28 | End: 2017-12-12

## 2017-11-28 RX ORDER — SERTRALINE 25 MG/1
1 TABLET, FILM COATED ORAL
Qty: 0 | Refills: 0 | COMMUNITY

## 2017-11-28 RX ORDER — RIVAROXABAN 15 MG-20MG
1 KIT ORAL
Qty: 36 | Refills: 0
Start: 2017-11-28 | End: 2017-12-16

## 2017-11-28 RX ORDER — CHOLECALCIFEROL (VITAMIN D3) 125 MCG
1 CAPSULE ORAL
Qty: 30 | Refills: 0
Start: 2017-11-28 | End: 2017-12-28

## 2017-11-28 RX ORDER — FLUCONAZOLE 150 MG/1
2 TABLET ORAL
Qty: 0 | Refills: 0 | COMMUNITY

## 2017-11-28 RX ORDER — IMATINIB MESYLATE 400 MG/1
0 TABLET, FILM COATED ORAL
Qty: 0 | Refills: 0 | COMMUNITY

## 2017-11-28 RX ORDER — METOPROLOL TARTRATE 50 MG
25 TABLET ORAL
Qty: 0 | Refills: 0 | Status: DISCONTINUED | OUTPATIENT
Start: 2017-11-28 | End: 2017-11-28

## 2017-11-28 RX ORDER — SODIUM CHLORIDE 9 MG/ML
500 INJECTION INTRAMUSCULAR; INTRAVENOUS; SUBCUTANEOUS ONCE
Qty: 0 | Refills: 0 | Status: DISCONTINUED | OUTPATIENT
Start: 2017-11-28 | End: 2017-11-28

## 2017-11-28 RX ORDER — SERTRALINE 25 MG/1
1 TABLET, FILM COATED ORAL
Qty: 30 | Refills: 0
Start: 2017-11-28 | End: 2017-12-28

## 2017-11-28 RX ORDER — ONDANSETRON 8 MG/1
1 TABLET, FILM COATED ORAL
Qty: 28 | Refills: 0
Start: 2017-11-28 | End: 2017-12-12

## 2017-11-28 RX ORDER — ACETAMINOPHEN 500 MG
2 TABLET ORAL
Qty: 120 | Refills: 0
Start: 2017-11-28 | End: 2017-12-13

## 2017-11-28 RX ORDER — ONDANSETRON 8 MG/1
1 TABLET, FILM COATED ORAL
Qty: 0 | Refills: 0 | COMMUNITY

## 2017-11-28 RX ORDER — RIVAROXABAN 15 MG-20MG
1 KIT ORAL
Qty: 36 | Refills: 0 | OUTPATIENT
Start: 2017-11-28 | End: 2017-12-16

## 2017-11-28 RX ORDER — METOPROLOL TARTRATE 50 MG
1 TABLET ORAL
Qty: 60 | Refills: 0
Start: 2017-11-28 | End: 2017-12-28

## 2017-11-28 RX ORDER — CHOLECALCIFEROL (VITAMIN D3) 125 MCG
1 CAPSULE ORAL
Qty: 30 | Refills: 0 | OUTPATIENT
Start: 2017-11-28 | End: 2017-12-28

## 2017-11-28 RX ORDER — SODIUM CHLORIDE 9 MG/ML
1000 INJECTION INTRAMUSCULAR; INTRAVENOUS; SUBCUTANEOUS ONCE
Qty: 0 | Refills: 0 | Status: COMPLETED | OUTPATIENT
Start: 2017-11-28 | End: 2017-11-28

## 2017-11-28 RX ORDER — DIPHENHYDRAMINE HCL 50 MG
1 CAPSULE ORAL
Qty: 0 | Refills: 0 | COMMUNITY

## 2017-11-28 RX ORDER — ESOMEPRAZOLE MAGNESIUM 40 MG/1
1 CAPSULE, DELAYED RELEASE ORAL
Qty: 30 | Refills: 0 | OUTPATIENT
Start: 2017-11-28 | End: 2017-12-28

## 2017-11-28 RX ORDER — ONDANSETRON 8 MG/1
0 TABLET, FILM COATED ORAL
Qty: 0 | Refills: 0 | COMMUNITY

## 2017-11-28 RX ORDER — SERTRALINE 25 MG/1
1 TABLET, FILM COATED ORAL
Qty: 30 | Refills: 0 | OUTPATIENT
Start: 2017-11-28 | End: 2017-12-28

## 2017-11-28 RX ORDER — POTASSIUM CHLORIDE 20 MEQ
1 PACKET (EA) ORAL
Qty: 0 | Refills: 0 | COMMUNITY

## 2017-11-28 RX ORDER — FUROSEMIDE 40 MG
1 TABLET ORAL
Qty: 0 | Refills: 0 | COMMUNITY

## 2017-11-28 RX ORDER — NABUMETONE 750 MG
1 TABLET ORAL
Qty: 0 | Refills: 0 | COMMUNITY

## 2017-11-28 RX ORDER — CHOLECALCIFEROL (VITAMIN D3) 125 MCG
1 CAPSULE ORAL
Qty: 0 | Refills: 0 | COMMUNITY

## 2017-11-28 RX ORDER — HYDROXYUREA 500 MG/1
0 CAPSULE ORAL
Qty: 0 | Refills: 0 | COMMUNITY

## 2017-11-28 RX ORDER — HYDROXYUREA 500 MG/1
1 CAPSULE ORAL
Qty: 0 | Refills: 0 | COMMUNITY

## 2017-11-28 RX ORDER — ESOMEPRAZOLE MAGNESIUM 40 MG/1
1 CAPSULE, DELAYED RELEASE ORAL
Qty: 30 | Refills: 0
Start: 2017-11-28 | End: 2017-12-28

## 2017-11-28 RX ORDER — ATOVAQUONE 750 MG/5ML
0 SUSPENSION ORAL
Qty: 0 | Refills: 0 | COMMUNITY

## 2017-11-28 RX ADMIN — RIVAROXABAN 15 MILLIGRAM(S): KIT at 18:23

## 2017-11-28 RX ADMIN — Medication 1000 UNIT(S): at 09:14

## 2017-11-28 RX ADMIN — IMATINIB MESYLATE 400 MILLIGRAM(S): 400 TABLET, FILM COATED ORAL at 09:14

## 2017-11-28 RX ADMIN — SERTRALINE 25 MILLIGRAM(S): 25 TABLET, FILM COATED ORAL at 09:15

## 2017-11-28 RX ADMIN — PANTOPRAZOLE SODIUM 40 MILLIGRAM(S): 20 TABLET, DELAYED RELEASE ORAL at 09:14

## 2017-11-28 RX ADMIN — FLUCONAZOLE 400 MILLIGRAM(S): 150 TABLET ORAL at 09:15

## 2017-11-28 RX ADMIN — Medication 10 MILLIGRAM(S): at 09:15

## 2017-11-28 RX ADMIN — RIVAROXABAN 15 MILLIGRAM(S): KIT at 09:15

## 2017-11-28 RX ADMIN — SODIUM CHLORIDE 3000 MILLILITER(S): 9 INJECTION INTRAMUSCULAR; INTRAVENOUS; SUBCUTANEOUS at 09:15

## 2017-11-28 RX ADMIN — HYDROXYUREA 500 MILLIGRAM(S): 500 CAPSULE ORAL at 09:15

## 2017-11-28 RX ADMIN — Medication 25 MILLIGRAM(S): at 18:22

## 2017-11-28 NOTE — PROGRESS NOTE ADULT - PROBLEM SELECTOR PLAN 2
-at this time, would check orthostatics given patient on lasix, intermittent dizziness when standing.  -D/C lasix  -if +orthostatics would give back gentle fluids.  -likely multifactorial 2/2 fluid status and PE -s/p fluids yesterdy, another bolus today.  -evidence of hypovolemia with hyperdynamic ventricle, elevated specific gravity in urine, positive orthostatics.  -recheck today.  -typically not concerned for sinus tach, however went to 170 with minimal exertion, with 14 beats WCT  -unsure etiology as does have presumed PE, but no chest pain, no hypoxia, tachy 170 atypical, and patient with reported persistence of tachycardia for month prior.  -given uncertainty of clnical picture, consulted cards. ?conduction system involvement for eosinophilic syndrome?

## 2017-11-28 NOTE — DISCHARGE NOTE ADULT - CARE PROVIDERS DIRECT ADDRESSES
[email or web address]

## 2017-11-28 NOTE — PROGRESS NOTE ADULT - PROBLEM SELECTOR PLAN 3
conitnue gleevac, hydrea, prednisone conitnue gleevac, hydrea, prednisone  -discussed with heme.  wants to continue mepron, does not want to change to bactrim 2/2 pancytopenia risk .  -will discuss with patietn imporance of mepron.

## 2017-11-28 NOTE — DISCHARGE NOTE ADULT - HOSPITAL COURSE
Ms. Russ is a 46 yo woman w/ hypereosinophilic syndrome on imatinib and hydroxyurea c/b DVT s/p Xarelto x 6 months (recently d/andreia) admitted for incidental finding of PE, tachycardia      Problem/Plan - 1:  ·  Problem: Other pulmonary embolism without acute cor pulmonale, unspecified chronicity.  Plan: TTE normal, negative CE, negative BNP  -no CTA as will not .   -continue Xarelto, needs total 21 day load, then 20mg dialy indefinitely. -patient sent in for CTA, however unable to obtain on admission due to recent cotnrast.    -at this time, patient CE negaitve, BNP negative  -has doppler evidence of new acute DVT.  -would at this time hold off on CTA as unless TTE shows RHS, would not , and would like to avoid further radiation if would not .  -discussed with heme fellow who will discuss with heme attending to weigh in.  -await TTE  -if TTE normal, not submassive PE, no need for discussion re: thrombectomy.    -continue Xarelto, needs total 21 day load, then 20mg dialy indefinitely      Problem/Plan - 2:  ·  Problem: Sinus tachycardia.  Plan: -s/p fluids yesterdy, another bolus today.  -evidence of hypovolemia with hyperdynamic ventricle, elevated specific gravity in urine, positive orthostatics.  -recheck today.  -typically not concerned for sinus tach, however went to 170 with minimal exertion, with 14 beats WCT  -unsure etiology as does have presumed PE, but no chest pain, no hypoxia, tachy 170 atypical, and patient with reported persistence of tachycardia for month prior.  -given uncertainty of clnical picture, consulted cards. ?conduction system involvement for eosinophilic syndrome? -at this time, would check orthostatics given patient on lasix, intermittent dizziness when standing.  -D/C lasix  -if +orthostatics would give back gentle fluids.  -likely multifactorial 2/2 fluid status and PE      Problem/Plan - 3:  ·  Problem: Hypereosinophilic syndrome.  Plan: conitnue gleevac, hydrea, prednisone  -discussed with heme.  wants to continue mepron, does not want to change to bactrim 2/2 pancytopenia risk .  -will discuss with patietn imporance of mepron. conitnue gleevac, hydrea, prednisone      Problem/Plan - 4:  ·  Problem: Abdominal pain.  Plan: no complaint of pain at this time.      Problem/Plan - 5:  ·  Problem: Depression.  Plan: ON sertraline.      Problem/Plan - 6:  Problem: Essential hypertension. Plan: - On ARB   - pressures well controlled here. - On ARB and Lasix, although the latter more for the oedema with chronic Prednisone requirements for the HES. Ms. Russ is a 48 yo woman w/ hypereosinophilic syndrome on imatinib and hydroxyurea c/b DVT s/p Xarelto x 6 months (recently d/andreia) admitted for incidental finding of PE, tachycardia. Patient admitted, unable to obtain CTA of chest 2/2 recent contrast load.  Doppler LE showed acute below the knee DVT.  No considered failure as patient off Xarelto for 3 weeks prior.  No SOB, TTE performed, no RV strain, CE negative, normal BNP.  Patient with persistent tachycardia, occurring prior for 1-2 months.  Seen by cardiology, started on beta blocker and cleared for D/C.  Dsihcarged off of lasix 2/2 orthostatic hypotension and dehydration (elevated specific gravity).   To follow up with Onc, PMD, Cardiology within 2 weeks of discharge.

## 2017-11-28 NOTE — DISCHARGE NOTE ADULT - MEDICATION SUMMARY - MEDICATIONS TO TAKE
I will START or STAY ON the medications listed below when I get home from the hospital:    predniSONE 10 mg oral tablet  -- 1 tab(s) by mouth once a day with food and in morning  -- Indication: For Hypereosinophilic syndrome    acetaminophen 325 mg oral tablet  -- 2 tab(s) by mouth every 6 hours, As needed, Mild Pain (1 - 3)  -- Indication: For Pain management    losartan 50 mg oral tablet  -- 1 tab(s) by mouth once a day  -- Indication: For Hypertension    rivaroxaban 15 mg oral tablet  -- 1 tab(s) by mouth 2 times a day x 18 days, then 20 mg daily and you will need new prescription  -- Indication: For Pulmonary embolism & DVT    sertraline 25 mg oral tablet  -- 1 tab(s) by mouth once a day  -- Indication: For Depression    Zofran ODT 8 mg oral tablet, disintegrating  -- 1 tab(s) by mouth 2 times a day, As Needed nausea/vomiting  -- Indication: For nausea / vomiting management    fluconazole 200 mg oral tablet  -- 2 tab(s) = 400 mg by mouth once a day  -- Indication: For thrush prophylaxis w/ steroids    hydroxyurea 500 mg oral capsule  -- 1 cap(s) by mouth once a day  -- Indication: For Hypereosinophilic syndrome    Gleevec 400 mg oral tablet  -- 1 tab(s) by mouth once a day  -- Indication: For Hypereosinophilic syndrome    Mepron 750 mg/5 mL oral suspension  -- 5 ml by mouth once a day  -- Indication: For PCP prophylaxis w/ steroids    NexIUM 40 mg oral delayed release capsule  -- 1 cap(s) by mouth once a day 1/2 hour before breakfast  -- It is very important that you take or use this exactly as directed.  Do not skip doses or discontinue unless directed by your doctor.  Obtain medical advice before taking any non-prescription drugs as some may affect the action of this medication.  Swallow whole.  Do not crush.  Take medication on an empty stomach 1 hour before or 2 to 3 hours after a meal unless otherwise directed by your doctor.    -- Indication: For Stomach protection    Vitamin D3 2000 intl units oral tablet  -- 1 tab(s) by mouth once a day  -- Indication: For vitamin D supplement I will START or STAY ON the medications listed below when I get home from the hospital:    predniSONE 10 mg oral tablet  -- 1 tab(s) by mouth once a day with food and in morning  -- Indication: For Hypereosinophilic syndrome    acetaminophen 325 mg oral tablet  -- 2 tab(s) by mouth every 6 hours, As needed, Mild Pain (1 - 3)  -- Indication: For Pain management    losartan 50 mg oral tablet  -- 1 tab(s) by mouth once a day  -- Indication: For Hypertension    rivaroxaban 15 mg oral tablet  -- 1 tab(s) by mouth 2 times a day x 18 days, then 20 mg daily and you will need new prescription  -- Indication: For Pulmonary embolism & DVT    sertraline 25 mg oral tablet  -- 1 tab(s) by mouth once a day  -- Indication: For Depression    Zofran ODT 8 mg oral tablet, disintegrating  -- 1 tab(s) by mouth 2 times a day, As Needed nausea/vomiting  -- Indication: For nausea/vomiting management    fluconazole 200 mg oral tablet  -- 2 tab(s) = 400 mg by mouth once a day  -- Indication: For thrush prophylaxis w/ steroids    hydroxyurea 500 mg oral capsule  -- 1 cap(s) by mouth once a day  -- Indication: For Hypereosinophilic syndrome    Gleevec 400 mg oral tablet  -- 1 tab(s) by mouth once a day  -- Indication: For Hypereosinophilic syndrome    metoprolol tartrate 25 mg oral tablet  -- 1 tab(s) by mouth 2 times a day  -- Indication: For tachycardia    Mepron 750 mg/5 mL oral suspension  -- 5 ml by mouth once a day  -- Indication: For PCP prophylaxis w/ steroids    NexIUM 40 mg oral delayed release capsule  -- 1 cap(s) by mouth once a day 1/2 hour before breakfast  -- It is very important that you take or use this exactly as directed.  Do not skip doses or discontinue unless directed by your doctor.  Obtain medical advice before taking any non-prescription drugs as some may affect the action of this medication.  Swallow whole.  Do not crush.  Take medication on an empty stomach 1 hour before or 2 to 3 hours after a meal unless otherwise directed by your doctor.    -- Indication: For Stomach protection    Vitamin D3 2000 intl units oral tablet  -- 1 tab(s) by mouth once a day  -- Indication: For vitamin D supplement

## 2017-11-28 NOTE — CONSULT NOTE ADULT - ASSESSMENT
Patient is a 47 year-old woman with persistent sinus tachycardia since starting consistent steroids for her eosinophilia. She is asymptomatic of her tachycardia and her echocardiogram was within normal limits (showing a hyperdynamic, slightly thickened LV).  Given ventricular ectopy seen on tele and persistent sinus tachycardia in setting of steroids, will start low dose beta-blocker.  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  No evidence of RV strain on echo. Cardiac biomarkers negative. Patient is asymptomatic of PE.  Anemia is additional potential etiology of tachycardia - follow-up with hematologist.    Patient should follow-up with me as outpatient in 1-2 weeks after starting new medication. If beta-blocker is not tolerated, can try diltiazem (as outpatient).    Case discussed with Hospitalist and NP on the floor.

## 2017-11-28 NOTE — DISCHARGE NOTE ADULT - PLAN OF CARE
optimal treatment for PE and follow up Take your anticoagulation (lovenox, coumadin) as directed.  Follow up with your health care provider within one week. Call for appointment.  If you develop shortness of breath or if your shortness of breath worsens call your Health Care Provider or go to the Emergency Department.  Xarelto/Rivaroxaban is used to thin the blood so clots will not form and to keep existing ones from getting bigger.  Take this medication daily as prescribed by your health care provider.  Take this medication with food to prevent upset stomach.  If you miss a dose call your health care provider or pharmacist right away.  Tell your doctor you use this drug before you have a spinal or epidural procedure  Tell dentists, surgeon, and other doctors that you use this drug.  You may bleed more easily.  Be careful and avoid injury.  Use a soft toothbrush and an electric razor.  take Xarelto 15 mg twice daily for 18 more days then change to 20 mg daily - you will need new prescription follow up with hematology as directed  take Prednisone, Mepron, Diflucan, Gleevec as directed adequate hydration  follow up with cardiology as directed Follow up with your medical doctor to establish long term blood pressure treatment goals. take Sertaline as directed and follow up with primary care physician resolved problem adequate hydration  follow up with cardiology as directed  Lopressor 25 twice daily as directed Take your anticoagulation, Xarelto  Follow up with your health care provider within one week. Call for appointment.  If you develop shortness of breath or if your shortness of breath worsens call your Health Care Provider or go to the Emergency Department.  Xarelto/Rivaroxaban is used to thin the blood so clots will not form and to keep existing ones from getting bigger.  Take this medication daily as prescribed by your health care provider.  Take this medication with food to prevent upset stomach.  If you miss a dose call your health care provider or pharmacist right away.  Tell your doctor you use this drug before you have a spinal or epidural procedure  Tell dentists, surgeon, and other doctors that you use this drug.  You may bleed more easily.  Be careful and avoid injury.  Use a soft toothbrush and an electric razor.  take Xarelto 15 mg twice daily for 18 more days then change to 20 mg daily - you will need new prescription

## 2017-11-28 NOTE — CONSULT NOTE ADULT - SUBJECTIVE AND OBJECTIVE BOX
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  Patient was seen twice at the [facility] ER last night apparently following leaving AMA from the ER earlier in the evening, but had received one dose of Lovenox at 2000 for findings for a PE on CTT angiogram.  Patient presently denies chest pain/pressure.  No dyspnea.  No HA, no focal weakness.  No abdominal pain, no red blood per rectum or melena.  NO back pain, no tearing back pain.  No hematuria, no dysuria.  No fever, no chills, no rigors.  NO suicidal or homicidal ideation.  Remaining review of systems not contributory. (2017 02:09)    PMH:   Depression  Hypereosinophilic syndrome  Renal stones  Lumbar herniated disc  Nephrolithiasis  Uterine leiomyoma, unspecified location    PSH:   Renal stone  H/O oral surgery  Fibroid uterus  No significant past surgical history    Medications:   acetaminophen   Tablet. 650 milliGRAM(s) Oral every 6 hours PRN  atovaquone Suspension 750 milliGRAM(s) Oral daily  cholecalciferol 1000 Unit(s) Oral daily  fluconAZOLE   Tablet 400 milliGRAM(s) Oral daily  hydroxyurea 500 milliGRAM(s) Oral daily  imatinib 400 milliGRAM(s) Oral daily  influenza   Vaccine 0.5 milliLiter(s) IntraMuscular once  losartan 50 milliGRAM(s) Oral daily  metoprolol     tartrate 25 milliGRAM(s) Oral two times a day  ondansetron Injectable 4 milliGRAM(s) IV Push every 12 hours PRN  pantoprazole    Tablet 40 milliGRAM(s) Oral before breakfast  predniSONE   Tablet 10 milliGRAM(s) Oral daily  rivaroxaban 15 milliGRAM(s) Oral two times a day  sertraline 25 milliGRAM(s) Oral daily  sodium chloride 0.9%. 1000 milliLiter(s) IV Continuous <Continuous>    Allergies:  Bactrim (Bangura-Steve)    FAMILY HISTORY:  Family history of aneurysm: Mother-  at 45, Sister-  at 72  Family history of cerebrovascular accident (CVA) (Sibling): Father-  at 97, Brother-  at 54  Family history of colon cancer (Sibling): 2 sisters- diagnosed &amp;  in 60s    Social History: , lives with daughter, works as RN  Smoking: non-smoker  Alcohol: no EtoH  Drugs: no illicit drug use    Review of Systems:  [ ]Unable to obtain  Constitutional: No fever, chills, fatigue, or changes in weight  HEENT: No blurry vision, eye pain, headache, runny nose, or sore throat  Respiratory: No shortness of breath, cough, or wheezing  Cardiovascular: No chest pain or palpitations  Gastrointestinal: No nausea, vomiting, diarrhea, or abdominal pain  Genitourinary: No dysuria or incontinence  Extremities: No lower extremity swelling  Neurologic: No focal findings  Lymphatic: No lymphadenopathy   Skin: No rash  Psychiatry: No anxiety or depression  10 point review of systems is otherwise negative except as mentioned above            Physical Exam:  T(C): 36.5 (17 @ 13:32), Max: 36.7 (17 @ 19:18)  HR: 107 (17 @ 13:32) (100 - 109)  BP: 111/69 (17 @ 13:32) (111/69 - 128/72)  RR: 19 (17 @ 13:32) (18 - 20)  SpO2: 100% (17 @ 13:32) (99% - 100%)  Wt(kg): --     @ 07:01  -   @ 07:00  --------------------------------------------------------  IN: 1560 mL / OUT: 0 mL / NET: 1560 mL     @ 07:01  -   @ 18:11  --------------------------------------------------------  IN: 1600 mL / OUT: 0 mL / NET: 1600 mL      Daily     Daily Weight in k.1 (2017 05:16)    Appearance: Normal, well groomed, NAD  Eyes: PERRLA, EOMI, pink conjunctiva, no scleral icterus   HENT: Normal oral mucosa  Cardiovascular: RRR, S1, S2, no murmur, rub, or gallop; no edema; no JVD  Procedural Access Site: Clean, dry, intact, without hematoma  Respiratory: Clear to auscultation bilaterally  Gastrointestinal: Soft, non-tender, non-distended, BS+  Musculoskeletal: No clubbing or joint deformity   Neurologic: No focal weakness  Lymphatic: No lymphadenopathy  Psychiatry: AAOx3 with appropriate mood and affect  Skin: No rashes, ecchymoses, or cyanosis    Cardiovascular Diagnostic Testing:    ECG: sinus tachycardia    Echo: < from: Transthoracic Echocardiogram (.17 @ 14:58) >  ------------------------------------------------------------------------  Conclusions:  1. Increased relative wall thickness with normal left  ventricular mass index, consistent with concentric left  ventricular remodeling.  2. Hyperdynamic left ventricular systolic function.  ------------------------------------------------------------------------  Confirmed on  2017 - 17:22:14 by Shola Alcala M.D.  ------------------------------------------------------------------------    < end of copied text >      Interpretation of Telemetry: sinus tachycardia with 14 beats NSVT overnight      Labs:                        10.1   4.42  )-----------( 195      ( 2017 07:29 )             31.9         141  |  104  |  10  ----------------------------<  83  4.1   |  23  |  0.98    Ca    8.5      2017 07:29  Phos  2.9       Mg     2.0                 Serum Pro-Brain Natriuretic Peptide: 45 pg/mL ( @ 17:45)

## 2017-11-28 NOTE — PROGRESS NOTE ADULT - PROBLEM SELECTOR PLAN 5
- On ARB and Lasix, although the latter more for the oedema with chronic Prednisone requirements for the HES.  - pressures well controlled here.
- On ARB and Lasix, although the latter more for the oedema with chronic Prednisone requirements for the HES.  - pressures well controlled here.
ON sertraline.
ON sertraline.

## 2017-11-28 NOTE — CHART NOTE - NSCHARTNOTEFT_GEN_A_CORE
Called by RN with 14 Beats of WCT on Tele. Pt was evaluated. Denies CP/SOB/ Palpitations/ Dipahoresis, HA/ Dizziness/ Blurry vision/ syncope, fever/chills, Abdominal Pain/ N/V/D/C, orthopnea/ PND. Pt is a  48 yo woman w/ hypereosinophilic syndrome on imatinib and hydroxyurea c/b DVT s/p Xarelto x 6 months (recently d/andreia) admitted for incidental finding of PE, tachycardia started on Xarelto Xarelto, needs total 21 day load, then 20mg daily indefinitely.     - Monitor on Tele.   - K/Mg/PO: 4.0/2.0/2.9.   - EKG:   - f/u TTE.   - Keep K/Mg>4.0/2.0.   - f/u Cardio recommendations.   - Will endorse to primary team in amLaney ZACARIAS. ELENA LEWIS   # [id number]  Medicine PA.

## 2017-11-28 NOTE — DISCHARGE NOTE ADULT - REASON FOR ADMISSION
Dyspnea, palpitations for one month, abdominal bloating - incidental pulmonary embolism on CT requiring Xarelto PE dosing treatment, history of hypereosinophilic syndrome, elevated heart rate with ambulation/activity

## 2017-11-28 NOTE — PROGRESS NOTE ADULT - PROBLEM SELECTOR PLAN 1
-patient sent in for CTA, however unable to obtain on admission due to recent cotnrast.    -at this time, patient CE negaitve, BNP negative  -has doppler evidence of new acute DVT.  -would at this time hold off on CTA as unless TTE shows RHS, would not , and would like to avoid further radiation if would not .  -discussed with heme fellow who will discuss with heme attending to weigh in.  -await TTE  -if TTE normal, not submassive PE, no need for discussion re: thrombectomy.    -continue Xarelto, needs total 21 day load, then 20mg dialy indefinitely TTE normal, negative CE, negative BNP  -no CTA as will not .   -continue Xarelto, needs total 21 day load, then 20mg dialy indefinitely

## 2017-11-28 NOTE — DISCHARGE NOTE ADULT - PATIENT PORTAL LINK FT
“You can access the FollowHealth Patient Portal, offered by [facility], by registering with the following website: [email or web address]”

## 2017-11-28 NOTE — DISCHARGE NOTE ADULT - MEDICATION SUMMARY - MEDICATIONS TO CHANGE
I will SWITCH the dose or number of times a day I take the medications listed below when I get home from the hospital:    hydroxyurea 500 mg oral capsule  -- 3 cap(s) by mouth 2 times a day    predniSONE 10 mg oral tablet  -- Take 4 tablets once a day for 7 days (see taper instructions).  -- It is very important that you take or use this exactly as directed.  Do not skip doses or discontinue unless directed by your doctor.  Obtain medical advice before taking any non-prescription drugs as some may affect the action of this medication.  Take with food or milk.    4 tab(s) orally once a day x 7 days 3 tab(s) orally once a day x 7 days 2 tab(s) orally once a day x 7 days. Then continue with 1 tablet daily.    Zofran 8 mg oral tablet    Vitamin D3 1000 intl units oral tablet  -- 1 tab(s) by mouth once a day    Hydrea 500 mg oral capsule  -- orally 2 times a day

## 2017-11-28 NOTE — DISCHARGE NOTE ADULT - CARE PLAN
Principal Discharge DX:	Pulmonary embolism  Goal:	optimal treatment for PE and follow up  Instructions for follow-up, activity and diet:	Take your anticoagulation (lovenox, coumadin) as directed.  Follow up with your health care provider within one week. Call for appointment.  If you develop shortness of breath or if your shortness of breath worsens call your Health Care Provider or go to the Emergency Department.  Xarelto/Rivaroxaban is used to thin the blood so clots will not form and to keep existing ones from getting bigger.  Take this medication daily as prescribed by your health care provider.  Take this medication with food to prevent upset stomach.  If you miss a dose call your health care provider or pharmacist right away.  Tell your doctor you use this drug before you have a spinal or epidural procedure  Tell dentists, surgeon, and other doctors that you use this drug.  You may bleed more easily.  Be careful and avoid injury.  Use a soft toothbrush and an electric razor.  take Xarelto 15 mg twice daily for 18 more days then change to 20 mg daily - you will need new prescription  Secondary Diagnosis:	Hypereosinophilic syndrome  Instructions for follow-up, activity and diet:	follow up with hematology as directed  take Prednisone, Mepron, Diflucan, Gleevec as directed  Secondary Diagnosis:	Sinus tachycardia  Instructions for follow-up, activity and diet:	adequate hydration  follow up with cardiology as directed  Secondary Diagnosis:	Essential hypertension  Instructions for follow-up, activity and diet:	Follow up with your medical doctor to establish long term blood pressure treatment goals.  Secondary Diagnosis:	Depression  Instructions for follow-up, activity and diet:	take Sertaline as directed and follow up with primary care physician  Secondary Diagnosis:	Abdominal pain  Instructions for follow-up, activity and diet:	resolved problem Principal Discharge DX:	Pulmonary embolism  Goal:	optimal treatment for PE and follow up  Instructions for follow-up, activity and diet:	Take your anticoagulation (lovenox, coumadin) as directed.  Follow up with your health care provider within one week. Call for appointment.  If you develop shortness of breath or if your shortness of breath worsens call your Health Care Provider or go to the Emergency Department.  Xarelto/Rivaroxaban is used to thin the blood so clots will not form and to keep existing ones from getting bigger.  Take this medication daily as prescribed by your health care provider.  Take this medication with food to prevent upset stomach.  If you miss a dose call your health care provider or pharmacist right away.  Tell your doctor you use this drug before you have a spinal or epidural procedure  Tell dentists, surgeon, and other doctors that you use this drug.  You may bleed more easily.  Be careful and avoid injury.  Use a soft toothbrush and an electric razor.  take Xarelto 15 mg twice daily for 18 more days then change to 20 mg daily - you will need new prescription  Secondary Diagnosis:	Hypereosinophilic syndrome  Instructions for follow-up, activity and diet:	follow up with hematology as directed  take Prednisone, Mepron, Diflucan, Gleevec as directed  Secondary Diagnosis:	Sinus tachycardia  Instructions for follow-up, activity and diet:	adequate hydration  follow up with cardiology as directed  Lopressor 25 twice daily as directed  Secondary Diagnosis:	Essential hypertension  Instructions for follow-up, activity and diet:	Follow up with your medical doctor to establish long term blood pressure treatment goals.  Secondary Diagnosis:	Depression  Instructions for follow-up, activity and diet:	take Sertaline as directed and follow up with primary care physician  Secondary Diagnosis:	Abdominal pain  Instructions for follow-up, activity and diet:	resolved problem Principal Discharge DX:	Pulmonary embolism  Goal:	optimal treatment for PE and follow up  Instructions for follow-up, activity and diet:	Take your anticoagulation, Xarelto  Follow up with your health care provider within one week. Call for appointment.  If you develop shortness of breath or if your shortness of breath worsens call your Health Care Provider or go to the Emergency Department.  Xarelto/Rivaroxaban is used to thin the blood so clots will not form and to keep existing ones from getting bigger.  Take this medication daily as prescribed by your health care provider.  Take this medication with food to prevent upset stomach.  If you miss a dose call your health care provider or pharmacist right away.  Tell your doctor you use this drug before you have a spinal or epidural procedure  Tell dentists, surgeon, and other doctors that you use this drug.  You may bleed more easily.  Be careful and avoid injury.  Use a soft toothbrush and an electric razor.  take Xarelto 15 mg twice daily for 18 more days then change to 20 mg daily - you will need new prescription  Secondary Diagnosis:	Hypereosinophilic syndrome  Instructions for follow-up, activity and diet:	follow up with hematology as directed  take Prednisone, Mepron, Diflucan, Gleevec as directed  Secondary Diagnosis:	Sinus tachycardia  Instructions for follow-up, activity and diet:	adequate hydration  follow up with cardiology as directed  Lopressor 25 twice daily as directed  Secondary Diagnosis:	Essential hypertension  Instructions for follow-up, activity and diet:	Follow up with your medical doctor to establish long term blood pressure treatment goals.  Secondary Diagnosis:	Depression  Instructions for follow-up, activity and diet:	take Sertaline as directed and follow up with primary care physician  Secondary Diagnosis:	Abdominal pain  Instructions for follow-up, activity and diet:	resolved problem

## 2017-11-28 NOTE — DISCHARGE NOTE ADULT - CARE PROVIDER_API CALL
Eduardo Braga), Hematology; Internal Medicine; Medical Oncology  [address] [place] [address]  Phone: [phone]  Fax: [phone]    Cristo Kendall), Cardiology; Internal Medicine  [facility] [place] [address] [address]  Phone: [phone]  Fax: [phone]

## 2017-11-28 NOTE — PROGRESS NOTE ADULT - SUBJECTIVE AND OBJECTIVE BOX
chief concern: abdominal distention.     HPI: No acute events overnight.  Dizzinesss resolved.         Vital Signs Last 24 Hrs  T(C): 36.5 (28 Nov 2017 13:32), Max: 36.7 (27 Nov 2017 19:18)  T(F): 97.7 (28 Nov 2017 13:32), Max: 98 (27 Nov 2017 19:18)  HR: 107 (28 Nov 2017 13:32) (100 - 109)  BP: 111/69 (28 Nov 2017 13:32) (111/69 - 128/72)  BP(mean): --  RR: 19 (28 Nov 2017 13:32) (18 - 20)  SpO2: 100% (28 Nov 2017 13:32) (99% - 100%)  Tele:  sinus.    PHYSICAL EXAM:  GENERAL: Comfortable. Cushingoid face.   HEAD:  Atraumatic, Normocephalic  EYES: EOMI, PERRLA, conjunctiva and sclera clear  NECK: Supple, No JVD  NERVOUS SYSTEM:  Alert & Oriented X3, Good concentration; Motor Strength 5/5 B/L upper and lower extremities;   CHEST/LUNG: Clear to percussion bilaterally; No rales, rhonchi, wheezing, or rubs  HEART: +tachycardia, regular rhythm. No rubs/gallops.  ABDOMEN: Nontender, Bowel sounds present  EXTREMITIES:  2+ Peripheral Pulses, No clubbing, cyanosis, R>L trace edema.  SKIN: multiple small pruritic lesions scattered diffusely, per patient improved from prior to gleevac    Consultant(s) Notes Reviewed:  [x ] YES  [ ] NO    LABS:                                     10.1   4.42  )-----------( 195      ( 28 Nov 2017 07:29 )             31.9      CAPILLARY BLOOD GLUCOSE    RADIOLOGY & ADDITIONAL TESTS:    Imaging Personally Reviewed:  yes    CT ab/p:  FINDINGS:    LOWER CHEST: Suggestion of filling defect in the right lower lobe   pulmonary artery.    LIVER: Hepatic steatosis.  BILE DUCTS: Normal caliber.  GALLBLADDER: Within normal limits.  SPLEEN: Within normal limits.  PANCREAS: Within normal limits.  ADRENALS: Within normal limits.  KIDNEYS/URETERS: Within normal limits except for punctate right renal   calculus..    BLADDER: Within normal limits.  REPRODUCTIVE ORGANS: Degenerating 2.7 cm posterior intramural fibroid.  BOWEL: No bowel obstruction. Appendix is normal.  PERITONEUM: No ascites.  VESSELS:  Within normal limits.  RETROPERITONEUM: Multiple subcentimeter nodes unchanged.    ABDOMINAL WALL: Within normal limits.  BONES: Within normal limits.    IMPRESSION:   Suspicious for right lower lobe pulmonary embolus.  Degenerating fibroid.    < from: [facility] Duplex Lower Ext Vein Scan, Bilat (11.26.17 @ 14:25) >  IMPRESSION:     Left leg deep vein thrombosis.    Acute deep venous thrombosis: below the knee.    < end of copied text > chief concern: abdominal distention.     HPI: No acute events overnight.  Dizzinesss resolved.         Vital Signs Last 24 Hrs  T(C): 36.5 (28 Nov 2017 13:32), Max: 36.7 (27 Nov 2017 19:18)  T(F): 97.7 (28 Nov 2017 13:32), Max: 98 (27 Nov 2017 19:18)  HR: 107 (28 Nov 2017 13:32) (100 - 109)  BP: 111/69 (28 Nov 2017 13:32) (111/69 - 128/72)  BP(mean): --  RR: 19 (28 Nov 2017 13:32) (18 - 20)  SpO2: 100% (28 Nov 2017 13:32) (99% - 100%)  Tele:  sinus.    PHYSICAL EXAM:  GENERAL: Comfortable. Cushingoid face.   HEAD:  Atraumatic, Normocephalic  EYES: EOMI, PERRLA, conjunctiva and sclera clear  NECK: Supple, No JVD  NERVOUS SYSTEM:  Alert & Oriented X3, Good concentration; Motor Strength 5/5 B/L upper and lower extremities;   CHEST/LUNG: Clear to percussion bilaterally; No rales, rhonchi, wheezing, or rubs  HEART: +tachycardia, regular rhythm. No rubs/gallops.  ABDOMEN: Nontender, Bowel sounds present  EXTREMITIES:  2+ Peripheral Pulses, No clubbing, cyanosis, R>L trace edema.  SKIN: multiple small pruritic lesions scattered diffusely, per patient improved from prior to gleevac    Consultant(s) Notes Reviewed:  [x ] YES  [ ] NO    LABS:                                     10.1   4.42  )-----------( 195      ( 28 Nov 2017 07:29 )             31.9      CAPILLARY BLOOD GLUCOSE    RADIOLOGY & ADDITIONAL TESTS:    Imaging Personally Reviewed:  yes    < from: Transthoracic Echocardiogram (11.27.17 @ 14:58) >  1. Increased relative wall thickness with normal left  ventricular mass index, consistent with concentric left  ventricular remodeling.  2. Hyperdynamic left ventricular systolic function.    < end of copied text >      < end of copied text >

## 2017-11-28 NOTE — PROGRESS NOTE ADULT - PROBLEM SELECTOR PLAN 6
- On ARB and Lasix, although the latter more for the oedema with chronic Prednisone requirements for the HES.  - pressures well controlled here. - On ARB   - pressures well controlled here.

## 2017-11-28 NOTE — DISCHARGE NOTE ADULT - ADDITIONAL INSTRUCTIONS
follow up with primary care physician within one week after discharge  follow up with hematology Dr. Braga as directed  follow up with cardiology Dr. Cristo Kendall as directed

## 2017-11-28 NOTE — DISCHARGE NOTE ADULT - MEDICATION SUMMARY - MEDICATIONS TO STOP TAKING
I will STOP taking the medications listed below when I get home from the hospital:    pantoprazole 40 mg oral delayed release tablet  -- 1 tab(s) by mouth once a day (before a meal)    aluminum hydroxide/diphenhydrAMINE/lidocaine/MgOH/simethicone mucous membrane suspension  -- 15 milliliter(s) swish and spit, every 8 hours as needed for mouth/throat pain    Lasix 20 mg oral tablet  -- 1 tab(s) by mouth once a day    potassium chloride 20 mEq oral powder for reconstitution  -- 1 each by mouth 2 times a day    potassium chloride 20 mEq oral tablet, extended release  -- 1 tab(s) by mouth once a day    nabumetone 500 mg oral tablet  -- 1 tab(s) by mouth 2 times a day, As Needed    Pepcid OTC  -- 1 tab(s) by mouth once a day    Benadryl 25 mg oral tablet  -- 1 tab(s) by mouth once a day, As Needed

## 2017-12-01 ENCOUNTER — OUTPATIENT (OUTPATIENT)
Dept: OUTPATIENT SERVICES | Facility: HOSPITAL | Age: 47
LOS: 1 days | Discharge: HOME | End: 2017-12-01

## 2017-12-01 DIAGNOSIS — D25.9 LEIOMYOMA OF UTERUS, UNSPECIFIED: Chronic | ICD-10-CM

## 2017-12-01 DIAGNOSIS — D72.1 EOSINOPHILIA: ICD-10-CM

## 2017-12-01 DIAGNOSIS — Z98.890 OTHER SPECIFIED POSTPROCEDURAL STATES: Chronic | ICD-10-CM

## 2017-12-01 DIAGNOSIS — N20.0 CALCULUS OF KIDNEY: Chronic | ICD-10-CM

## 2017-12-04 ENCOUNTER — APPOINTMENT (OUTPATIENT)
Dept: HEMATOLOGY ONCOLOGY | Facility: CLINIC | Age: 47
End: 2017-12-04
Payer: COMMERCIAL

## 2017-12-04 ENCOUNTER — RESULT REVIEW (OUTPATIENT)
Age: 47
End: 2017-12-04

## 2017-12-04 VITALS
BODY MASS INDEX: 37.31 KG/M2 | OXYGEN SATURATION: 100 % | DIASTOLIC BLOOD PRESSURE: 84 MMHG | SYSTOLIC BLOOD PRESSURE: 126 MMHG | HEART RATE: 83 BPM | RESPIRATION RATE: 16 BRPM | WEIGHT: 231.04 LBS | TEMPERATURE: 98.8 F

## 2017-12-04 DIAGNOSIS — R14.0 ABDOMINAL DISTENSION (GASEOUS): ICD-10-CM

## 2017-12-04 LAB
ANISOCYTOSIS BLD QL: SIGNIFICANT CHANGE UP
BASOPHILS # BLD AUTO: 0.1 K/UL — SIGNIFICANT CHANGE UP (ref 0–0.2)
BASOPHILS NFR BLD AUTO: 3 % — HIGH (ref 0–2)
DACRYOCYTES BLD QL SMEAR: SLIGHT — SIGNIFICANT CHANGE UP
ELLIPTOCYTES BLD QL SMEAR: SLIGHT — SIGNIFICANT CHANGE UP
EOSINOPHIL # BLD AUTO: 1.2 K/UL — HIGH (ref 0–0.5)
EOSINOPHIL NFR BLD AUTO: 19 % — HIGH (ref 0–6)
HCT VFR BLD CALC: 30.7 % — LOW (ref 34.5–45)
HGB BLD-MCNC: 10 G/DL — LOW (ref 11.5–15.5)
HYPOCHROMIA BLD QL: SLIGHT — SIGNIFICANT CHANGE UP
LYMPHOCYTES # BLD AUTO: 1.1 K/UL — SIGNIFICANT CHANGE UP (ref 1–3.3)
LYMPHOCYTES # BLD AUTO: 21 % — SIGNIFICANT CHANGE UP (ref 13–44)
MACROCYTES BLD QL: SLIGHT — SIGNIFICANT CHANGE UP
MCHC RBC-ENTMCNC: 30.6 PG — SIGNIFICANT CHANGE UP (ref 27–34)
MCHC RBC-ENTMCNC: 32.6 G/DL — SIGNIFICANT CHANGE UP (ref 32–36)
MCV RBC AUTO: 94.1 FL — SIGNIFICANT CHANGE UP (ref 80–100)
MICROCYTES BLD QL: SLIGHT — SIGNIFICANT CHANGE UP
MONOCYTES # BLD AUTO: 0.3 K/UL — SIGNIFICANT CHANGE UP (ref 0–0.9)
MONOCYTES NFR BLD AUTO: 7 % — SIGNIFICANT CHANGE UP (ref 2–14)
NEUTROPHILS # BLD AUTO: 2.2 K/UL — SIGNIFICANT CHANGE UP (ref 1.8–7.4)
NEUTROPHILS NFR BLD AUTO: 50 % — SIGNIFICANT CHANGE UP (ref 43–77)
PLAT MORPH BLD: NORMAL — SIGNIFICANT CHANGE UP
PLATELET # BLD AUTO: 279 K/UL — SIGNIFICANT CHANGE UP (ref 150–400)
POIKILOCYTOSIS BLD QL AUTO: SLIGHT — SIGNIFICANT CHANGE UP
POLYCHROMASIA BLD QL SMEAR: SLIGHT — SIGNIFICANT CHANGE UP
RBC # BLD: 3.26 M/UL — LOW (ref 3.8–5.2)
RBC # FLD: 17.4 % — HIGH (ref 10.3–14.5)
RBC BLD AUTO: ABNORMAL
WBC # BLD: 4.8 K/UL — SIGNIFICANT CHANGE UP (ref 3.8–10.5)
WBC # FLD AUTO: 4.8 K/UL — SIGNIFICANT CHANGE UP (ref 3.8–10.5)

## 2017-12-04 PROCEDURE — 99214 OFFICE O/P EST MOD 30 MIN: CPT

## 2017-12-05 ENCOUNTER — NON-APPOINTMENT (OUTPATIENT)
Age: 47
End: 2017-12-05

## 2017-12-05 ENCOUNTER — APPOINTMENT (OUTPATIENT)
Dept: CARDIOLOGY | Facility: CLINIC | Age: 47
End: 2017-12-05
Payer: COMMERCIAL

## 2017-12-05 VITALS
BODY MASS INDEX: 37.15 KG/M2 | SYSTOLIC BLOOD PRESSURE: 131 MMHG | WEIGHT: 230 LBS | DIASTOLIC BLOOD PRESSURE: 82 MMHG | HEART RATE: 90 BPM

## 2017-12-05 DIAGNOSIS — Z63.4 DISAPPEARANCE AND DEATH OF FAMILY MEMBER: ICD-10-CM

## 2017-12-05 PROBLEM — R14.0 ABDOMINAL BLOATING: Status: ACTIVE | Noted: 2017-12-04

## 2017-12-05 PROCEDURE — 93000 ELECTROCARDIOGRAM COMPLETE: CPT

## 2017-12-05 PROCEDURE — 99215 OFFICE O/P EST HI 40 MIN: CPT

## 2017-12-05 SDOH — SOCIAL STABILITY - SOCIAL INSECURITY: DISSAPEARANCE AND DEATH OF FAMILY MEMBER: Z63.4

## 2017-12-15 ENCOUNTER — MOBILE ON CALL (OUTPATIENT)
Age: 47
End: 2017-12-15

## 2017-12-15 ENCOUNTER — APPOINTMENT (OUTPATIENT)
Dept: DERMATOLOGY | Facility: CLINIC | Age: 47
End: 2017-12-15
Payer: MEDICAID

## 2017-12-15 ENCOUNTER — APPOINTMENT (OUTPATIENT)
Dept: HEMATOLOGY ONCOLOGY | Facility: CLINIC | Age: 47
End: 2017-12-15
Payer: COMMERCIAL

## 2017-12-15 ENCOUNTER — LABORATORY RESULT (OUTPATIENT)
Age: 47
End: 2017-12-15

## 2017-12-15 ENCOUNTER — RESULT REVIEW (OUTPATIENT)
Age: 47
End: 2017-12-15

## 2017-12-15 VITALS
RESPIRATION RATE: 16 BRPM | WEIGHT: 226 LBS | OXYGEN SATURATION: 99 % | HEART RATE: 80 BPM | SYSTOLIC BLOOD PRESSURE: 104 MMHG | TEMPERATURE: 98.8 F | BODY MASS INDEX: 36.5 KG/M2 | DIASTOLIC BLOOD PRESSURE: 70 MMHG

## 2017-12-15 VITALS — DIASTOLIC BLOOD PRESSURE: 70 MMHG | SYSTOLIC BLOOD PRESSURE: 110 MMHG

## 2017-12-15 PROBLEM — Z63.4 WIDOWED: Status: ACTIVE | Noted: 2017-12-15

## 2017-12-15 LAB
ANISOCYTOSIS BLD QL: SLIGHT — SIGNIFICANT CHANGE UP
BASOPHILS # BLD AUTO: 0 K/UL — SIGNIFICANT CHANGE UP (ref 0–0.2)
BASOPHILS NFR BLD AUTO: 0 % — SIGNIFICANT CHANGE UP (ref 0–2)
ELLIPTOCYTES BLD QL SMEAR: SLIGHT — SIGNIFICANT CHANGE UP
EOSINOPHIL # BLD AUTO: 0.2 K/UL — SIGNIFICANT CHANGE UP (ref 0–0.5)
EOSINOPHIL NFR BLD AUTO: 14.2 % — HIGH (ref 0–6)
HCT VFR BLD CALC: 29.5 % — LOW (ref 34.5–45)
HCT VFR BLD CALC: 29.8 % — LOW (ref 34.5–45)
HGB BLD-MCNC: 9.5 G/DL — LOW (ref 11.5–15.5)
HGB BLD-MCNC: 9.8 G/DL — LOW (ref 11.5–15.5)
HYPOCHROMIA BLD QL: SLIGHT — SIGNIFICANT CHANGE UP
LYMPHOCYTES # BLD AUTO: 0.8 K/UL — LOW (ref 1–3.3)
LYMPHOCYTES # BLD AUTO: 45.6 % — HIGH (ref 13–44)
MACROCYTES BLD QL: SLIGHT — SIGNIFICANT CHANGE UP
MCHC RBC-ENTMCNC: 30.2 PG — SIGNIFICANT CHANGE UP (ref 27–34)
MCHC RBC-ENTMCNC: 30.8 PG — SIGNIFICANT CHANGE UP (ref 27–34)
MCHC RBC-ENTMCNC: 32.3 G/DL — SIGNIFICANT CHANGE UP (ref 32–36)
MCHC RBC-ENTMCNC: 33 G/DL — SIGNIFICANT CHANGE UP (ref 32–36)
MCV RBC AUTO: 93.4 FL — SIGNIFICANT CHANGE UP (ref 80–100)
MCV RBC AUTO: 93.5 FL — SIGNIFICANT CHANGE UP (ref 80–100)
MICROCYTES BLD QL: SLIGHT — SIGNIFICANT CHANGE UP
MONOCYTES # BLD AUTO: 0.1 K/UL — SIGNIFICANT CHANGE UP (ref 0–0.9)
MONOCYTES NFR BLD AUTO: 7.2 % — SIGNIFICANT CHANGE UP (ref 2–14)
NEUTROPHILS # BLD AUTO: 0.6 K/UL — LOW (ref 1.8–7.4)
NEUTROPHILS NFR BLD AUTO: 33 % — LOW (ref 43–77)
PLAT MORPH BLD: NORMAL — SIGNIFICANT CHANGE UP
PLATELET # BLD AUTO: 178 K/UL — SIGNIFICANT CHANGE UP (ref 150–400)
PLATELET # BLD AUTO: 191 K/UL — SIGNIFICANT CHANGE UP (ref 150–400)
POIKILOCYTOSIS BLD QL AUTO: SLIGHT — SIGNIFICANT CHANGE UP
POLYCHROMASIA BLD QL SMEAR: SLIGHT — SIGNIFICANT CHANGE UP
RBC # BLD: 3.15 M/UL — LOW (ref 3.8–5.2)
RBC # BLD: 3.19 M/UL — LOW (ref 3.8–5.2)
RBC # FLD: 17.2 % — HIGH (ref 10.3–14.5)
RBC # FLD: 17.3 % — HIGH (ref 10.3–14.5)
RBC BLD AUTO: ABNORMAL
WBC # BLD: 1.7 K/UL — LOW (ref 3.8–10.5)
WBC # BLD: 2.6 K/UL — LOW (ref 3.8–10.5)
WBC # FLD AUTO: 1.7 K/UL — LOW (ref 3.8–10.5)
WBC # FLD AUTO: 2.6 K/UL — LOW (ref 3.8–10.5)

## 2017-12-15 PROCEDURE — 99213 OFFICE O/P EST LOW 20 MIN: CPT | Mod: GC

## 2017-12-15 PROCEDURE — 99215 OFFICE O/P EST HI 40 MIN: CPT

## 2017-12-15 RX ORDER — DOXEPIN HYDROCHLORIDE 50 MG/G
5 CREAM TOPICAL
Qty: 45 | Refills: 0 | Status: DISCONTINUED | COMMUNITY
Start: 2017-01-11 | End: 2017-12-15

## 2017-12-15 RX ORDER — PENICILLIN V POTASSIUM 500 MG/1
500 TABLET, FILM COATED ORAL
Qty: 28 | Refills: 0 | Status: DISCONTINUED | COMMUNITY
Start: 2017-01-17 | End: 2017-12-15

## 2017-12-15 RX ORDER — FLUCONAZOLE 150 MG/1
150 TABLET ORAL
Qty: 2 | Refills: 0 | Status: DISCONTINUED | COMMUNITY
Start: 2017-04-01 | End: 2017-12-15

## 2017-12-15 RX ORDER — DOXYCYCLINE HYCLATE 100 MG/1
100 CAPSULE ORAL
Qty: 20 | Refills: 0 | Status: DISCONTINUED | COMMUNITY
Start: 2017-03-03 | End: 2017-12-15

## 2017-12-15 RX ORDER — SULFAMETHOXAZOLE AND TRIMETHOPRIM 800; 160 MG/1; MG/1
800-160 TABLET ORAL
Qty: 28 | Refills: 0 | Status: DISCONTINUED | COMMUNITY
Start: 2017-04-26 | End: 2017-12-15

## 2017-12-15 RX ORDER — AMOXICILLIN AND CLAVULANATE POTASSIUM 500; 125 MG/1; MG/1
500-125 TABLET, FILM COATED ORAL
Qty: 14 | Refills: 0 | Status: DISCONTINUED | COMMUNITY
Start: 2017-04-01 | End: 2017-12-15

## 2017-12-15 RX ORDER — METRONIDAZOLE 500 MG/1
500 TABLET ORAL
Qty: 21 | Refills: 0 | Status: DISCONTINUED | COMMUNITY
Start: 2017-01-17 | End: 2017-12-15

## 2017-12-15 RX ORDER — TERBINAFINE HYDROCHLORIDE 250 MG/1
250 TABLET ORAL
Qty: 30 | Refills: 0 | Status: DISCONTINUED | COMMUNITY
Start: 2017-01-13 | End: 2017-12-15

## 2017-12-18 ENCOUNTER — APPOINTMENT (OUTPATIENT)
Dept: HEMATOLOGY ONCOLOGY | Facility: CLINIC | Age: 47
End: 2017-12-18

## 2017-12-19 ENCOUNTER — LABORATORY RESULT (OUTPATIENT)
Age: 47
End: 2017-12-19

## 2017-12-19 ENCOUNTER — APPOINTMENT (OUTPATIENT)
Dept: DERMATOLOGY | Facility: CLINIC | Age: 47
End: 2017-12-19
Payer: COMMERCIAL

## 2017-12-19 LAB
ANION GAP SERPL CALC-SCNC: 15 MMOL/L
BUN SERPL-MCNC: 7 MG/DL
CALCIUM SERPL-MCNC: 8.8 MG/DL
CHLORIDE SERPL-SCNC: 102 MMOL/L
CO2 SERPL-SCNC: 23 MMOL/L
CREAT SERPL-MCNC: 0.98 MG/DL
GLUCOSE SERPL-MCNC: 94 MG/DL
POTASSIUM SERPL-SCNC: 3.7 MMOL/L
SODIUM SERPL-SCNC: 140 MMOL/L

## 2017-12-19 PROCEDURE — 11100 BX SKIN SUBCUTANEOUS&/MUCOUS MEMBRANE 1 LESION: CPT

## 2017-12-19 PROCEDURE — 11101 BIOPSY SKIN SUBQ&/MUCOUS MEMBRANE EA ADDL LESN: CPT

## 2017-12-19 PROCEDURE — 99214 OFFICE O/P EST MOD 30 MIN: CPT | Mod: 25

## 2017-12-21 ENCOUNTER — RX RENEWAL (OUTPATIENT)
Age: 47
End: 2017-12-21

## 2017-12-22 ENCOUNTER — RESULT REVIEW (OUTPATIENT)
Age: 47
End: 2017-12-22

## 2017-12-22 ENCOUNTER — APPOINTMENT (OUTPATIENT)
Dept: HEMATOLOGY ONCOLOGY | Facility: CLINIC | Age: 47
End: 2017-12-22
Payer: COMMERCIAL

## 2017-12-22 VITALS
DIASTOLIC BLOOD PRESSURE: 86 MMHG | TEMPERATURE: 98.7 F | SYSTOLIC BLOOD PRESSURE: 136 MMHG | BODY MASS INDEX: 35.96 KG/M2 | OXYGEN SATURATION: 100 % | WEIGHT: 222.66 LBS | RESPIRATION RATE: 16 BRPM | HEART RATE: 94 BPM

## 2017-12-22 LAB
BASOPHILS # BLD AUTO: 0.1 K/UL — SIGNIFICANT CHANGE UP (ref 0–0.2)
BASOPHILS NFR BLD AUTO: 1.5 % — SIGNIFICANT CHANGE UP (ref 0–2)
EOSINOPHIL # BLD AUTO: 0.8 K/UL — HIGH (ref 0–0.5)
EOSINOPHIL NFR BLD AUTO: 14.8 % — HIGH (ref 0–6)
HCT VFR BLD CALC: 27.5 % — LOW (ref 34.5–45)
HGB BLD-MCNC: 9 G/DL — LOW (ref 11.5–15.5)
LYMPHOCYTES # BLD AUTO: 1.6 K/UL — SIGNIFICANT CHANGE UP (ref 1–3.3)
LYMPHOCYTES # BLD AUTO: 28.2 % — SIGNIFICANT CHANGE UP (ref 13–44)
MCHC RBC-ENTMCNC: 30.2 PG — SIGNIFICANT CHANGE UP (ref 27–34)
MCHC RBC-ENTMCNC: 32.5 G/DL — SIGNIFICANT CHANGE UP (ref 32–36)
MCV RBC AUTO: 92.9 FL — SIGNIFICANT CHANGE UP (ref 80–100)
MONOCYTES # BLD AUTO: 0.8 K/UL — SIGNIFICANT CHANGE UP (ref 0–0.9)
MONOCYTES NFR BLD AUTO: 13.7 % — SIGNIFICANT CHANGE UP (ref 2–14)
NEUTROPHILS # BLD AUTO: 2.4 K/UL — SIGNIFICANT CHANGE UP (ref 1.8–7.4)
NEUTROPHILS NFR BLD AUTO: 41.9 % — LOW (ref 43–77)
PLATELET # BLD AUTO: 273 K/UL — SIGNIFICANT CHANGE UP (ref 150–400)
RBC # BLD: 2.96 M/UL — LOW (ref 3.8–5.2)
RBC # FLD: 16.7 % — HIGH (ref 10.3–14.5)
WBC # BLD: 5.7 K/UL — SIGNIFICANT CHANGE UP (ref 3.8–10.5)
WBC # FLD AUTO: 5.7 K/UL — SIGNIFICANT CHANGE UP (ref 3.8–10.5)

## 2017-12-22 PROCEDURE — 99214 OFFICE O/P EST MOD 30 MIN: CPT

## 2017-12-29 ENCOUNTER — APPOINTMENT (OUTPATIENT)
Dept: HEMATOLOGY ONCOLOGY | Facility: CLINIC | Age: 47
End: 2017-12-29

## 2018-01-01 PROCEDURE — G9001: CPT

## 2018-01-02 ENCOUNTER — OUTPATIENT (OUTPATIENT)
Dept: OUTPATIENT SERVICES | Facility: HOSPITAL | Age: 48
LOS: 1 days | Discharge: HOME | End: 2018-01-02

## 2018-01-02 DIAGNOSIS — N20.0 CALCULUS OF KIDNEY: Chronic | ICD-10-CM

## 2018-01-02 DIAGNOSIS — Z98.890 OTHER SPECIFIED POSTPROCEDURAL STATES: Chronic | ICD-10-CM

## 2018-01-02 DIAGNOSIS — D72.1 EOSINOPHILIA: ICD-10-CM

## 2018-01-02 DIAGNOSIS — D25.9 LEIOMYOMA OF UTERUS, UNSPECIFIED: Chronic | ICD-10-CM

## 2018-01-05 ENCOUNTER — RESULT REVIEW (OUTPATIENT)
Age: 48
End: 2018-01-05

## 2018-01-05 ENCOUNTER — APPOINTMENT (OUTPATIENT)
Dept: HEMATOLOGY ONCOLOGY | Facility: CLINIC | Age: 48
End: 2018-01-05
Payer: COMMERCIAL

## 2018-01-05 VITALS
RESPIRATION RATE: 16 BRPM | TEMPERATURE: 97.9 F | OXYGEN SATURATION: 100 % | BODY MASS INDEX: 36.82 KG/M2 | HEART RATE: 90 BPM | WEIGHT: 228 LBS | SYSTOLIC BLOOD PRESSURE: 131 MMHG | DIASTOLIC BLOOD PRESSURE: 81 MMHG

## 2018-01-05 LAB
ALBUMIN SERPL ELPH-MCNC: 3.8 G/DL
ALP BLD-CCNC: 61 U/L
ALT SERPL-CCNC: 15 U/L
ANA SER IF-ACNC: NEGATIVE
ANION GAP SERPL CALC-SCNC: 15 MMOL/L
AST SERPL-CCNC: 22 U/L
BASOPHILS # BLD AUTO: 0.1 K/UL — SIGNIFICANT CHANGE UP (ref 0–0.2)
BASOPHILS NFR BLD AUTO: 1.2 % — SIGNIFICANT CHANGE UP (ref 0–2)
BILIRUB SERPL-MCNC: 0.3 MG/DL
BUN SERPL-MCNC: 11 MG/DL
CALCIUM SERPL-MCNC: 9.2 MG/DL
CHLORIDE SERPL-SCNC: 102 MMOL/L
CO2 SERPL-SCNC: 25 MMOL/L
CREAT SERPL-MCNC: 1.06 MG/DL
EOSINOPHIL # BLD AUTO: 1.2 K/UL — HIGH (ref 0–0.5)
EOSINOPHIL NFR BLD AUTO: 10.6 % — HIGH (ref 0–6)
GLUCOSE SERPL-MCNC: 125 MG/DL
HCT VFR BLD CALC: 29.3 % — LOW (ref 34.5–45)
HGB BLD-MCNC: 9.2 G/DL — LOW (ref 11.5–15.5)
LYMPHOCYTES # BLD AUTO: 1 K/UL — SIGNIFICANT CHANGE UP (ref 1–3.3)
LYMPHOCYTES # BLD AUTO: 8.8 % — LOW (ref 13–44)
MCHC RBC-ENTMCNC: 27.8 PG — SIGNIFICANT CHANGE UP (ref 27–34)
MCHC RBC-ENTMCNC: 31.4 G/DL — LOW (ref 32–36)
MCV RBC AUTO: 88.4 FL — SIGNIFICANT CHANGE UP (ref 80–100)
MONOCYTES # BLD AUTO: 0.3 K/UL — SIGNIFICANT CHANGE UP (ref 0–0.9)
MONOCYTES NFR BLD AUTO: 3 % — SIGNIFICANT CHANGE UP (ref 2–14)
NEUTROPHILS # BLD AUTO: 8.4 K/UL — HIGH (ref 1.8–7.4)
NEUTROPHILS NFR BLD AUTO: 76.4 % — SIGNIFICANT CHANGE UP (ref 43–77)
PLATELET # BLD AUTO: 260 K/UL — SIGNIFICANT CHANGE UP (ref 150–400)
POTASSIUM SERPL-SCNC: 3.2 MMOL/L
PROT SERPL-MCNC: 7.3 G/DL
RBC # BLD: 3.32 M/UL — LOW (ref 3.8–5.2)
RBC # FLD: 20.5 % — HIGH (ref 10.3–14.5)
SODIUM SERPL-SCNC: 142 MMOL/L
WBC # BLD: 10.9 K/UL — HIGH (ref 3.8–10.5)
WBC # FLD AUTO: 10.9 K/UL — HIGH (ref 3.8–10.5)

## 2018-01-05 PROCEDURE — 99214 OFFICE O/P EST MOD 30 MIN: CPT

## 2018-01-08 LAB
ALBUMIN SERPL ELPH-MCNC: 4.2 G/DL
ALP BLD-CCNC: 65 U/L
ALT SERPL-CCNC: 30 U/L
ANION GAP SERPL CALC-SCNC: 17 MMOL/L
AST SERPL-CCNC: 28 U/L
BILIRUB SERPL-MCNC: 0.3 MG/DL
BUN SERPL-MCNC: 10 MG/DL
CALCIUM SERPL-MCNC: 9.7 MG/DL
CHLORIDE SERPL-SCNC: 102 MMOL/L
CO2 SERPL-SCNC: 23 MMOL/L
CREAT SERPL-MCNC: 0.99 MG/DL
GLUCOSE SERPL-MCNC: 116 MG/DL
POTASSIUM SERPL-SCNC: 4.7 MMOL/L
PROT SERPL-MCNC: 7.5 G/DL
SODIUM SERPL-SCNC: 142 MMOL/L

## 2018-01-09 ENCOUNTER — APPOINTMENT (OUTPATIENT)
Dept: DERMATOLOGY | Facility: CLINIC | Age: 48
End: 2018-01-09
Payer: MEDICAID

## 2018-01-09 VITALS — SYSTOLIC BLOOD PRESSURE: 134 MMHG | DIASTOLIC BLOOD PRESSURE: 92 MMHG

## 2018-01-09 PROCEDURE — 99214 OFFICE O/P EST MOD 30 MIN: CPT

## 2018-01-09 RX ORDER — PREDNISONE 10 MG/1
10 TABLET ORAL DAILY
Qty: 7 | Refills: 1 | Status: DISCONTINUED | COMMUNITY
Start: 2017-09-19 | End: 2018-01-09

## 2018-01-09 RX ORDER — PREDNISONE 20 MG/1
20 TABLET ORAL
Qty: 90 | Refills: 0 | Status: DISCONTINUED | COMMUNITY
Start: 2017-04-26 | End: 2018-01-09

## 2018-01-09 RX ORDER — PREDNISONE 10 MG/1
10 TABLET ORAL
Qty: 30 | Refills: 0 | Status: DISCONTINUED | COMMUNITY
Start: 2017-01-20 | End: 2018-01-09

## 2018-01-09 RX ORDER — PREDNISONE 10 MG/1
10 TABLET ORAL
Qty: 1 | Refills: 0 | Status: DISCONTINUED | COMMUNITY
Start: 2017-12-15 | End: 2018-01-09

## 2018-01-09 RX ORDER — PREDNISONE 10 MG/1
10 TABLET ORAL
Qty: 50 | Refills: 0 | Status: DISCONTINUED | COMMUNITY
Start: 2017-12-27 | End: 2018-01-09

## 2018-01-09 RX ORDER — PREDNISONE 50 MG/1
50 TABLET ORAL
Qty: 28 | Refills: 0 | Status: DISCONTINUED | COMMUNITY
Start: 2017-05-10 | End: 2018-01-09

## 2018-01-19 ENCOUNTER — APPOINTMENT (OUTPATIENT)
Dept: HEMATOLOGY ONCOLOGY | Facility: CLINIC | Age: 48
End: 2018-01-19
Payer: COMMERCIAL

## 2018-01-19 ENCOUNTER — RESULT REVIEW (OUTPATIENT)
Age: 48
End: 2018-01-19

## 2018-01-19 VITALS
OXYGEN SATURATION: 99 % | TEMPERATURE: 98.5 F | BODY MASS INDEX: 37.03 KG/M2 | DIASTOLIC BLOOD PRESSURE: 80 MMHG | RESPIRATION RATE: 18 BRPM | WEIGHT: 229.28 LBS | HEART RATE: 120 BPM | SYSTOLIC BLOOD PRESSURE: 138 MMHG

## 2018-01-19 LAB
ALBUMIN SERPL ELPH-MCNC: 3.5 G/DL
ALP BLD-CCNC: 62 U/L
ALT SERPL-CCNC: 30 U/L
ANION GAP SERPL CALC-SCNC: 17 MMOL/L
AST SERPL-CCNC: 25 U/L
BASOPHILS # BLD AUTO: 0.1 K/UL — SIGNIFICANT CHANGE UP (ref 0–0.2)
BASOPHILS NFR BLD AUTO: 1.9 % — SIGNIFICANT CHANGE UP (ref 0–2)
BILIRUB SERPL-MCNC: <0.2 MG/DL
BUN SERPL-MCNC: 13 MG/DL
CALCIUM SERPL-MCNC: 8.9 MG/DL
CHLORIDE SERPL-SCNC: 106 MMOL/L
CO2 SERPL-SCNC: 21 MMOL/L
CREAT SERPL-MCNC: 1.35 MG/DL
EOSINOPHIL # BLD AUTO: 1 K/UL — HIGH (ref 0–0.5)
EOSINOPHIL NFR BLD AUTO: 15.8 % — HIGH (ref 0–6)
GLUCOSE SERPL-MCNC: 113 MG/DL
HCT VFR BLD CALC: 28.3 % — LOW (ref 34.5–45)
HGB BLD-MCNC: 8.9 G/DL — LOW (ref 11.5–15.5)
LYMPHOCYTES # BLD AUTO: 2.2 K/UL — SIGNIFICANT CHANGE UP (ref 1–3.3)
LYMPHOCYTES # BLD AUTO: 33 % — SIGNIFICANT CHANGE UP (ref 13–44)
MCHC RBC-ENTMCNC: 26.6 PG — LOW (ref 27–34)
MCHC RBC-ENTMCNC: 31.6 G/DL — LOW (ref 32–36)
MCV RBC AUTO: 84.2 FL — SIGNIFICANT CHANGE UP (ref 80–100)
MONOCYTES # BLD AUTO: 0.5 K/UL — SIGNIFICANT CHANGE UP (ref 0–0.9)
MONOCYTES NFR BLD AUTO: 7.2 % — SIGNIFICANT CHANGE UP (ref 2–14)
NEUTROPHILS # BLD AUTO: 2.8 K/UL — SIGNIFICANT CHANGE UP (ref 1.8–7.4)
NEUTROPHILS NFR BLD AUTO: 42.1 % — LOW (ref 43–77)
PLATELET # BLD AUTO: 319 K/UL — SIGNIFICANT CHANGE UP (ref 150–400)
POTASSIUM SERPL-SCNC: 4.4 MMOL/L
PROT SERPL-MCNC: 6.8 G/DL
RBC # BLD: 3.36 M/UL — LOW (ref 3.8–5.2)
RBC # FLD: 21 % — HIGH (ref 10.3–14.5)
SODIUM SERPL-SCNC: 144 MMOL/L
WBC # BLD: 6.5 K/UL — SIGNIFICANT CHANGE UP (ref 3.8–10.5)
WBC # FLD AUTO: 6.5 K/UL — SIGNIFICANT CHANGE UP (ref 3.8–10.5)

## 2018-01-19 PROCEDURE — 99214 OFFICE O/P EST MOD 30 MIN: CPT

## 2018-02-06 ENCOUNTER — OUTPATIENT (OUTPATIENT)
Dept: OUTPATIENT SERVICES | Facility: HOSPITAL | Age: 48
LOS: 1 days | Discharge: HOME | End: 2018-02-06

## 2018-02-06 DIAGNOSIS — D25.9 LEIOMYOMA OF UTERUS, UNSPECIFIED: Chronic | ICD-10-CM

## 2018-02-06 DIAGNOSIS — N20.0 CALCULUS OF KIDNEY: Chronic | ICD-10-CM

## 2018-02-06 DIAGNOSIS — D72.1 EOSINOPHILIA: ICD-10-CM

## 2018-02-06 DIAGNOSIS — Z98.890 OTHER SPECIFIED POSTPROCEDURAL STATES: Chronic | ICD-10-CM

## 2018-02-09 ENCOUNTER — LABORATORY RESULT (OUTPATIENT)
Age: 48
End: 2018-02-09

## 2018-02-09 ENCOUNTER — RESULT REVIEW (OUTPATIENT)
Age: 48
End: 2018-02-09

## 2018-02-09 ENCOUNTER — APPOINTMENT (OUTPATIENT)
Dept: HEMATOLOGY ONCOLOGY | Facility: CLINIC | Age: 48
End: 2018-02-09
Payer: MEDICAID

## 2018-02-09 VITALS
HEART RATE: 88 BPM | TEMPERATURE: 96.7 F | DIASTOLIC BLOOD PRESSURE: 76 MMHG | SYSTOLIC BLOOD PRESSURE: 131 MMHG | BODY MASS INDEX: 37.28 KG/M2 | OXYGEN SATURATION: 100 % | WEIGHT: 230.82 LBS | RESPIRATION RATE: 18 BRPM

## 2018-02-09 LAB
BASOPHILS # BLD AUTO: 0.1 K/UL — SIGNIFICANT CHANGE UP (ref 0–0.2)
BASOPHILS NFR BLD AUTO: 2.8 % — HIGH (ref 0–2)
EOSINOPHIL # BLD AUTO: 0.6 K/UL — HIGH (ref 0–0.5)
EOSINOPHIL NFR BLD AUTO: 17.9 % — HIGH (ref 0–6)
HCT VFR BLD CALC: 28.3 % — LOW (ref 34.5–45)
HGB BLD-MCNC: 8.8 G/DL — LOW (ref 11.5–15.5)
LYMPHOCYTES # BLD AUTO: 1.5 K/UL — SIGNIFICANT CHANGE UP (ref 1–3.3)
LYMPHOCYTES # BLD AUTO: 42.4 % — SIGNIFICANT CHANGE UP (ref 13–44)
MCHC RBC-ENTMCNC: 24.3 PG — LOW (ref 27–34)
MCHC RBC-ENTMCNC: 31.2 G/DL — LOW (ref 32–36)
MCV RBC AUTO: 77.8 FL — LOW (ref 80–100)
MONOCYTES # BLD AUTO: 0.4 K/UL — SIGNIFICANT CHANGE UP (ref 0–0.9)
MONOCYTES NFR BLD AUTO: 10.1 % — SIGNIFICANT CHANGE UP (ref 2–14)
NEUTROPHILS # BLD AUTO: 1 K/UL — LOW (ref 1.8–7.4)
NEUTROPHILS NFR BLD AUTO: 26.8 % — LOW (ref 43–77)
PLATELET # BLD AUTO: 287 K/UL — SIGNIFICANT CHANGE UP (ref 150–400)
RBC # BLD: 3.64 M/UL — LOW (ref 3.8–5.2)
RBC # FLD: 22 % — HIGH (ref 10.3–14.5)
WBC # BLD: 3.6 K/UL — LOW (ref 3.8–10.5)
WBC # FLD AUTO: 3.6 K/UL — LOW (ref 3.8–10.5)

## 2018-02-09 PROCEDURE — 99214 OFFICE O/P EST MOD 30 MIN: CPT

## 2018-02-12 LAB
ALBUMIN SERPL ELPH-MCNC: 3.8 G/DL
ALP BLD-CCNC: 70 U/L
ALT SERPL-CCNC: 24 U/L
ANION GAP SERPL CALC-SCNC: 14 MMOL/L
AST SERPL-CCNC: 31 U/L
BILIRUB SERPL-MCNC: 0.3 MG/DL
BUN SERPL-MCNC: 10 MG/DL
CALCIUM SERPL-MCNC: 8.9 MG/DL
CHLORIDE SERPL-SCNC: 103 MMOL/L
CO2 SERPL-SCNC: 21 MMOL/L
CREAT SERPL-MCNC: 1.19 MG/DL
GLUCOSE SERPL-MCNC: 85 MG/DL
POTASSIUM SERPL-SCNC: 4.3 MMOL/L
PROT SERPL-MCNC: 7.2 G/DL
SODIUM SERPL-SCNC: 138 MMOL/L

## 2018-02-22 ENCOUNTER — FORM ENCOUNTER (OUTPATIENT)
Age: 48
End: 2018-02-22

## 2018-02-23 ENCOUNTER — OUTPATIENT (OUTPATIENT)
Dept: OUTPATIENT SERVICES | Facility: HOSPITAL | Age: 48
LOS: 1 days | End: 2018-02-23
Payer: MEDICAID

## 2018-02-23 ENCOUNTER — APPOINTMENT (OUTPATIENT)
Dept: RADIOLOGY | Facility: IMAGING CENTER | Age: 48
End: 2018-02-23
Payer: MEDICAID

## 2018-02-23 DIAGNOSIS — D25.9 LEIOMYOMA OF UTERUS, UNSPECIFIED: Chronic | ICD-10-CM

## 2018-02-23 DIAGNOSIS — N20.0 CALCULUS OF KIDNEY: Chronic | ICD-10-CM

## 2018-02-23 DIAGNOSIS — M25.552 PAIN IN LEFT HIP: ICD-10-CM

## 2018-02-23 DIAGNOSIS — Z00.8 ENCOUNTER FOR OTHER GENERAL EXAMINATION: ICD-10-CM

## 2018-02-23 DIAGNOSIS — Z98.890 OTHER SPECIFIED POSTPROCEDURAL STATES: Chronic | ICD-10-CM

## 2018-02-23 PROCEDURE — 72110 X-RAY EXAM L-2 SPINE 4/>VWS: CPT | Mod: 26

## 2018-02-23 PROCEDURE — 73552 X-RAY EXAM OF FEMUR 2/>: CPT | Mod: 26,LT

## 2018-02-23 PROCEDURE — 73502 X-RAY EXAM HIP UNI 2-3 VIEWS: CPT | Mod: 26,LT

## 2018-02-23 PROCEDURE — 72110 X-RAY EXAM L-2 SPINE 4/>VWS: CPT

## 2018-02-23 PROCEDURE — 73502 X-RAY EXAM HIP UNI 2-3 VIEWS: CPT

## 2018-02-23 PROCEDURE — 73552 X-RAY EXAM OF FEMUR 2/>: CPT

## 2018-03-02 ENCOUNTER — APPOINTMENT (OUTPATIENT)
Dept: HEMATOLOGY ONCOLOGY | Facility: CLINIC | Age: 48
End: 2018-03-02

## 2018-03-08 ENCOUNTER — OUTPATIENT (OUTPATIENT)
Dept: OUTPATIENT SERVICES | Facility: HOSPITAL | Age: 48
LOS: 1 days | Discharge: HOME | End: 2018-03-08

## 2018-03-08 DIAGNOSIS — N20.0 CALCULUS OF KIDNEY: Chronic | ICD-10-CM

## 2018-03-08 DIAGNOSIS — D72.1 EOSINOPHILIA: ICD-10-CM

## 2018-03-08 DIAGNOSIS — D25.9 LEIOMYOMA OF UTERUS, UNSPECIFIED: Chronic | ICD-10-CM

## 2018-03-08 DIAGNOSIS — Z98.890 OTHER SPECIFIED POSTPROCEDURAL STATES: Chronic | ICD-10-CM

## 2018-03-09 ENCOUNTER — RESULT REVIEW (OUTPATIENT)
Age: 48
End: 2018-03-09

## 2018-03-09 ENCOUNTER — APPOINTMENT (OUTPATIENT)
Dept: HEMATOLOGY ONCOLOGY | Facility: CLINIC | Age: 48
End: 2018-03-09
Payer: MEDICAID

## 2018-03-09 VITALS
HEART RATE: 104 BPM | DIASTOLIC BLOOD PRESSURE: 81 MMHG | RESPIRATION RATE: 18 BRPM | OXYGEN SATURATION: 100 % | WEIGHT: 228.4 LBS | BODY MASS INDEX: 36.89 KG/M2 | SYSTOLIC BLOOD PRESSURE: 124 MMHG | TEMPERATURE: 98.6 F

## 2018-03-09 DIAGNOSIS — R60.9 EDEMA, UNSPECIFIED: ICD-10-CM

## 2018-03-09 LAB
ANISOCYTOSIS BLD QL: SLIGHT — SIGNIFICANT CHANGE UP
BASOPHILS # BLD AUTO: 0 K/UL — SIGNIFICANT CHANGE UP (ref 0–0.2)
BASOPHILS NFR BLD AUTO: 4 % — HIGH (ref 0–2)
DACRYOCYTES BLD QL SMEAR: SLIGHT — SIGNIFICANT CHANGE UP
EOSINOPHIL # BLD AUTO: 0.5 K/UL — SIGNIFICANT CHANGE UP (ref 0–0.5)
EOSINOPHIL NFR BLD AUTO: 18 % — HIGH (ref 0–6)
HCT VFR BLD CALC: 23.3 % — LOW (ref 34.5–45)
HGB BLD-MCNC: 7.1 G/DL — LOW (ref 11.5–15.5)
HYPOCHROMIA BLD QL: SLIGHT — SIGNIFICANT CHANGE UP
LYMPHOCYTES # BLD AUTO: 0.8 K/UL — LOW (ref 1–3.3)
LYMPHOCYTES # BLD AUTO: 40 % — SIGNIFICANT CHANGE UP (ref 13–44)
MACROCYTES BLD QL: SLIGHT — SIGNIFICANT CHANGE UP
MCHC RBC-ENTMCNC: 21.4 PG — LOW (ref 27–34)
MCHC RBC-ENTMCNC: 30.3 G/DL — LOW (ref 32–36)
MCV RBC AUTO: 70.6 FL — LOW (ref 80–100)
MICROCYTES BLD QL: SLIGHT — SIGNIFICANT CHANGE UP
MONOCYTES # BLD AUTO: 0.3 K/UL — SIGNIFICANT CHANGE UP (ref 0–0.9)
MONOCYTES NFR BLD AUTO: 8 % — SIGNIFICANT CHANGE UP (ref 2–14)
NEUTROPHILS # BLD AUTO: 0.6 K/UL — LOW (ref 1.8–7.4)
NEUTROPHILS NFR BLD AUTO: 30 % — LOW (ref 43–77)
PLAT MORPH BLD: NORMAL — SIGNIFICANT CHANGE UP
PLATELET # BLD AUTO: 273 K/UL — SIGNIFICANT CHANGE UP (ref 150–400)
POIKILOCYTOSIS BLD QL AUTO: SLIGHT — SIGNIFICANT CHANGE UP
POLYCHROMASIA BLD QL SMEAR: SLIGHT — SIGNIFICANT CHANGE UP
RBC # BLD: 3.31 M/UL — LOW (ref 3.8–5.2)
RBC # FLD: 22.1 % — HIGH (ref 10.3–14.5)
RBC BLD AUTO: ABNORMAL
WBC # BLD: 2.2 K/UL — LOW (ref 3.8–10.5)
WBC # FLD AUTO: 2.2 K/UL — LOW (ref 3.8–10.5)

## 2018-03-09 PROCEDURE — 99214 OFFICE O/P EST MOD 30 MIN: CPT

## 2018-03-12 ENCOUNTER — APPOINTMENT (OUTPATIENT)
Dept: CARDIOLOGY | Facility: CLINIC | Age: 48
End: 2018-03-12
Payer: MEDICAID

## 2018-03-12 ENCOUNTER — NON-APPOINTMENT (OUTPATIENT)
Age: 48
End: 2018-03-12

## 2018-03-12 VITALS — OXYGEN SATURATION: 100 % | HEART RATE: 98 BPM

## 2018-03-12 VITALS
WEIGHT: 227 LBS | TEMPERATURE: 98.1 F | HEART RATE: 121 BPM | DIASTOLIC BLOOD PRESSURE: 86 MMHG | OXYGEN SATURATION: 100 % | HEIGHT: 65 IN | BODY MASS INDEX: 37.82 KG/M2 | SYSTOLIC BLOOD PRESSURE: 146 MMHG

## 2018-03-12 VITALS — DIASTOLIC BLOOD PRESSURE: 70 MMHG | SYSTOLIC BLOOD PRESSURE: 112 MMHG

## 2018-03-12 DIAGNOSIS — R00.0 TACHYCARDIA, UNSPECIFIED: ICD-10-CM

## 2018-03-12 DIAGNOSIS — I47.2 VENTRICULAR TACHYCARDIA: ICD-10-CM

## 2018-03-12 DIAGNOSIS — Z79.01 LONG TERM (CURRENT) USE OF ANTICOAGULANTS: ICD-10-CM

## 2018-03-12 PROCEDURE — 93000 ELECTROCARDIOGRAM COMPLETE: CPT

## 2018-03-12 PROCEDURE — 99215 OFFICE O/P EST HI 40 MIN: CPT

## 2018-03-12 RX ORDER — RIVAROXABAN 15 MG/1
15 TABLET, FILM COATED ORAL
Qty: 42 | Refills: 0 | Status: DISCONTINUED | COMMUNITY
Start: 2017-05-17 | End: 2018-03-12

## 2018-03-14 LAB
ALBUMIN SERPL ELPH-MCNC: 3.7 G/DL
ALP BLD-CCNC: 55 U/L
ALT SERPL-CCNC: 8 U/L
ANION GAP SERPL CALC-SCNC: 13 MMOL/L
APPEARANCE: ABNORMAL
AST SERPL-CCNC: 25 U/L
BACTERIA: NEGATIVE
BILIRUB SERPL-MCNC: 0.2 MG/DL
BILIRUBIN URINE: NEGATIVE
BLOOD URINE: ABNORMAL
BUN SERPL-MCNC: 6 MG/DL
CALCIUM OXALATE CRYSTALS: ABNORMAL
CALCIUM SERPL-MCNC: 8.8 MG/DL
CHLORIDE SERPL-SCNC: 107 MMOL/L
CO2 SERPL-SCNC: 22 MMOL/L
COLOR: YELLOW
CREAT SERPL-MCNC: 1 MG/DL
GLUCOSE QUALITATIVE U: NEGATIVE MG/DL
GLUCOSE SERPL-MCNC: 98 MG/DL
HYALINE CASTS: 2 /LPF
KETONES URINE: ABNORMAL
LEUKOCYTE ESTERASE URINE: NEGATIVE
MICROSCOPIC-UA: NORMAL
NITRITE URINE: NEGATIVE
PH URINE: 5.5
POTASSIUM SERPL-SCNC: 3.6 MMOL/L
PROT SERPL-MCNC: 7.1 G/DL
PROTEIN URINE: ABNORMAL MG/DL
RED BLOOD CELLS URINE: 327 /HPF
SODIUM SERPL-SCNC: 142 MMOL/L
SPECIFIC GRAVITY URINE: 1.03
SQUAMOUS EPITHELIAL CELLS: 4 /HPF
UROBILINOGEN URINE: NEGATIVE MG/DL
WHITE BLOOD CELLS URINE: 3 /HPF

## 2018-03-20 ENCOUNTER — OUTPATIENT (OUTPATIENT)
Dept: OUTPATIENT SERVICES | Facility: HOSPITAL | Age: 48
LOS: 1 days | End: 2018-03-20
Payer: COMMERCIAL

## 2018-03-20 ENCOUNTER — APPOINTMENT (OUTPATIENT)
Dept: ULTRASOUND IMAGING | Facility: IMAGING CENTER | Age: 48
End: 2018-03-20
Payer: MEDICAID

## 2018-03-20 DIAGNOSIS — I82.409 ACUTE EMBOLISM AND THROMBOSIS OF UNSPECIFIED DEEP VEINS OF UNSPECIFIED LOWER EXTREMITY: ICD-10-CM

## 2018-03-20 DIAGNOSIS — Z98.890 OTHER SPECIFIED POSTPROCEDURAL STATES: Chronic | ICD-10-CM

## 2018-03-20 DIAGNOSIS — N20.0 CALCULUS OF KIDNEY: Chronic | ICD-10-CM

## 2018-03-20 DIAGNOSIS — I82.401 ACUTE EMBOLISM AND THROMBOSIS OF UNSPECIFIED DEEP VEINS OF RIGHT LOWER EXTREMITY: ICD-10-CM

## 2018-03-20 DIAGNOSIS — D25.9 LEIOMYOMA OF UTERUS, UNSPECIFIED: Chronic | ICD-10-CM

## 2018-03-20 PROCEDURE — 93970 EXTREMITY STUDY: CPT

## 2018-03-20 PROCEDURE — 93970 EXTREMITY STUDY: CPT | Mod: 26

## 2018-03-23 ENCOUNTER — RESULT REVIEW (OUTPATIENT)
Age: 48
End: 2018-03-23

## 2018-03-23 ENCOUNTER — APPOINTMENT (OUTPATIENT)
Dept: HEMATOLOGY ONCOLOGY | Facility: CLINIC | Age: 48
End: 2018-03-23
Payer: MEDICAID

## 2018-03-23 ENCOUNTER — APPOINTMENT (OUTPATIENT)
Dept: INFUSION THERAPY | Facility: HOSPITAL | Age: 48
End: 2018-03-23

## 2018-03-23 VITALS
TEMPERATURE: 98.1 F | OXYGEN SATURATION: 99 % | RESPIRATION RATE: 16 BRPM | BODY MASS INDEX: 37.42 KG/M2 | DIASTOLIC BLOOD PRESSURE: 76 MMHG | HEART RATE: 115 BPM | WEIGHT: 224.87 LBS | SYSTOLIC BLOOD PRESSURE: 140 MMHG

## 2018-03-23 LAB
ANISOCYTOSIS BLD QL: SLIGHT — SIGNIFICANT CHANGE UP
BASOPHILS # BLD AUTO: 0.1 K/UL — SIGNIFICANT CHANGE UP (ref 0–0.2)
DACRYOCYTES BLD QL SMEAR: SLIGHT — SIGNIFICANT CHANGE UP
ELLIPTOCYTES BLD QL SMEAR: SLIGHT — SIGNIFICANT CHANGE UP
EOSINOPHIL # BLD AUTO: 0.3 K/UL — SIGNIFICANT CHANGE UP (ref 0–0.5)
EOSINOPHIL NFR BLD AUTO: 16 % — HIGH (ref 0–6)
HCT VFR BLD CALC: 21.4 % — LOW (ref 34.5–45)
HGB BLD-MCNC: 6.8 G/DL — CRITICAL LOW (ref 11.5–15.5)
HYPOCHROMIA BLD QL: SIGNIFICANT CHANGE UP
LYMPHOCYTES # BLD AUTO: 1.3 K/UL — SIGNIFICANT CHANGE UP (ref 1–3.3)
LYMPHOCYTES # BLD AUTO: 46 % — HIGH (ref 13–44)
MACROCYTES BLD QL: SLIGHT — SIGNIFICANT CHANGE UP
MCHC RBC-ENTMCNC: 21.2 PG — LOW (ref 27–34)
MCHC RBC-ENTMCNC: 31.5 G/DL — LOW (ref 32–36)
MCV RBC AUTO: 67.3 FL — LOW (ref 80–100)
MICROCYTES BLD QL: SLIGHT — SIGNIFICANT CHANGE UP
MONOCYTES # BLD AUTO: 0.3 K/UL — SIGNIFICANT CHANGE UP (ref 0–0.9)
MONOCYTES NFR BLD AUTO: 10 % — SIGNIFICANT CHANGE UP (ref 2–14)
NEUTROPHILS # BLD AUTO: 0.7 K/UL — LOW (ref 1.8–7.4)
NEUTROPHILS NFR BLD AUTO: 28 % — LOW (ref 43–77)
PLAT MORPH BLD: NORMAL — SIGNIFICANT CHANGE UP
PLATELET # BLD AUTO: 278 K/UL — SIGNIFICANT CHANGE UP (ref 150–400)
POIKILOCYTOSIS BLD QL AUTO: SLIGHT — SIGNIFICANT CHANGE UP
POLYCHROMASIA BLD QL SMEAR: SLIGHT — SIGNIFICANT CHANGE UP
RBC # BLD: 3.18 M/UL — LOW (ref 3.8–5.2)
RBC # FLD: 22.6 % — HIGH (ref 10.3–14.5)
RBC BLD AUTO: ABNORMAL
TARGETS BLD QL SMEAR: SLIGHT — SIGNIFICANT CHANGE UP
WBC # BLD: 2.6 K/UL — LOW (ref 3.8–10.5)
WBC # FLD AUTO: 2.6 K/UL — LOW (ref 3.8–10.5)

## 2018-03-23 PROCEDURE — 99214 OFFICE O/P EST MOD 30 MIN: CPT

## 2018-03-26 DIAGNOSIS — D50.9 IRON DEFICIENCY ANEMIA, UNSPECIFIED: ICD-10-CM

## 2018-03-26 DIAGNOSIS — C81.70 OTHER HODGKIN LYMPHOMA, UNSPECIFIED SITE: ICD-10-CM

## 2018-03-26 LAB
ALBUMIN SERPL ELPH-MCNC: 3.9 G/DL
ALP BLD-CCNC: 56 U/L
ALT SERPL-CCNC: 9 U/L
ANION GAP SERPL CALC-SCNC: 13 MMOL/L
AST SERPL-CCNC: 20 U/L
BILIRUB SERPL-MCNC: 0.2 MG/DL
BUN SERPL-MCNC: 9 MG/DL
CALCIUM SERPL-MCNC: 9 MG/DL
CHLORIDE SERPL-SCNC: 106 MMOL/L
CO2 SERPL-SCNC: 24 MMOL/L
CREAT SERPL-MCNC: 0.97 MG/DL
GLUCOSE SERPL-MCNC: 92 MG/DL
POTASSIUM SERPL-SCNC: 3.8 MMOL/L
PROT SERPL-MCNC: 7.3 G/DL
SODIUM SERPL-SCNC: 143 MMOL/L

## 2018-03-30 ENCOUNTER — RESULT REVIEW (OUTPATIENT)
Age: 48
End: 2018-03-30

## 2018-03-30 ENCOUNTER — APPOINTMENT (OUTPATIENT)
Dept: INFUSION THERAPY | Facility: HOSPITAL | Age: 48
End: 2018-03-30

## 2018-03-30 LAB
ANISOCYTOSIS BLD QL: SLIGHT — SIGNIFICANT CHANGE UP
DACRYOCYTES BLD QL SMEAR: SLIGHT — SIGNIFICANT CHANGE UP
ELLIPTOCYTES BLD QL SMEAR: SLIGHT — SIGNIFICANT CHANGE UP
EOSINOPHIL # BLD AUTO: 0.4 K/UL — SIGNIFICANT CHANGE UP (ref 0–0.5)
EOSINOPHIL NFR BLD AUTO: 12 % — HIGH (ref 0–6)
HCT VFR BLD CALC: 26.6 % — LOW (ref 34.5–45)
HGB BLD-MCNC: 8.1 G/DL — LOW (ref 11.5–15.5)
HYPOCHROMIA BLD QL: SLIGHT — SIGNIFICANT CHANGE UP
LYMPHOCYTES # BLD AUTO: 1.2 K/UL — SIGNIFICANT CHANGE UP (ref 1–3.3)
LYMPHOCYTES # BLD AUTO: 32 % — SIGNIFICANT CHANGE UP (ref 13–44)
MACROCYTES BLD QL: SLIGHT — SIGNIFICANT CHANGE UP
MCHC RBC-ENTMCNC: 21.9 PG — LOW (ref 27–34)
MCHC RBC-ENTMCNC: 30.5 G/DL — LOW (ref 32–36)
MCV RBC AUTO: 71.9 FL — LOW (ref 80–100)
MONOCYTES # BLD AUTO: 0.2 K/UL — SIGNIFICANT CHANGE UP (ref 0–0.9)
MONOCYTES NFR BLD AUTO: 10 % — SIGNIFICANT CHANGE UP (ref 2–14)
NEUTROPHILS # BLD AUTO: 0.9 K/UL — LOW (ref 1.8–7.4)
NEUTROPHILS NFR BLD AUTO: 46 % — SIGNIFICANT CHANGE UP (ref 43–77)
NRBC # BLD: 1 /100 — HIGH (ref 0–0)
PLAT MORPH BLD: NORMAL — SIGNIFICANT CHANGE UP
PLATELET # BLD AUTO: 226 K/UL — SIGNIFICANT CHANGE UP (ref 150–400)
POIKILOCYTOSIS BLD QL AUTO: SLIGHT — SIGNIFICANT CHANGE UP
POLYCHROMASIA BLD QL SMEAR: SLIGHT — SIGNIFICANT CHANGE UP
RBC # BLD: 3.7 M/UL — LOW (ref 3.8–5.2)
RBC # FLD: 29.9 % — HIGH (ref 10.3–14.5)
RBC BLD AUTO: ABNORMAL
TARGETS BLD QL SMEAR: SLIGHT — SIGNIFICANT CHANGE UP
WBC # BLD: 2.6 K/UL — LOW (ref 3.8–10.5)
WBC # FLD AUTO: 2.6 K/UL — LOW (ref 3.8–10.5)

## 2018-04-05 ENCOUNTER — OUTPATIENT (OUTPATIENT)
Dept: OUTPATIENT SERVICES | Facility: HOSPITAL | Age: 48
LOS: 1 days | Discharge: HOME | End: 2018-04-05

## 2018-04-05 DIAGNOSIS — Z98.890 OTHER SPECIFIED POSTPROCEDURAL STATES: Chronic | ICD-10-CM

## 2018-04-05 DIAGNOSIS — D72.1 EOSINOPHILIA: ICD-10-CM

## 2018-04-05 DIAGNOSIS — D25.9 LEIOMYOMA OF UTERUS, UNSPECIFIED: Chronic | ICD-10-CM

## 2018-04-05 DIAGNOSIS — N20.0 CALCULUS OF KIDNEY: Chronic | ICD-10-CM

## 2018-04-09 ENCOUNTER — RESULT REVIEW (OUTPATIENT)
Age: 48
End: 2018-04-09

## 2018-04-09 ENCOUNTER — APPOINTMENT (OUTPATIENT)
Dept: HEMATOLOGY ONCOLOGY | Facility: CLINIC | Age: 48
End: 2018-04-09
Payer: MEDICAID

## 2018-04-09 VITALS
BODY MASS INDEX: 38.77 KG/M2 | WEIGHT: 232.98 LBS | TEMPERATURE: 98 F | OXYGEN SATURATION: 99 % | RESPIRATION RATE: 16 BRPM | SYSTOLIC BLOOD PRESSURE: 130 MMHG | HEART RATE: 101 BPM | DIASTOLIC BLOOD PRESSURE: 79 MMHG

## 2018-04-09 LAB
ANISOCYTOSIS BLD QL: SLIGHT — SIGNIFICANT CHANGE UP
BASOPHILS # BLD AUTO: 0 K/UL — SIGNIFICANT CHANGE UP (ref 0–0.2)
BASOPHILS NFR BLD AUTO: 3 % — HIGH (ref 0–2)
DACRYOCYTES BLD QL SMEAR: SLIGHT — SIGNIFICANT CHANGE UP
ELLIPTOCYTES BLD QL SMEAR: SLIGHT — SIGNIFICANT CHANGE UP
EOSINOPHIL # BLD AUTO: 0.4 K/UL — SIGNIFICANT CHANGE UP (ref 0–0.5)
EOSINOPHIL NFR BLD AUTO: 16 % — HIGH (ref 0–6)
HCT VFR BLD CALC: 31.4 % — LOW (ref 34.5–45)
HGB BLD-MCNC: 10 G/DL — LOW (ref 11.5–15.5)
HYPOCHROMIA BLD QL: SLIGHT — SIGNIFICANT CHANGE UP
LG PLATELETS BLD QL AUTO: SLIGHT — SIGNIFICANT CHANGE UP
LYMPHOCYTES # BLD AUTO: 1 K/UL — SIGNIFICANT CHANGE UP (ref 1–3.3)
LYMPHOCYTES # BLD AUTO: 32 % — SIGNIFICANT CHANGE UP (ref 13–44)
MACROCYTES BLD QL: SLIGHT — SIGNIFICANT CHANGE UP
MCHC RBC-ENTMCNC: 24.3 PG — LOW (ref 27–34)
MCHC RBC-ENTMCNC: 31.8 G/DL — LOW (ref 32–36)
MCV RBC AUTO: 76.4 FL — LOW (ref 80–100)
MONOCYTES # BLD AUTO: 0.3 K/UL — SIGNIFICANT CHANGE UP (ref 0–0.9)
MONOCYTES NFR BLD AUTO: 10 % — SIGNIFICANT CHANGE UP (ref 2–14)
NEUTROPHILS # BLD AUTO: 0.9 K/UL — LOW (ref 1.8–7.4)
NEUTROPHILS NFR BLD AUTO: 39 % — LOW (ref 43–77)
PLAT MORPH BLD: NORMAL — SIGNIFICANT CHANGE UP
PLATELET # BLD AUTO: 219 K/UL — SIGNIFICANT CHANGE UP (ref 150–400)
POIKILOCYTOSIS BLD QL AUTO: SLIGHT — SIGNIFICANT CHANGE UP
POLYCHROMASIA BLD QL SMEAR: SLIGHT — SIGNIFICANT CHANGE UP
RBC # BLD: 4.11 M/UL — SIGNIFICANT CHANGE UP (ref 3.8–5.2)
RBC # FLD: 29.8 % — HIGH (ref 10.3–14.5)
RBC BLD AUTO: ABNORMAL
TARGETS BLD QL SMEAR: SLIGHT — SIGNIFICANT CHANGE UP
WBC # BLD: 2.6 K/UL — LOW (ref 3.8–10.5)
WBC # FLD AUTO: 2.6 K/UL — LOW (ref 3.8–10.5)

## 2018-04-09 PROCEDURE — 99214 OFFICE O/P EST MOD 30 MIN: CPT

## 2018-04-11 LAB
ALBUMIN SERPL ELPH-MCNC: 3.9 G/DL
ALP BLD-CCNC: 60 U/L
ALT SERPL-CCNC: 12 U/L
ANION GAP SERPL CALC-SCNC: 7 MMOL/L
AST SERPL-CCNC: 27 U/L
BILIRUB SERPL-MCNC: 0.3 MG/DL
BUN SERPL-MCNC: 7 MG/DL
CALCIUM SERPL-MCNC: 8.9 MG/DL
CHLORIDE SERPL-SCNC: 109 MMOL/L
CO2 SERPL-SCNC: 21 MMOL/L
CREAT SERPL-MCNC: 0.82 MG/DL
FERRITIN SERPL-MCNC: 777 NG/ML
GLUCOSE SERPL-MCNC: 103 MG/DL
IRON SATN MFR SERPL: 16 %
IRON SERPL-MCNC: 46 UG/DL
POTASSIUM SERPL-SCNC: 3.9 MMOL/L
PROT SERPL-MCNC: 7.8 G/DL
SODIUM SERPL-SCNC: 137 MMOL/L
TIBC SERPL-MCNC: 284 UG/DL
UIBC SERPL-MCNC: 238 UG/DL

## 2018-04-23 ENCOUNTER — LABORATORY RESULT (OUTPATIENT)
Age: 48
End: 2018-04-23

## 2018-04-23 ENCOUNTER — APPOINTMENT (OUTPATIENT)
Dept: HEMATOLOGY ONCOLOGY | Facility: CLINIC | Age: 48
End: 2018-04-23
Payer: MEDICAID

## 2018-04-23 ENCOUNTER — RESULT REVIEW (OUTPATIENT)
Age: 48
End: 2018-04-23

## 2018-04-23 ENCOUNTER — APPOINTMENT (OUTPATIENT)
Dept: RHEUMATOLOGY | Facility: CLINIC | Age: 48
End: 2018-04-23
Payer: COMMERCIAL

## 2018-04-23 VITALS
HEIGHT: 65 IN | TEMPERATURE: 98 F | DIASTOLIC BLOOD PRESSURE: 76 MMHG | HEART RATE: 99 BPM | OXYGEN SATURATION: 98 % | SYSTOLIC BLOOD PRESSURE: 126 MMHG | BODY MASS INDEX: 35.32 KG/M2 | WEIGHT: 212 LBS

## 2018-04-23 VITALS
HEART RATE: 99 BPM | BODY MASS INDEX: 35.26 KG/M2 | DIASTOLIC BLOOD PRESSURE: 76 MMHG | TEMPERATURE: 98 F | OXYGEN SATURATION: 98 % | SYSTOLIC BLOOD PRESSURE: 126 MMHG | RESPIRATION RATE: 16 BRPM | WEIGHT: 211.86 LBS

## 2018-04-23 DIAGNOSIS — D72.819 DECREASED WHITE BLOOD CELL COUNT, UNSPECIFIED: ICD-10-CM

## 2018-04-23 LAB
ALBUMIN SERPL ELPH-MCNC: 3.6 G/DL
ALP BLD-CCNC: 65 U/L
ALT SERPL-CCNC: 13 U/L
ANION GAP SERPL CALC-SCNC: 12 MMOL/L
ANISOCYTOSIS BLD QL: SLIGHT — SIGNIFICANT CHANGE UP
AST SERPL-CCNC: 27 U/L
BASOPHILS # BLD AUTO: 0.1 K/UL — SIGNIFICANT CHANGE UP (ref 0–0.2)
BASOPHILS NFR BLD AUTO: 4 % — HIGH (ref 0–2)
BILIRUB SERPL-MCNC: 0.3 MG/DL
BUN SERPL-MCNC: 6 MG/DL
C3 SERPL-MCNC: 65 MG/DL
C4 SERPL-MCNC: 5 MG/DL
CALCIUM SERPL-MCNC: 9.4 MG/DL
CHLORIDE SERPL-SCNC: 108 MMOL/L
CO2 SERPL-SCNC: 23 MMOL/L
CREAT SERPL-MCNC: 0.79 MG/DL
CRP SERPL-MCNC: <0.2 MG/DL
DACRYOCYTES BLD QL SMEAR: SLIGHT — SIGNIFICANT CHANGE UP
ELLIPTOCYTES BLD QL SMEAR: SLIGHT — SIGNIFICANT CHANGE UP
EOSINOPHIL # BLD AUTO: 0.5 K/UL — SIGNIFICANT CHANGE UP (ref 0–0.5)
EOSINOPHIL NFR BLD AUTO: 18 % — HIGH (ref 0–6)
ERYTHROCYTE [SEDIMENTATION RATE] IN BLOOD: 2 MM/HR — SIGNIFICANT CHANGE UP (ref 0–15)
GLUCOSE SERPL-MCNC: 83 MG/DL
HCT VFR BLD CALC: 33.8 % — LOW (ref 34.5–45)
HGB BLD-MCNC: 10.9 G/DL — LOW (ref 11.5–15.5)
HYPOCHROMIA BLD QL: SLIGHT — SIGNIFICANT CHANGE UP
LYMPHOCYTES # BLD AUTO: 1.2 K/UL — SIGNIFICANT CHANGE UP (ref 1–3.3)
LYMPHOCYTES # BLD AUTO: 49 % — HIGH (ref 13–44)
MACROCYTES BLD QL: SLIGHT — SIGNIFICANT CHANGE UP
MCHC RBC-ENTMCNC: 25.7 PG — LOW (ref 27–34)
MCHC RBC-ENTMCNC: 32.1 G/DL — SIGNIFICANT CHANGE UP (ref 32–36)
MCV RBC AUTO: 80.1 FL — SIGNIFICANT CHANGE UP (ref 80–100)
MICROCYTES BLD QL: SLIGHT — SIGNIFICANT CHANGE UP
MONOCYTES # BLD AUTO: 0.2 K/UL — SIGNIFICANT CHANGE UP (ref 0–0.9)
MONOCYTES NFR BLD AUTO: 9 % — SIGNIFICANT CHANGE UP (ref 2–14)
NEUTROPHILS # BLD AUTO: 0.6 K/UL — LOW (ref 1.8–7.4)
NEUTROPHILS NFR BLD AUTO: 20 % — LOW (ref 43–77)
PLAT MORPH BLD: NORMAL — SIGNIFICANT CHANGE UP
PLATELET # BLD AUTO: 172 K/UL — SIGNIFICANT CHANGE UP (ref 150–400)
POIKILOCYTOSIS BLD QL AUTO: SLIGHT — SIGNIFICANT CHANGE UP
POLYCHROMASIA BLD QL SMEAR: SLIGHT — SIGNIFICANT CHANGE UP
POTASSIUM SERPL-SCNC: 4.3 MMOL/L
PROT SERPL-MCNC: 7.1 G/DL
RBC # BLD: 4.23 M/UL — SIGNIFICANT CHANGE UP (ref 3.8–5.2)
RBC # FLD: 27 % — HIGH (ref 10.3–14.5)
RBC BLD AUTO: ABNORMAL
RHEUMATOID FACT SER QL: 7 IU/ML
SCHISTOCYTES BLD QL AUTO: SLIGHT — SIGNIFICANT CHANGE UP
SODIUM SERPL-SCNC: 143 MMOL/L
TARGETS BLD QL SMEAR: SLIGHT — SIGNIFICANT CHANGE UP
URATE SERPL-MCNC: 4.5 MG/DL
WBC # BLD: 2.5 K/UL — LOW (ref 3.8–10.5)
WBC # FLD AUTO: 2.5 K/UL — LOW (ref 3.8–10.5)

## 2018-04-23 PROCEDURE — 99215 OFFICE O/P EST HI 40 MIN: CPT

## 2018-04-23 PROCEDURE — 99214 OFFICE O/P EST MOD 30 MIN: CPT

## 2018-04-24 LAB
CREAT SPEC-SCNC: 115 MG/DL
CREAT/PROT UR: 0.1 RATIO
DSDNA AB SER-ACNC: 13 IU/ML
PROT UR-MCNC: 7 MG/DL

## 2018-04-25 LAB
APPEARANCE: CLEAR
BILIRUBIN URINE: NEGATIVE
BLOOD URINE: ABNORMAL
CCP AB SER IA-ACNC: <8 UNITS
COLOR: YELLOW
GLUCOSE QUALITATIVE U: NEGATIVE MG/DL
KETONES URINE: NEGATIVE
LEUKOCYTE ESTERASE URINE: NEGATIVE
NITRITE URINE: POSITIVE
PH URINE: 7
PROTEIN URINE: NEGATIVE MG/DL
RF+CCP IGG SER-IMP: NEGATIVE
SPECIFIC GRAVITY URINE: 1.01
UROBILINOGEN URINE: NEGATIVE MG/DL

## 2018-04-28 LAB — ANA SER IF-ACNC: NEGATIVE

## 2018-05-07 ENCOUNTER — OUTPATIENT (OUTPATIENT)
Dept: OUTPATIENT SERVICES | Facility: HOSPITAL | Age: 48
LOS: 1 days | Discharge: HOME | End: 2018-05-07

## 2018-05-07 DIAGNOSIS — D72.1 EOSINOPHILIA: ICD-10-CM

## 2018-05-07 DIAGNOSIS — N20.0 CALCULUS OF KIDNEY: Chronic | ICD-10-CM

## 2018-05-07 DIAGNOSIS — D25.9 LEIOMYOMA OF UTERUS, UNSPECIFIED: Chronic | ICD-10-CM

## 2018-05-07 DIAGNOSIS — Z98.890 OTHER SPECIFIED POSTPROCEDURAL STATES: Chronic | ICD-10-CM

## 2018-05-08 ENCOUNTER — APPOINTMENT (OUTPATIENT)
Dept: HEMATOLOGY ONCOLOGY | Facility: CLINIC | Age: 48
End: 2018-05-08

## 2018-05-31 ENCOUNTER — RESULT REVIEW (OUTPATIENT)
Age: 48
End: 2018-05-31

## 2018-05-31 ENCOUNTER — APPOINTMENT (OUTPATIENT)
Dept: HEMATOLOGY ONCOLOGY | Facility: CLINIC | Age: 48
End: 2018-05-31
Payer: COMMERCIAL

## 2018-05-31 VITALS
TEMPERATURE: 98.6 F | WEIGHT: 209.44 LBS | RESPIRATION RATE: 16 BRPM | HEART RATE: 90 BPM | DIASTOLIC BLOOD PRESSURE: 75 MMHG | SYSTOLIC BLOOD PRESSURE: 113 MMHG | BODY MASS INDEX: 34.85 KG/M2 | OXYGEN SATURATION: 100 %

## 2018-05-31 LAB
ANISOCYTOSIS BLD QL: SLIGHT — SIGNIFICANT CHANGE UP
ELLIPTOCYTES BLD QL SMEAR: SLIGHT — SIGNIFICANT CHANGE UP
EOSINOPHIL NFR BLD AUTO: 35 % — HIGH (ref 0–6)
HCT VFR BLD CALC: 37.9 % — SIGNIFICANT CHANGE UP (ref 34.5–45)
HGB BLD-MCNC: 12.5 G/DL — SIGNIFICANT CHANGE UP (ref 11.5–15.5)
HYPOCHROMIA BLD QL: SLIGHT — SIGNIFICANT CHANGE UP
LYMPHOCYTES # BLD AUTO: 44 % — SIGNIFICANT CHANGE UP (ref 13–44)
LYMPHOCYTES # BLD AUTO: SIGNIFICANT CHANGE UP K/UL (ref 1–3.3)
MACROCYTES BLD QL: SLIGHT — SIGNIFICANT CHANGE UP
MCHC RBC-ENTMCNC: 27.5 PG — SIGNIFICANT CHANGE UP (ref 27–34)
MCHC RBC-ENTMCNC: 33.1 G/DL — SIGNIFICANT CHANGE UP (ref 32–36)
MCV RBC AUTO: 82.9 FL — SIGNIFICANT CHANGE UP (ref 80–100)
MICROCYTES BLD QL: SLIGHT — SIGNIFICANT CHANGE UP
MONOCYTES NFR BLD AUTO: 4 % — SIGNIFICANT CHANGE UP (ref 2–14)
NEUTROPHILS # BLD AUTO: 0.7 K/UL — LOW (ref 1.8–7.4)
NEUTROPHILS NFR BLD AUTO: 17 % — LOW (ref 43–77)
PLAT MORPH BLD: NORMAL — SIGNIFICANT CHANGE UP
PLATELET # BLD AUTO: 162 K/UL — SIGNIFICANT CHANGE UP (ref 150–400)
POIKILOCYTOSIS BLD QL AUTO: SLIGHT — SIGNIFICANT CHANGE UP
POLYCHROMASIA BLD QL SMEAR: SLIGHT — SIGNIFICANT CHANGE UP
RBC # BLD: 4.56 M/UL — SIGNIFICANT CHANGE UP (ref 3.8–5.2)
RBC # FLD: 18.7 % — HIGH (ref 10.3–14.5)
RBC BLD AUTO: ABNORMAL
SCHISTOCYTES BLD QL AUTO: SLIGHT — SIGNIFICANT CHANGE UP
WBC # BLD: 3.4 K/UL — LOW (ref 3.8–10.5)
WBC # FLD AUTO: 3.4 K/UL — LOW (ref 3.8–10.5)

## 2018-05-31 PROCEDURE — 99214 OFFICE O/P EST MOD 30 MIN: CPT

## 2018-06-01 LAB
ALBUMIN SERPL ELPH-MCNC: 4.1 G/DL
ALP BLD-CCNC: 69 U/L
ALT SERPL-CCNC: 12 U/L
ANION GAP SERPL CALC-SCNC: 14 MMOL/L
AST SERPL-CCNC: 16 U/L
BILIRUB SERPL-MCNC: 0.2 MG/DL
BUN SERPL-MCNC: 12 MG/DL
CALCIUM SERPL-MCNC: 9.5 MG/DL
CHLORIDE SERPL-SCNC: 103 MMOL/L
CO2 SERPL-SCNC: 20 MMOL/L
CREAT SERPL-MCNC: 0.74 MG/DL
FERRITIN SERPL-MCNC: 151 NG/ML
GLUCOSE SERPL-MCNC: 99 MG/DL
IRON SATN MFR SERPL: 19 %
IRON SERPL-MCNC: 46 UG/DL
POTASSIUM SERPL-SCNC: 3.8 MMOL/L
PROT SERPL-MCNC: 8 G/DL
SODIUM SERPL-SCNC: 137 MMOL/L
TIBC SERPL-MCNC: 247 UG/DL
UIBC SERPL-MCNC: 201 UG/DL

## 2018-06-07 ENCOUNTER — APPOINTMENT (OUTPATIENT)
Dept: RHEUMATOLOGY | Facility: CLINIC | Age: 48
End: 2018-06-07
Payer: COMMERCIAL

## 2018-06-07 VITALS
HEART RATE: 83 BPM | OXYGEN SATURATION: 98 % | WEIGHT: 209 LBS | DIASTOLIC BLOOD PRESSURE: 79 MMHG | TEMPERATURE: 98.1 F | HEIGHT: 65 IN | BODY MASS INDEX: 34.82 KG/M2 | SYSTOLIC BLOOD PRESSURE: 142 MMHG

## 2018-06-07 PROCEDURE — 99214 OFFICE O/P EST MOD 30 MIN: CPT

## 2018-06-21 ENCOUNTER — OUTPATIENT (OUTPATIENT)
Dept: OUTPATIENT SERVICES | Facility: HOSPITAL | Age: 48
LOS: 1 days | Discharge: HOME | End: 2018-06-21

## 2018-06-21 DIAGNOSIS — D25.9 LEIOMYOMA OF UTERUS, UNSPECIFIED: Chronic | ICD-10-CM

## 2018-06-21 DIAGNOSIS — N20.0 CALCULUS OF KIDNEY: Chronic | ICD-10-CM

## 2018-06-21 DIAGNOSIS — D72.1 EOSINOPHILIA: ICD-10-CM

## 2018-06-21 DIAGNOSIS — Z98.890 OTHER SPECIFIED POSTPROCEDURAL STATES: Chronic | ICD-10-CM

## 2018-06-25 ENCOUNTER — RESULT REVIEW (OUTPATIENT)
Age: 48
End: 2018-06-25

## 2018-06-25 ENCOUNTER — APPOINTMENT (OUTPATIENT)
Dept: HEMATOLOGY ONCOLOGY | Facility: CLINIC | Age: 48
End: 2018-06-25
Payer: COMMERCIAL

## 2018-06-25 VITALS
BODY MASS INDEX: 35.22 KG/M2 | HEART RATE: 96 BPM | RESPIRATION RATE: 16 BRPM | SYSTOLIC BLOOD PRESSURE: 144 MMHG | TEMPERATURE: 98.4 F | WEIGHT: 211.64 LBS | DIASTOLIC BLOOD PRESSURE: 88 MMHG | OXYGEN SATURATION: 100 %

## 2018-06-25 LAB
BASOPHILS # BLD AUTO: 0.1 K/UL — SIGNIFICANT CHANGE UP (ref 0–0.2)
BASOPHILS NFR BLD AUTO: 1.5 % — SIGNIFICANT CHANGE UP (ref 0–2)
EOSINOPHIL # BLD AUTO: 1.6 K/UL — HIGH (ref 0–0.5)
EOSINOPHIL NFR BLD AUTO: 26.1 % — HIGH (ref 0–6)
HCT VFR BLD CALC: 43.1 % — SIGNIFICANT CHANGE UP (ref 34.5–45)
HGB BLD-MCNC: 14.4 G/DL — SIGNIFICANT CHANGE UP (ref 11.5–15.5)
LYMPHOCYTES # BLD AUTO: 1.6 K/UL — SIGNIFICANT CHANGE UP (ref 1–3.3)
LYMPHOCYTES # BLD AUTO: 27.3 % — SIGNIFICANT CHANGE UP (ref 13–44)
MCHC RBC-ENTMCNC: 28.2 PG — SIGNIFICANT CHANGE UP (ref 27–34)
MCHC RBC-ENTMCNC: 33.4 G/DL — SIGNIFICANT CHANGE UP (ref 32–36)
MCV RBC AUTO: 84.4 FL — SIGNIFICANT CHANGE UP (ref 80–100)
MONOCYTES # BLD AUTO: 0.4 K/UL — SIGNIFICANT CHANGE UP (ref 0–0.9)
MONOCYTES NFR BLD AUTO: 7.3 % — SIGNIFICANT CHANGE UP (ref 2–14)
NEUTROPHILS # BLD AUTO: 2.3 K/UL — SIGNIFICANT CHANGE UP (ref 1.8–7.4)
NEUTROPHILS NFR BLD AUTO: 37.9 % — LOW (ref 43–77)
PLATELET # BLD AUTO: 208 K/UL — SIGNIFICANT CHANGE UP (ref 150–400)
RBC # BLD: 5.11 M/UL — SIGNIFICANT CHANGE UP (ref 3.8–5.2)
RBC # FLD: 15.3 % — HIGH (ref 10.3–14.5)
WBC # BLD: 6.1 K/UL — SIGNIFICANT CHANGE UP (ref 3.8–10.5)
WBC # FLD AUTO: 6.1 K/UL — SIGNIFICANT CHANGE UP (ref 3.8–10.5)

## 2018-06-25 PROCEDURE — 99214 OFFICE O/P EST MOD 30 MIN: CPT

## 2018-06-28 LAB
ALBUMIN SERPL ELPH-MCNC: 4.1 G/DL
ALP BLD-CCNC: 84 U/L
ALT SERPL-CCNC: 10 U/L
ANION GAP SERPL CALC-SCNC: 14 MMOL/L
AST SERPL-CCNC: 17 U/L
BILIRUB SERPL-MCNC: 0.2 MG/DL
BUN SERPL-MCNC: 12 MG/DL
CALCIUM SERPL-MCNC: 9.8 MG/DL
CHLORIDE SERPL-SCNC: 103 MMOL/L
CO2 SERPL-SCNC: 22 MMOL/L
CREAT SERPL-MCNC: 0.83 MG/DL
FERRITIN SERPL-MCNC: 67 NG/ML
GLUCOSE SERPL-MCNC: 101 MG/DL
IRON SATN MFR SERPL: 12 %
IRON SERPL-MCNC: 38 UG/DL
POTASSIUM SERPL-SCNC: 5.4 MMOL/L
PROT SERPL-MCNC: 8.7 G/DL
SODIUM SERPL-SCNC: 139 MMOL/L
TIBC SERPL-MCNC: 310 UG/DL
UIBC SERPL-MCNC: 272 UG/DL

## 2018-07-17 ENCOUNTER — OUTPATIENT (OUTPATIENT)
Dept: OUTPATIENT SERVICES | Facility: HOSPITAL | Age: 48
LOS: 1 days | Discharge: HOME | End: 2018-07-17

## 2018-07-17 DIAGNOSIS — Z98.890 OTHER SPECIFIED POSTPROCEDURAL STATES: Chronic | ICD-10-CM

## 2018-07-17 DIAGNOSIS — N20.0 CALCULUS OF KIDNEY: Chronic | ICD-10-CM

## 2018-07-17 DIAGNOSIS — D25.9 LEIOMYOMA OF UTERUS, UNSPECIFIED: Chronic | ICD-10-CM

## 2018-07-17 DIAGNOSIS — D72.1 EOSINOPHILIA: ICD-10-CM

## 2018-07-17 PROBLEM — M51.26 OTHER INTERVERTEBRAL DISC DISPLACEMENT, LUMBAR REGION: Chronic | Status: ACTIVE | Noted: 2017-04-06

## 2018-07-17 PROBLEM — F32.9 MAJOR DEPRESSIVE DISORDER, SINGLE EPISODE, UNSPECIFIED: Chronic | Status: ACTIVE | Noted: 2017-11-25

## 2018-07-23 ENCOUNTER — RESULT REVIEW (OUTPATIENT)
Age: 48
End: 2018-07-23

## 2018-07-23 ENCOUNTER — APPOINTMENT (OUTPATIENT)
Dept: HEMATOLOGY ONCOLOGY | Facility: CLINIC | Age: 48
End: 2018-07-23
Payer: COMMERCIAL

## 2018-07-23 VITALS
TEMPERATURE: 98.6 F | HEART RATE: 98 BPM | RESPIRATION RATE: 16 BRPM | DIASTOLIC BLOOD PRESSURE: 74 MMHG | BODY MASS INDEX: 35.59 KG/M2 | WEIGHT: 213.85 LBS | SYSTOLIC BLOOD PRESSURE: 130 MMHG

## 2018-07-23 LAB
BASOPHILS # BLD AUTO: 0.1 K/UL — SIGNIFICANT CHANGE UP (ref 0–0.2)
BASOPHILS NFR BLD AUTO: 1.3 % — SIGNIFICANT CHANGE UP (ref 0–2)
EOSINOPHIL # BLD AUTO: 1 K/UL — HIGH (ref 0–0.5)
EOSINOPHIL NFR BLD AUTO: 26.6 % — HIGH (ref 0–6)
HCT VFR BLD CALC: 39.1 % — SIGNIFICANT CHANGE UP (ref 34.5–45)
HGB BLD-MCNC: 12.7 G/DL — SIGNIFICANT CHANGE UP (ref 11.5–15.5)
LYMPHOCYTES # BLD AUTO: 1.5 K/UL — SIGNIFICANT CHANGE UP (ref 1–3.3)
LYMPHOCYTES # BLD AUTO: 39.5 % — SIGNIFICANT CHANGE UP (ref 13–44)
MCHC RBC-ENTMCNC: 27.6 PG — SIGNIFICANT CHANGE UP (ref 27–34)
MCHC RBC-ENTMCNC: 32.5 G/DL — SIGNIFICANT CHANGE UP (ref 32–36)
MCV RBC AUTO: 84.9 FL — SIGNIFICANT CHANGE UP (ref 80–100)
MONOCYTES # BLD AUTO: 0.3 K/UL — SIGNIFICANT CHANGE UP (ref 0–0.9)
MONOCYTES NFR BLD AUTO: 7.1 % — SIGNIFICANT CHANGE UP (ref 2–14)
NEUTROPHILS # BLD AUTO: 1 K/UL — LOW (ref 1.8–7.4)
NEUTROPHILS NFR BLD AUTO: 25.5 % — LOW (ref 43–77)
PLATELET # BLD AUTO: 214 K/UL — SIGNIFICANT CHANGE UP (ref 150–400)
RBC # BLD: 4.6 M/UL — SIGNIFICANT CHANGE UP (ref 3.8–5.2)
RBC # FLD: 13.7 % — SIGNIFICANT CHANGE UP (ref 10.3–14.5)
WBC # BLD: 3.9 K/UL — SIGNIFICANT CHANGE UP (ref 3.8–10.5)
WBC # FLD AUTO: 3.9 K/UL — SIGNIFICANT CHANGE UP (ref 3.8–10.5)

## 2018-07-23 PROCEDURE — 99214 OFFICE O/P EST MOD 30 MIN: CPT

## 2018-07-25 LAB
ALBUMIN SERPL ELPH-MCNC: 4.1 G/DL
ALP BLD-CCNC: 84 U/L
ALT SERPL-CCNC: 10 U/L
ANION GAP SERPL CALC-SCNC: 15 MMOL/L
AST SERPL-CCNC: 18 U/L
BILIRUB SERPL-MCNC: 0.3 MG/DL
BUN SERPL-MCNC: 11 MG/DL
CALCIUM SERPL-MCNC: 9.8 MG/DL
CHLORIDE SERPL-SCNC: 105 MMOL/L
CO2 SERPL-SCNC: 19 MMOL/L
CREAT SERPL-MCNC: 1.04 MG/DL
FERRITIN SERPL-MCNC: 55 NG/ML
GLUCOSE SERPL-MCNC: 75 MG/DL
IRON SATN MFR SERPL: 30 %
IRON SERPL-MCNC: 87 UG/DL
POTASSIUM SERPL-SCNC: 4.2 MMOL/L
PROT SERPL-MCNC: 7.8 G/DL
SODIUM SERPL-SCNC: 139 MMOL/L
TIBC SERPL-MCNC: 290 UG/DL
UIBC SERPL-MCNC: 203 UG/DL

## 2018-08-09 ENCOUNTER — OUTPATIENT (OUTPATIENT)
Dept: OUTPATIENT SERVICES | Facility: HOSPITAL | Age: 48
LOS: 1 days | Discharge: HOME | End: 2018-08-09

## 2018-08-09 DIAGNOSIS — Z98.890 OTHER SPECIFIED POSTPROCEDURAL STATES: Chronic | ICD-10-CM

## 2018-08-09 DIAGNOSIS — D25.9 LEIOMYOMA OF UTERUS, UNSPECIFIED: Chronic | ICD-10-CM

## 2018-08-09 DIAGNOSIS — D72.1 EOSINOPHILIA: ICD-10-CM

## 2018-08-09 DIAGNOSIS — N20.0 CALCULUS OF KIDNEY: Chronic | ICD-10-CM

## 2018-08-20 ENCOUNTER — APPOINTMENT (OUTPATIENT)
Dept: HEMATOLOGY ONCOLOGY | Facility: CLINIC | Age: 48
End: 2018-08-20

## 2018-08-28 ENCOUNTER — RX RENEWAL (OUTPATIENT)
Age: 48
End: 2018-08-28

## 2018-09-04 ENCOUNTER — APPOINTMENT (OUTPATIENT)
Dept: HEMATOLOGY ONCOLOGY | Facility: CLINIC | Age: 48
End: 2018-09-04
Payer: COMMERCIAL

## 2018-09-04 ENCOUNTER — RESULT REVIEW (OUTPATIENT)
Age: 48
End: 2018-09-04

## 2018-09-04 VITALS
DIASTOLIC BLOOD PRESSURE: 85 MMHG | SYSTOLIC BLOOD PRESSURE: 121 MMHG | BODY MASS INDEX: 35.59 KG/M2 | WEIGHT: 213.85 LBS | RESPIRATION RATE: 16 BRPM | OXYGEN SATURATION: 98 % | TEMPERATURE: 97.7 F | HEART RATE: 90 BPM

## 2018-09-04 LAB
BASOPHILS # BLD AUTO: 0.1 K/UL — SIGNIFICANT CHANGE UP (ref 0–0.2)
BASOPHILS NFR BLD AUTO: 1.9 % — SIGNIFICANT CHANGE UP (ref 0–2)
EOSINOPHIL # BLD AUTO: 0.7 K/UL — HIGH (ref 0–0.5)
EOSINOPHIL NFR BLD AUTO: 19.2 % — HIGH (ref 0–6)
HCT VFR BLD CALC: 35.8 % — SIGNIFICANT CHANGE UP (ref 34.5–45)
HGB BLD-MCNC: 11.4 G/DL — LOW (ref 11.5–15.5)
LYMPHOCYTES # BLD AUTO: 1.5 K/UL — SIGNIFICANT CHANGE UP (ref 1–3.3)
LYMPHOCYTES # BLD AUTO: 39.8 % — SIGNIFICANT CHANGE UP (ref 13–44)
MCHC RBC-ENTMCNC: 27.9 PG — SIGNIFICANT CHANGE UP (ref 27–34)
MCHC RBC-ENTMCNC: 31.7 G/DL — LOW (ref 32–36)
MCV RBC AUTO: 87.8 FL — SIGNIFICANT CHANGE UP (ref 80–100)
MONOCYTES # BLD AUTO: 0.4 K/UL — SIGNIFICANT CHANGE UP (ref 0–0.9)
MONOCYTES NFR BLD AUTO: 9.4 % — SIGNIFICANT CHANGE UP (ref 2–14)
NEUTROPHILS # BLD AUTO: 1.1 K/UL — LOW (ref 1.8–7.4)
NEUTROPHILS NFR BLD AUTO: 29.7 % — LOW (ref 43–77)
PLATELET # BLD AUTO: 244 K/UL — SIGNIFICANT CHANGE UP (ref 150–400)
RBC # BLD: 4.08 M/UL — SIGNIFICANT CHANGE UP (ref 3.8–5.2)
RBC # FLD: 15.4 % — HIGH (ref 10.3–14.5)
WBC # BLD: 3.8 K/UL — SIGNIFICANT CHANGE UP (ref 3.8–10.5)
WBC # FLD AUTO: 3.8 K/UL — SIGNIFICANT CHANGE UP (ref 3.8–10.5)

## 2018-09-04 PROCEDURE — 99214 OFFICE O/P EST MOD 30 MIN: CPT

## 2018-09-05 LAB
ALBUMIN SERPL ELPH-MCNC: 3.9 G/DL
ALP BLD-CCNC: 75 U/L
ALT SERPL-CCNC: 8 U/L
ANION GAP SERPL CALC-SCNC: 13 MMOL/L
AST SERPL-CCNC: 15 U/L
BILIRUB SERPL-MCNC: 0.2 MG/DL
BUN SERPL-MCNC: 8 MG/DL
CALCIUM SERPL-MCNC: 9.1 MG/DL
CHLORIDE SERPL-SCNC: 107 MMOL/L
CO2 SERPL-SCNC: 23 MMOL/L
CREAT SERPL-MCNC: 0.86 MG/DL
GLUCOSE SERPL-MCNC: 70 MG/DL
POTASSIUM SERPL-SCNC: 3.9 MMOL/L
PROT SERPL-MCNC: 7.1 G/DL
SODIUM SERPL-SCNC: 143 MMOL/L

## 2018-09-24 ENCOUNTER — OUTPATIENT (OUTPATIENT)
Dept: OUTPATIENT SERVICES | Facility: HOSPITAL | Age: 48
LOS: 1 days | Discharge: HOME | End: 2018-09-24

## 2018-09-24 DIAGNOSIS — N20.0 CALCULUS OF KIDNEY: Chronic | ICD-10-CM

## 2018-09-24 DIAGNOSIS — Z98.890 OTHER SPECIFIED POSTPROCEDURAL STATES: Chronic | ICD-10-CM

## 2018-09-24 DIAGNOSIS — D25.9 LEIOMYOMA OF UTERUS, UNSPECIFIED: Chronic | ICD-10-CM

## 2018-09-24 DIAGNOSIS — D72.1 EOSINOPHILIA: ICD-10-CM

## 2018-10-02 ENCOUNTER — RESULT REVIEW (OUTPATIENT)
Age: 48
End: 2018-10-02

## 2018-10-02 ENCOUNTER — APPOINTMENT (OUTPATIENT)
Dept: HEMATOLOGY ONCOLOGY | Facility: CLINIC | Age: 48
End: 2018-10-02
Payer: COMMERCIAL

## 2018-10-02 VITALS
OXYGEN SATURATION: 100 % | DIASTOLIC BLOOD PRESSURE: 82 MMHG | RESPIRATION RATE: 16 BRPM | BODY MASS INDEX: 36.32 KG/M2 | TEMPERATURE: 98.2 F | WEIGHT: 218.25 LBS | SYSTOLIC BLOOD PRESSURE: 127 MMHG | HEART RATE: 86 BPM

## 2018-10-02 LAB
ALBUMIN SERPL ELPH-MCNC: 3.8 G/DL
ALP BLD-CCNC: 68 U/L
ALT SERPL-CCNC: 12 U/L
ANION GAP SERPL CALC-SCNC: 12 MMOL/L
AST SERPL-CCNC: 16 U/L
BASOPHILS # BLD AUTO: 0.1 K/UL — SIGNIFICANT CHANGE UP (ref 0–0.2)
BASOPHILS NFR BLD AUTO: 2.6 % — HIGH (ref 0–2)
BILIRUB SERPL-MCNC: 0.3 MG/DL
BUN SERPL-MCNC: 7 MG/DL
CALCIUM SERPL-MCNC: 8.8 MG/DL
CHLORIDE SERPL-SCNC: 106 MMOL/L
CO2 SERPL-SCNC: 23 MMOL/L
CREAT SERPL-MCNC: 0.95 MG/DL
EOSINOPHIL # BLD AUTO: 0.8 K/UL — HIGH (ref 0–0.5)
EOSINOPHIL NFR BLD AUTO: 20 % — HIGH (ref 0–6)
GLUCOSE SERPL-MCNC: 84 MG/DL
HCT VFR BLD CALC: 35.8 % — SIGNIFICANT CHANGE UP (ref 34.5–45)
HGB BLD-MCNC: 11.2 G/DL — LOW (ref 11.5–15.5)
LYMPHOCYTES # BLD AUTO: 1.7 K/UL — SIGNIFICANT CHANGE UP (ref 1–3.3)
LYMPHOCYTES # BLD AUTO: 40.4 % — SIGNIFICANT CHANGE UP (ref 13–44)
MCHC RBC-ENTMCNC: 27.8 PG — SIGNIFICANT CHANGE UP (ref 27–34)
MCHC RBC-ENTMCNC: 31.4 G/DL — LOW (ref 32–36)
MCV RBC AUTO: 88.7 FL — SIGNIFICANT CHANGE UP (ref 80–100)
MONOCYTES # BLD AUTO: 0.4 K/UL — SIGNIFICANT CHANGE UP (ref 0–0.9)
MONOCYTES NFR BLD AUTO: 9.9 % — SIGNIFICANT CHANGE UP (ref 2–14)
NEUTROPHILS # BLD AUTO: 1.1 K/UL — LOW (ref 1.8–7.4)
NEUTROPHILS NFR BLD AUTO: 27.1 % — LOW (ref 43–77)
PLATELET # BLD AUTO: 228 K/UL — SIGNIFICANT CHANGE UP (ref 150–400)
POTASSIUM SERPL-SCNC: 3.6 MMOL/L
PROT SERPL-MCNC: 6.9 G/DL
RBC # BLD: 4.03 M/UL — SIGNIFICANT CHANGE UP (ref 3.8–5.2)
RBC # FLD: 14.2 % — SIGNIFICANT CHANGE UP (ref 10.3–14.5)
SODIUM SERPL-SCNC: 141 MMOL/L
WBC # BLD: 4.2 K/UL — SIGNIFICANT CHANGE UP (ref 3.8–10.5)
WBC # FLD AUTO: 4.2 K/UL — SIGNIFICANT CHANGE UP (ref 3.8–10.5)

## 2018-10-02 PROCEDURE — 99213 OFFICE O/P EST LOW 20 MIN: CPT

## 2018-11-06 ENCOUNTER — RESULT REVIEW (OUTPATIENT)
Age: 48
End: 2018-11-06

## 2018-11-06 ENCOUNTER — OUTPATIENT (OUTPATIENT)
Dept: OUTPATIENT SERVICES | Facility: HOSPITAL | Age: 48
LOS: 1 days | Discharge: HOME | End: 2018-11-06

## 2018-11-06 ENCOUNTER — APPOINTMENT (OUTPATIENT)
Dept: HEMATOLOGY ONCOLOGY | Facility: CLINIC | Age: 48
End: 2018-11-06
Payer: SELF-PAY

## 2018-11-06 VITALS
BODY MASS INDEX: 36.32 KG/M2 | WEIGHT: 218.26 LBS | OXYGEN SATURATION: 99 % | SYSTOLIC BLOOD PRESSURE: 122 MMHG | DIASTOLIC BLOOD PRESSURE: 74 MMHG | TEMPERATURE: 99 F | RESPIRATION RATE: 16 BRPM | HEART RATE: 102 BPM

## 2018-11-06 DIAGNOSIS — D72.1 EOSINOPHILIA: ICD-10-CM

## 2018-11-06 DIAGNOSIS — N20.0 CALCULUS OF KIDNEY: Chronic | ICD-10-CM

## 2018-11-06 DIAGNOSIS — D25.9 LEIOMYOMA OF UTERUS, UNSPECIFIED: Chronic | ICD-10-CM

## 2018-11-06 DIAGNOSIS — Z98.890 OTHER SPECIFIED POSTPROCEDURAL STATES: Chronic | ICD-10-CM

## 2018-11-06 LAB
BASOPHILS # BLD AUTO: 0.1 K/UL — SIGNIFICANT CHANGE UP (ref 0–0.2)
BASOPHILS NFR BLD AUTO: 1.7 % — SIGNIFICANT CHANGE UP (ref 0–2)
EOSINOPHIL # BLD AUTO: 0.8 K/UL — HIGH (ref 0–0.5)
EOSINOPHIL NFR BLD AUTO: 19.6 % — HIGH (ref 0–6)
HCT VFR BLD CALC: 36.6 % — SIGNIFICANT CHANGE UP (ref 34.5–45)
HGB BLD-MCNC: 11.6 G/DL — SIGNIFICANT CHANGE UP (ref 11.5–15.5)
LYMPHOCYTES # BLD AUTO: 1.6 K/UL — SIGNIFICANT CHANGE UP (ref 1–3.3)
LYMPHOCYTES # BLD AUTO: 37.8 % — SIGNIFICANT CHANGE UP (ref 13–44)
MCHC RBC-ENTMCNC: 26.7 PG — LOW (ref 27–34)
MCHC RBC-ENTMCNC: 31.7 G/DL — LOW (ref 32–36)
MCV RBC AUTO: 84.4 FL — SIGNIFICANT CHANGE UP (ref 80–100)
MONOCYTES # BLD AUTO: 0.3 K/UL — SIGNIFICANT CHANGE UP (ref 0–0.9)
MONOCYTES NFR BLD AUTO: 7.3 % — SIGNIFICANT CHANGE UP (ref 2–14)
NEUTROPHILS # BLD AUTO: 1.4 K/UL — LOW (ref 1.8–7.4)
NEUTROPHILS NFR BLD AUTO: 33.6 % — LOW (ref 43–77)
PLATELET # BLD AUTO: 249 K/UL — SIGNIFICANT CHANGE UP (ref 150–400)
RBC # BLD: 4.34 M/UL — SIGNIFICANT CHANGE UP (ref 3.8–5.2)
RBC # FLD: 13.2 % — SIGNIFICANT CHANGE UP (ref 10.3–14.5)
WBC # BLD: 4.3 K/UL — SIGNIFICANT CHANGE UP (ref 3.8–10.5)
WBC # FLD AUTO: 4.3 K/UL — SIGNIFICANT CHANGE UP (ref 3.8–10.5)

## 2018-11-06 PROCEDURE — 99214 OFFICE O/P EST MOD 30 MIN: CPT

## 2018-11-06 NOTE — HISTORY OF PRESENT ILLNESS
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 Seen initially on 17. PT reports improvement in fatigue since admission 17, but still with pain in L elbow, and weakness in flexion of L arm at elbow. .ros. PT admits to paraesthesias in 5th digit of L hand, and tenderness at heels in both feet, and chronic cough, for which she takes Guaifenesin.\par \par HYPEREOSINOPHILIA HX:\par - 2016: was in [place] for her Sister's , rash started 2 weeks after returning, claims to have many bug bites from "horseflies" starting at bilateral medial knees. \par - 10/2016: Saw Beatriz Wu ([phone]), md ([facility] [employer]), and allergist at [facility], felt that the diagnosis was vasculitis, started on clobetazol, hydrocortizone, to little effect\par - 2016: Saw Dr. Mendoza who continued to treat pain with naproxen, neurontin\par - 2017: Saw Dermatologist Dr. Wisdom\par - 2017: Dr Wisdom 2017 performed biopsy  at that time showed changes consistent with hypereosinophilic syndrome vs drug vs arthropod bite like reaction (superficial and deep perivascular and interstitial dermatitis with eosinophils), started on doxycycline, to some effect.  \par - 2017: Saw Dr. Inocencio Batres on 17 generalized rash including facial rash and itching, and edema. who performed  Punch biopsy of R arm showing interface dermatitis with prominent dermal eosinophils.  2nd punch biopsy of right arm suggestive of lupus erythematosus w/ granular linear signals of igA and IgM and patchy granular signal of C3 at basement membrane.  IgG negative. \par - ADM 2017 TO 2017 TO [facility] with diffuse generalized rash. T 98.1, , /85, RR 18, O2 97%. Labs significant for WBC 28.15, eosinophils 97%, Hgb 11.1, MCV 75.6. Received prednisone 40mg & morphine.She was also evaluated by opthamology for complaint of blurry vision in her right eye.  Was found to have cotton wool spots off both optic nerves.  Had MRI brain which showed microvascular changes.\par - 2017: 17 bone marrow biopsy without evidence of leukemia, no blasts, [id number] negative. \par - 2017: 17 STARTED HYDROXYUREA , 60mg po prednisone daily\par - 2017: 5/3/17 presents with painful rash on hands and feet, exquisitely tender, stopped Hydroxyurea. \par - 2017: 17 found to have swollen R leg, RLE duplex ultrasound c/w extensive DVT, started on xarelto 15mg po bid from [facility] at [facility] [place]. \par - 2017: 17 restarted hydrea 500mg po BID with worsening rash.\par - 2017: 17 decreased prednisone from 60mg po daily to 40mg po daily continue 500mg hydrea BID\par - 2017: 17 prednisone at 40mg po daily, hydrea increased to 1000mg in AM and 500mg in PM. \par - Started Gleevec . Tapered off prednisone 17. \par \par Currently on Losartan 50mg for BP control.\par She was admitted  for incidental finding of PE on CT A/P and tachycardia. They were unable to obtain CTA of chest 2/2 recent contrast load. Doppler LE showed acute below the knee DVT on left side. She was off Xarelto for 3 weeks prior so was resumed on Xarelto.  No SOB, TTE performed which showed no RV strain, CE negative, normal BNP.  Patient with persistent tachycardia, seen by cardiology who started on beta blocker.  \par \par Started Gleevec . Tapered off prednisone 17. \par Noted to have a new rash around . She reports that this rash is different than her previous rash. She saw derm, was started back on prednisone taper, completed 3-4 days ago. \par We held her Gleevec on 12/15 b/c neutropenia. She also stopped HU. Her counts improved by the following week; we restarted Gleevec 18. \par Holding Gleevec again since 3/9/18 for neutropenia. \par Her periods have been very heavy b/c of Xarelto. Refused any pills to control her menses. On po iron supplements but unable to take every day because of constipation and hemorrhoids. She received Injectafer 3/23 and 3/30. \par She now holds the Xarelto for 2 days when she gets her menses.  [de-identified] : Resumed Gleevec at 300mg 6/2018.  \par Taking 5mg prednisone daily. 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

## 2018-11-06 NOTE — PHYSICAL EXAM
[Fully active, able to carry on all pre-disease performance without restriction] : Status 0 - Fully active, able to carry on all pre-disease performance without restriction [Normal] : grossly intact [de-identified] : acneiform rash on face

## 2018-11-06 NOTE — ASSESSMENT
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 Rich, dermatologist by biopsy, [id number] fusion negative, JAK2 mutation negative, BCR-ABL mutation negative, with recent skin biopsy suggesting possible lupus as well steroid dependent. Course complicated by 5/17/17 RLE duplex ultrasound c/w extensive DVT, s/p 6 months of Xarelto (finished 11/2017), now w/ new DVT on L, back on Xarelto \par She started Gleevec on 9/17, was tapered off steroids 12/4/17 \par She developed a new rash around 12/12. Labwork from 12/15 also showed new cytopenias, Gleevec was held; she also self d/c'd hydrea. Gleevec was eventually restarted when counts recovered but now back on hold for now since 3/9/18 for neutropenia. Restarted in 6/2018 at 300mg.\par \par -she gets recurrent rash when she is off steroids so we started her on low dose steroids, on 5mg currently.\par -cont Gleevec 300mg, abs eos 800 today\par -pt has h/o iron deficiency anemia, s/p injectafer weekly x2 on 3/23 and 3/30. H/H improved\par -continue Xarelto for recurrent DVT\par -pt allergic to sulfas, does not like mepron and won't take it, will do dapsone for PCP ppx\par -RTC 6-8 weeks

## 2018-11-06 NOTE — REVIEW OF SYSTEMS
[Fatigue] : fatigue [SOB on Exertion] : shortness of breath during exertion [Joint Pain] : joint pain [Negative] : Heme/Lymph [Skin Rash] : skin rash [FreeTextEntry7] : bloating improved

## 2018-11-08 LAB
ALBUMIN SERPL ELPH-MCNC: 3.9 G/DL
ALP BLD-CCNC: 77 U/L
ALT SERPL-CCNC: 10 U/L
ANION GAP SERPL CALC-SCNC: 13 MMOL/L
AST SERPL-CCNC: 17 U/L
BILIRUB SERPL-MCNC: <0.2 MG/DL
BUN SERPL-MCNC: 9 MG/DL
CALCIUM SERPL-MCNC: 9.3 MG/DL
CHLORIDE SERPL-SCNC: 105 MMOL/L
CO2 SERPL-SCNC: 22 MMOL/L
CREAT SERPL-MCNC: 0.87 MG/DL
GLUCOSE SERPL-MCNC: 117 MG/DL
POTASSIUM SERPL-SCNC: 4 MMOL/L
PROT SERPL-MCNC: 7.6 G/DL
SODIUM SERPL-SCNC: 140 MMOL/L

## 2019-01-07 ENCOUNTER — APPOINTMENT (OUTPATIENT)
Dept: HEMATOLOGY ONCOLOGY | Facility: CLINIC | Age: 49
End: 2019-01-07
Payer: MEDICAID

## 2019-01-07 ENCOUNTER — LABORATORY RESULT (OUTPATIENT)
Age: 49
End: 2019-01-07

## 2019-01-07 ENCOUNTER — RESULT REVIEW (OUTPATIENT)
Age: 49
End: 2019-01-07

## 2019-01-07 ENCOUNTER — OUTPATIENT (OUTPATIENT)
Dept: OUTPATIENT SERVICES | Facility: HOSPITAL | Age: 49
LOS: 1 days | Discharge: HOME | End: 2019-01-07

## 2019-01-07 VITALS
WEIGHT: 224.87 LBS | OXYGEN SATURATION: 100 % | BODY MASS INDEX: 37.42 KG/M2 | SYSTOLIC BLOOD PRESSURE: 137 MMHG | HEART RATE: 90 BPM | DIASTOLIC BLOOD PRESSURE: 79 MMHG | TEMPERATURE: 98.2 F | RESPIRATION RATE: 16 BRPM

## 2019-01-07 DIAGNOSIS — D72.1 EOSINOPHILIA: ICD-10-CM

## 2019-01-07 DIAGNOSIS — N20.0 CALCULUS OF KIDNEY: Chronic | ICD-10-CM

## 2019-01-07 DIAGNOSIS — D25.9 LEIOMYOMA OF UTERUS, UNSPECIFIED: Chronic | ICD-10-CM

## 2019-01-07 DIAGNOSIS — Z98.890 OTHER SPECIFIED POSTPROCEDURAL STATES: Chronic | ICD-10-CM

## 2019-01-07 DIAGNOSIS — K13.0 DISEASES OF LIPS: ICD-10-CM

## 2019-01-07 LAB
BASOPHILS # BLD AUTO: 0 K/UL — SIGNIFICANT CHANGE UP (ref 0–0.2)
BASOPHILS NFR BLD AUTO: 0.1 % — SIGNIFICANT CHANGE UP (ref 0–2)
EOSINOPHIL # BLD AUTO: 0.8 K/UL — HIGH (ref 0–0.5)
EOSINOPHIL NFR BLD AUTO: 16.8 % — HIGH (ref 0–6)
HCT VFR BLD CALC: 32.1 % — LOW (ref 34.5–45)
HGB BLD-MCNC: 9.8 G/DL — LOW (ref 11.5–15.5)
LYMPHOCYTES # BLD AUTO: 1.5 K/UL — SIGNIFICANT CHANGE UP (ref 1–3.3)
LYMPHOCYTES # BLD AUTO: 32.4 % — SIGNIFICANT CHANGE UP (ref 13–44)
MCHC RBC-ENTMCNC: 22.6 PG — LOW (ref 27–34)
MCHC RBC-ENTMCNC: 30.6 G/DL — LOW (ref 32–36)
MCV RBC AUTO: 74 FL — LOW (ref 80–100)
MONOCYTES # BLD AUTO: 0.3 K/UL — SIGNIFICANT CHANGE UP (ref 0–0.9)
MONOCYTES NFR BLD AUTO: 6.2 % — SIGNIFICANT CHANGE UP (ref 2–14)
NEUTROPHILS # BLD AUTO: 2 K/UL — SIGNIFICANT CHANGE UP (ref 1.8–7.4)
NEUTROPHILS NFR BLD AUTO: 44.4 % — SIGNIFICANT CHANGE UP (ref 43–77)
PLATELET # BLD AUTO: 310 K/UL — SIGNIFICANT CHANGE UP (ref 150–400)
RBC # BLD: 4.34 M/UL — SIGNIFICANT CHANGE UP (ref 3.8–5.2)
RBC # FLD: 16.9 % — HIGH (ref 10.3–14.5)
WBC # BLD: 4.5 K/UL — SIGNIFICANT CHANGE UP (ref 3.8–10.5)
WBC # FLD AUTO: 4.5 K/UL — SIGNIFICANT CHANGE UP (ref 3.8–10.5)

## 2019-01-07 PROCEDURE — 99214 OFFICE O/P EST MOD 30 MIN: CPT

## 2019-01-08 PROBLEM — K13.0 EROSION OF LIP: Status: ACTIVE | Noted: 2017-12-15

## 2019-01-08 LAB
ALBUMIN SERPL ELPH-MCNC: 3.8 G/DL
ALP BLD-CCNC: 68 U/L
ALT SERPL-CCNC: 7 U/L
ANION GAP SERPL CALC-SCNC: 9 MMOL/L
AST SERPL-CCNC: 15 U/L
BILIRUB SERPL-MCNC: 0.3 MG/DL
BUN SERPL-MCNC: 7 MG/DL
CALCIUM SERPL-MCNC: 8.6 MG/DL
CHLORIDE SERPL-SCNC: 108 MMOL/L
CO2 SERPL-SCNC: 23 MMOL/L
CREAT SERPL-MCNC: 0.8 MG/DL
GLUCOSE SERPL-MCNC: 96 MG/DL
POTASSIUM SERPL-SCNC: 3.8 MMOL/L
PROT SERPL-MCNC: 7.7 G/DL
SODIUM SERPL-SCNC: 140 MMOL/L

## 2019-01-08 NOTE — REVIEW OF SYSTEMS
[Fatigue] : fatigue [SOB on Exertion] : shortness of breath during exertion [Joint Pain] : joint pain [Skin Rash] : skin rash [Negative] : Heme/Lymph [FreeTextEntry7] : bloating improved

## 2019-01-08 NOTE — PHYSICAL EXAM
[Fully active, able to carry on all pre-disease performance without restriction] : Status 0 - Fully active, able to carry on all pre-disease performance without restriction [Normal] : grossly intact [de-identified] : scattered hyperpigmented rash, blisters on lower lip

## 2019-01-08 NOTE — HISTORY OF PRESENT ILLNESS
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 Seen initially on 17. PT reports improvement in fatigue since admission 17, but still with pain in L elbow, and weakness in flexion of L arm at elbow. .ros. PT admits to paraesthesias in 5th digit of L hand, and tenderness at heels in both feet, and chronic cough, for which she takes Guaifenesin.\par \par HYPEREOSINOPHILIA HX:\par - 2016: was in [place] for her Sister's , rash started 2 weeks after returning, claims to have many bug bites from "horseflies" starting at bilateral medial knees. \par - 10/2016: Saw Beatriz Wu ([phone]), md ([facility] [employer]), and allergist at [facility], felt that the diagnosis was vasculitis, started on clobetazol, hydrocortizone, to little effect\par - 2016: Saw Dr. Mendoza who continued to treat pain with naproxen, neurontin\par - 2017: Saw Dermatologist Dr. Wisdom\par - 2017: Dr Wisdom 2017 performed biopsy  at that time showed changes consistent with hypereosinophilic syndrome vs drug vs arthropod bite like reaction (superficial and deep perivascular and interstitial dermatitis with eosinophils), started on doxycycline, to some effect.  \par - 2017: Saw Dr. Inocencio Batres on 17 generalized rash including facial rash and itching, and edema. who performed  Punch biopsy of R arm showing interface dermatitis with prominent dermal eosinophils.  2nd punch biopsy of right arm suggestive of lupus erythematosus w/ granular linear signals of igA and IgM and patchy granular signal of C3 at basement membrane.  IgG negative. \par - ADM 2017 TO 2017 TO [facility] with diffuse generalized rash. T 98.1, , /85, RR 18, O2 97%. Labs significant for WBC 28.15, eosinophils 97%, Hgb 11.1, MCV 75.6. Received prednisone 40mg & morphine.She was also evaluated by opthamology for complaint of blurry vision in her right eye.  Was found to have cotton wool spots off both optic nerves.  Had MRI brain which showed microvascular changes.\par - 2017: 17 bone marrow biopsy without evidence of leukemia, no blasts, [id number] negative. \par - 2017: 17 STARTED HYDROXYUREA , 60mg po prednisone daily\par - 2017: 5/3/17 presents with painful rash on hands and feet, exquisitely tender, stopped Hydroxyurea. \par - 2017: 17 found to have swollen R leg, RLE duplex ultrasound c/w extensive DVT, started on xarelto 15mg po bid from [facility] at [facility] [place]. \par - 2017: 17 restarted hydrea 500mg po BID with worsening rash.\par - 2017: 17 decreased prednisone from 60mg po daily to 40mg po daily continue 500mg hydrea BID\par - 2017: 17 prednisone at 40mg po daily, hydrea increased to 1000mg in AM and 500mg in PM. \par - Started Gleevec . Tapered off prednisone 17. \par \par Currently on Losartan 50mg for BP control.\par She was admitted  for incidental finding of PE on CT A/P and tachycardia. They were unable to obtain CTA of chest 2/2 recent contrast load. Doppler LE showed acute below the knee DVT on left side. She was off Xarelto for 3 weeks prior so was resumed on Xarelto.  No SOB, TTE performed which showed no RV strain, CE negative, normal BNP.  Patient with persistent tachycardia, seen by cardiology who started on beta blocker.  \par \par Started Gleevec . Tapered off prednisone 17. \par Noted to have a new rash around . She reports that this rash is different than her previous rash. She saw derm, was started back on prednisone taper, completed 3-4 days ago. \par We held her Gleevec on 12/15 b/c neutropenia. She also stopped HU. Her counts improved by the following week; we restarted Gleevec 18. \par Holding Gleevec again since 3/9/18 for neutropenia. \par Her periods have been very heavy b/c of Xarelto. Refused any pills to control her menses. On po iron supplements but unable to take every day because of constipation and hemorrhoids. She received Injectafer 3/23 and 3/30. \par She now holds the Xarelto for 2 days when she gets her menses.  [de-identified] : She is working per [employer] now, no longer has insurance. Emergency Medicaid pending. \par She has been out of medications for the last 3 weeks. She has been breaking her old Gleevec 400mg tabs in half and taking it. She is out of prednisone and dapsone. Using samples of Xarelto, will run out in a week. \par Started itching about 2 weeks ago, using benadryl 25mg daily to try to control itching. Blisters on lip are coming back.

## 2019-01-08 NOTE — ASSESSMENT
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 iRch, dermatologist by biopsy, [id number] fusion negative, JAK2 mutation negative, BCR-ABL mutation negative, with recent skin biopsy suggesting possible lupus as well steroid dependent. Course complicated by 5/17/17 RLE duplex ultrasound c/w extensive DVT, s/p 6 months of Xarelto (finished 11/2017), now w/ new DVT on L, back on Xarelto \par She started Gleevec on 9/17, was tapered off steroids 12/4/17 \par She developed a new rash around 12/12. Labwork from 12/15 also showed new cytopenias, Gleevec was held; she also self d/c'd hydrea. Gleevec was eventually restarted when counts recovered but now back on hold for now since 3/9/18 for neutropenia. Restarted in 6/2018 at 300mg. She gets recurrent rash when she is off steroids so we started her on low dose steroids (5mg daily).\par \par She has run out of medications b/c lack of insurance. Her rash/itching and blisters on lip have now returned. I spoke with [facility] - prednisone for 2 weeks will be ~$10 so will start pt on prednisone 40mg x1 week  and 20mg x1 week. She is Medicaid pending, will hear back in 7-10 days. She will let me know once it has been approved and we can restart her medications. \par \par -pt has h/o iron deficiency anemia, s/p injectafer weekly x2 on 3/23 and 3/30/18. She is once again anemic today. She will start po iron supplements, may need injectafer again in the future once insurance back in place. \par -continue Xarelto for recurrent DVT\par -RTC 4 weeks

## 2019-01-24 ENCOUNTER — RX RENEWAL (OUTPATIENT)
Age: 49
End: 2019-01-24

## 2019-02-07 ENCOUNTER — OUTPATIENT (OUTPATIENT)
Dept: OUTPATIENT SERVICES | Facility: HOSPITAL | Age: 49
LOS: 1 days | Discharge: HOME | End: 2019-02-07

## 2019-02-07 DIAGNOSIS — Z98.890 OTHER SPECIFIED POSTPROCEDURAL STATES: Chronic | ICD-10-CM

## 2019-02-07 DIAGNOSIS — D25.9 LEIOMYOMA OF UTERUS, UNSPECIFIED: Chronic | ICD-10-CM

## 2019-02-07 DIAGNOSIS — D72.1 EOSINOPHILIA: ICD-10-CM

## 2019-02-07 DIAGNOSIS — N20.0 CALCULUS OF KIDNEY: Chronic | ICD-10-CM

## 2019-02-08 ENCOUNTER — APPOINTMENT (OUTPATIENT)
Dept: HEMATOLOGY ONCOLOGY | Facility: CLINIC | Age: 49
End: 2019-02-08

## 2019-02-15 ENCOUNTER — RESULT REVIEW (OUTPATIENT)
Age: 49
End: 2019-02-15

## 2019-02-15 ENCOUNTER — APPOINTMENT (OUTPATIENT)
Dept: HEMATOLOGY ONCOLOGY | Facility: CLINIC | Age: 49
End: 2019-02-15
Payer: MEDICAID

## 2019-02-15 ENCOUNTER — MEDICATION RENEWAL (OUTPATIENT)
Age: 49
End: 2019-02-15

## 2019-02-15 VITALS
RESPIRATION RATE: 16 BRPM | HEART RATE: 99 BPM | SYSTOLIC BLOOD PRESSURE: 137 MMHG | DIASTOLIC BLOOD PRESSURE: 83 MMHG | TEMPERATURE: 97.8 F | OXYGEN SATURATION: 98 % | BODY MASS INDEX: 38.15 KG/M2 | WEIGHT: 229.28 LBS

## 2019-02-15 LAB
BASOPHILS # BLD AUTO: 0.1 K/UL — SIGNIFICANT CHANGE UP (ref 0–0.2)
BASOPHILS NFR BLD AUTO: 2 % — SIGNIFICANT CHANGE UP (ref 0–2)
EOSINOPHIL # BLD AUTO: 0.6 K/UL — HIGH (ref 0–0.5)
EOSINOPHIL NFR BLD AUTO: 12.7 % — HIGH (ref 0–6)
HCT VFR BLD CALC: 32.1 % — LOW (ref 34.5–45)
HGB BLD-MCNC: 10.2 G/DL — LOW (ref 11.5–15.5)
LYMPHOCYTES # BLD AUTO: 2.1 K/UL — SIGNIFICANT CHANGE UP (ref 1–3.3)
LYMPHOCYTES # BLD AUTO: 47.7 % — HIGH (ref 13–44)
MCHC RBC-ENTMCNC: 22 PG — LOW (ref 27–34)
MCHC RBC-ENTMCNC: 31.7 G/DL — LOW (ref 32–36)
MCV RBC AUTO: 69.5 FL — LOW (ref 80–100)
MONOCYTES # BLD AUTO: 0.4 K/UL — SIGNIFICANT CHANGE UP (ref 0–0.9)
MONOCYTES NFR BLD AUTO: 8.7 % — SIGNIFICANT CHANGE UP (ref 2–14)
NEUTROPHILS # BLD AUTO: 1.3 K/UL — LOW (ref 1.8–7.4)
NEUTROPHILS NFR BLD AUTO: 28.9 % — LOW (ref 43–77)
PLATELET # BLD AUTO: 364 K/UL — SIGNIFICANT CHANGE UP (ref 150–400)
RBC # BLD: 4.62 M/UL — SIGNIFICANT CHANGE UP (ref 3.8–5.2)
RBC # FLD: 19.5 % — HIGH (ref 10.3–14.5)
WBC # BLD: 4.4 K/UL — SIGNIFICANT CHANGE UP (ref 3.8–10.5)
WBC # FLD AUTO: 4.4 K/UL — SIGNIFICANT CHANGE UP (ref 3.8–10.5)

## 2019-02-15 PROCEDURE — 99214 OFFICE O/P EST MOD 30 MIN: CPT

## 2019-02-15 RX ORDER — FLUOCINONIDE 0.5 MG/ML
0.05 SOLUTION TOPICAL
Qty: 1 | Refills: 2 | Status: ACTIVE | COMMUNITY
Start: 2017-12-15 | End: 1900-01-01

## 2019-02-15 RX ORDER — HALOBETASOL PROPIONATE 0.5 MG/G
0.05 OINTMENT TOPICAL
Qty: 2 | Refills: 2 | Status: ACTIVE | COMMUNITY
Start: 2017-12-15 | End: 1900-01-01

## 2019-02-19 LAB
ALBUMIN SERPL ELPH-MCNC: 4.1 G/DL
ALP BLD-CCNC: 87 U/L
ALT SERPL-CCNC: 12 U/L
ANION GAP SERPL CALC-SCNC: 12 MMOL/L
AST SERPL-CCNC: 19 U/L
BILIRUB SERPL-MCNC: 0.2 MG/DL
BUN SERPL-MCNC: 6 MG/DL
CALCIUM SERPL-MCNC: 8.9 MG/DL
CHLORIDE SERPL-SCNC: 107 MMOL/L
CO2 SERPL-SCNC: 19 MMOL/L
CREAT SERPL-MCNC: 0.92 MG/DL
FERRITIN SERPL-MCNC: 11 NG/ML
GLUCOSE SERPL-MCNC: 83 MG/DL
IRON SATN MFR SERPL: 7 %
IRON SERPL-MCNC: 26 UG/DL
POTASSIUM SERPL-SCNC: 3.7 MMOL/L
PROT SERPL-MCNC: 7.5 G/DL
SODIUM SERPL-SCNC: 138 MMOL/L
TIBC SERPL-MCNC: 384 UG/DL
UIBC SERPL-MCNC: 358 UG/DL

## 2019-02-19 NOTE — ASSESSMENT
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 Rich, dermatologist by biopsy, [id number] fusion negative, JAK2 mutation negative, BCR-ABL mutation negative, with recent skin biopsy suggesting possible lupus as well steroid dependent. Course complicated by 5/17/17 RLE duplex ultrasound c/w extensive DVT, s/p 6 months of Xarelto (finished 11/2017), now w/ new DVT on L, back on Xarelto \par She started Gleevec on 9/17, was tapered off steroids 12/4/17 \par She developed a new rash around 12/12. Labwork from 12/15 also showed new cytopenias, Gleevec was held; she also self d/c'd hydrea. Gleevec was eventually restarted when counts recovered but now back on hold for now since 3/9/18 for neutropenia. Restarted in 6/2018 at 300mg. She gets recurrent rash when she is off steroids so we started her on low dose steroids (5mg daily).\par \par -she still has persistent rash and blisters on lip, will make f/u appointment with derm\par -absolute eosinophils 600 today, cont Gleevec 300mg daily\par -pt has h/o iron deficiency anemia, s/p injectafer weekly x2 on 3/23 and 3/30/18. She is once again iron deficient, started on oral supplements and does not want IV yet. She will increase po iron supplements, but may need injectafer again in the future. \par -continue Xarelto for recurrent DVT - refilled today\par -RTC 6 weeks

## 2019-02-19 NOTE — PHYSICAL EXAM
[Fully active, able to carry on all pre-disease performance without restriction] : Status 0 - Fully active, able to carry on all pre-disease performance without restriction [Normal] : grossly intact [de-identified] : scattered hyperpigmented rash, blisters on lower lip

## 2019-02-19 NOTE — HISTORY OF PRESENT ILLNESS
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 Seen initially on 17. PT reports improvement in fatigue since admission 17, but still with pain in L elbow, and weakness in flexion of L arm at elbow. .ros. PT admits to paraesthesias in 5th digit of L hand, and tenderness at heels in both feet, and chronic cough, for which she takes Guaifenesin.\par \par HYPEREOSINOPHILIA HX:\par - 2016: was in [place] for her Sister's , rash started 2 weeks after returning, claims to have many bug bites from "horseflies" starting at bilateral medial knees. \par - 10/2016: Saw Beatriz Wu ([phone]), md ([employer]), and allergist at [facility], felt that the diagnosis was vasculitis, started on clobetazol, hydrocortizone, to little effect\par - 2016: Saw Dr. Mendoza who continued to treat pain with naproxen, neurontin\par - 2017: Saw Dermatologist Dr. Wisdom\par - 2017: Dr Wisdom 2017 performed biopsy  at that time showed changes consistent with hypereosinophilic syndrome vs drug vs arthropod bite like reaction (superficial and deep perivascular and interstitial dermatitis with eosinophils), started on doxycycline, to some effect.  \par - 2017: Saw Dr. Inocencio Batres on 17 generalized rash including facial rash and itching, and edema. who performed  Punch biopsy of R arm showing interface dermatitis with prominent dermal eosinophils.  2nd punch biopsy of right arm suggestive of lupus erythematosus w/ granular linear signals of igA and IgM and patchy granular signal of C3 at basement membrane.  IgG negative. \par - ADM 2017 TO 2017 TO [facility] with diffuse generalized rash. T 98.1, , /85, RR 18, O2 97%. Labs significant for WBC 28.15, eosinophils 97%, Hgb 11.1, MCV 75.6. Received prednisone 40mg & morphine.She was also evaluated by opthamology for complaint of blurry vision in her right eye.  Was found to have cotton wool spots off both optic nerves.  Had MRI brain which showed microvascular changes.\par - 2017: 17 bone marrow biopsy without evidence of leukemia, no blasts, [id number] negative. \par - 2017: 17 STARTED HYDROXYUREA , 60mg po prednisone daily\par - 2017: 5/3/17 presents with painful rash on hands and feet, exquisitely tender, stopped Hydroxyurea. \par - 2017: 17 found to have swollen R leg, RLE duplex ultrasound c/w extensive DVT, started on xarelto 15mg po bid from vivo pharmacy [place] [facility] [place] \par - 2017: 17 restarted hydrea 500mg po BID with worsening rash.\par - 2017: 17 decreased prednisone from 60mg po daily to 40mg po daily continue 500mg hydrea BID\par - 2017: 17 prednisone at 40mg po daily, hydrea increased to 1000mg in AM and 500mg in PM. \par - Started Gleevec . Tapered off prednisone 17. \par \par Currently on Losartan 50mg for BP control.\par She was admitted  for incidental finding of PE on CT A/P and tachycardia. They were unable to obtain CTA of chest 2/2 recent contrast load. Doppler LE showed acute below the knee DVT on left side. She was off Xarelto for 3 weeks prior so was resumed on Xarelto.  No SOB, TTE performed which showed no RV strain, CE negative, normal BNP.  Patient with persistent tachycardia, seen by cardiology who started on beta blocker.  \par \par Started Gleevec . Tapered off prednisone 17. \par Noted to have a new rash around . She reports that this rash is different than her previous rash. She saw derm, was started back on prednisone taper, completed 3-4 days ago. \par We held her Gleevec on 12/15 b/c neutropenia. She also stopped HU. Her counts improved by the following week; we restarted Gleevec 18. \par Holding Gleevec again since 3/9/18 for neutropenia. \par Her periods have been very heavy b/c of Xarelto. Refused any pills to control her menses. On po iron supplements but unable to take every day because of constipation and hemorrhoids. She received Injectafer 3/23 and 3/30. \par She now holds the Xarelto for 2 days when she gets her menses.  [de-identified] : Still having the itching and blisters on lip. She was given a short course of prednisone 40mg last visit, now tapered back to prednisone 5mg. Does not want to increase dose again - will see derm first. \par \par

## 2019-03-02 NOTE — PROGRESS NOTE ADULT - PROBLEM/PLAN-4
ED Headache HPI





- General


Chief Complaint: Nausea/Vomiting/Diarrhea


Stated Complaint: HEADACHE/NAUSEA


Time Seen by Provider: 03/02/19 16:12


Source: patient





- History of Present Illness


Initial Comments: 





61-year-old male presents to ED with complaint of headache since this morning.  

The patient states he awoke with bitemporal headache, nausea and vomiting.  

Patient also reports blurred vision.  States he did not take anything for the 

pain prior to ED arrival due to his nausea.  Did not take his blood pressure 

medications this morning due to same reason.  Patient denies history of 

headaches.  Denies fever, URI symptoms including runny nose, congestion, cough, 

shortness of breath.  Patient denies trauma.  Denies neck pain.  Denies numbness

or paresthesias.





PCP: [facility]


Timing/Duration: other (this morning)


Quality: moderate


Head Injury Location: temporal


Recent Head Trauma: other


Modifying Factors: improves with: other (position, worse with sitting up, better

with laying back)


Associated Symptoms: nausea/vomiting, vision changes.  denies: facial pain, 

fever/chills, nasal congestion, nasal drainage, sinus infection, stiff neck


Allergies/Adverse Reactions: 


Allergies





lisinopril Allergy (Verified 05/19/16 21:27)


   Unknown








Home Medications: 


Ambulatory Orders





Acetaminophen/Codeine [Tylenol #3] 1 tab PO Q6H PRN #10 tab 05/20/16 


Furosemide [Lasix] 20 mg PO QDAY #30 tablet 05/20/16 


Insulin NPH/Regular [Novolin 70/30] 25 unit SQ BID 05/20/16 


Insulin Regular, Human [HumuLIN R] 1 unit SQ Q6HR 05/20/16 


amLODIPine [Norvasc] 5 mg PO DAILY #30 tab 05/20/16 


Butalb/Acetamin/Caff -40 [Fioricet] 1 tab PO Q6HR PRN #10 tab 03/02/19 











ED Review of Systems


ROS: 


Stated complaint: HEADACHE/NAUSEA


Other details as noted in HPI





Comment: All other systems reviewed and negative


Constitutional: denies: chills, fever


Eyes: vision change (blurred vision)


ENT: denies: throat pain, congestion


Respiratory: denies: cough


Cardiovascular: denies: chest pain


Gastrointestinal: nausea, vomiting.  denies: abdominal pain


Neurological: headache.  denies: weakness, numbness, vertigo





ED Past Medical Hx





- Past Medical History


Hx Hypertension: Yes


Hx Diabetes: Yes





- Surgical History


Hx Appendectomy: Yes





- Social History


Smoking Status: Never Smoker


Substance Use Type: None





- Medications


Home Medications: 


                                Home Medications











 Medication  Instructions  Recorded  Confirmed  Last Taken  Type


 


Acetaminophen/Codeine [Tylenol #3] 1 tab PO Q6H PRN #10 tab 05/20/16 08/03/17 08/03/17 Rx


 


Furosemide [Lasix] 20 mg PO QDAY #30 tablet 05/20/16 08/03/17 08/03/17 Rx


 


Insulin NPH/Regular [Novolin 70/30] 25 unit SQ BID 05/20/16 08/03/17 08/03/17 

History


 


Insulin Regular, Human [HumuLIN R] 1 unit SQ Q6HR 05/20/16 08/03/17 08/03/17 

History


 


amLODIPine [Norvasc] 5 mg PO DAILY #30 tab 05/20/16 08/03/17 08/03/17 Rx


 


Butalb/Acetamin/Caff -40 1 tab PO Q6HR PRN #10 tab 03/02/19  Unknown Rx





[Fioricet]     














ED Physical Exam





- General


Limitations: No Limitations


General appearance: alert, in no apparent distress





- Head


Head exam: Present: atraumatic, normocephalic





- Eye


Eye exam: Present: normal appearance, PERRL, EOMI





- ENT


ENT exam: Present: mucous membranes moist





- Neck


Neck exam: Present: normal inspection.  Absent: tenderness, meningismus





- Respiratory


Respiratory exam: Present: normal lung sounds bilaterally.  Absent: respiratory 

distress





- Cardiovascular


Cardiovascular Exam: Present: regular rate, normal rhythm





- GI/Abdominal


GI/Abdominal exam: Present: soft.  Absent: distended, tenderness





- Extremities Exam


Extremities exam: Present: normal inspection





- Neurological Exam


Neurological exam: Present: alert, oriented X3, CN II-XII intact.  Absent: motor

 sensory deficit





- Psychiatric


Psychiatric exam: Present: normal affect, normal mood





- Skin


Skin exam: Present: warm, dry, intact, normal color.  Absent: rash





ED Course


                                   Vital Signs











  03/02/19 03/02/19





  14:43 18:43


 


Temperature 98.4 F 


 


Pulse Rate 74 98 H


 


Respiratory 16 16





Rate  


 


Blood Pressure 178/87 


 


Blood Pressure  173/86





[Left]  


 


O2 Sat by Pulse 98 96





Oximetry  














ED Medical Decision Making





- Lab Data


Result diagrams: 


                                 03/02/19 16:45





                                 03/02/19 16:45





- Radiology Data


Radiology results: report reviewed, image reviewed





CT Head: no acute abnormality





**Radiology reports not crossing into [employer] due to tech issues; results faxed

 to ED





- Medical Decision Making





61-year-old male with temporal headache since this morning.  Patient initially 

hypertensive upon ED arrival.  Neurologic exam normal.  CT scan did not show any

 acute findings.  Patient was given morphine, Zofran, Reglan, Benadryl.  Patient

 reported improvement of pain following medication administration.  The patient 

currently improved to 142/69. Patient has tolerated PO, ate an entire meal tray 

here in ED.  Will discharge at this time.  Return precautions given. Outpatient 

follow-up advised.





- Differential Diagnosis


intracranial bleed, electrolyte abnormality, hypertensive HA, tension HA


Critical care attestation.: 


If time is entered above; I have spent that time in minutes in the direct care 

of this critically ill patient, excluding procedure time.








ED Disposition


Clinical Impression: 


 Acute headache





Disposition: DC-01 TO HOME OR SELFCARE


Is pt being admited?: No


Condition: Stable


Prescriptions: 


Butalb/Acetamin/Caff -40 [Fioricet] 1 tab PO Q6HR PRN #10 tab


 PRN Reason: Headache


Referrals: 


PRIMARY CARE,MD [Primary Care Provider] - 3-5 Days


Time of Disposition: 19:53
DISPLAY PLAN FREE TEXT

## 2019-04-04 ENCOUNTER — OUTPATIENT (OUTPATIENT)
Dept: OUTPATIENT SERVICES | Facility: HOSPITAL | Age: 49
LOS: 1 days | Discharge: HOME | End: 2019-04-04

## 2019-04-04 DIAGNOSIS — D25.9 LEIOMYOMA OF UTERUS, UNSPECIFIED: Chronic | ICD-10-CM

## 2019-04-04 DIAGNOSIS — N20.0 CALCULUS OF KIDNEY: Chronic | ICD-10-CM

## 2019-04-04 DIAGNOSIS — Z98.890 OTHER SPECIFIED POSTPROCEDURAL STATES: Chronic | ICD-10-CM

## 2019-04-04 DIAGNOSIS — D72.1 EOSINOPHILIA: ICD-10-CM

## 2019-04-15 ENCOUNTER — APPOINTMENT (OUTPATIENT)
Dept: HEMATOLOGY ONCOLOGY | Facility: CLINIC | Age: 49
End: 2019-04-15
Payer: MEDICAID

## 2019-04-15 ENCOUNTER — RESULT REVIEW (OUTPATIENT)
Age: 49
End: 2019-04-15

## 2019-04-15 VITALS
SYSTOLIC BLOOD PRESSURE: 117 MMHG | TEMPERATURE: 98.3 F | WEIGHT: 222.66 LBS | BODY MASS INDEX: 37.05 KG/M2 | RESPIRATION RATE: 16 BRPM | OXYGEN SATURATION: 100 % | HEART RATE: 93 BPM | DIASTOLIC BLOOD PRESSURE: 73 MMHG

## 2019-04-15 LAB
ANISOCYTOSIS BLD QL: SLIGHT — SIGNIFICANT CHANGE UP
BASOPHILS # BLD AUTO: 0 K/UL — SIGNIFICANT CHANGE UP (ref 0–0.2)
BASOPHILS NFR BLD AUTO: 2 % — SIGNIFICANT CHANGE UP (ref 0–2)
DACRYOCYTES BLD QL SMEAR: SLIGHT — SIGNIFICANT CHANGE UP
ELLIPTOCYTES BLD QL SMEAR: SLIGHT — SIGNIFICANT CHANGE UP
EOSINOPHIL # BLD AUTO: 0.4 K/UL — SIGNIFICANT CHANGE UP (ref 0–0.5)
EOSINOPHIL NFR BLD AUTO: 17 % — HIGH (ref 0–6)
FERRITIN SERPL-MCNC: 8 NG/ML
HCT VFR BLD CALC: 28.9 % — LOW (ref 34.5–45)
HGB BLD-MCNC: 9 G/DL — LOW (ref 11.5–15.5)
HYPOCHROMIA BLD QL: SLIGHT — SIGNIFICANT CHANGE UP
IRON SATN MFR SERPL: 4 %
IRON SERPL-MCNC: 15 UG/DL
LYMPHOCYTES # BLD AUTO: 1.4 K/UL — SIGNIFICANT CHANGE UP (ref 1–3.3)
LYMPHOCYTES # BLD AUTO: 48 % — HIGH (ref 13–44)
MACROCYTES BLD QL: SLIGHT — SIGNIFICANT CHANGE UP
MCHC RBC-ENTMCNC: 21.3 PG — LOW (ref 27–34)
MCHC RBC-ENTMCNC: 31.1 G/DL — LOW (ref 32–36)
MCV RBC AUTO: 68.5 FL — LOW (ref 80–100)
MICROCYTES BLD QL: SLIGHT — SIGNIFICANT CHANGE UP
MONOCYTES # BLD AUTO: 0.3 K/UL — SIGNIFICANT CHANGE UP (ref 0–0.9)
MONOCYTES NFR BLD AUTO: 8 % — SIGNIFICANT CHANGE UP (ref 2–14)
NEUTROPHILS # BLD AUTO: 0.8 K/UL — LOW (ref 1.8–7.4)
NEUTROPHILS NFR BLD AUTO: 25 % — LOW (ref 43–77)
PLAT MORPH BLD: NORMAL — SIGNIFICANT CHANGE UP
PLATELET # BLD AUTO: 306 K/UL — SIGNIFICANT CHANGE UP (ref 150–400)
POIKILOCYTOSIS BLD QL AUTO: SLIGHT — SIGNIFICANT CHANGE UP
POLYCHROMASIA BLD QL SMEAR: SLIGHT — SIGNIFICANT CHANGE UP
RBC # BLD: 4.22 M/UL — SIGNIFICANT CHANGE UP (ref 3.8–5.2)
RBC # FLD: 20 % — HIGH (ref 10.3–14.5)
RBC BLD AUTO: ABNORMAL
TIBC SERPL-MCNC: 338 UG/DL
UIBC SERPL-MCNC: 323 UG/DL
WBC # BLD: 2.9 K/UL — LOW (ref 3.8–10.5)
WBC # FLD AUTO: 2.9 K/UL — LOW (ref 3.8–10.5)

## 2019-04-15 PROCEDURE — 99214 OFFICE O/P EST MOD 30 MIN: CPT

## 2019-04-15 RX ORDER — RIVAROXABAN 20 MG/1
20 TABLET, FILM COATED ORAL
Qty: 30 | Refills: 5 | Status: DISCONTINUED | COMMUNITY
Start: 2017-06-07 | End: 2019-04-15

## 2019-04-16 LAB
ALBUMIN SERPL ELPH-MCNC: 3.7 G/DL
ALP BLD-CCNC: 78 U/L
ALT SERPL-CCNC: 11 U/L
ANION GAP SERPL CALC-SCNC: 11 MMOL/L
AST SERPL-CCNC: 17 U/L
BILIRUB SERPL-MCNC: 0.2 MG/DL
BUN SERPL-MCNC: 8 MG/DL
CALCIUM SERPL-MCNC: 8.7 MG/DL
CHLORIDE SERPL-SCNC: 108 MMOL/L
CO2 SERPL-SCNC: 21 MMOL/L
CREAT SERPL-MCNC: 0.86 MG/DL
GLUCOSE SERPL-MCNC: 100 MG/DL
POTASSIUM SERPL-SCNC: 4.1 MMOL/L
PROT SERPL-MCNC: 7.2 G/DL
SODIUM SERPL-SCNC: 140 MMOL/L

## 2019-04-16 NOTE — PHYSICAL EXAM
[Fully active, able to carry on all pre-disease performance without restriction] : Status 0 - Fully active, able to carry on all pre-disease performance without restriction [Normal] : grossly intact [de-identified] : scattered hyperpigmented rash, hyperpigmented lesions on lower lip

## 2019-04-16 NOTE — HISTORY OF PRESENT ILLNESS
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 Seen initially on 17. PT reports improvement in fatigue since admission 17, but still with pain in L elbow, and weakness in flexion of L arm at elbow. .ros. PT admits to paraesthesias in 5th digit of L hand, and tenderness at heels in both feet, and chronic cough, for which she takes Guaifenesin.\par \par HYPEREOSINOPHILIA HX:\par - 2016: was in [place] for her Sister's , rash started 2 weeks after returning, claims to have many bug bites from "horseflies" starting at bilateral medial knees. \par - 10/2016: Saw Beatriz Wu ([phone]), md ([employer]), and allergist at [facility], felt that the diagnosis was vasculitis, started on clobetazol, hydrocortizone, to little effect\par - 2016: Saw Dr. Mendoza who continued to treat pain with naproxen, neurontin\par - 2017: Saw Dermatologist Dr. Wisdom\par - 2017: Dr Wisdom 2017 performed biopsy  at that time showed changes consistent with hypereosinophilic syndrome vs drug vs arthropod bite like reaction (superficial and deep perivascular and interstitial dermatitis with eosinophils), started on doxycycline, to some effect.  \par - 2017: Saw Dr. Inocencio Batres on 17 generalized rash including facial rash and itching, and edema. who performed  Punch biopsy of R arm showing interface dermatitis with prominent dermal eosinophils.  2nd punch biopsy of right arm suggestive of lupus erythematosus w/ granular linear signals of igA and IgM and patchy granular signal of C3 at basement membrane.  IgG negative. \par - ADM 2017 TO 2017 TO [facility] with diffuse generalized rash. T 98.1, , /85, RR 18, O2 97%. Labs significant for WBC 28.15, eosinophils 97%, Hgb 11.1, MCV 75.6. Received prednisone 40mg & morphine.She was also evaluated by opthamology for complaint of blurry vision in her right eye.  Was found to have cotton wool spots off both optic nerves.  Had MRI brain which showed microvascular changes.\par - 2017: 17 bone marrow biopsy without evidence of leukemia, no blasts, [id number] negative. \par - 2017: 17 STARTED HYDROXYUREA , 60mg po prednisone daily\par - 2017: 5/3/17 presents with painful rash on hands and feet, exquisitely tender, stopped Hydroxyurea. \par - 2017: 17 found to have swollen R leg, RLE duplex ultrasound c/w extensive DVT, started on [place]arel[place]mg [place] [facility] [place] \par - 2017: 17 restarted hydrea 500mg po BID with worsening rash.\par - 2017: 17 decreased prednisone from 60mg po daily to 40mg po daily continue 500mg hydrea BID\par - 2017: 17 prednisone at 40mg po daily, hydrea increased to 1000mg in AM and 500mg in PM. \par - Started Gleevec . Tapered off prednisone 17. \par \par Currently on Losartan 50mg for BP control.\par She was admitted  for incidental finding of PE on CT A/P and tachycardia. They were unable to obtain CTA of chest 2/2 recent contrast load. Doppler LE showed acute below the knee DVT on left side. She was off Xarelto for 3 weeks prior so was resumed on Xarelto.  No SOB, TTE performed which showed no RV strain, CE negative, normal BNP.  Patient with persistent tachycardia, seen by cardiology who started on beta blocker.  \par \par Started Gleevec . Tapered off prednisone 17. \par Noted to have a new rash around . She reports that this rash is different than her previous rash. She saw derm, was started back on prednisone taper, completed 3-4 days ago. \par We held her Gleevec on 12/15 b/c neutropenia. She also stopped HU. Her counts improved by the following week; we restarted Gleevec 18. \par Holding Gleevec again since 3/9/18 for neutropenia. \par Her periods have been very heavy b/c of Xarelto. Refused any pills to control her menses. On po iron supplements but unable to take every day because of constipation and hemorrhoids. She received Injectafer 3/23 and 3/30. \par She now holds the Xarelto for 2 days when she gets her menses.  [de-identified] : She remains on prednisone 5mg and Gleevec 300mg. \par Still notes having some itching - spoke with derm, no further therapies at this time. \par \par \par

## 2019-04-16 NOTE — ASSESSMENT
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 Rich, dermatologist by biopsy, [id number] fusion negative, JAK2 mutation negative, BCR-ABL mutation negative, with recent skin biopsy suggesting possible lupus as well steroid dependent. Course complicated by 5/17/17 RLE duplex ultrasound c/w extensive DVT, s/p 6 months of Xarelto (finished 11/2017), now w/ new DVT on L, back on Xarelto \par She started Gleevec on 9/17, was tapered off steroids 12/4/17 \par She developed a new rash around 12/12. Labwork from 12/15 also showed new cytopenias, Gleevec was held; she also self d/c'd hydrea. Gleevec was eventually restarted when counts recovered but now back on hold for now since 3/9/18 for neutropenia. Restarted in 6/2018 at 300mg. She gets recurrent rash when she is off steroids so we started her on low dose steroids (5mg daily).\par \par -absolute eosinophils 400 today, however . Will hold Gleevec for 2 weeks and repeat CBC\par -pt has h/o iron deficiency anemia, s/p injectafer weekly x2 on 3/23 and 3/30/18. She is once again iron deficient, started on oral supplements and does not want IV yet. She will agree to injectafer again next month if remains low.\par -continue Xarelto 10mg for recurrent DVT - refilled today\par -RTC 4 weeks

## 2019-04-29 ENCOUNTER — APPOINTMENT (OUTPATIENT)
Dept: HEMATOLOGY ONCOLOGY | Facility: CLINIC | Age: 49
End: 2019-04-29

## 2019-05-21 ENCOUNTER — OUTPATIENT (OUTPATIENT)
Dept: OUTPATIENT SERVICES | Facility: HOSPITAL | Age: 49
LOS: 1 days | Discharge: HOME | End: 2019-05-21

## 2019-05-21 DIAGNOSIS — D25.9 LEIOMYOMA OF UTERUS, UNSPECIFIED: Chronic | ICD-10-CM

## 2019-05-21 DIAGNOSIS — D72.1 EOSINOPHILIA: ICD-10-CM

## 2019-05-21 DIAGNOSIS — Z98.890 OTHER SPECIFIED POSTPROCEDURAL STATES: Chronic | ICD-10-CM

## 2019-05-21 DIAGNOSIS — N20.0 CALCULUS OF KIDNEY: Chronic | ICD-10-CM

## 2019-05-22 ENCOUNTER — RESULT REVIEW (OUTPATIENT)
Age: 49
End: 2019-05-22

## 2019-05-22 ENCOUNTER — APPOINTMENT (OUTPATIENT)
Dept: HEMATOLOGY ONCOLOGY | Facility: CLINIC | Age: 49
End: 2019-05-22
Payer: MEDICAID

## 2019-05-22 VITALS
SYSTOLIC BLOOD PRESSURE: 135 MMHG | TEMPERATURE: 97.6 F | OXYGEN SATURATION: 99 % | BODY MASS INDEX: 36.98 KG/M2 | HEART RATE: 97 BPM | WEIGHT: 222.22 LBS | DIASTOLIC BLOOD PRESSURE: 87 MMHG | RESPIRATION RATE: 17 BRPM

## 2019-05-22 LAB
BASOPHILS # BLD AUTO: 0.1 K/UL — SIGNIFICANT CHANGE UP (ref 0–0.2)
BASOPHILS NFR BLD AUTO: 1.6 % — SIGNIFICANT CHANGE UP (ref 0–2)
EOSINOPHIL # BLD AUTO: 0.6 K/UL — HIGH (ref 0–0.5)
EOSINOPHIL NFR BLD AUTO: 11 % — HIGH (ref 0–6)
HCT VFR BLD CALC: 31.9 % — LOW (ref 34.5–45)
HGB BLD-MCNC: 10 G/DL — LOW (ref 11.5–15.5)
LYMPHOCYTES # BLD AUTO: 2.6 K/UL — SIGNIFICANT CHANGE UP (ref 1–3.3)
LYMPHOCYTES # BLD AUTO: 51 % — HIGH (ref 13–44)
MCHC RBC-ENTMCNC: 21.8 PG — LOW (ref 27–34)
MCHC RBC-ENTMCNC: 31.4 G/DL — LOW (ref 32–36)
MCV RBC AUTO: 69.4 FL — LOW (ref 80–100)
MONOCYTES # BLD AUTO: 0.5 K/UL — SIGNIFICANT CHANGE UP (ref 0–0.9)
MONOCYTES NFR BLD AUTO: 9 % — SIGNIFICANT CHANGE UP (ref 2–14)
NEUTROPHILS # BLD AUTO: 1.4 K/UL — LOW (ref 1.8–7.4)
NEUTROPHILS NFR BLD AUTO: 27.3 % — LOW (ref 43–77)
PLATELET # BLD AUTO: 323 K/UL — SIGNIFICANT CHANGE UP (ref 150–400)
RBC # BLD: 4.59 M/UL — SIGNIFICANT CHANGE UP (ref 3.8–5.2)
RBC # FLD: 20.4 % — HIGH (ref 10.3–14.5)
WBC # BLD: 5 K/UL — SIGNIFICANT CHANGE UP (ref 3.8–10.5)
WBC # FLD AUTO: 5 K/UL — SIGNIFICANT CHANGE UP (ref 3.8–10.5)

## 2019-05-22 PROCEDURE — 99214 OFFICE O/P EST MOD 30 MIN: CPT

## 2019-05-22 NOTE — HISTORY OF PRESENT ILLNESS
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 Seen initially on 17. PT reports improvement in fatigue since admission 17, but still with pain in L elbow, and weakness in flexion of L arm at elbow. .ros. PT admits to paraesthesias in 5th digit of L hand, and tenderness at heels in both feet, and chronic cough, for which she takes Guaifenesin.\par \par HYPEREOSINOPHILIA HX:\par - 2016: was in [place] for her Sister's , rash started 2 weeks after returning, claims to have many bug bites from "horseflies" starting at bilateral medial knees. \par - 10/2016: Saw Beatriz Wu ([phone]), md ([employer]), and allergist at [facility], felt that the diagnosis was vasculitis, started on clobetazol, hydrocortizone, to little effect\par - 2016: Saw Dr. Mendoza who continued to treat pain with naproxen, neurontin\par - 2017: Saw Dermatologist Dr. Wisdom\par - 2017: Dr Wisdom 2017 performed biopsy  at that time showed changes consistent with hypereosinophilic syndrome vs drug vs arthropod bite like reaction (superficial and deep perivascular and interstitial dermatitis with eosinophils), started on doxycycline, to some effect.  \par - 2017: Saw Dr. Inocencio Batres on 17 generalized rash including facial rash and itching, and edema. who performed  Punch biopsy of R arm showing interface dermatitis with prominent dermal eosinophils.  2nd punch biopsy of right arm suggestive of lupus erythematosus w/ granular linear signals of igA and IgM and patchy granular signal of C3 at basement membrane.  IgG negative. \par - ADM 2017 TO 2017 TO [facility] with diffuse generalized rash. T 98.1, , /85, RR 18, O2 97%. Labs significant for WBC 28.15, eosinophils 97%, Hgb 11.1, MCV 75.6. Received prednisone 40mg & morphine.She was also evaluated by opthamology for complaint of blurry vision in her right eye.  Was found to have cotton wool spots off both optic nerves.  Had MRI brain which showed microvascular changes.\par - 2017: 17 bone marrow biopsy without evidence of leukemia, no blasts, [id number] negative. \par - 2017: 17 STARTED HYDROXYUREA , 60mg po prednisone daily\par - 2017: 5/3/17 presents with painful rash on hands and feet, exquisitely tender, stopped Hydroxyurea. \par - 2017: 17 found to have swollen R leg, RLE duplex ultrasound c/w extensive DVT, started on xarelto 15mg po bid from vivo pharmacy at [facility] [place]. \par - 2017: 17 restarted hydrea 500mg po BID with worsening rash.\par - 2017: 17 decreased prednisone from 60mg po daily to 40mg po daily continue 500mg hydrea BID\par - 2017: 17 prednisone at 40mg po daily, hydrea increased to 1000mg in AM and 500mg in PM. \par - Started Gleevec . Tapered off prednisone 17. \par \par Currently on Losartan 50mg for BP control.\par She was admitted  for incidental finding of PE on CT A/P and tachycardia. They were unable to obtain CTA of chest 2/2 recent contrast load. Doppler LE showed acute below the knee DVT on left side. She was off Xarelto for 3 weeks prior so was resumed on Xarelto.  No SOB, TTE performed which showed no RV strain, CE negative, normal BNP.  Patient with persistent tachycardia, seen by cardiology who started on beta blocker.  \par \par Started Gleevec . Tapered off prednisone 17. \par Noted to have a new rash around . She reports that this rash is different than her previous rash. She saw derm, was started back on prednisone taper, completed 3-4 days ago. \par We held her Gleevec on 12/15 b/c neutropenia. She also stopped HU. Her counts improved by the following week; we restarted Gleevec 18. \par Holding Gleevec again since 3/9/18 for neutropenia. \par Her periods have been very heavy b/c of Xarelto. Refused any pills to control her menses. On po iron supplements but unable to take every day because of constipation and hemorrhoids. She received Injectafer 3/23 and 3/30. \par She now holds the Xarelto for 2 days when she gets her menses.  [de-identified] : She remains on prednisone 5mg. Gleevec has been on hold since 4/15/19 for neutropenia.\par She reports having a rash again for the last 2 weeks. Increased prednisone to 10mg at that time.

## 2019-05-22 NOTE — PHYSICAL EXAM
[Fully active, able to carry on all pre-disease performance without restriction] : Status 0 - Fully active, able to carry on all pre-disease performance without restriction [Normal] : grossly intact [de-identified] : scattered hyperpigmented rash on bests, back, abdomen and legs; hyperpigmented lesions on lower lip

## 2019-05-22 NOTE — ASSESSMENT
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 Rich, dermatologist by biopsy, [id number] fusion negative, JAK2 mutation negative, BCR-ABL mutation negative, with recent skin biopsy suggesting possible lupus as well steroid dependent. Course complicated by 5/17/17 RLE duplex ultrasound c/w extensive DVT, s/p 6 months of Xarelto (finished 11/2017), now w/ new DVT on L, back on Xarelto \par She started Gleevec on 9/2017, was tapered off steroids 12/4/17 but she gets recurrent rash when she is off steroids so we started her on low dose steroids (5mg daily). \par \par -absolute eosinophils 600 today, ANC improved to 1400. Will reduce Gleevec to 200mg and restart\par -cont prednisone 10mg for now\par -pt has h/o iron deficiency anemia, s/p injectafer weekly x2 on 3/23 and 3/30/18. She is once again iron deficient, started on oral supplements and does not want IV yet. Her counts have improved but she is still anemic. 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

## 2019-05-23 LAB
ALBUMIN SERPL ELPH-MCNC: 4 G/DL
ALP BLD-CCNC: 71 U/L
ALT SERPL-CCNC: 12 U/L
ANION GAP SERPL CALC-SCNC: 12 MMOL/L
AST SERPL-CCNC: 15 U/L
BILIRUB SERPL-MCNC: 0.2 MG/DL
BUN SERPL-MCNC: 7 MG/DL
CALCIUM SERPL-MCNC: 9.5 MG/DL
CHLORIDE SERPL-SCNC: 105 MMOL/L
CO2 SERPL-SCNC: 24 MMOL/L
CREAT SERPL-MCNC: 0.84 MG/DL
FERRITIN SERPL-MCNC: 11 NG/ML
GLUCOSE SERPL-MCNC: 91 MG/DL
IRON SATN MFR SERPL: 5 %
IRON SERPL-MCNC: 18 UG/DL
POTASSIUM SERPL-SCNC: 4.2 MMOL/L
PROT SERPL-MCNC: 7.6 G/DL
SODIUM SERPL-SCNC: 141 MMOL/L
TIBC SERPL-MCNC: 345 UG/DL
UIBC SERPL-MCNC: 327 UG/DL

## 2019-06-25 ENCOUNTER — RX RENEWAL (OUTPATIENT)
Age: 49
End: 2019-06-25

## 2019-06-30 ENCOUNTER — OUTPATIENT (OUTPATIENT)
Dept: OUTPATIENT SERVICES | Facility: HOSPITAL | Age: 49
LOS: 1 days | Discharge: HOME | End: 2019-06-30

## 2019-06-30 DIAGNOSIS — D25.9 LEIOMYOMA OF UTERUS, UNSPECIFIED: Chronic | ICD-10-CM

## 2019-06-30 DIAGNOSIS — N20.0 CALCULUS OF KIDNEY: Chronic | ICD-10-CM

## 2019-06-30 DIAGNOSIS — D72.1 EOSINOPHILIA: ICD-10-CM

## 2019-06-30 DIAGNOSIS — Z98.890 OTHER SPECIFIED POSTPROCEDURAL STATES: Chronic | ICD-10-CM

## 2019-07-03 ENCOUNTER — RESULT REVIEW (OUTPATIENT)
Age: 49
End: 2019-07-03

## 2019-07-03 ENCOUNTER — APPOINTMENT (OUTPATIENT)
Dept: HEMATOLOGY ONCOLOGY | Facility: CLINIC | Age: 49
End: 2019-07-03
Payer: MEDICAID

## 2019-07-03 VITALS
OXYGEN SATURATION: 100 % | DIASTOLIC BLOOD PRESSURE: 75 MMHG | TEMPERATURE: 98.1 F | HEART RATE: 110 BPM | RESPIRATION RATE: 16 BRPM | WEIGHT: 223.11 LBS | SYSTOLIC BLOOD PRESSURE: 117 MMHG | BODY MASS INDEX: 37.13 KG/M2

## 2019-07-03 LAB
BASOPHILS # BLD AUTO: 0 K/UL — SIGNIFICANT CHANGE UP (ref 0–0.2)
BASOPHILS NFR BLD AUTO: 0.1 % — SIGNIFICANT CHANGE UP (ref 0–2)
EOSINOPHIL # BLD AUTO: 0.5 K/UL — SIGNIFICANT CHANGE UP (ref 0–0.5)
EOSINOPHIL NFR BLD AUTO: 10.3 % — HIGH (ref 0–6)
HCT VFR BLD CALC: 31.5 % — LOW (ref 34.5–45)
HGB BLD-MCNC: 9.6 G/DL — LOW (ref 11.5–15.5)
LYMPHOCYTES # BLD AUTO: 2 K/UL — SIGNIFICANT CHANGE UP (ref 1–3.3)
LYMPHOCYTES # BLD AUTO: 38.5 % — SIGNIFICANT CHANGE UP (ref 13–44)
MCHC RBC-ENTMCNC: 22.1 PG — LOW (ref 27–34)
MCHC RBC-ENTMCNC: 30.4 G/DL — LOW (ref 32–36)
MCV RBC AUTO: 72.8 FL — LOW (ref 80–100)
MONOCYTES # BLD AUTO: 0.4 K/UL — SIGNIFICANT CHANGE UP (ref 0–0.9)
MONOCYTES NFR BLD AUTO: 7.2 % — SIGNIFICANT CHANGE UP (ref 2–14)
NEUTROPHILS # BLD AUTO: 2.3 K/UL — SIGNIFICANT CHANGE UP (ref 1.8–7.4)
NEUTROPHILS NFR BLD AUTO: 43.8 % — SIGNIFICANT CHANGE UP (ref 43–77)
PLATELET # BLD AUTO: 285 K/UL — SIGNIFICANT CHANGE UP (ref 150–400)
RBC # BLD: 4.33 M/UL — SIGNIFICANT CHANGE UP (ref 3.8–5.2)
RBC # FLD: 23.2 % — HIGH (ref 10.3–14.5)
WBC # BLD: 5.2 K/UL — SIGNIFICANT CHANGE UP (ref 3.8–10.5)
WBC # FLD AUTO: 5.2 K/UL — SIGNIFICANT CHANGE UP (ref 3.8–10.5)

## 2019-07-03 PROCEDURE — 99214 OFFICE O/P EST MOD 30 MIN: CPT

## 2019-07-03 NOTE — PHYSICAL EXAM
[Fully active, able to carry on all pre-disease performance without restriction] : Status 0 - Fully active, able to carry on all pre-disease performance without restriction [de-identified] : scattered hyperpigmented rash on bests, back, abdomen and legs; hyperpigmented lesions on lower lip [Normal] : pharynx is unremarkable, moist mucus membrane, no oral lesions

## 2019-07-03 NOTE — REASON FOR VISIT
[Follow-Up Visit] : a follow-up visit for [FreeTextEntry2] : hypereosinophilia, iron deficiency anemia

## 2019-07-03 NOTE — ASSESSMENT
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 Rich, dermatologist by biopsy, [id number] fusion negative, JAK2 mutation negative, BCR-ABL mutation negative, with recent skin biopsy suggesting possible lupus as well steroid dependent. Course complicated by 5/17/17 RLE duplex ultrasound c/w extensive DVT, s/p 6 months of Xarelto (finished 11/2017), now w/ new DVT on L, back on Xarelto \par She started Gleevec on 9/2017, was tapered off steroids 12/4/17 but she gets recurrent rash when she is off steroids so we started her on low dose steroids (5mg daily). \par \par -absolute eosinophils 500 today. She is on Gleevec 200mg secondary to neutropenia\par -cont prednisone 5mg\par -pt has h/o iron deficiency anemia, s/p injectafer weekly x2 on 3/23 and 3/30/18. She is once again iron deficient, started on oral supplements and does not want IV yet. She remains anemic. She is now agreeable to IV iron - will schedule for weekly Injectafer x2\par -continue Xarelto 10mg for recurrent DVT \par -she will f/u w/ GYN for menorrhagia\par -RTC 4 weeks

## 2019-07-03 NOTE — HISTORY OF PRESENT ILLNESS
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 Seen initially on 17. PT reports improvement in fatigue since admission 17, but still with pain in L elbow, and weakness in flexion of L arm at elbow. .ros. PT admits to paraesthesias in 5th digit of L hand, and tenderness at heels in both feet, and chronic cough, for which she takes Guaifenesin.\par \par HYPEREOSINOPHILIA HX:\par - 2016: was in [place] for her Sister's , rash started 2 weeks after returning, claims to have many bug bites from "horseflies" starting at bilateral medial knees. \par - 10/2016: Saw Beatriz Wu ([phone]), md ([employer]), and allergist at [facility], felt that the diagnosis was vasculitis, started on clobetazol, hydrocortizone, to little effect\par - 2016: Saw Dr. Mendoza who continued to treat pain with naproxen, neurontin\par - 2017: Saw Dermatologist Dr. Wisdom\par - 2017: Dr Wisdom 2017 performed biopsy  at that time showed changes consistent with hypereosinophilic syndrome vs drug vs arthropod bite like reaction (superficial and deep perivascular and interstitial dermatitis with eosinophils), started on doxycycline, to some effect.  \par - 2017: Saw Dr. Inocencio Batres on 17 generalized rash including facial rash and itching, and edema. who performed  Punch biopsy of R arm showing interface dermatitis with prominent dermal eosinophils.  2nd punch biopsy of right arm suggestive of lupus erythematosus w/ granular linear signals of igA and IgM and patchy granular signal of C3 at basement membrane.  IgG negative. \par - ADM 2017 TO 2017 TO [facility] with diffuse generalized rash. T 98.1, , /85, RR 18, O2 97%. Labs significant for WBC 28.15, eosinophils 97%, Hgb 11.1, MCV 75.6. Received prednisone 40mg & morphine.She was also evaluated by opthamology for complaint of blurry vision in her right eye.  Was found to have cotton wool spots off both optic nerves.  Had MRI brain which showed microvascular changes.\par - 2017: 17 bone marrow biopsy without evidence of leukemia, no blasts, [id number] negative. \par - 2017: 17 STARTED HYDROXYUREA , 60mg po prednisone daily\par - 2017: 5/3/17 presents with painful rash on hands and feet, exquisitely tender, stopped Hydroxyurea. \par - 2017: 17 found to have swollen R leg, RLE duplex ultrasound c/w extensive DVT, started on xarelto 15mg po bid from vivo pharmacy at [facility] [place]. \par - 2017: 17 restarted hydrea 500mg po BID with worsening rash.\par - 2017: 17 decreased prednisone from 60mg po daily to 40mg po daily continue 500mg hydrea BID\par - 2017: 17 prednisone at 40mg po daily, hydrea increased to 1000mg in AM and 500mg in PM. \par - Started Gleevec . Tapered off prednisone 17. \par \par Currently on Losartan 50mg for BP control.\par She was admitted  for incidental finding of PE on CT A/P and tachycardia. They were unable to obtain CTA of chest 2/2 recent contrast load. Doppler LE showed acute below the knee DVT on left side. She was off Xarelto for 3 weeks prior so was resumed on Xarelto.  No SOB, TTE performed which showed no RV strain, CE negative, normal BNP.  Patient with persistent tachycardia, seen by cardiology who started on beta blocker.  \par \par Started Gleevec . Tapered off prednisone 17. \par Noted to have a new rash around . She reports that this rash is different than her previous rash. She saw derm, was started back on prednisone taper, completed 3-4 days ago. \par We held her Gleevec on 12/15 b/c neutropenia. She also stopped HU. Her counts improved by the following week; we restarted Gleevec 18. \par Holding Gleevec again since 3/9/18 for neutropenia. \par Her periods have been very heavy b/c of Xarelto. Refused any pills to control her menses. On po iron supplements but unable to take every day because of constipation and hemorrhoids. She received Injectafer 3/23 and 3/30. \par She now holds the Xarelto for 2 days when she gets her menses.  [de-identified] : She remains on prednisone 5mg. Gleevec has been restarted in 5/2019 but at 200mg for neutropenia.\par She reports having intermittent rash. Notes feeling very fatigued.

## 2019-07-05 LAB
ALBUMIN SERPL ELPH-MCNC: 3.9 G/DL
ALP BLD-CCNC: 67 U/L
ALT SERPL-CCNC: 14 U/L
ANION GAP SERPL CALC-SCNC: 13 MMOL/L
AST SERPL-CCNC: 17 U/L
BILIRUB SERPL-MCNC: 0.3 MG/DL
BUN SERPL-MCNC: 6 MG/DL
CALCIUM SERPL-MCNC: 9 MG/DL
CHLORIDE SERPL-SCNC: 103 MMOL/L
CO2 SERPL-SCNC: 23 MMOL/L
CREAT SERPL-MCNC: 0.92 MG/DL
FERRITIN SERPL-MCNC: 11 NG/ML
GLUCOSE SERPL-MCNC: 98 MG/DL
IRON SATN MFR SERPL: 7 %
IRON SERPL-MCNC: 25 UG/DL
POTASSIUM SERPL-SCNC: 3.5 MMOL/L
PROT SERPL-MCNC: 7.2 G/DL
SODIUM SERPL-SCNC: 139 MMOL/L
TIBC SERPL-MCNC: 343 UG/DL
UIBC SERPL-MCNC: 318 UG/DL

## 2019-07-17 ENCOUNTER — APPOINTMENT (OUTPATIENT)
Dept: INFUSION THERAPY | Facility: HOSPITAL | Age: 49
End: 2019-07-17

## 2019-07-17 RX ORDER — ATOVAQUONE 750 MG/5ML
5 SUSPENSION ORAL
Qty: 0 | Refills: 0 | DISCHARGE

## 2019-07-17 RX ORDER — HYDROXYUREA 500 MG/1
1 CAPSULE ORAL
Qty: 0 | Refills: 0 | DISCHARGE

## 2019-07-17 RX ORDER — FLUCONAZOLE 150 MG/1
2 TABLET ORAL
Qty: 0 | Refills: 0 | DISCHARGE

## 2019-07-17 RX ORDER — LOSARTAN POTASSIUM 100 MG/1
1 TABLET, FILM COATED ORAL
Qty: 0 | Refills: 0 | DISCHARGE

## 2019-07-18 DIAGNOSIS — D50.9 IRON DEFICIENCY ANEMIA, UNSPECIFIED: ICD-10-CM

## 2019-07-24 ENCOUNTER — APPOINTMENT (OUTPATIENT)
Dept: INFUSION THERAPY | Facility: HOSPITAL | Age: 49
End: 2019-07-24

## 2019-07-24 RX ORDER — IMATINIB MESYLATE 400 MG/1
1 TABLET, FILM COATED ORAL
Qty: 0 | Refills: 0 | DISCHARGE

## 2019-09-17 ENCOUNTER — OUTPATIENT (OUTPATIENT)
Dept: OUTPATIENT SERVICES | Facility: HOSPITAL | Age: 49
LOS: 1 days | Discharge: HOME | End: 2019-09-17

## 2019-09-17 DIAGNOSIS — D25.9 LEIOMYOMA OF UTERUS, UNSPECIFIED: Chronic | ICD-10-CM

## 2019-09-17 DIAGNOSIS — Z98.890 OTHER SPECIFIED POSTPROCEDURAL STATES: Chronic | ICD-10-CM

## 2019-09-17 DIAGNOSIS — D72.1 EOSINOPHILIA: ICD-10-CM

## 2019-09-17 DIAGNOSIS — N20.0 CALCULUS OF KIDNEY: Chronic | ICD-10-CM

## 2019-09-18 ENCOUNTER — RESULT REVIEW (OUTPATIENT)
Age: 49
End: 2019-09-18

## 2019-09-18 ENCOUNTER — APPOINTMENT (OUTPATIENT)
Dept: HEMATOLOGY ONCOLOGY | Facility: CLINIC | Age: 49
End: 2019-09-18
Payer: MEDICAID

## 2019-09-18 VITALS
DIASTOLIC BLOOD PRESSURE: 84 MMHG | RESPIRATION RATE: 17 BRPM | WEIGHT: 222.67 LBS | SYSTOLIC BLOOD PRESSURE: 131 MMHG | HEART RATE: 97 BPM | TEMPERATURE: 96.7 F | OXYGEN SATURATION: 98 % | BODY MASS INDEX: 37.05 KG/M2

## 2019-09-18 DIAGNOSIS — I82.401 ACUTE EMBOLISM AND THROMBOSIS OF UNSPECIFIED DEEP VEINS OF RIGHT LOWER EXTREMITY: ICD-10-CM

## 2019-09-18 LAB
ALBUMIN SERPL ELPH-MCNC: 3.9 G/DL
ALP BLD-CCNC: 85 U/L
ALT SERPL-CCNC: 16 U/L
ANION GAP SERPL CALC-SCNC: 9 MMOL/L
AST SERPL-CCNC: 17 U/L
BASOPHILS # BLD AUTO: 0 K/UL — SIGNIFICANT CHANGE UP (ref 0–0.2)
BASOPHILS NFR BLD AUTO: 1.1 % — SIGNIFICANT CHANGE UP (ref 0–2)
BILIRUB SERPL-MCNC: <0.2 MG/DL
BUN SERPL-MCNC: 12 MG/DL
CALCIUM SERPL-MCNC: 8.3 MG/DL
CHLORIDE SERPL-SCNC: 109 MMOL/L
CO2 SERPL-SCNC: 22 MMOL/L
CREAT SERPL-MCNC: 0.87 MG/DL
EOSINOPHIL # BLD AUTO: 0.4 K/UL — SIGNIFICANT CHANGE UP (ref 0–0.5)
EOSINOPHIL NFR BLD AUTO: 10.2 % — HIGH (ref 0–6)
FERRITIN SERPL-MCNC: 180 NG/ML
GLUCOSE SERPL-MCNC: 104 MG/DL
HCT VFR BLD CALC: 36.3 % — SIGNIFICANT CHANGE UP (ref 34.5–45)
HGB BLD-MCNC: 11.6 G/DL — SIGNIFICANT CHANGE UP (ref 11.5–15.5)
IRON SATN MFR SERPL: 16 %
IRON SERPL-MCNC: 39 UG/DL
LYMPHOCYTES # BLD AUTO: 1.5 K/UL — SIGNIFICANT CHANGE UP (ref 1–3.3)
LYMPHOCYTES # BLD AUTO: 41.8 % — SIGNIFICANT CHANGE UP (ref 13–44)
MCHC RBC-ENTMCNC: 29.2 PG — SIGNIFICANT CHANGE UP (ref 27–34)
MCHC RBC-ENTMCNC: 32 G/DL — SIGNIFICANT CHANGE UP (ref 32–36)
MCV RBC AUTO: 91.2 FL — SIGNIFICANT CHANGE UP (ref 80–100)
MONOCYTES # BLD AUTO: 0.3 K/UL — SIGNIFICANT CHANGE UP (ref 0–0.9)
MONOCYTES NFR BLD AUTO: 8.6 % — SIGNIFICANT CHANGE UP (ref 2–14)
NEUTROPHILS # BLD AUTO: 1.3 K/UL — LOW (ref 1.8–7.4)
NEUTROPHILS NFR BLD AUTO: 38.2 % — LOW (ref 43–77)
PLATELET # BLD AUTO: 209 K/UL — SIGNIFICANT CHANGE UP (ref 150–400)
POTASSIUM SERPL-SCNC: 3.8 MMOL/L
PROT SERPL-MCNC: 7.2 G/DL
RBC # BLD: 3.98 M/UL — SIGNIFICANT CHANGE UP (ref 3.8–5.2)
RBC # FLD: 19.6 % — HIGH (ref 10.3–14.5)
SODIUM SERPL-SCNC: 140 MMOL/L
TIBC SERPL-MCNC: 238 UG/DL
UIBC SERPL-MCNC: 199 UG/DL
WBC # BLD: 3.5 K/UL — LOW (ref 3.8–10.5)
WBC # FLD AUTO: 3.5 K/UL — LOW (ref 3.8–10.5)

## 2019-09-18 PROCEDURE — 99214 OFFICE O/P EST MOD 30 MIN: CPT

## 2019-09-18 RX ORDER — DIPHENHYDRAMINE HYDROCHLORIDE AND LIDOCAINE HYDROCHLORIDE AND ALUMINUM HYDROXIDE AND MAGNESIUM HYDRO
KIT
Qty: 1 | Refills: 3 | Status: ACTIVE | COMMUNITY
Start: 2017-04-27 | End: 1900-01-01

## 2019-09-18 RX ORDER — MOMETASONE FUROATE 1 MG/G
0.1 OINTMENT TOPICAL
Qty: 1 | Refills: 2 | Status: ACTIVE | COMMUNITY
Start: 2017-12-15 | End: 1900-01-01

## 2019-09-18 NOTE — PHYSICAL EXAM
[Fully active, able to carry on all pre-disease performance without restriction] : Status 0 - Fully active, able to carry on all pre-disease performance without restriction [Obese] : obese [Normal] : affect appropriate [de-identified] : Patient refused letting me examine her mouth because of pain after tooth surgery [de-identified] : fungal rash under breast [de-identified] : scattered hyperpigmented rash on bests, back, abdomen and legs; hyperpigmented lesions on lower lip. \par skin erythema under both breast (candida like lesions),

## 2019-09-18 NOTE — ASSESSMENT
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 Rich, dermatologist by biopsy, [id number] fusion negative, JAK2 mutation negative, BCR-ABL mutation negative, with recent skin biopsy suggesting possible lupus as well steroid dependent. Course complicated by 5/17/17 RLE duplex ultrasound c/w extensive DVT, s/p 6 months of Xarelto (finished 11/2017), now w/ new DVT on L, back on Xarelto \par She started Gleevec on 9/2017, was tapered off steroids 12/4/17 but she gets recurrent rash when she is off steroids so was started her on low dose steroids (5mg daily). \par \par -absolute eosinophils 400 today. She is intentionally missing frequent doses of Gleevec and asking to decrease the dose. Will decrease Gleevec to 100 mg for now and monitor CBC closely.\par -cont prednisone 5mg\par -pt has h/o iron deficiency anemia, s/p injectafer weekly x2 on 3/23 and 3/30/18. Also s/p IV Iron x 2 on 7/17 and 7/24/19. Now on Iron pills. \par -continue Xarelto 10mg for recurrent DVT \par -f/u w/ GYN for menorrhagia\par -RTC 2 months.\par

## 2019-09-18 NOTE — HISTORY OF PRESENT ILLNESS
[de-identified] : She remains on prednisone 5mg. \par Gleevec has been restarted in 5/2019 but at 200mg for neutropenia. She reports from past couple months she intentionally missed some Gleevec doses and wants to be tapered off of it. She reports having intermittent rash and feeling very fatigued. The fatigue level did not change from her last clinic visit. Also has a fungal rash under both breast, using nystatin powder.\par She is s/p Injectafer on 7/17 and 7/24/19.  [de-identified] : DAUGHTER ALAN [phone]\par PATIENT CELL [phone]\par PATIENT HOME [phone]\par \par 47 yo F seen in consultation for hypereosinophilic syndrome dx by Dr Wisdom 2017. Seen initially on 17. PT reports improvement in fatigue since admission 17, but still with pain in L elbow, and weakness in flexion of L arm at elbow..ros. PT admits to paraesthesias in 5th digit of L hand, and tenderness at heels in both feet, and chronic cough, for which she takes Guaifenesin.\par \par HYPEREOSINOPHILIA HX:\par - 2016: was in [place] for her Sister's , rash started 2 weeks after returning, claims to have many bug bites from "horseflies" starting at bilateral medial knees. \par - 10/2016: Saw Beatriz Wu ([phone]), md ([employer]), and allergist at [facility] [place], felt that the diagnosis was vasculitis, started on clobetazol, hydrocortizone, to little effect\par - 2016: Saw Dr. Mendoza who continued to treat pain with naproxen, neurontin\par - 2017: Saw Dermatologist Dr. Wisdom\par - 2017: Dr Wisdom 2017 performed biopsy at that time showed changes consistent with hypereosinophilic syndrome vs drug vs arthropod bite like reaction (superficial and deep perivascular and interstitial dermatitis with eosinophils), started on doxycycline, to some effect. \par - 2017: Saw Dr. Inocencio Batres on 17 generalized rash including facial rash and itching, and edema. who performed Punch biopsy of R arm showing interface dermatitis with prominent dermal eosinophils. 2nd punch biopsy of right arm suggestive of lupus erythematosus w/ granular linear signals of igA and IgM and patchy granular signal of C3 at basement membrane. IgG negative. \par - ADM 2017 TO 2017 TO [facility] with diffuse generalized rash. T 98.1, , /85, RR 18, O2 97%. Labs significant for WBC 28.15, eosinophils 97%, Hgb 11.1, MCV 75.6. Received prednisone 40mg & morphine.She was also evaluated by opthamology for complaint of blurry vision in her right eye. Was found to have cotton wool spots off both optic nerves. Had MRI brain which showed microvascular changes.\par - 2017: 17 bone marrow biopsy without evidence of leukemia, no blasts, [id number] negative. \par - 2017: 17 STARTED HYDROXYUREA , 60mg po prednisone daily\par - 2017: 5/3/17 presents with painful rash on hands and feet, exquisitely tender, stopped Hydroxyurea. \par - 2017: 17 found to have swollen R leg, RLE duplex ultrasound c/w extensive DVT, started on xarelto 15mg po bid from vivo pharmacy at [facility] [place] \par - 2017: 17 restarted hydrea 500mg po BID with worsening rash.\par - 2017: 17 decreased prednisone from 60mg po daily to 40mg po daily continue 500mg hydrea BID\par - 2017: 17 prednisone at 40mg po daily, hydrea increased to 1000mg in AM and 500mg in PM. \par - Started Gleevec . Tapered off prednisone 17. \par \par Was on on Losartan 50mg for BP control.  that stopped recently by cardiologist.\par She was admitted  for incidental finding of PE on CT A/P and tachycardia. They were unable to obtain CTA of chest 2/2 recent contrast load. Doppler LE showed acute below the knee DVT on left side. She was off Xarelto for 3 weeks prior so was resumed on Xarelto. No SOB, TTE performed which showed no RV strain, CE negative, normal BNP. Patient with persistent tachycardia, seen by cardiology who started on beta blocker. \par \par Started Gleevec . Tapered off prednisone 17. \par Noted to have a new rash around . She reports that this rash is different than her previous rash. She saw derm, was started back on prednisone taper, completed 3-4 days ago. \par We held her Gleevec on 12/15 b/c neutropenia. She also stopped HU. Her counts improved by the following week; we restarted Gleevec 1/5/18. \par Holding Gleevec again since 3/9/18 for neutropenia. \par Her periods have been very heavy b/c of Xarelto. Refused any pills to control her menses. On po iron supplements but unable to take every day because of constipation and hemorrhoids. She received Injectafer 3/23 and 3/30. \par She now holds the Xarelto for 2 days when she gets her menses. \par

## 2019-10-16 ENCOUNTER — RX RENEWAL (OUTPATIENT)
Age: 49
End: 2019-10-16

## 2019-11-01 ENCOUNTER — OUTPATIENT (OUTPATIENT)
Dept: OUTPATIENT SERVICES | Facility: HOSPITAL | Age: 49
LOS: 1 days | Discharge: HOME | End: 2019-11-01

## 2019-11-01 DIAGNOSIS — D72.1 EOSINOPHILIA: ICD-10-CM

## 2019-11-01 DIAGNOSIS — N20.0 CALCULUS OF KIDNEY: Chronic | ICD-10-CM

## 2019-11-01 DIAGNOSIS — D25.9 LEIOMYOMA OF UTERUS, UNSPECIFIED: Chronic | ICD-10-CM

## 2019-11-01 DIAGNOSIS — Z98.890 OTHER SPECIFIED POSTPROCEDURAL STATES: Chronic | ICD-10-CM

## 2019-11-08 ENCOUNTER — OTHER (OUTPATIENT)
Age: 49
End: 2019-11-08

## 2019-11-13 ENCOUNTER — APPOINTMENT (OUTPATIENT)
Dept: HEMATOLOGY ONCOLOGY | Facility: CLINIC | Age: 49
End: 2019-11-13

## 2019-11-22 ENCOUNTER — OTHER (OUTPATIENT)
Age: 49
End: 2019-11-22

## 2019-11-27 ENCOUNTER — RESULT REVIEW (OUTPATIENT)
Age: 49
End: 2019-11-27

## 2019-11-27 ENCOUNTER — APPOINTMENT (OUTPATIENT)
Dept: HEMATOLOGY ONCOLOGY | Facility: CLINIC | Age: 49
End: 2019-11-27
Payer: MEDICAID

## 2019-11-27 VITALS
RESPIRATION RATE: 18 BRPM | BODY MASS INDEX: 37.79 KG/M2 | WEIGHT: 227.07 LBS | SYSTOLIC BLOOD PRESSURE: 126 MMHG | TEMPERATURE: 97.8 F | OXYGEN SATURATION: 98 % | HEART RATE: 113 BPM | DIASTOLIC BLOOD PRESSURE: 81 MMHG

## 2019-11-27 LAB
BASOPHILS # BLD AUTO: 0.1 K/UL — SIGNIFICANT CHANGE UP (ref 0–0.2)
BASOPHILS NFR BLD AUTO: 1.3 % — SIGNIFICANT CHANGE UP (ref 0–2)
EOSINOPHIL # BLD AUTO: 0.4 K/UL — SIGNIFICANT CHANGE UP (ref 0–0.5)
EOSINOPHIL NFR BLD AUTO: 9.3 % — HIGH (ref 0–6)
HCT VFR BLD CALC: 35.6 % — SIGNIFICANT CHANGE UP (ref 34.5–45)
HGB BLD-MCNC: 11.5 G/DL — SIGNIFICANT CHANGE UP (ref 11.5–15.5)
LYMPHOCYTES # BLD AUTO: 1.5 K/UL — SIGNIFICANT CHANGE UP (ref 1–3.3)
LYMPHOCYTES # BLD AUTO: 33.4 % — SIGNIFICANT CHANGE UP (ref 13–44)
MCHC RBC-ENTMCNC: 30.7 PG — SIGNIFICANT CHANGE UP (ref 27–34)
MCHC RBC-ENTMCNC: 32.2 G/DL — SIGNIFICANT CHANGE UP (ref 32–36)
MCV RBC AUTO: 95.1 FL — SIGNIFICANT CHANGE UP (ref 80–100)
MONOCYTES # BLD AUTO: 0.4 K/UL — SIGNIFICANT CHANGE UP (ref 0–0.9)
MONOCYTES NFR BLD AUTO: 8 % — SIGNIFICANT CHANGE UP (ref 2–14)
NEUTROPHILS # BLD AUTO: 2.2 K/UL — SIGNIFICANT CHANGE UP (ref 1.8–7.4)
NEUTROPHILS NFR BLD AUTO: 48 % — SIGNIFICANT CHANGE UP (ref 43–77)
PLATELET # BLD AUTO: 174 K/UL — SIGNIFICANT CHANGE UP (ref 150–400)
RBC # BLD: 3.75 M/UL — LOW (ref 3.8–5.2)
RBC # FLD: 13.1 % — SIGNIFICANT CHANGE UP (ref 10.3–14.5)
WBC # BLD: 4.6 K/UL — SIGNIFICANT CHANGE UP (ref 3.8–10.5)
WBC # FLD AUTO: 4.6 K/UL — SIGNIFICANT CHANGE UP (ref 3.8–10.5)

## 2019-11-27 PROCEDURE — 99214 OFFICE O/P EST MOD 30 MIN: CPT

## 2019-11-27 NOTE — PHYSICAL EXAM
[Fully active, able to carry on all pre-disease performance without restriction] : Status 0 - Fully active, able to carry on all pre-disease performance without restriction [Obese] : obese [Normal] : affect appropriate [de-identified] : Patient refused letting me examine her mouth because of pain after tooth surgery [de-identified] : fungal rash under breast [de-identified] : scattered hyperpigmented rash on bests, back, abdomen and legs; hyperpigmented lesions on lower lip. \par skin erythema under both breast (candida like lesions),

## 2019-11-27 NOTE — HISTORY OF PRESENT ILLNESS
[de-identified] : She remains on prednisone 5mg. \par Gleevec has been restarted in 5/2019 but at 200mg for neutropenia. She reports from past couple months she intentionally missed some Gleevec doses and wants to be tapered off of it. She reports having intermittent rash and feeling very fatigued. The fatigue level did not change from her last clinic visit. Also has a fungal rash under both breast, using nystatin powder.\par She is s/p Injectafer on 7/17 and 7/24/19. \par She reports having the rash again on her abdomen Was on 7 days of prednisone 10mg, now back on 5mg.  [de-identified] : DAUGHTER ALAN [phone]\par PATIENT CELL [phone]\par PATIENT HOME [phone]\par \par 45 yo F seen in consultation for hypereosinophilic syndrome dx by Dr Wisdom 2017. Seen initially on 17. PT reports improvement in fatigue since admission 17, but still with pain in L elbow, and weakness in flexion of L arm at elbow..ros. PT admits to paraesthesias in 5th digit of L hand, and tenderness at heels in both feet, and chronic cough, for which she takes Guaifenesin.\par \par HYPEREOSINOPHILIA HX:\par - 2016: was in [place] for her Sister's , rash started 2 weeks after returning, claims to have many bug bites from "horseflies" starting at bilateral medial knees. \par - 10/2016: Saw Beatriz Wu ([phone]), md ([employer]), and allergist at [facility], felt that the diagnosis was vasculitis, started on clobetazol, hydrocortizone, to little effect\par - 2016: Saw Dr. Mendoza who continued to treat pain with naproxen, neurontin\par - 2017: Saw Dermatologist Dr. Wisdom\par - 2017: Dr Wisdom 2017 performed biopsy at that time showed changes consistent with hypereosinophilic syndrome vs drug vs arthropod bite like reaction (superficial and deep perivascular and interstitial dermatitis with eosinophils), started on doxycycline, to some effect. \par - 2017: Saw Dr. Inocencio Batres on 17 generalized rash including facial rash and itching, and edema. who performed Punch biopsy of R arm showing interface dermatitis with prominent dermal eosinophils. 2nd punch biopsy of right arm suggestive of lupus erythematosus w/ granular linear signals of igA and IgM and patchy granular signal of C3 at basement membrane. IgG negative. \par - ADM 2017 TO 2017 TO [facility] with diffuse generalized rash. T 98.1, , /85, RR 18, O2 97%. Labs significant for WBC 28.15, eosinophils 97%, Hgb 11.1, MCV 75.6. Received prednisone 40mg & morphine.She was also evaluated by opthamology for complaint of blurry vision in her right eye. Was found to have cotton wool spots off both optic nerves. Had MRI brain which showed microvascular changes.\par - 2017: 17 bone marrow biopsy without evidence of leukemia, no blasts, [id number] negative. \par - 2017: 17 STARTED HYDROXYUREA , 60mg po prednisone daily\par - 2017: 5/3/17 presents with painful rash on hands and feet, exquisitely tender, stopped Hydroxyurea. \par - 2017: 17 found to have swollen R leg, RLE duplex ultrasound c/w extensive DVT, started on xarelto 15mg po bid from vivo pharmacy at [facility] [place]. \par - 2017: 17 restarted hydrea 500mg po BID with worsening rash.\par - 2017: 17 decreased prednisone from 60mg po daily to 40mg po daily continue 500mg hydrea BID\par - 2017: 17 prednisone at 40mg po daily, hydrea increased to 1000mg in AM and 500mg in PM. \par - Started Gleevec . Tapered off prednisone 17. \par \par Was on on Losartan 50mg for BP control.  that stopped recently by cardiologist.\par She was admitted  for incidental finding of PE on CT A/P and tachycardia. They were unable to obtain CTA of chest 2/2 recent contrast load. Doppler LE showed acute below the knee DVT on left side. She was off Xarelto for 3 weeks prior so was resumed on Xarelto. No SOB, TTE performed which showed no RV strain, CE negative, normal BNP. Patient with persistent tachycardia, seen by cardiology who started on beta blocker. \par \par Started Gleevec . Tapered off prednisone 17. \par Noted to have a new rash around . She reports that this rash is different than her previous rash. She saw derm, was started back on prednisone taper, completed 3-4 days ago. \par We held her Gleevec on 12/15 b/c neutropenia. She also stopped HU. Her counts improved by the following week; we restarted Gleevec 1/5/18. \par Holding Gleevec again since 3/9/18 for neutropenia. \par Her periods have been very heavy b/c of Xarelto. Refused any pills to control her menses. On po iron supplements but unable to take every day because of constipation and hemorrhoids. She received Injectafer 3/23 and 3/30. \par She now holds the Xarelto for 2 days when she gets her menses. \par

## 2019-11-27 NOTE — ASSESSMENT
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 Rich, dermatologist by biopsy, [id number] fusion negative, JAK2 mutation negative, BCR-ABL mutation negative, with recent skin biopsy suggesting possible lupus as well steroid dependent. Course complicated by 5/17/17 RLE duplex ultrasound c/w extensive DVT, s/p 6 months of Xarelto (finished 11/2017), now w/ new DVT on L, back on Xarelto \par She started Gleevec on 9/2017, was tapered off steroids 12/4/17 but she gets recurrent rash when she is off steroids so was started her on low dose steroids (5mg daily). \par \par -absolute eosinophils 400 today. She remains on Gleevec.\par -increase prednisone to 40mg\par -pt has h/o iron deficiency anemia, s/p injectafer weekly x2 on 3/23 and 3/30/18. Also s/p IV Iron x 2 on 7/17 and 7/24/19. Now on Iron pills. \par -continue Xarelto 10mg for recurrent DVT \par -f/u w/ GYN for menorrhagia\par -RTC 2 months.\par

## 2019-12-02 LAB
ALBUMIN SERPL ELPH-MCNC: 3.8 G/DL
ALP BLD-CCNC: 85 U/L
ALT SERPL-CCNC: 13 U/L
ANION GAP SERPL CALC-SCNC: 14 MMOL/L
AST SERPL-CCNC: 18 U/L
BILIRUB SERPL-MCNC: 0.3 MG/DL
BUN SERPL-MCNC: 7 MG/DL
CALCIUM SERPL-MCNC: 8.5 MG/DL
CHLORIDE SERPL-SCNC: 108 MMOL/L
CO2 SERPL-SCNC: 19 MMOL/L
CREAT SERPL-MCNC: 0.78 MG/DL
FERRITIN SERPL-MCNC: 90 NG/ML
GLUCOSE SERPL-MCNC: 109 MG/DL
IRON SATN MFR SERPL: 23 %
IRON SERPL-MCNC: 59 UG/DL
POTASSIUM SERPL-SCNC: 3.5 MMOL/L
PROT SERPL-MCNC: 7.3 G/DL
SODIUM SERPL-SCNC: 141 MMOL/L
TIBC SERPL-MCNC: 254 UG/DL
UIBC SERPL-MCNC: 195 UG/DL

## 2019-12-24 ENCOUNTER — RX RENEWAL (OUTPATIENT)
Age: 49
End: 2019-12-24

## 2020-01-22 ENCOUNTER — OUTPATIENT (OUTPATIENT)
Dept: OUTPATIENT SERVICES | Facility: HOSPITAL | Age: 50
LOS: 1 days | Discharge: HOME | End: 2020-01-22

## 2020-01-22 DIAGNOSIS — Z98.890 OTHER SPECIFIED POSTPROCEDURAL STATES: Chronic | ICD-10-CM

## 2020-01-22 DIAGNOSIS — N20.0 CALCULUS OF KIDNEY: Chronic | ICD-10-CM

## 2020-01-22 DIAGNOSIS — D72.1 EOSINOPHILIA: ICD-10-CM

## 2020-01-22 DIAGNOSIS — D25.9 LEIOMYOMA OF UTERUS, UNSPECIFIED: Chronic | ICD-10-CM

## 2020-01-27 ENCOUNTER — RESULT REVIEW (OUTPATIENT)
Age: 50
End: 2020-01-27

## 2020-01-27 ENCOUNTER — APPOINTMENT (OUTPATIENT)
Dept: HEMATOLOGY ONCOLOGY | Facility: CLINIC | Age: 50
End: 2020-01-27
Payer: COMMERCIAL

## 2020-01-27 VITALS
RESPIRATION RATE: 18 BRPM | OXYGEN SATURATION: 97 % | TEMPERATURE: 97.6 F | BODY MASS INDEX: 38.26 KG/M2 | SYSTOLIC BLOOD PRESSURE: 126 MMHG | HEART RATE: 93 BPM | DIASTOLIC BLOOD PRESSURE: 87 MMHG | WEIGHT: 229.94 LBS

## 2020-01-27 LAB
BASOPHILS # BLD AUTO: 0 K/UL — SIGNIFICANT CHANGE UP (ref 0–0.2)
BASOPHILS NFR BLD AUTO: 1.2 % — SIGNIFICANT CHANGE UP (ref 0–2)
EOSINOPHIL # BLD AUTO: 0.3 K/UL — SIGNIFICANT CHANGE UP (ref 0–0.5)
EOSINOPHIL NFR BLD AUTO: 7.3 % — HIGH (ref 0–6)
HCT VFR BLD CALC: 39.5 % — SIGNIFICANT CHANGE UP (ref 34.5–45)
HGB BLD-MCNC: 12.3 G/DL — SIGNIFICANT CHANGE UP (ref 11.5–15.5)
LYMPHOCYTES # BLD AUTO: 1.5 K/UL — SIGNIFICANT CHANGE UP (ref 1–3.3)
LYMPHOCYTES # BLD AUTO: 37.7 % — SIGNIFICANT CHANGE UP (ref 13–44)
MCHC RBC-ENTMCNC: 28.9 PG — SIGNIFICANT CHANGE UP (ref 27–34)
MCHC RBC-ENTMCNC: 31.2 G/DL — LOW (ref 32–36)
MCV RBC AUTO: 92.5 FL — SIGNIFICANT CHANGE UP (ref 80–100)
MONOCYTES # BLD AUTO: 0.4 K/UL — SIGNIFICANT CHANGE UP (ref 0–0.9)
MONOCYTES NFR BLD AUTO: 10.9 % — SIGNIFICANT CHANGE UP (ref 2–14)
NEUTROPHILS # BLD AUTO: 1.7 K/UL — LOW (ref 1.8–7.4)
NEUTROPHILS NFR BLD AUTO: 42.8 % — LOW (ref 43–77)
PLATELET # BLD AUTO: 184 K/UL — SIGNIFICANT CHANGE UP (ref 150–400)
RBC # BLD: 4.27 M/UL — SIGNIFICANT CHANGE UP (ref 3.8–5.2)
RBC # FLD: 13.2 % — SIGNIFICANT CHANGE UP (ref 10.3–14.5)
WBC # BLD: 4 K/UL — SIGNIFICANT CHANGE UP (ref 3.8–10.5)
WBC # FLD AUTO: 4 K/UL — SIGNIFICANT CHANGE UP (ref 3.8–10.5)

## 2020-01-27 PROCEDURE — 99214 OFFICE O/P EST MOD 30 MIN: CPT

## 2020-01-27 RX ORDER — ATOVAQUONE 750 MG/5ML
750 SUSPENSION ORAL DAILY
Qty: 1 | Refills: 2 | Status: DISCONTINUED | COMMUNITY
Start: 2017-06-30 | End: 2020-01-27

## 2020-01-27 RX ORDER — HYDROXYUREA 500 MG/1
500 CAPSULE ORAL
Qty: 270 | Refills: 1 | Status: DISCONTINUED | COMMUNITY
Start: 2017-07-26 | End: 2020-01-27

## 2020-01-27 NOTE — HISTORY OF PRESENT ILLNESS
[de-identified] : She remains on prednisone 5mg. \par Gleevec has been restarted in 5/2019 but at 200mg for neutropenia. She reports from past couple months she intentionally missed some Gleevec doses and wants to be tapered off of it. However started having rash and fatigue so now back on Gleevec. On 300mg daily.\par Fungal rash under both breast improved, using nystatin powder.\par She is s/p Injectafer on 7/17 and 7/24/19. Has been taking oral iron supplements. \par Getting her menses every 3 weeks. She holds Xarelto during her menses. Needs to get a new gynecologist.  [de-identified] : DAUGHTER ALAN [phone]\par PATIENT CELL [phone]\par PATIENT HOME [phone]\par \par 45 yo F seen in consultation for hypereosinophilic syndrome dx by Dr Wisdom 2017. Seen initially on 17. PT reports improvement in fatigue since admission 17, but still with pain in L elbow, and weakness in flexion of L arm at elbow..ros. PT admits to paraesthesias in 5th digit of L hand, and tenderness at heels in both feet, and chronic cough, for which she takes Guaifenesin.\par \par HYPEREOSINOPHILIA HX:\par - 2016: was in [place] for her Sister's , rash started 2 weeks after returning, claims to have many bug bites from "horseflies" starting at bilateral medial knees. \par - 10/2016: Saw Beatriz Wu ([phone]), md ([employer]), and allergist at [facility], felt that the diagnosis was vasculitis, started on clobetazol, hydrocortizone, to little effect\par - 2016: Saw Dr. Mendoza who continued to treat pain with naproxen, neurontin\par - 2017: Saw Dermatologist Dr. Wisdom\par - 2017: Dr Wisdom 2017 performed biopsy at that time showed changes consistent with hypereosinophilic syndrome vs drug vs arthropod bite like reaction (superficial and deep perivascular and interstitial dermatitis with eosinophils), started on doxycycline, to some effect. \par - 2017: Saw Dr. Inocencio Batres on 17 generalized rash including facial rash and itching, and edema. who performed Punch biopsy of R arm showing interface dermatitis with prominent dermal eosinophils. 2nd punch biopsy of right arm suggestive of lupus erythematosus w/ granular linear signals of igA and IgM and patchy granular signal of C3 at basement membrane. IgG negative. \par - ADM 2017 TO 2017 TO [facility] with diffuse generalized rash. T 98.1, , /85, RR 18, O2 97%. Labs significant for WBC 28.15, eosinophils 97%, Hgb 11.1, MCV 75.6. Received prednisone 40mg & morphine.She was also evaluated by opthamology for complaint of blurry vision in her right eye. Was found to have cotton wool spots off both optic nerves. Had MRI brain which showed microvascular changes.\par - 2017: 17 bone marrow biopsy without evidence of leukemia, no blasts, [id number] negative. \par - 2017: 17 STARTED HYDROXYUREA , 60mg po prednisone daily\par - 2017: 5/3/17 presents with painful rash on hands and feet, exquisitely tender, stopped Hydroxyurea. \par - 2017: 17 found to have swollen R leg, RLE duplex ultrasound c/w extensive DVT, started on xarelto 15mg po bid from vivo pharmacy at [facility] [place]. \par - 2017: 17 restarted hydrea 500mg po BID with worsening rash.\par - 2017: 17 decreased prednisone from 60mg po daily to 40mg po daily continue 500mg hydrea BID\par - 2017: 17 prednisone at 40mg po daily, hydrea increased to 1000mg in AM and 500mg in PM. \par - Started Gleevec . Tapered off prednisone 17. \par \par Was on on Losartan 50mg for BP control.  that stopped recently by cardiologist.\par She was admitted  for incidental finding of PE on CT A/P and tachycardia. They were unable to obtain CTA of chest 2/2 recent contrast load. Doppler LE showed acute below the knee DVT on left side. She was off Xarelto for 3 weeks prior so was resumed on Xarelto. No SOB, TTE performed which showed no RV strain, CE negative, normal BNP. Patient with persistent tachycardia, seen by cardiology who started on beta blocker. \par \par Started Gleevec . Tapered off prednisone 17. \par Noted to have a new rash around . She reports that this rash is different than her previous rash. She saw derm, was started back on prednisone taper, completed 3-4 days ago. \par We held her Gleevec on 12/15 b/c neutropenia. She also stopped HU. Her counts improved by the following week; we restarted Gleevec 1/5/18. \par Holding Gleevec again since 3/9/18 for neutropenia. \par Her periods have been very heavy b/c of Xarelto. Refused any pills to control her menses. On po iron supplements but unable to take every day because of constipation and hemorrhoids. She received Injectafer 3/23 and 3/30. \par She now holds the Xarelto for 2 days when she gets her menses. \par

## 2020-01-27 NOTE — REVIEW OF SYSTEMS
[Fatigue] : fatigue [Skin Rash] : skin rash [Negative] : Heme/Lymph [Recent Change In Weight] : ~T recent weight change

## 2020-01-27 NOTE — PHYSICAL EXAM
[Fully active, able to carry on all pre-disease performance without restriction] : Status 0 - Fully active, able to carry on all pre-disease performance without restriction [Obese] : obese [Normal] : pharynx is unremarkable, moist mucus membrane, no oral lesions [de-identified] : fungal rash under breast improved [de-identified] : scattered hyperpigmented rash on back, abdomen, arms and legs; hyperpigmented lesions on lower lip

## 2020-01-27 NOTE — ASSESSMENT
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 Rich, dermatologist by biopsy, [id number] fusion negative, JAK2 mutation negative, BCR-ABL mutation negative, with recent skin biopsy suggesting possible lupus as well steroid dependent. Course complicated by 5/17/17 RLE duplex ultrasound c/w extensive DVT, s/p 6 months of Xarelto (finished 11/2017), now w/ new DVT on L, back on Xarelto \par She started Gleevec on 9/2017, was tapered off steroids 12/4/17 but she gets recurrent rash when she is off steroids so was started her on low dose steroids (5mg daily). \par \par -absolute eosinophils 300 today. She remains on Gleevec 300mg.\par -pt has h/o iron deficiency anemia, s/p injectafer weekly x2 on 3/23 and 3/30/18. Also s/p IV Iron x 2 on 7/17 and 7/24/19. Now on Iron pills. \par -continue Xarelto 10mg for recurrent DVT - changed to Eliquis given insurance coverage. \par -f/u w/ GYN for menorrhagia\par -RTC 2 months.

## 2020-01-28 LAB
ALBUMIN SERPL ELPH-MCNC: 3.9 G/DL
ALP BLD-CCNC: 65 U/L
ALT SERPL-CCNC: 12 U/L
ANION GAP SERPL CALC-SCNC: 12 MMOL/L
AST SERPL-CCNC: 18 U/L
BILIRUB SERPL-MCNC: 0.2 MG/DL
BUN SERPL-MCNC: 11 MG/DL
CALCIUM SERPL-MCNC: 9.5 MG/DL
CHLORIDE SERPL-SCNC: 104 MMOL/L
CO2 SERPL-SCNC: 22 MMOL/L
CREAT SERPL-MCNC: 1.02 MG/DL
FERRITIN SERPL-MCNC: 21 NG/ML
GLUCOSE SERPL-MCNC: 86 MG/DL
IRON SATN MFR SERPL: 21 %
IRON SERPL-MCNC: 63 UG/DL
POTASSIUM SERPL-SCNC: 4.2 MMOL/L
PROT SERPL-MCNC: 7.4 G/DL
SODIUM SERPL-SCNC: 138 MMOL/L
TIBC SERPL-MCNC: 297 UG/DL
UIBC SERPL-MCNC: 235 UG/DL

## 2020-03-20 ENCOUNTER — OUTPATIENT (OUTPATIENT)
Dept: OUTPATIENT SERVICES | Facility: HOSPITAL | Age: 50
LOS: 1 days | Discharge: HOME | End: 2020-03-20

## 2020-03-20 DIAGNOSIS — D72.1 EOSINOPHILIA: ICD-10-CM

## 2020-03-20 DIAGNOSIS — Z98.890 OTHER SPECIFIED POSTPROCEDURAL STATES: Chronic | ICD-10-CM

## 2020-03-20 DIAGNOSIS — N20.0 CALCULUS OF KIDNEY: Chronic | ICD-10-CM

## 2020-03-20 DIAGNOSIS — D25.9 LEIOMYOMA OF UTERUS, UNSPECIFIED: Chronic | ICD-10-CM

## 2020-06-05 ENCOUNTER — RESULT REVIEW (OUTPATIENT)
Age: 50
End: 2020-06-05

## 2020-06-05 ENCOUNTER — OUTPATIENT (OUTPATIENT)
Dept: OUTPATIENT SERVICES | Facility: HOSPITAL | Age: 50
LOS: 1 days | Discharge: HOME | End: 2020-06-05

## 2020-06-05 ENCOUNTER — LABORATORY RESULT (OUTPATIENT)
Age: 50
End: 2020-06-05

## 2020-06-05 ENCOUNTER — APPOINTMENT (OUTPATIENT)
Dept: HEMATOLOGY ONCOLOGY | Facility: CLINIC | Age: 50
End: 2020-06-05

## 2020-06-05 DIAGNOSIS — Z98.890 OTHER SPECIFIED POSTPROCEDURAL STATES: Chronic | ICD-10-CM

## 2020-06-05 DIAGNOSIS — N20.0 CALCULUS OF KIDNEY: Chronic | ICD-10-CM

## 2020-06-05 DIAGNOSIS — D25.9 LEIOMYOMA OF UTERUS, UNSPECIFIED: Chronic | ICD-10-CM

## 2020-06-05 DIAGNOSIS — D72.1 EOSINOPHILIA: ICD-10-CM

## 2020-06-05 LAB
BASOPHILS # BLD AUTO: 0.14 K/UL — SIGNIFICANT CHANGE UP (ref 0–0.2)
BASOPHILS NFR BLD AUTO: 4 % — HIGH (ref 0–2)
EOSINOPHIL # BLD AUTO: 0.18 K/UL — SIGNIFICANT CHANGE UP (ref 0–0.5)
EOSINOPHIL NFR BLD AUTO: 5 % — SIGNIFICANT CHANGE UP (ref 0–6)
HCT VFR BLD CALC: 35.5 % — SIGNIFICANT CHANGE UP (ref 34.5–45)
HGB BLD-MCNC: 11.1 G/DL — LOW (ref 11.5–15.5)
LYMPHOCYTES # BLD AUTO: 1.01 K/UL — SIGNIFICANT CHANGE UP (ref 1–3.3)
LYMPHOCYTES # BLD AUTO: 28 % — SIGNIFICANT CHANGE UP (ref 13–44)
MCHC RBC-ENTMCNC: 27.9 PG — SIGNIFICANT CHANGE UP (ref 27–34)
MCHC RBC-ENTMCNC: 31.3 GM/DL — LOW (ref 32–36)
MCV RBC AUTO: 89.2 FL — SIGNIFICANT CHANGE UP (ref 80–100)
MONOCYTES # BLD AUTO: 0.43 K/UL — SIGNIFICANT CHANGE UP (ref 0–0.9)
MONOCYTES NFR BLD AUTO: 12 % — SIGNIFICANT CHANGE UP (ref 2–14)
NEUTROPHILS # BLD AUTO: 1.84 K/UL — SIGNIFICANT CHANGE UP (ref 1.8–7.4)
NEUTROPHILS NFR BLD AUTO: 50 % — SIGNIFICANT CHANGE UP (ref 43–77)
NEUTS BAND # BLD: 1 % — SIGNIFICANT CHANGE UP (ref 0–8)
NRBC # BLD: 0 /100 — SIGNIFICANT CHANGE UP (ref 0–0)
NRBC # BLD: SIGNIFICANT CHANGE UP /100 WBCS (ref 0–0)
PLAT MORPH BLD: NORMAL — SIGNIFICANT CHANGE UP
PLATELET # BLD AUTO: 219 K/UL — SIGNIFICANT CHANGE UP (ref 150–400)
RBC # BLD: 3.98 M/UL — SIGNIFICANT CHANGE UP (ref 3.8–5.2)
RBC # FLD: 14.6 % — HIGH (ref 10.3–14.5)
RBC BLD AUTO: SIGNIFICANT CHANGE UP
WBC # BLD: 3.6 K/UL — LOW (ref 3.8–10.5)
WBC # FLD AUTO: 3.6 K/UL — LOW (ref 3.8–10.5)

## 2020-06-08 ENCOUNTER — APPOINTMENT (OUTPATIENT)
Dept: HEMATOLOGY ONCOLOGY | Facility: CLINIC | Age: 50
End: 2020-06-08

## 2020-06-08 ENCOUNTER — APPOINTMENT (OUTPATIENT)
Dept: HEMATOLOGY ONCOLOGY | Facility: CLINIC | Age: 50
End: 2020-06-08
Payer: COMMERCIAL

## 2020-06-08 DIAGNOSIS — M79.604 PAIN IN RIGHT LEG: ICD-10-CM

## 2020-06-08 PROCEDURE — 99214 OFFICE O/P EST MOD 30 MIN: CPT | Mod: 95

## 2020-06-08 NOTE — ASSESSMENT
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 Rich, dermatologist by biopsy, [id number] fusion negative, JAK2 mutation negative, BCR-ABL mutation negative, with recent skin biopsy suggesting possible lupus as well steroid dependent. Course complicated by 5/17/17 RLE duplex ultrasound c/w extensive DVT, s/p 6 months of Xarelto (finished 11/2017), now w/ new DVT on L, back on Xarelto \par She started Gleevec on 9/2017, was tapered off steroids 12/4/17 but she gets recurrent rash when she is off steroids so was started her on low dose steroids (5mg daily). \par Since last visit, she is no longer on Gleevec or iron tabs b/c of high copay. She has Xarelto and Eliquis tabs from before that she is using. \par \par -we discussed changing Gleevec to Hydrea which should have a lower copay but pt does not want to given previous side effects. We also discussed increasing prednisone dose in the meantime which she is hesitant as well. I ran this by [employer] and our  service and unfortunately there is no financial assistance available for her diagnosis; the only option we have for lowering her copay is for [facility] to fill the medication out of pocket for $180 - pt will reach out to have it filled at [facility]. \par -pt has h/o iron deficiency anemia, s/p injectafer weekly x2 on 3/23 and 3/30/18. Also s/p IV Iron x 2 on 7/17 and 7/24/19. Advised her to increase iron in her diet now that she is off Iron pills. May need IV iron again in the near future.\par -continue AC for recurrent DVT \par -f/u w/ GYN for menorrhagia\par -RTC 1 mo

## 2020-06-08 NOTE — PHYSICAL EXAM
[Fully active, able to carry on all pre-disease performance without restriction] : Status 0 - Fully active, able to carry on all pre-disease performance without restriction [Obese] : obese [Normal] : affect appropriate [de-identified] : scattered hyperpigmented rash on face

## 2020-06-08 NOTE — HISTORY OF PRESENT ILLNESS
[Other Location: e.g. School (Enter Location, City,State)___] : at [unfilled], at the time of the visit. [Medical Office: ([facility])___] : at the medical office located in  [Verbal consent obtained from patient] : the patient, [unfilled] [de-identified] : She remains on prednisone 5mg. \par Gleevec has been restarted in 5/2019 but at 200mg for neutropenia. She reports from past couple months she intentionally missed some Gleevec doses and wants to be tapered off of it. However started having rash and fatigue so now back on Gleevec. On 300mg daily.\par Fungal rash under both breast improved, using nystatin powder.\par She is s/p Injectafer on 7/17 and 7/24/19. Has been taking oral iron supplements. \par \par Since last visit in Jan, she is no longer on Gleevec - has new insurance and it is $375 copay a month. Rash is coming back on face and stomach - it is burning, not itching. She is also off her iron supplements 2/2 high copay. She has leftover Xarelto and Eliquis but otherwise it also has a high copay which she cannot afford. \par Has L hip, L knee and R ankle pain. Both legs are swollen and she is putting on weight. She was ordered for XRays but there is a wait given backlog from COVID-19 pandemic.  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 Seen initially on 17. PT reports improvement in fatigue since admission 17, but still with pain in L elbow, and weakness in flexion of L arm at elbow..ros. PT admits to paraesthesias in 5th digit of L hand, and tenderness at heels in both feet, and chronic cough, for which she takes Guaifenesin.\par \par HYPEREOSINOPHILIA HX:\par - 2016: was in [place] for her Sister's , rash started 2 weeks after returning, claims to have many bug bites from "horseflies" starting at bilateral medial knees. \par - 10/2016: Saw Beatriz Wu ([phone]), md ([employer] [employer]), and allergist at [facility], felt that the diagnosis was vasculitis, started on clobetazol, hydrocortizone, to little effect\par - 2016: Saw Dr. Mendoza who continued to treat pain with naproxen, neurontin\par - 2017: Saw Dermatologist Dr. Wisdom\par - 2017: Dr Wisdom 2017 performed biopsy at that time showed changes consistent with hypereosinophilic syndrome vs drug vs arthropod bite like reaction (superficial and deep perivascular and interstitial dermatitis with eosinophils), started on doxycycline, to some effect. \par - 2017: Saw Dr. Inocencio Batres on 17 generalized rash including facial rash and itching, and edema. who performed Punch biopsy of R arm showing interface dermatitis with prominent dermal eosinophils. 2nd punch biopsy of right arm suggestive of lupus erythematosus w/ granular linear signals of igA and IgM and patchy granular signal of C3 at basement membrane. IgG negative. \par - ADM 2017 TO 2017 TO [facility] with diffuse generalized rash. T 98.1, , /85, RR 18, O2 97%. Labs significant for WBC 28.15, eosinophils 97%, Hgb 11.1, MCV 75.6. Received prednisone 40mg & morphine.She was also evaluated by opthamology for complaint of blurry vision in her right eye. Was found to have cotton wool spots off both optic nerves. Had MRI brain which showed microvascular changes.\par - 2017: 17 bone marrow biopsy without evidence of leukemia, no blasts, [id number] negative. \par - 2017: 17 STARTED HYDROXYUREA , 60mg po prednisone daily\par - 2017: 5/3/17 presents with painful rash on hands and feet, exquisitely tender, stopped Hydroxyurea. \par - 2017: 17 found to have swollen R leg, RLE duplex ultrasound c/w extensive DVT, started on xarelto 15mg po bid from [facility] at [facility] [place] \par - 2017: 17 restarted hydrea 500mg po BID with worsening rash.\par - 2017: 17 decreased prednisone from 60mg po daily to 40mg po daily continue 500mg hydrea BID\par - 2017: 17 prednisone at 40mg po daily, hydrea increased to 1000mg in AM and 500mg in PM. \par - Started Gleevec . Tapered off prednisone 17. \par \par Was on on Losartan 50mg for BP control.  that stopped recently by cardiologist.\par She was admitted  for incidental finding of PE on CT A/P and tachycardia. They were unable to obtain CTA of chest 2/2 recent contrast load. Doppler LE showed acute below the knee DVT on left side. She was off Xarelto for 3 weeks prior so was resumed on Xarelto. No SOB, TTE performed which showed no RV strain, CE negative, normal BNP. Patient with persistent tachycardia, seen by cardiology who started on beta blocker. \par \par Started Gleevec . Tapered off prednisone 17. \par Noted to have a new rash around . She reports that this rash is different than her previous rash. She saw derm, was started back on prednisone taper, completed 3-4 days ago. \par We held her Gleevec on 12/15 b/c neutropenia. She also stopped HU. Her counts improved by the following week; we restarted Gleevec 1/5/18. \par Holding Gleevec again since 3/9/18 for neutropenia. \par Her periods have been very heavy b/c of Xarelto. Refused any pills to control her menses. On po iron supplements but unable to take every day because of constipation and hemorrhoids. She received Injectafer 3/23 and 3/30. \par She now holds the Xarelto for 2 days when she gets her menses. \par

## 2020-06-08 NOTE — REVIEW OF SYSTEMS
[Fatigue] : fatigue [Recent Change In Weight] : ~T recent weight change [Skin Rash] : skin rash [Negative] : Heme/Lymph [Joint Pain] : joint pain

## 2020-06-09 LAB
ALBUMIN SERPL ELPH-MCNC: 4.2 G/DL
ALP BLD-CCNC: 63 U/L
ALT SERPL-CCNC: 16 U/L
ANION GAP SERPL CALC-SCNC: 8 MMOL/L
AST SERPL-CCNC: 18 U/L
BILIRUB SERPL-MCNC: 0.2 MG/DL
BUN SERPL-MCNC: 11 MG/DL
CALCIUM SERPL-MCNC: 9.3 MG/DL
CHLORIDE SERPL-SCNC: 107 MMOL/L
CO2 SERPL-SCNC: 24 MMOL/L
CREAT SERPL-MCNC: 0.8 MG/DL
FERRITIN SERPL-MCNC: 15 NG/ML
GLUCOSE SERPL-MCNC: 102 MG/DL
IRON SATN MFR SERPL: 9 %
IRON SERPL-MCNC: 29 UG/DL
POTASSIUM SERPL-SCNC: 4.3 MMOL/L
PROT SERPL-MCNC: 7.8 G/DL
SODIUM SERPL-SCNC: 138 MMOL/L
TIBC SERPL-MCNC: 318 UG/DL
UIBC SERPL-MCNC: 288 UG/DL

## 2020-06-12 ENCOUNTER — APPOINTMENT (OUTPATIENT)
Dept: RADIOLOGY | Facility: IMAGING CENTER | Age: 50
End: 2020-06-12
Payer: COMMERCIAL

## 2020-06-12 ENCOUNTER — APPOINTMENT (OUTPATIENT)
Dept: HEMATOLOGY ONCOLOGY | Facility: CLINIC | Age: 50
End: 2020-06-12

## 2020-06-12 ENCOUNTER — OUTPATIENT (OUTPATIENT)
Dept: OUTPATIENT SERVICES | Facility: HOSPITAL | Age: 50
LOS: 1 days | End: 2020-06-12
Payer: COMMERCIAL

## 2020-06-12 DIAGNOSIS — Z98.890 OTHER SPECIFIED POSTPROCEDURAL STATES: Chronic | ICD-10-CM

## 2020-06-12 DIAGNOSIS — Z00.8 ENCOUNTER FOR OTHER GENERAL EXAMINATION: ICD-10-CM

## 2020-06-12 DIAGNOSIS — D25.9 LEIOMYOMA OF UTERUS, UNSPECIFIED: Chronic | ICD-10-CM

## 2020-06-12 DIAGNOSIS — N20.0 CALCULUS OF KIDNEY: Chronic | ICD-10-CM

## 2020-06-12 DIAGNOSIS — M79.604 PAIN IN RIGHT LEG: ICD-10-CM

## 2020-06-12 PROCEDURE — 73610 X-RAY EXAM OF ANKLE: CPT

## 2020-06-12 PROCEDURE — 73502 X-RAY EXAM HIP UNI 2-3 VIEWS: CPT

## 2020-06-12 PROCEDURE — 73562 X-RAY EXAM OF KNEE 3: CPT | Mod: 26,LT

## 2020-06-12 PROCEDURE — 73562 X-RAY EXAM OF KNEE 3: CPT

## 2020-06-12 PROCEDURE — 73610 X-RAY EXAM OF ANKLE: CPT | Mod: 26,RT

## 2020-06-12 PROCEDURE — 73502 X-RAY EXAM HIP UNI 2-3 VIEWS: CPT | Mod: 26,LT

## 2020-06-18 DIAGNOSIS — M79.672 PAIN IN LEFT FOOT: ICD-10-CM

## 2020-06-18 DIAGNOSIS — G89.29 PAIN IN RIGHT KNEE: ICD-10-CM

## 2020-06-18 DIAGNOSIS — M25.552 PAIN IN LEFT HIP: ICD-10-CM

## 2020-06-18 DIAGNOSIS — M25.562 PAIN IN RIGHT KNEE: ICD-10-CM

## 2020-06-18 DIAGNOSIS — M25.561 PAIN IN RIGHT KNEE: ICD-10-CM

## 2020-07-27 ENCOUNTER — OUTPATIENT (OUTPATIENT)
Dept: OUTPATIENT SERVICES | Facility: HOSPITAL | Age: 50
LOS: 1 days | Discharge: HOME | End: 2020-07-27

## 2020-07-27 DIAGNOSIS — D25.9 LEIOMYOMA OF UTERUS, UNSPECIFIED: Chronic | ICD-10-CM

## 2020-07-27 DIAGNOSIS — D72.1 EOSINOPHILIA: ICD-10-CM

## 2020-07-27 DIAGNOSIS — N20.0 CALCULUS OF KIDNEY: Chronic | ICD-10-CM

## 2020-07-27 DIAGNOSIS — Z98.890 OTHER SPECIFIED POSTPROCEDURAL STATES: Chronic | ICD-10-CM

## 2020-07-30 ENCOUNTER — RESULT REVIEW (OUTPATIENT)
Age: 50
End: 2020-07-30

## 2020-07-30 ENCOUNTER — APPOINTMENT (OUTPATIENT)
Dept: HEMATOLOGY ONCOLOGY | Facility: CLINIC | Age: 50
End: 2020-07-30
Payer: COMMERCIAL

## 2020-07-30 VITALS
RESPIRATION RATE: 16 BRPM | BODY MASS INDEX: 37.86 KG/M2 | OXYGEN SATURATION: 98 % | WEIGHT: 227.5 LBS | DIASTOLIC BLOOD PRESSURE: 75 MMHG | SYSTOLIC BLOOD PRESSURE: 121 MMHG | HEART RATE: 100 BPM

## 2020-07-30 LAB
ALBUMIN SERPL ELPH-MCNC: 4.3 G/DL
ALP BLD-CCNC: 69 U/L
ALT SERPL-CCNC: 12 U/L
ANION GAP SERPL CALC-SCNC: 13 MMOL/L
AST SERPL-CCNC: 14 U/L
BASOPHILS # BLD AUTO: 0.04 K/UL — SIGNIFICANT CHANGE UP (ref 0–0.2)
BASOPHILS NFR BLD AUTO: 0.8 % — SIGNIFICANT CHANGE UP (ref 0–2)
BILIRUB SERPL-MCNC: 0.3 MG/DL
BUN SERPL-MCNC: 7 MG/DL
CALCIUM SERPL-MCNC: 8.8 MG/DL
CHLORIDE SERPL-SCNC: 107 MMOL/L
CO2 SERPL-SCNC: 20 MMOL/L
CREAT SERPL-MCNC: 0.77 MG/DL
EOSINOPHIL # BLD AUTO: 0.19 K/UL — SIGNIFICANT CHANGE UP (ref 0–0.5)
EOSINOPHIL NFR BLD AUTO: 3.6 % — SIGNIFICANT CHANGE UP (ref 0–6)
FERRITIN SERPL-MCNC: 13 NG/ML
GLUCOSE SERPL-MCNC: 132 MG/DL
HCT VFR BLD CALC: 36 % — SIGNIFICANT CHANGE UP (ref 34.5–45)
HGB BLD-MCNC: 10.9 G/DL — LOW (ref 11.5–15.5)
IMM GRANULOCYTES NFR BLD AUTO: 1 % — SIGNIFICANT CHANGE UP (ref 0–1.5)
IRON SATN MFR SERPL: 10 %
IRON SERPL-MCNC: 36 UG/DL
LYMPHOCYTES # BLD AUTO: 1.11 K/UL — SIGNIFICANT CHANGE UP (ref 1–3.3)
LYMPHOCYTES # BLD AUTO: 21.2 % — SIGNIFICANT CHANGE UP (ref 13–44)
MCHC RBC-ENTMCNC: 25.3 PG — LOW (ref 27–34)
MCHC RBC-ENTMCNC: 30.3 GM/DL — LOW (ref 32–36)
MCV RBC AUTO: 83.5 FL — SIGNIFICANT CHANGE UP (ref 80–100)
MONOCYTES # BLD AUTO: 0.25 K/UL — SIGNIFICANT CHANGE UP (ref 0–0.9)
MONOCYTES NFR BLD AUTO: 4.8 % — SIGNIFICANT CHANGE UP (ref 2–14)
NEUTROPHILS # BLD AUTO: 3.6 K/UL — SIGNIFICANT CHANGE UP (ref 1.8–7.4)
NEUTROPHILS NFR BLD AUTO: 68.6 % — SIGNIFICANT CHANGE UP (ref 43–77)
NRBC # BLD: 0 /100 WBCS — SIGNIFICANT CHANGE UP (ref 0–0)
PLATELET # BLD AUTO: 287 K/UL — SIGNIFICANT CHANGE UP (ref 150–400)
POTASSIUM SERPL-SCNC: 3.7 MMOL/L
PROT SERPL-MCNC: 7.8 G/DL
RBC # BLD: 4.31 M/UL — SIGNIFICANT CHANGE UP (ref 3.8–5.2)
RBC # FLD: 17.2 % — HIGH (ref 10.3–14.5)
SODIUM SERPL-SCNC: 141 MMOL/L
TIBC SERPL-MCNC: 368 UG/DL
UIBC SERPL-MCNC: 332 UG/DL
WBC # BLD: 5.24 K/UL — SIGNIFICANT CHANGE UP (ref 3.8–10.5)
WBC # FLD AUTO: 5.24 K/UL — SIGNIFICANT CHANGE UP (ref 3.8–10.5)

## 2020-07-30 PROCEDURE — 99214 OFFICE O/P EST MOD 30 MIN: CPT

## 2020-07-30 NOTE — PHYSICAL EXAM
[Fully active, able to carry on all pre-disease performance without restriction] : Status 0 - Fully active, able to carry on all pre-disease performance without restriction [Obese] : obese [Normal] : no JVD, no calf tenderness, venous stasis changes, varices [de-identified] : hyperpigmented rash improving

## 2020-07-30 NOTE — ASSESSMENT
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 Rich, dermatologist by biopsy, [id number] fusion negative, JAK2 mutation negative, BCR-ABL mutation negative, with recent skin biopsy suggesting possible lupus as well, steroid dependent. Course complicated by 5/17/17 RLE duplex ultrasound c/w extensive DVT, s/p 6 months of Xarelto (finished 11/2017), now w/ new DVT on L, back on Xarelto \par She started Gleevec on 9/2017, was tapered off steroids 12/4/17 but she gets recurrent rash when she is off steroids so was started on low dose steroids (5mg daily). \par She has new insurance this year with high copays for her medications.\par \par -we have previously discussed changing Gleevec to Hydrea which should have a lower copay but pt does not want to given previous side effects. We increased steroids last visit with improvement in rash. She is asking about holding Gleevec and monitoring eosinophils and rash\par -pt has h/o iron deficiency anemia, s/p injectafer weekly x2 on 3/23 and 3/30/18. Also s/p IV Iron x 2 on 7/17 and 7/24/19. Advised her to increase iron in her diet and restart Iron pills. May need IV iron again but pt doesn't want at this time\par -continue AC for recurrent DVT \par -f/u w/ GYN for menorrhagia\par -RTC 2 mo

## 2020-07-30 NOTE — REVIEW OF SYSTEMS
[Fatigue] : fatigue [Recent Change In Weight] : ~T recent weight change [Joint Pain] : joint pain [Skin Rash] : skin rash [Negative] : Heme/Lymph

## 2020-07-30 NOTE — HISTORY OF PRESENT ILLNESS
[de-identified] : Gleevec has been restarted in 5/2019 but at 200mg for neutropenia. She reports from past couple months she intentionally missed some Gleevec doses and wants to be tapered off of it. However started having rash and fatigue so now back on Gleevec. On 300mg daily. We have continued her on prednisone 5mg. \par She is s/p Injectafer on 7/17 and 7/24/19. \par \par Since visit in Jan, she has new insurance and it is $375 copay for Gleevec a month. Rash is coming back on face and stomach - it is burning, not itching. She is also off her iron supplements 2/2 high copay. She has leftover Xarelto and Eliquis but otherwise it also has a high copay which she cannot afford. She states that she was breaking the leftover 400mg Gleevec in half and taking it that way.\par She is putting on weight. \par Last visit ion 6/2020, we increased prednisone to 40mg which she took for 3 weeks, then started tapering 30mg for 2 weeks, 20mg for the last 2 weeks - rash has improved \par Hip and knee pain improved but still having ankle pain. Xrays done were unremarkable last month. \par She bought OTC iron but has not started iron supplements yet.  [de-identified] : DAUGHTER ALAN [phone]\par PATIENT CELL [phone]\par PATIENT HOME [phone]\par \par 45 yo F seen in consultation for hypereosinophilic syndrome dx by Dr Wisdom 2017. Seen initially on 17. PT reports improvement in fatigue since admission 17, but still with pain in L elbow, and weakness in flexion of L arm at elbow..ros. PT admits to paraesthesias in 5th digit of L hand, and tenderness at heels in both feet, and chronic cough, for which she takes Guaifenesin.\par \par HYPEREOSINOPHILIA HX:\par - 2016: was in [place] for her Sister's , rash started 2 weeks after returning, claims to have many bug bites from "horseflies" starting at bilateral medial knees. \par - 10/2016: Saw Beatriz Wu ([phone]), md ([employer]), and allergist at [facility], felt that the diagnosis was vasculitis, started on clobetazol, hydrocortizone, to little effect\par - 2016: Saw Dr. Mendoza who continued to treat pain with naproxen, neurontin\par - 2017: Saw Dermatologist Dr. Wisdom\par - 2017: Dr Wisdom 2017 performed biopsy at that time showed changes consistent with hypereosinophilic syndrome vs drug vs arthropod bite like reaction (superficial and deep perivascular and interstitial dermatitis with eosinophils), started on doxycycline, to some effect. \par - 2017: Saw Dr. Inocencio Batres on 17 generalized rash including facial rash and itching, and edema. who performed Punch biopsy of R arm showing interface dermatitis with prominent dermal eosinophils. 2nd punch biopsy of right arm suggestive of lupus erythematosus w/ granular linear signals of igA and IgM and patchy granular signal of C3 at basement membrane. IgG negative. \par - ADM 2017 TO 2017 TO [facility] with diffuse generalized rash. T 98.1, , /85, RR 18, O2 97%. Labs significant for WBC 28.15, eosinophils 97%, Hgb 11.1, MCV 75.6. Received prednisone 40mg & morphine.She was also evaluated by opthamology for complaint of blurry vision in her right eye. Was found to have cotton wool spots off both optic nerves. Had MRI brain which showed microvascular changes.\par - 2017: 17 bone marrow biopsy without evidence of leukemia, no blasts, [id number] negative. \par - 2017: 17 STARTED HYDROXYUREA , 60mg po prednisone daily\par - 2017: 5/3/17 presents with painful rash on hands and feet, exquisitely tender, stopped Hydroxyurea. \par - 2017: 17 found to have swollen R leg, RLE duplex ultrasound c/w extensive DVT, started on xarelto 15mg po bid from [facility] [place] [facility] [place] \par - 2017: 17 restarted hydrea 500mg po BID with worsening rash.\par - 2017: 17 decreased prednisone from 60mg po daily to 40mg po daily continue 500mg hydrea BID\par - 2017: 17 prednisone at 40mg po daily, hydrea increased to 1000mg in AM and 500mg in PM. \par - Started Gleevec . Tapered off prednisone 17. \par \par Was on on Losartan 50mg for BP control.  that stopped recently by cardiologist.\par She was admitted  for incidental finding of PE on CT A/P and tachycardia. They were unable to obtain CTA of chest 2/2 recent contrast load. Doppler LE showed acute below the knee DVT on left side. She was off Xarelto for 3 weeks prior so was resumed on Xarelto. No SOB, TTE performed which showed no RV strain, CE negative, normal BNP. Patient with persistent tachycardia, seen by cardiology who started on beta blocker. \par \par Started Gleevec . Tapered off prednisone 17. \par Noted to have a new rash around . She reports that this rash is different than her previous rash. She saw derm, was started back on prednisone taper, completed 3-4 days ago. \par We held her Gleevec on 12/15 b/c neutropenia. She also stopped HU. Her counts improved by the following week; we restarted Gleevec 1/5/18. \par Holding Gleevec again since 3/9/18 for neutropenia. \par Her periods have been very heavy b/c of Xarelto. Refused any pills to control her menses. On po iron supplements but unable to take every day because of constipation and hemorrhoids. She received Injectafer 3/23 and 3/30. \par She now holds the Xarelto for 2 days when she gets her menses. \par

## 2020-09-22 ENCOUNTER — OUTPATIENT (OUTPATIENT)
Dept: OUTPATIENT SERVICES | Facility: HOSPITAL | Age: 50
LOS: 1 days | Discharge: HOME | End: 2020-09-22

## 2020-09-22 DIAGNOSIS — N20.0 CALCULUS OF KIDNEY: Chronic | ICD-10-CM

## 2020-09-22 DIAGNOSIS — D25.9 LEIOMYOMA OF UTERUS, UNSPECIFIED: Chronic | ICD-10-CM

## 2020-09-22 DIAGNOSIS — D72.1 EOSINOPHILIA: ICD-10-CM

## 2020-09-22 DIAGNOSIS — Z98.890 OTHER SPECIFIED POSTPROCEDURAL STATES: Chronic | ICD-10-CM

## 2020-09-28 ENCOUNTER — APPOINTMENT (OUTPATIENT)
Dept: HEMATOLOGY ONCOLOGY | Facility: CLINIC | Age: 50
End: 2020-09-28

## 2020-10-02 DIAGNOSIS — Z00.00 ENCOUNTER FOR GENERAL ADULT MEDICAL EXAMINATION W/OUT ABNORMAL FINDINGS: ICD-10-CM

## 2020-10-05 ENCOUNTER — RESULT REVIEW (OUTPATIENT)
Age: 50
End: 2020-10-05

## 2020-10-05 ENCOUNTER — APPOINTMENT (OUTPATIENT)
Dept: HEMATOLOGY ONCOLOGY | Facility: CLINIC | Age: 50
End: 2020-10-05
Payer: COMMERCIAL

## 2020-10-05 VITALS
SYSTOLIC BLOOD PRESSURE: 109 MMHG | HEART RATE: 102 BPM | WEIGHT: 231.49 LBS | BODY MASS INDEX: 38.52 KG/M2 | OXYGEN SATURATION: 98 % | DIASTOLIC BLOOD PRESSURE: 75 MMHG | RESPIRATION RATE: 16 BRPM

## 2020-10-05 LAB
ANISOCYTOSIS BLD QL: SLIGHT — SIGNIFICANT CHANGE UP
BASOPHILS # BLD AUTO: 0 K/UL — SIGNIFICANT CHANGE UP (ref 0–0.2)
BASOPHILS NFR BLD AUTO: 0 % — SIGNIFICANT CHANGE UP (ref 0–2)
EOSINOPHIL # BLD AUTO: 0.12 K/UL — SIGNIFICANT CHANGE UP (ref 0–0.5)
EOSINOPHIL NFR BLD AUTO: 4 % — SIGNIFICANT CHANGE UP (ref 0–6)
HCT VFR BLD CALC: 31.9 % — LOW (ref 34.5–45)
HGB BLD-MCNC: 9.6 G/DL — LOW (ref 11.5–15.5)
LYMPHOCYTES # BLD AUTO: 1.23 K/UL — SIGNIFICANT CHANGE UP (ref 1–3.3)
LYMPHOCYTES # BLD AUTO: 41 % — SIGNIFICANT CHANGE UP (ref 13–44)
MCHC RBC-ENTMCNC: 23.6 PG — LOW (ref 27–34)
MCHC RBC-ENTMCNC: 30.1 G/DL — LOW (ref 32–36)
MCV RBC AUTO: 78.4 FL — LOW (ref 80–100)
MONOCYTES # BLD AUTO: 0.03 K/UL — SIGNIFICANT CHANGE UP (ref 0–0.9)
MONOCYTES NFR BLD AUTO: 1 % — LOW (ref 2–14)
NEUTROPHILS # BLD AUTO: 1.62 K/UL — LOW (ref 1.8–7.4)
NEUTROPHILS NFR BLD AUTO: 54 % — SIGNIFICANT CHANGE UP (ref 43–77)
NRBC # BLD: 0 /100 — SIGNIFICANT CHANGE UP (ref 0–0)
NRBC # BLD: SIGNIFICANT CHANGE UP /100 WBCS (ref 0–0)
PLAT MORPH BLD: NORMAL — SIGNIFICANT CHANGE UP
PLATELET # BLD AUTO: 208 K/UL — SIGNIFICANT CHANGE UP (ref 150–400)
POIKILOCYTOSIS BLD QL AUTO: SLIGHT — SIGNIFICANT CHANGE UP
RBC # BLD: 4.07 M/UL — SIGNIFICANT CHANGE UP (ref 3.8–5.2)
RBC # FLD: 19.4 % — HIGH (ref 10.3–14.5)
RBC BLD AUTO: SIGNIFICANT CHANGE UP
WBC # BLD: 3 K/UL — LOW (ref 3.8–10.5)
WBC # FLD AUTO: 3 K/UL — LOW (ref 3.8–10.5)

## 2020-10-05 PROCEDURE — 99214 OFFICE O/P EST MOD 30 MIN: CPT

## 2020-10-06 LAB
25(OH)D3 SERPL-MCNC: 10.7 NG/ML
ALBUMIN SERPL ELPH-MCNC: 3.9 G/DL
ALP BLD-CCNC: 71 U/L
ALT SERPL-CCNC: 11 U/L
ANION GAP SERPL CALC-SCNC: 10 MMOL/L
AST SERPL-CCNC: 19 U/L
BILIRUB SERPL-MCNC: 0.3 MG/DL
BUN SERPL-MCNC: 9 MG/DL
CALCIUM SERPL-MCNC: 8.7 MG/DL
CHLORIDE SERPL-SCNC: 108 MMOL/L
CHOLEST SERPL-MCNC: 113 MG/DL
CHOLEST/HDLC SERPL: 3.1 RATIO
CO2 SERPL-SCNC: 20 MMOL/L
CREAT SERPL-MCNC: 0.79 MG/DL
ESTIMATED AVERAGE GLUCOSE: 100 MG/DL
FERRITIN SERPL-MCNC: 44 NG/ML
GLUCOSE SERPL-MCNC: 108 MG/DL
HBA1C MFR BLD HPLC: 5.1 %
HDLC SERPL-MCNC: 36 MG/DL
IRON SATN MFR SERPL: 17 %
IRON SERPL-MCNC: 56 UG/DL
LDLC SERPL CALC-MCNC: 64 MG/DL
POTASSIUM SERPL-SCNC: 3.4 MMOL/L
PROT SERPL-MCNC: 7.4 G/DL
SODIUM SERPL-SCNC: 138 MMOL/L
TIBC SERPL-MCNC: 325 UG/DL
TRIGL SERPL-MCNC: 61 MG/DL
TSH SERPL-ACNC: 0.78 UIU/ML
UIBC SERPL-MCNC: 268 UG/DL

## 2020-10-06 NOTE — ASSESSMENT
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 Rich, dermatologist by biopsy, [id number] fusion negative, JAK2 mutation negative, BCR-ABL mutation negative, with recent skin biopsy suggesting possible lupus as well, steroid dependent. Course complicated by 5/17/17 RLE duplex ultrasound c/w extensive DVT, s/p 6 months of Xarelto (finished 11/2017), now w/ new DVT on L, back on Xarelto \par She started Gleevec on 9/2017, was tapered off steroids 12/4/17 but she gets recurrent rash when she is off steroids so was started on low dose steroids (5mg daily). \par She has new insurance this year with high copays for her medications.\par \par -we have previously discussed changing Gleevec to Hydrea which should have a lower copay but pt does not want to given previous side effects. We increased steroids last visit with improvement in rash. She is asking about holding Gleevec and monitoring eosinophils and rash\par -pt has h/o iron deficiency anemia, s/p injectafer weekly x2 on 3/23 and 3/30/18. Also s/p IV Iron x 2 on 7/17 and 7/24/19. Advised her to increase iron in her diet and cont Iron pills. f/u iron studies today\par -continue AC for recurrent DVT \par -f/u w/ GYN for menorrhagia\par -RTC 1-2 mo

## 2020-10-06 NOTE — HISTORY OF PRESENT ILLNESS
[de-identified] : Gleevec has been restarted in 5/2019 but at 200mg for neutropenia. She reports from past couple months she intentionally missed some Gleevec doses and wants to be tapered off of it. However started having rash and fatigue so now back on Gleevec. On 300mg daily. We have continued her on prednisone 5mg. \par She is s/p Injectafer on 7/17 and 7/24/19. \par \par Since visit in Jan, she has new insurance and it is $375 copay for Gleevec a month. Rash is coming back on face and stomach - it is burning, not itching. She is also off her iron supplements 2/2 high copay. She has leftover Xarelto and Eliquis but otherwise it also has a high copay which she cannot afford. She states that she was breaking the leftover 400mg Gleevec in half and taking it that way.\par She is putting on weight. \par Last visit ion 6/2020, we increased prednisone to 40mg which she took for 3 weeks, then started tapering 30mg for 2 weeks, 20mg for the last 2 weeks - rash has improved \par Hip and knee pain improved but still having ankle pain. Xrays done were unremarkable. \par She bought OTC iron - started on Thurs 10/1/20. Currently has her menses\par Saw podiatry - has bone spurs in b/l feet, awaiting special shoes. Now on Baclofen and Naproxen\par Had hematuria which has resolved. Saw Dr. Mendoza PCP on 9/29/20.  [de-identified] : DAUGHTER ALAN [phone]\par PATIENT CELL [phone]\par PATIENT HOME [phone]\par \par 47 yo F seen in consultation for hypereosinophilic syndrome dx by Dr Wisdom 2017. Seen initially on 17. PT reports improvement in fatigue since admission 17, but still with pain in L elbow, and weakness in flexion of L arm at elbow..ros. PT admits to paraesthesias in 5th digit of L hand, and tenderness at heels in both feet, and chronic cough, for which she takes Guaifenesin.\par \par HYPEREOSINOPHILIA HX:\par - 2016: was in [place] for her Sister's , rash started 2 weeks after returning, claims to have many bug bites from "horseflies" starting at bilateral medial knees. \par - 10/2016: Saw Beatriz Wu ([phone]), md ([employer]), and allergist at [facility]], felt that the diagnosis was vasculitis, started on clobetazol, hydrocortizone, to little effect\par - 2016: Saw Dr. Mendoza who continued to treat pain with naproxen, neurontin\par - 2017: Saw Dermatologist Dr. Wisdom\par - 2017: Dr Wisdom 2017 performed biopsy at that time showed changes consistent with hypereosinophilic syndrome vs drug vs arthropod bite like reaction (superficial and deep perivascular and interstitial dermatitis with eosinophils), started on doxycycline, to some effect. \par - 2017: Saw Dr. Inocencio Batres on 17 generalized rash including facial rash and itching, and edema. who performed Punch biopsy of R arm showing interface dermatitis with prominent dermal eosinophils. 2nd punch biopsy of right arm suggestive of lupus erythematosus w/ granular linear signals of igA and IgM and patchy granular signal of C3 at basement membrane. IgG negative. \par - ADM 2017 TO 2017 TO [facility] with diffuse generalized rash. T 98.1, , /85, RR 18, O2 97%. Labs significant for WBC 28.15, eosinophils 97%, Hgb 11.1, MCV 75.6. Received prednisone 40mg & morphine.She was also evaluated by opthamology for complaint of blurry vision in her right eye. Was found to have cotton wool spots off both optic nerves. Had MRI brain which showed microvascular changes.\par - 2017: 17 bone marrow biopsy without evidence of leukemia, no blasts, [id number] negative. \par - 2017: 17 STARTED HYDROXYUREA , 60mg po prednisone daily\par - 2017: 5/3/17 presents with painful rash on hands and feet, exquisitely tender, stopped Hydroxyurea. \par - 2017: 17 found to have swollen R leg, RLE duplex ultrasound c/w extensive DVT, started on xarelto 15mg po bid from [facility] at [facility] [place]. \par - 2017: 17 restarted hydrea 500mg po BID with worsening rash.\par - 2017: 17 decreased prednisone from 60mg po daily to 40mg po daily continue 500mg hydrea BID\par - 2017: 17 prednisone at 40mg po daily, hydrea increased to 1000mg in AM and 500mg in PM. \par - Started Gleevec . Tapered off prednisone 17. \par \par Was on on Losartan 50mg for BP control.  that stopped recently by cardiologist.\par She was admitted  for incidental finding of PE on CT A/P and tachycardia. They were unable to obtain CTA of chest 2/2 recent contrast load. Doppler LE showed acute below the knee DVT on left side. She was off Xarelto for 3 weeks prior so was resumed on Xarelto. No SOB, TTE performed which showed no RV strain, CE negative, normal BNP. Patient with persistent tachycardia, seen by cardiology who started on beta blocker. \par \par Started Gleevec . Tapered off prednisone 17. \par Noted to have a new rash around . She reports that this rash is different than her previous rash. She saw derm, was started back on prednisone taper, completed 3-4 days ago. \par We held her Gleevec on 12/15 b/c neutropenia. She also stopped HU. Her counts improved by the following week; we restarted Gleevec 1/5/18. \par Holding Gleevec again since 3/9/18 for neutropenia. \par Her periods have been very heavy b/c of Xarelto. Refused any pills to control her menses. On po iron supplements but unable to take every day because of constipation and hemorrhoids. She received Injectafer 3/23 and 3/30. \par She now holds the Xarelto for 2 days when she gets her menses. \par

## 2020-10-06 NOTE — PHYSICAL EXAM
[Fully active, able to carry on all pre-disease performance without restriction] : Status 0 - Fully active, able to carry on all pre-disease performance without restriction [Obese] : obese [Normal] : affect appropriate [de-identified] : hyperpigmented rash improved

## 2020-11-10 NOTE — PATIENT PROFILE ADULT. - NS SC CAGE ALCOHOL CUT DOWN
Dermatology Rooming Note    Jerad Ross's goals for this visit include:   Chief Complaint   Patient presents with     Skin Check     Jerad is here today for a skin check- Jerad notes an area of concern on his forearm.      HUGO Clark      no

## 2020-12-07 ENCOUNTER — OUTPATIENT (OUTPATIENT)
Dept: OUTPATIENT SERVICES | Facility: HOSPITAL | Age: 50
LOS: 1 days | Discharge: HOME | End: 2020-12-07

## 2020-12-07 DIAGNOSIS — D25.9 LEIOMYOMA OF UTERUS, UNSPECIFIED: Chronic | ICD-10-CM

## 2020-12-07 DIAGNOSIS — Z98.890 OTHER SPECIFIED POSTPROCEDURAL STATES: Chronic | ICD-10-CM

## 2020-12-07 DIAGNOSIS — N20.0 CALCULUS OF KIDNEY: Chronic | ICD-10-CM

## 2020-12-07 DIAGNOSIS — D72.119 HYPEREOSINOPHILIC SYNDROME [HES], UNSPECIFIED: ICD-10-CM

## 2020-12-09 ENCOUNTER — RESULT REVIEW (OUTPATIENT)
Age: 50
End: 2020-12-09

## 2020-12-09 ENCOUNTER — APPOINTMENT (OUTPATIENT)
Dept: HEMATOLOGY ONCOLOGY | Facility: CLINIC | Age: 50
End: 2020-12-09
Payer: COMMERCIAL

## 2020-12-09 VITALS
OXYGEN SATURATION: 100 % | DIASTOLIC BLOOD PRESSURE: 85 MMHG | WEIGHT: 222.2 LBS | HEART RATE: 87 BPM | RESPIRATION RATE: 15 BRPM | SYSTOLIC BLOOD PRESSURE: 140 MMHG | BODY MASS INDEX: 35.71 KG/M2 | HEIGHT: 66 IN | TEMPERATURE: 97 F

## 2020-12-09 LAB
BASOPHILS # BLD AUTO: 0 K/UL — SIGNIFICANT CHANGE UP (ref 0–0.2)
BASOPHILS NFR BLD AUTO: 0 % — SIGNIFICANT CHANGE UP (ref 0–2)
EOSINOPHIL # BLD AUTO: 0.21 K/UL — SIGNIFICANT CHANGE UP (ref 0–0.5)
EOSINOPHIL NFR BLD AUTO: 7 % — HIGH (ref 0–6)
HCT VFR BLD CALC: 35.6 % — SIGNIFICANT CHANGE UP (ref 34.5–45)
HGB BLD-MCNC: 11.2 G/DL — LOW (ref 11.5–15.5)
LYMPHOCYTES # BLD AUTO: 1.41 K/UL — SIGNIFICANT CHANGE UP (ref 1–3.3)
LYMPHOCYTES # BLD AUTO: 47 % — HIGH (ref 13–44)
MCHC RBC-ENTMCNC: 25.5 PG — LOW (ref 27–34)
MCHC RBC-ENTMCNC: 31.5 G/DL — LOW (ref 32–36)
MCV RBC AUTO: 81.1 FL — SIGNIFICANT CHANGE UP (ref 80–100)
MONOCYTES # BLD AUTO: 0.21 K/UL — SIGNIFICANT CHANGE UP (ref 0–0.9)
MONOCYTES NFR BLD AUTO: 7 % — SIGNIFICANT CHANGE UP (ref 2–14)
NEUTROPHILS # BLD AUTO: 1.17 K/UL — LOW (ref 1.8–7.4)
NEUTROPHILS NFR BLD AUTO: 39 % — LOW (ref 43–77)
NRBC # BLD: 0 /100 — SIGNIFICANT CHANGE UP (ref 0–0)
NRBC # BLD: SIGNIFICANT CHANGE UP /100 WBCS (ref 0–0)
PLAT MORPH BLD: NORMAL — SIGNIFICANT CHANGE UP
PLATELET # BLD AUTO: 242 K/UL — SIGNIFICANT CHANGE UP (ref 150–400)
RBC # BLD: 4.39 M/UL — SIGNIFICANT CHANGE UP (ref 3.8–5.2)
RBC # FLD: 17.4 % — HIGH (ref 10.3–14.5)
RBC BLD AUTO: SIGNIFICANT CHANGE UP
WBC # BLD: 2.99 K/UL — LOW (ref 3.8–10.5)
WBC # FLD AUTO: 2.99 K/UL — LOW (ref 3.8–10.5)

## 2020-12-09 PROCEDURE — 99214 OFFICE O/P EST MOD 30 MIN: CPT

## 2020-12-09 PROCEDURE — 99072 ADDL SUPL MATRL&STAF TM PHE: CPT

## 2020-12-10 NOTE — ASSESSMENT
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 Rich, dermatologist by biopsy, [id number] fusion negative, JAK2 mutation negative, BCR-ABL mutation negative, with recent skin biopsy suggesting possible lupus as well, steroid dependent. Course complicated by 5/17/17 RLE duplex ultrasound c/w extensive DVT, s/p 6 months of Xarelto (finished 11/2017), now w/ new DVT on L, back on Xarelto \par She started Gleevec on 9/2017, was tapered off steroids 12/4/17 but she gets recurrent rash when she is off steroids so was started on low dose steroids (5mg daily). \par She has new insurance this year with high copays for her medications.\par \par -we have previously discussed changing Gleevec to Hydrea which should have a lower copay but pt does not want to given previous side effects. She is now off Gleevec since 3 weeks ago. She is also not on steroids for 3 weeks. Will monitor eos and rash\par -pt has h/o iron deficiency anemia, s/p injectafer weekly x2 on 3/23 and 3/30/18. Also s/p IV Iron x 2 on 7/17 and 7/24/19. Advised her to increase iron in her diet and cont Iron pills. Her H/H has improved today, f/u iron studies \par -continue AC for recurrent DVT. Pt asked about stopping Xarelto. We discussed that since she has recurrent VTE, staying on AC would be best but if she really wants to stop, we should at least change to ASA with the understanding that risk of recurrence would be higher. Pt understands, will continue Xarelto\par -f/u w/ GYN for menorrhagia\par -RTC 1-2 mo

## 2020-12-10 NOTE — HISTORY OF PRESENT ILLNESS
[de-identified] : Gleevec has been restarted in 5/2019 but at 200mg for neutropenia. She reports from past couple months she intentionally missed some Gleevec doses and wants to be tapered off of it. However started having rash and fatigue so now back on Gleevec. On 300mg daily. We have continued her on prednisone 5mg. \par She is s/p Injectafer on 7/17 and 7/24/19. \par \par Since visit in Jan, she has new insurance and it is $375 copay for Gleevec a month. Rash is coming back on face and stomach - it is burning, not itching. She is also off her iron supplements 2/2 high copay. She has leftover Xarelto and Eliquis but otherwise it also has a high copay which she cannot afford. She states that she was breaking the leftover 400mg Gleevec in half and taking it that way.\par  \par Visit on 6/2020, we increased prednisone to 40mg which she took for 3 weeks, then started tapering 30mg for 2 weeks, 20mg for the last 2 weeks - rash has improved \par Hip and knee pain improved but still having ankle pain. Xrays done were unremarkable. \par She bought OTC iron - started on Thurs 10/1/20. \par Saw podiatry - has bone spurs in b/l feet, awaiting special shoes. Now on Baclofen and Naproxen\par Had hematuria which has resolved. Saw Dr. Mendoza PCP on 9/29/20. \par \par She stopped taking Gleevec about 3 weeks ago. She is also now off steroids for the same period of time. \par She reports losing weight since coming off steroids.  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 Seen initially on 17. PT reports improvement in fatigue since admission 17, but still with pain in L elbow, and weakness in flexion of L arm at elbow..ros. PT admits to paraesthesias in 5th digit of L hand, and tenderness at heels in both feet, and chronic cough, for which she takes Guaifenesin.\par \par HYPEREOSINOPHILIA HX:\par - 2016: was in [place] for her Sister's , rash started 2 weeks after returning, claims to have many bug bites from "horseflies" starting at bilateral medial knees. \par - 10/2016: Saw Beatriz uW ([phone]), md ([employer] [employer]), and allergist at [facility], felt that the diagnosis was vasculitis, started on clobetazol, hydrocortizone, to little effect\par - 2016: Saw Dr. Mendoza who continued to treat pain with naproxen, neurontin\par - 2017: Saw Dermatologist Dr. Wisdom\par - 2017: Dr Wisdom 2017 performed biopsy at that time showed changes consistent with hypereosinophilic syndrome vs drug vs arthropod bite like reaction (superficial and deep perivascular and interstitial dermatitis with eosinophils), started on doxycycline, to some effect. \par - 2017: Saw Dr. Inocencio Batres on 17 generalized rash including facial rash and itching, and edema. who performed Punch biopsy of R arm showing interface dermatitis with prominent dermal eosinophils. 2nd punch biopsy of right arm suggestive of lupus erythematosus w/ granular linear signals of igA and IgM and patchy granular signal of C3 at basement membrane. IgG negative. \par - ADM 2017 TO 2017 TO [facility] with diffuse generalized rash. T 98.1, , /85, RR 18, O2 97%. Labs significant for WBC 28.15, eosinophils 97%, Hgb 11.1, MCV 75.6. Received prednisone 40mg & morphine.She was also evaluated by opthamology for complaint of blurry vision in her right eye. Was found to have cotton wool spots off both optic nerves. Had MRI brain which showed microvascular changes.\par - 2017: 17 bone marrow biopsy without evidence of leukemia, no blasts, [id number] negative. \par - 2017: 17 STARTED HYDROXYUREA , 60mg po prednisone daily\par - 2017: 5/3/17 presents with painful rash on hands and feet, exquisitely tender, stopped Hydroxyurea. \par - 2017: 17 found to have swollen R leg, RLE duplex ultrasound c/w extensive DVT, started on xarelto 15mg po bid [place] [facility] [place] [facility] [place] \par - 2017: 17 restarted hydrea 500mg po BID with worsening rash.\par - 2017: 17 decreased prednisone from 60mg po daily to 40mg po daily continue 500mg hydrea BID\par - 2017: 17 prednisone at 40mg po daily, hydrea increased to 1000mg in AM and 500mg in PM. \par - Started Gleevec . Tapered off prednisone 17. \par \par Was on on Losartan 50mg for BP control.  that stopped recently by cardiologist.\par She was admitted  for incidental finding of PE on CT A/P and tachycardia. They were unable to obtain CTA of chest 2/2 recent contrast load. Doppler LE showed acute below the knee DVT on left side. She was off Xarelto for 3 weeks prior so was resumed on Xarelto. No SOB, TTE performed which showed no RV strain, CE negative, normal BNP. Patient with persistent tachycardia, seen by cardiology who started on beta blocker. \par \par Started Gleevec . Tapered off prednisone 17. \par Noted to have a new rash around . She reports that this rash is different than her previous rash. She saw derm, was started back on prednisone taper, completed 3-4 days ago. \par We held her Gleevec on 12/15 b/c neutropenia. She also stopped HU. Her counts improved by the following week; we restarted Gleevec 1/5/18. \par Holding Gleevec again since 3/9/18 for neutropenia. \par Her periods have been very heavy b/c of Xarelto. Refused any pills to control her menses. On po iron supplements but unable to take every day because of constipation and hemorrhoids. She received Injectafer 3/23 and 3/30. \par She now holds the Xarelto for 2 days when she gets her menses. \par

## 2020-12-10 NOTE — PHYSICAL EXAM
[Fully active, able to carry on all pre-disease performance without restriction] : Status 0 - Fully active, able to carry on all pre-disease performance without restriction [Obese] : obese [Normal] : affect appropriate [de-identified] : hyperpigmented rash improved

## 2020-12-11 LAB
ALBUMIN SERPL ELPH-MCNC: 4 G/DL
ALP BLD-CCNC: 77 U/L
ALT SERPL-CCNC: 11 U/L
ANION GAP SERPL CALC-SCNC: 14 MMOL/L
APPEARANCE: CLEAR
AST SERPL-CCNC: 18 U/L
BILIRUB SERPL-MCNC: 0.2 MG/DL
BILIRUBIN URINE: NEGATIVE
BLOOD URINE: NEGATIVE
BUN SERPL-MCNC: 9 MG/DL
CALCIUM SERPL-MCNC: 9.2 MG/DL
CHLORIDE SERPL-SCNC: 107 MMOL/L
CO2 SERPL-SCNC: 21 MMOL/L
COLOR: NORMAL
CREAT SERPL-MCNC: 1 MG/DL
FERRITIN SERPL-MCNC: 13 NG/ML
GLUCOSE QUALITATIVE U: NEGATIVE
GLUCOSE SERPL-MCNC: 89 MG/DL
IRON SATN MFR SERPL: 10 %
IRON SERPL-MCNC: 28 UG/DL
KETONES URINE: NEGATIVE
LEUKOCYTE ESTERASE URINE: NEGATIVE
NITRITE URINE: NEGATIVE
PH URINE: 6
POTASSIUM SERPL-SCNC: 3.8 MMOL/L
PROT SERPL-MCNC: 8 G/DL
PROTEIN URINE: NEGATIVE
SODIUM SERPL-SCNC: 141 MMOL/L
SPECIFIC GRAVITY URINE: 1.01
TIBC SERPL-MCNC: 294 UG/DL
UIBC SERPL-MCNC: 265 UG/DL
UROBILINOGEN URINE: NORMAL

## 2020-12-29 ENCOUNTER — RESULT REVIEW (OUTPATIENT)
Age: 50
End: 2020-12-29

## 2020-12-29 ENCOUNTER — APPOINTMENT (OUTPATIENT)
Dept: HEMATOLOGY ONCOLOGY | Facility: CLINIC | Age: 50
End: 2020-12-29
Payer: COMMERCIAL

## 2020-12-29 VITALS
HEIGHT: 66 IN | BODY MASS INDEX: 35.93 KG/M2 | SYSTOLIC BLOOD PRESSURE: 135 MMHG | TEMPERATURE: 98.2 F | OXYGEN SATURATION: 99 % | DIASTOLIC BLOOD PRESSURE: 87 MMHG | WEIGHT: 223.55 LBS | RESPIRATION RATE: 14 BRPM | HEART RATE: 78 BPM

## 2020-12-29 DIAGNOSIS — B34.9 VIRAL INFECTION, UNSPECIFIED: ICD-10-CM

## 2020-12-29 LAB
ALBUMIN SERPL ELPH-MCNC: 3.8 G/DL
ALP BLD-CCNC: 76 U/L
ALT SERPL-CCNC: 13 U/L
ANION GAP SERPL CALC-SCNC: 11 MMOL/L
AST SERPL-CCNC: 18 U/L
BASOPHILS # BLD AUTO: 0.04 K/UL — SIGNIFICANT CHANGE UP (ref 0–0.2)
BASOPHILS NFR BLD AUTO: 1 % — SIGNIFICANT CHANGE UP (ref 0–2)
BILIRUB SERPL-MCNC: 0.2 MG/DL
BUN SERPL-MCNC: 7 MG/DL — SIGNIFICANT CHANGE UP (ref 7–23)
BUN SERPL-MCNC: 8 MG/DL
CA-I BLDA-SCNC: 1.24 MMOL/L — SIGNIFICANT CHANGE UP (ref 1.12–1.3)
CALCIUM SERPL-MCNC: 9 MG/DL
CHLORIDE SERPL-SCNC: 108 MMOL/L — SIGNIFICANT CHANGE UP (ref 96–108)
CHLORIDE SERPL-SCNC: 109 MMOL/L
CO2 SERPL-SCNC: 21 MMOL/L
CO2 SERPL-SCNC: 25 MMOL/L — SIGNIFICANT CHANGE UP (ref 22–31)
CREAT SERPL-MCNC: 0.8 MG/DL — SIGNIFICANT CHANGE UP (ref 0.5–1.3)
CREAT SERPL-MCNC: 0.91 MG/DL
EOSINOPHIL # BLD AUTO: 0.18 K/UL — SIGNIFICANT CHANGE UP (ref 0–0.5)
EOSINOPHIL NFR BLD AUTO: 5 % — SIGNIFICANT CHANGE UP (ref 0–6)
GLUCOSE SERPL-MCNC: 100 MG/DL
GLUCOSE SERPL-MCNC: 103 MG/DL — HIGH (ref 70–99)
HCT VFR BLD CALC: 34.7 % — SIGNIFICANT CHANGE UP (ref 34.5–45)
HGB BLD-MCNC: 10.3 G/DL — LOW (ref 11.5–15.5)
LDH SERPL-CCNC: 188 U/L
LYMPHOCYTES # BLD AUTO: 1.36 K/UL — SIGNIFICANT CHANGE UP (ref 1–3.3)
LYMPHOCYTES # BLD AUTO: 38 % — SIGNIFICANT CHANGE UP (ref 13–44)
MCHC RBC-ENTMCNC: 24.1 PG — LOW (ref 27–34)
MCHC RBC-ENTMCNC: 29.7 G/DL — LOW (ref 32–36)
MCV RBC AUTO: 81.1 FL — SIGNIFICANT CHANGE UP (ref 80–100)
MONOCYTES # BLD AUTO: 0.07 K/UL — SIGNIFICANT CHANGE UP (ref 0–0.9)
MONOCYTES NFR BLD AUTO: 2 % — SIGNIFICANT CHANGE UP (ref 2–14)
NEUTROPHILS # BLD AUTO: 1.93 K/UL — SIGNIFICANT CHANGE UP (ref 1.8–7.4)
NEUTROPHILS NFR BLD AUTO: 54 % — SIGNIFICANT CHANGE UP (ref 43–77)
NRBC # BLD: 0 /100 — SIGNIFICANT CHANGE UP (ref 0–0)
NRBC # BLD: SIGNIFICANT CHANGE UP /100 WBCS (ref 0–0)
PLAT MORPH BLD: NORMAL — SIGNIFICANT CHANGE UP
PLATELET # BLD AUTO: 207 K/UL — SIGNIFICANT CHANGE UP (ref 150–400)
POTASSIUM SERPL-MCNC: 3.8 MMOL/L — SIGNIFICANT CHANGE UP (ref 3.5–5.3)
POTASSIUM SERPL-SCNC: 3.8 MMOL/L — SIGNIFICANT CHANGE UP (ref 3.5–5.3)
POTASSIUM SERPL-SCNC: 4.1 MMOL/L
PROT SERPL-MCNC: 7.8 G/DL
RBC # BLD: 4.28 M/UL — SIGNIFICANT CHANGE UP (ref 3.8–5.2)
RBC # FLD: 15.9 % — HIGH (ref 10.3–14.5)
RBC BLD AUTO: SIGNIFICANT CHANGE UP
SODIUM SERPL-SCNC: 141 MMOL/L
SODIUM SERPL-SCNC: 143 MMOL/L — SIGNIFICANT CHANGE UP (ref 135–145)
WBC # BLD: 3.58 K/UL — LOW (ref 3.8–10.5)
WBC # FLD AUTO: 3.58 K/UL — LOW (ref 3.8–10.5)

## 2020-12-29 PROCEDURE — 99072 ADDL SUPL MATRL&STAF TM PHE: CPT

## 2020-12-29 PROCEDURE — 99214 OFFICE O/P EST MOD 30 MIN: CPT

## 2020-12-29 RX ORDER — LIDOCAINE AND PRILOCAINE 25; 25 MG/G; MG/G
2.5-2.5 CREAM TOPICAL
Qty: 1 | Refills: 1 | Status: DISCONTINUED | COMMUNITY
Start: 2017-12-19 | End: 2020-12-29

## 2020-12-29 RX ORDER — MUPIROCIN 20 MG/G
2 OINTMENT TOPICAL 3 TIMES DAILY
Qty: 1 | Refills: 1 | Status: DISCONTINUED | COMMUNITY
Start: 2017-03-20 | End: 2020-12-29

## 2020-12-29 RX ORDER — CLOTRIMAZOLE 10 MG/G
1 CREAM TOPICAL 3 TIMES DAILY
Qty: 1 | Refills: 1 | Status: DISCONTINUED | COMMUNITY
Start: 2019-09-18 | End: 2020-12-29

## 2020-12-29 RX ORDER — PREDNISONE 10 MG/1
10 TABLET ORAL DAILY
Qty: 30 | Refills: 0 | Status: DISCONTINUED | COMMUNITY
Start: 2018-01-09 | End: 2020-12-29

## 2020-12-29 RX ORDER — PREDNISONE 5 MG/1
5 TABLET ORAL DAILY
Qty: 30 | Refills: 3 | Status: DISCONTINUED | COMMUNITY
Start: 2018-07-23 | End: 2020-12-29

## 2020-12-29 RX ORDER — PANTOPRAZOLE 40 MG/1
40 TABLET, DELAYED RELEASE ORAL
Qty: 90 | Refills: 1 | Status: DISCONTINUED | COMMUNITY
Start: 2017-04-17 | End: 2020-12-29

## 2020-12-29 RX ORDER — POTASSIUM CHLORIDE 1500 MG/1
20 TABLET, FILM COATED, EXTENDED RELEASE ORAL DAILY
Qty: 30 | Refills: 2 | Status: DISCONTINUED | COMMUNITY
Start: 2017-10-24 | End: 2020-12-29

## 2020-12-29 RX ORDER — LOSARTAN POTASSIUM 50 MG/1
50 TABLET, FILM COATED ORAL DAILY
Qty: 90 | Refills: 0 | Status: ACTIVE | COMMUNITY
Start: 2017-09-17

## 2020-12-29 RX ORDER — FUROSEMIDE 20 MG/1
20 TABLET ORAL DAILY
Qty: 30 | Refills: 0 | Status: DISCONTINUED | COMMUNITY
Start: 2017-10-24 | End: 2020-12-29

## 2020-12-29 RX ORDER — IMATINIB MESYLATE 100 MG/1
100 TABLET, FILM COATED ORAL DAILY
Qty: 90 | Refills: 2 | Status: DISCONTINUED | COMMUNITY
Start: 2018-03-09 | End: 2020-12-29

## 2020-12-29 RX ORDER — ONDANSETRON 8 MG/1
8 TABLET, ORALLY DISINTEGRATING ORAL
Qty: 30 | Refills: 0 | Status: ACTIVE | COMMUNITY
Start: 2017-10-03 | End: 1900-01-01

## 2020-12-29 RX ORDER — BACLOFEN 10 MG/1
10 TABLET ORAL
Refills: 0 | Status: ACTIVE | COMMUNITY

## 2020-12-29 RX ORDER — NAPROXEN SODIUM 550 MG/1
550 TABLET ORAL
Qty: 14 | Refills: 0 | Status: DISCONTINUED | COMMUNITY
Start: 2017-01-17 | End: 2020-12-29

## 2020-12-29 RX ORDER — POTASSIUM CHLORIDE 1500 MG/1
20 TABLET, FILM COATED, EXTENDED RELEASE ORAL DAILY
Qty: 7 | Refills: 0 | Status: DISCONTINUED | COMMUNITY
Start: 2017-10-04 | End: 2020-12-29

## 2020-12-29 RX ORDER — PREDNISONE 10 MG/1
10 TABLET ORAL
Qty: 90 | Refills: 0 | Status: DISCONTINUED | COMMUNITY
Start: 2020-06-18 | End: 2020-12-29

## 2020-12-29 RX ORDER — SERTRALINE 25 MG/1
25 TABLET, FILM COATED ORAL DAILY
Qty: 30 | Refills: 2 | Status: DISCONTINUED | COMMUNITY
Start: 2017-05-17 | End: 2020-12-29

## 2020-12-29 RX ORDER — LIDOCAINE AND PRILOCAINE 25; 25 MG/G; MG/G
2.5-2.5 CREAM TOPICAL
Qty: 30 | Refills: 0 | Status: DISCONTINUED | COMMUNITY
Start: 2016-10-20 | End: 2020-12-29

## 2020-12-29 RX ORDER — METOPROLOL TARTRATE 25 MG/1
25 TABLET, FILM COATED ORAL TWICE DAILY
Qty: 180 | Refills: 3 | Status: DISCONTINUED | COMMUNITY
Start: 2017-12-05 | End: 2020-12-29

## 2020-12-29 RX ORDER — SIMETHICONE 180 MG
180 CAPSULE ORAL 4 TIMES DAILY
Qty: 90 | Refills: 0 | Status: DISCONTINUED | COMMUNITY
Start: 2017-12-04 | End: 2020-12-29

## 2020-12-29 RX ORDER — PREDNISONE 20 MG/1
20 TABLET ORAL
Qty: 21 | Refills: 0 | Status: DISCONTINUED | COMMUNITY
Start: 2019-01-07 | End: 2020-12-29

## 2020-12-29 RX ORDER — NYSTATIN 100000 [USP'U]/G
100000 POWDER TOPICAL
Qty: 1 | Refills: 1 | Status: DISCONTINUED | COMMUNITY
Start: 2018-09-04 | End: 2020-12-29

## 2020-12-29 RX ORDER — PREDNISONE 10 MG/1
10 TABLET ORAL
Qty: 16 | Refills: 0 | Status: DISCONTINUED | COMMUNITY
Start: 2018-07-23 | End: 2020-12-29

## 2020-12-29 RX ORDER — IMATINIB MESYLATE 400 MG/1
400 TABLET, FILM COATED ORAL DAILY
Qty: 30 | Refills: 2 | Status: DISCONTINUED | COMMUNITY
Start: 2017-08-29 | End: 2020-12-29

## 2020-12-29 RX ORDER — FLUCONAZOLE 200 MG/1
200 TABLET ORAL
Qty: 60 | Refills: 2 | Status: DISCONTINUED | COMMUNITY
Start: 2017-05-03 | End: 2020-12-29

## 2020-12-29 RX ORDER — APIXABAN 2.5 MG/1
2.5 TABLET, FILM COATED ORAL
Qty: 60 | Refills: 5 | Status: DISCONTINUED | COMMUNITY
Start: 2020-01-27 | End: 2020-12-29

## 2020-12-29 RX ORDER — IRON POLYSACCHARIDE COMPLEX 150 MG
150 CAPSULE ORAL
Qty: 60 | Refills: 5 | Status: DISCONTINUED | COMMUNITY
Start: 2018-02-09 | End: 2020-12-29

## 2021-01-04 LAB
RAPID RVP RESULT: NOT DETECTED
SARS-COV-2 RNA PNL RESP NAA+PROBE: NOT DETECTED

## 2021-01-25 PROBLEM — B34.9 ACUTE VIRAL SYNDROME: Status: ACTIVE | Noted: 2021-01-25

## 2021-01-26 NOTE — REASON FOR VISIT
[Follow-Up Visit] : a follow-up visit for [FreeTextEntry2] : hypereosinophilia, iron deficiency anemia, DVT

## 2021-01-26 NOTE — REVIEW OF SYSTEMS
[Fatigue] : fatigue [Recent Change In Weight] : ~T recent weight change [Joint Pain] : joint pain [Negative] : Heme/Lymph [Chills] : chills [Cough] : cough [Vomiting] : vomiting [Diarrhea] : diarrhea [Dizziness] : dizziness [FreeTextEntry4] : nasal congestion  [de-identified] : headache

## 2021-01-26 NOTE — HISTORY OF PRESENT ILLNESS
[de-identified] : 12/29/2020 Urgent Visit:\par Patient is here with c/o n/v dizziness\par \par Patient c/o fatigue, nasal congestion, headache and dry cough since last night.  She also c/o lower back pain.  She denies fever but took an NSAID this am.  This am, she also c/o dizziness, nausea/vomiting X 3, loose stools X 2, shaking chills and elevated BP to 164/92.  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  She was tested yesterday but the results are pending.  She reports that she had COVID already in 7/2020.  Patient denies any CP or SOB.  \par \par Hypereosinophilia Syndrome- Gleevec and Prednisone have been on hold X 1 month. She was on Gleevec and Prednisone for approx 3 years. \par Iron deficiency anemia- currently on Ferrous gluconate BID since 10/1/2020. She is s/p Injectafer on 7/17 and 7/24/19. She reports a h/o heavy menses twice per month. \par DVT/PE on Xarelto 10mg po daily \par Hypertension- on Norvasc and Losartan.  Took meds at 6am today. \par Bilateral bone spurs- saw podiatry- awaiting special shoes. Now on Baclofen and Naproxen\par Had hematuria which has resolved. Saw Dr. Mendoza PCP on 9/29/20. \par Patient refused flu vaccine this season.  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 Seen initially on 17. PT reports improvement in fatigue since admission 17, but still with pain in L elbow, and weakness in flexion of L arm at elbow..ros. PT admits to paraesthesias in 5th digit of L hand, and tenderness at heels in both feet, and chronic cough, for which she takes Guaifenesin.\par \par HYPEREOSINOPHILIA HX:\par - 2016: was in [place] for her Sister's , rash started 2 weeks after returning, claims to have many bug bites from "horseflies" starting at bilateral medial knees. \par - 10/2016: Saw Beatriz Wu ([phone]), md ([employer]), and allergist at [facility], felt that the diagnosis was vasculitis, started on clobetazol, hydrocortizone, to little effect\par - 2016: Saw Dr. Mendoza who continued to treat pain with naproxen, neurontin\par - 2017: Saw Dermatologist Dr. Wisdom\par - 2017: Dr Wisdom 2017 performed biopsy at that time showed changes consistent with hypereosinophilic syndrome vs drug vs arthropod bite like reaction (superficial and deep perivascular and interstitial dermatitis with eosinophils), started on doxycycline, to some effect. \par - 2017: Saw Dr. Inocencio Batres on 17 generalized rash including facial rash and itching, and edema. who performed Punch biopsy of R arm showing interface dermatitis with prominent dermal eosinophils. 2nd punch biopsy of right arm suggestive of lupus erythematosus w/ granular linear signals of igA and IgM and patchy granular signal of C3 at basement membrane. IgG negative. \par - ADM 2017 TO 2017 TO [facility] with diffuse generalized rash. T 98.1, , /85, RR 18, O2 97%. Labs significant for WBC 28.15, eosinophils 97%, Hgb 11.1, MCV 75.6. Received prednisone 40mg & morphine.She was also evaluated by opthamology for complaint of blurry vision in her right eye. Was found to have cotton wool spots off both optic nerves. Had MRI brain which showed microvascular changes.\par - 2017: 17 bone marrow biopsy without evidence of leukemia, no blasts, [id number] negative. \par - 2017: 17 STARTED HYDROXYUREA , 60mg po prednisone daily\par - 2017: 5/3/17 presents with painful rash on hands and feet, exquisitely tender, stopped Hydroxyurea. \par - 2017: 17 found to have swollen R leg, RLE duplex ultrasound c/w extensive DVT, started on [place]arelto [place]mg [place] [facility] [place] [facility] [place] \par - 2017: 17 restarted hydrea 500mg po BID with worsening rash.\par - 2017: 17 decreased prednisone from 60mg po daily to 40mg po daily continue 500mg hydrea BID\par - 2017: 17 prednisone at 40mg po daily, hydrea increased to 1000mg in AM and 500mg in PM. \par - Started Gleevec . Tapered off prednisone 17. \par \par Was on on Losartan 50mg for BP control.  that stopped recently by cardiologist.\par She was admitted  for incidental finding of PE on CT A/P and tachycardia. They were unable to obtain CTA of chest 2/2 recent contrast load. Doppler LE showed acute below the knee DVT on left side. She was off Xarelto for 3 weeks prior so was resumed on Xarelto. No SOB, TTE performed which showed no RV strain, CE negative, normal BNP. Patient with persistent tachycardia, seen by cardiology who started on beta blocker. \par \par Started Gleevec . Tapered off prednisone 17. \par Noted to have a new rash around . She reports that this rash is different than her previous rash. She saw derm, was started back on prednisone taper, completed 3-4 days ago. \par We held her Gleevec on 12/15 b/c neutropenia. She also stopped HU. Her counts improved by the following week; we restarted Gleevec 1/5/18. \par Holding Gleevec again since 3/9/18 for neutropenia. \par Her periods have been very heavy b/c of Xarelto. Refused any pills to control her menses. On po iron supplements but unable to take every day because of constipation and hemorrhoids. She received Injectafer 3/23 and 3/30. \par She now holds the Xarelto for 2 days when she gets her menses. \par

## 2021-01-26 NOTE — ASSESSMENT
[FreeTextEntry1] : 49yo woman with hypereosinophilic syndrome diagnosed on 2/2017 by Dr. Rich, dermatologist by biopsy, [id number] fusion negative, JAK2 mutation negative, BCR-ABL mutation negative, with recent skin biopsy suggesting possible lupus as well, steroid dependent. Course complicated by 5/17/17 RLE duplex ultrasound c/w extensive DVT, s/p 6 months of Xarelto (finished 11/2017), now w/ new DVT on L, back on Xarelto \par She started Gleevec on 9/2017, was tapered off steroids 12/4/17 but she gets recurrent rash when she is off steroids so was started on low dose steroids (5mg daily). \par She has new insurance this year with high copays for her medications.\par \par - Viral symptoms today.  Will do COVID and RVP panel.  Rest.  Tylenol PRN. PO hydration.  Zofran ODT prn.  Patient advised to quarantine pending COVID results.  \par -we have previously discussed changing Gleevec to Hydrea which should have a lower copay but pt does not want to given previous side effects. She is now off Gleevec since 4 weeks ago. She is also not on steroids for 4 weeks. Will monitor eos and rash\par -pt has h/o iron deficiency anemia, s/p injectafer weekly x2 on 3/23 and 3/30/18. Also s/p IV Iron x 2 on 7/17 and 7/24/19. Advised her to increase iron in her diet and continue Iron pills. Her H/H has improved today, f/u iron studies \par -continue AC for recurrent DVT.  We discussed that since she has recurrent VTE, staying on AC would be best but if she really wants to stop, we should at least change to ASA with the understanding that risk of recurrence would be higher. Pt understands, will continue Xarelto\par -f/u w/ GYN for menorrhagia\par -RTO in 1 month\par Discussed with Dr. Saravia.

## 2021-01-26 NOTE — PHYSICAL EXAM
[Fully active, able to carry on all pre-disease performance without restriction] : Status 0 - Fully active, able to carry on all pre-disease performance without restriction [Obese] : obese [Normal] : no peripheral adenopathy appreciated [de-identified] : hyperpigmented rash improved

## 2021-03-02 ENCOUNTER — OUTPATIENT (OUTPATIENT)
Dept: OUTPATIENT SERVICES | Facility: HOSPITAL | Age: 51
LOS: 1 days | Discharge: HOME | End: 2021-03-02

## 2021-03-02 DIAGNOSIS — D25.9 LEIOMYOMA OF UTERUS, UNSPECIFIED: Chronic | ICD-10-CM

## 2021-03-02 DIAGNOSIS — N20.0 CALCULUS OF KIDNEY: Chronic | ICD-10-CM

## 2021-03-02 DIAGNOSIS — Z98.890 OTHER SPECIFIED POSTPROCEDURAL STATES: Chronic | ICD-10-CM

## 2021-03-02 DIAGNOSIS — D72.119 HYPEREOSINOPHILIC SYNDROME [HES], UNSPECIFIED: ICD-10-CM

## 2021-03-04 ENCOUNTER — APPOINTMENT (OUTPATIENT)
Dept: HEMATOLOGY ONCOLOGY | Facility: CLINIC | Age: 51
End: 2021-03-04
Payer: COMMERCIAL

## 2021-03-04 ENCOUNTER — RESULT REVIEW (OUTPATIENT)
Age: 51
End: 2021-03-04

## 2021-03-04 ENCOUNTER — EMERGENCY (EMERGENCY)
Facility: HOSPITAL | Age: 51
LOS: 1 days | Discharge: HOME | End: 2021-03-04
Admitting: HOSPITALIST
Payer: COMMERCIAL

## 2021-03-04 ENCOUNTER — NON-APPOINTMENT (OUTPATIENT)
Age: 51
End: 2021-03-04

## 2021-03-04 VITALS
RESPIRATION RATE: 18 BRPM | HEART RATE: 85 BPM | OXYGEN SATURATION: 100 % | DIASTOLIC BLOOD PRESSURE: 68 MMHG | SYSTOLIC BLOOD PRESSURE: 106 MMHG | TEMPERATURE: 98 F | HEIGHT: 66 IN

## 2021-03-04 VITALS
SYSTOLIC BLOOD PRESSURE: 126 MMHG | OXYGEN SATURATION: 99 % | TEMPERATURE: 97.2 F | RESPIRATION RATE: 15 BRPM | HEIGHT: 66 IN | WEIGHT: 219.8 LBS | DIASTOLIC BLOOD PRESSURE: 89 MMHG | HEART RATE: 108 BPM | BODY MASS INDEX: 35.32 KG/M2

## 2021-03-04 DIAGNOSIS — Z98.890 OTHER SPECIFIED POSTPROCEDURAL STATES: Chronic | ICD-10-CM

## 2021-03-04 DIAGNOSIS — N20.0 CALCULUS OF KIDNEY: Chronic | ICD-10-CM

## 2021-03-04 DIAGNOSIS — F32.9 MAJOR DEPRESSIVE DISORDER, SINGLE EPISODE, UNSPECIFIED: ICD-10-CM

## 2021-03-04 DIAGNOSIS — D25.9 LEIOMYOMA OF UTERUS, UNSPECIFIED: Chronic | ICD-10-CM

## 2021-03-04 DIAGNOSIS — F33.2 MAJOR DEPRESSIVE DISORDER, RECURRENT SEVERE WITHOUT PSYCHOTIC FEATURES: ICD-10-CM

## 2021-03-04 LAB
BASOPHILS # BLD AUTO: 0.04 K/UL — SIGNIFICANT CHANGE UP (ref 0–0.2)
BASOPHILS NFR BLD AUTO: 1.2 % — SIGNIFICANT CHANGE UP (ref 0–2)
EOSINOPHIL # BLD AUTO: 0.16 K/UL — SIGNIFICANT CHANGE UP (ref 0–0.5)
EOSINOPHIL NFR BLD AUTO: 4.9 % — SIGNIFICANT CHANGE UP (ref 0–6)
HCT VFR BLD CALC: 31.8 % — LOW (ref 34.5–45)
HGB BLD-MCNC: 9.7 G/DL — LOW (ref 11.5–15.5)
IMM GRANULOCYTES NFR BLD AUTO: 0.3 % — SIGNIFICANT CHANGE UP (ref 0–1.5)
LYMPHOCYTES # BLD AUTO: 1.42 K/UL — SIGNIFICANT CHANGE UP (ref 1–3.3)
LYMPHOCYTES # BLD AUTO: 43.8 % — SIGNIFICANT CHANGE UP (ref 13–44)
MCHC RBC-ENTMCNC: 23.8 PG — LOW (ref 27–34)
MCHC RBC-ENTMCNC: 30.5 G/DL — LOW (ref 32–36)
MCV RBC AUTO: 77.9 FL — LOW (ref 80–100)
MONOCYTES # BLD AUTO: 0.22 K/UL — SIGNIFICANT CHANGE UP (ref 0–0.9)
MONOCYTES NFR BLD AUTO: 6.8 % — SIGNIFICANT CHANGE UP (ref 2–14)
NEUTROPHILS # BLD AUTO: 1.39 K/UL — LOW (ref 1.8–7.4)
NEUTROPHILS NFR BLD AUTO: 43 % — SIGNIFICANT CHANGE UP (ref 43–77)
NRBC # BLD: 0 /100 WBCS — SIGNIFICANT CHANGE UP (ref 0–0)
PLATELET # BLD AUTO: 215 K/UL — SIGNIFICANT CHANGE UP (ref 150–400)
RBC # BLD: 4.08 M/UL — SIGNIFICANT CHANGE UP (ref 3.8–5.2)
RBC # FLD: 17.8 % — HIGH (ref 10.3–14.5)
WBC # BLD: 3.24 K/UL — LOW (ref 3.8–10.5)
WBC # FLD AUTO: 3.24 K/UL — LOW (ref 3.8–10.5)

## 2021-03-04 PROCEDURE — 99215 OFFICE O/P EST HI 40 MIN: CPT

## 2021-03-04 PROCEDURE — 99072 ADDL SUPL MATRL&STAF TM PHE: CPT

## 2021-03-04 PROCEDURE — 99283 EMERGENCY DEPT VISIT LOW MDM: CPT

## 2021-03-04 PROCEDURE — 90792 PSYCH DIAG EVAL W/MED SRVCS: CPT | Mod: GC

## 2021-03-04 RX ORDER — NABUMETONE 500 MG/1
500 TABLET, FILM COATED ORAL
Qty: 180 | Refills: 1 | Status: ACTIVE | COMMUNITY
Start: 2017-03-20 | End: 1900-01-01

## 2021-03-04 NOTE — ED ADULT TRIAGE NOTE - CHIEF COMPLAINT QUOTE
Arrives via EMS from cancer center for further evaluation of anxiety and depression.  Denies SI/HI/AH.  Not sleeping well with loss of appetite.  Pt was on anti-depressants.  Pt endorses back pain and cramping.

## 2021-03-04 NOTE — ED PROVIDER NOTE - PATIENT PORTAL LINK FT
You can access the FollowMyHealth Patient Portal offered by [facility] by registering at the following website: [email or web address]. By joining "[employer]’s FollowMyHealth portal, you will also be able to view your health information using other applications (apps) compatible with our system.

## 2021-03-04 NOTE — ED BEHAVIORAL HEALTH ASSESSMENT NOTE - CASE SUMMARY
Reason For Visit  NATALEE STEVE is here today for a nurse visit for TB reading.      Current Meds   1. Microgestin FE 1/20 1-20 MG-MCG Oral Tablet;   Therapy: (Recorded:02Apr2018) to Recorded    Allergies  No Known Drug Allergies    Assessment   1. Encounter for PPD test (Z11.1)    Plan   1. PPD, tuberculin skin test; purified protein derivative solution, intradermal    Patient Instructions Pt returned for TB reading on 04/04/2018 at 4:05PM - 0 mm induration- Negative TB test. Pt notified of results.        Signatures   Electronically signed by : MONALISA CARL NP; Apr 4 2018  4:30PM CST     Pt is a 49 yo woman, single, domiciled alone, employed as nurse manager; PMH hypereosinophilic syndrome, PE/DVT; PPH (post-partum) depression, no IPP or outpatient psychiatric treatment, prior trials of Paxil and Zoloft by OBGYN/medical admission, no suicide attempts, no aggression, no legal, no significant substance; referred by [facility] [place] for depression and irritability.  Pt reports depressed mood with worsening irritability, inability to focus at work, trouble sleeping and constant crying in the context of multiple stressors. She denies any si/i/p at this time, is future oriented and motivated to start treatment. She is refusing voluntary hospitalization and at this time does not meet criteria for involuntary hospitalization.

## 2021-03-04 NOTE — ED BEHAVIORAL HEALTH ASSESSMENT NOTE - NSSUICRSKFACTOR_PSY_ALL_CORE
Current and Past Psychiatric Diagnoses/Presenting Symptoms Current and Past Psychiatric Diagnoses/Presenting Symptoms/Activating Events/Stressors

## 2021-03-04 NOTE — ED BEHAVIORAL HEALTH ASSESSMENT NOTE - DESCRIPTION
Born in [place], moved to [place] 40 years ago, never , has 2 adult daughters and 1 adult son, employed as nursing manager hypereosinophilic syndrome, PE/DVT, uterine fibroids, oral surgeries, kidney stones Crying, overall calm    ICU Vital Signs Last 24 Hrs  T(C): 36.8 (04 Mar 2021 17:58), Max: 36.8 (04 Mar 2021 17:58)  T(F): 98.2 (04 Mar 2021 17:58), Max: 98.2 (04 Mar 2021 17:58)  HR: 85 (04 Mar 2021 17:58) (85 - 85)  BP: 106/68 (04 Mar 2021 17:58) (106/68 - 106/68)  BP(mean): --  ABP: --  ABP(mean): --  RR: 18 (04 Mar 2021 17:58) (18 - 18)  SpO2: 100% (04 Mar 2021 17:58) (100% - 100%) Crying, overall calm. no prns required    ICU Vital Signs Last 24 Hrs  T(C): 36.8 (04 Mar 2021 17:58), Max: 36.8 (04 Mar 2021 17:58)  T(F): 98.2 (04 Mar 2021 17:58), Max: 98.2 (04 Mar 2021 17:58)  HR: 85 (04 Mar 2021 17:58) (85 - 85)  BP: 106/68 (04 Mar 2021 17:58) (106/68 - 106/68)  BP(mean): --  ABP: --  ABP(mean): --  RR: 18 (04 Mar 2021 17:58) (18 - 18)  SpO2: 100% (04 Mar 2021 17:58) (100% - 100%)

## 2021-03-04 NOTE — ED BEHAVIORAL HEALTH ASSESSMENT NOTE - SUMMARY
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    Pt presents with 2 months of worsening mood, both low and irritable, and associated neurovegetative symptoms consistent with recurrent MDD. She is unable to identify an acute stressor but chronic contributors to depression include persistent grief from sudden deaths of her sisters and work related stress. 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    Pt presents with 2 months of worsening mood, both low and irritable, and associated neurovegetative symptoms consistent with recurrent MDD. She is unable to identify an acute stressor but chronic contributors to depression include persistent grief from sudden deaths of her sisters and work related stress.    Pt denying all SI/I/P at this time, is future oriented and is willing to restart treatment. Collateral also has no acute safety concerns. Pt refusing voluntary hospitalization and does not meet criteria for involuntary hospitalization.

## 2021-03-04 NOTE — ED BEHAVIORAL HEALTH ASSESSMENT NOTE - HPI (INCLUDE ILLNESS QUALITY, SEVERITY, DURATION, TIMING, CONTEXT, MODIFYING FACTORS, ASSOCIATED SIGNS AND SYMPTOMS)
Pt is a 51 yo woman, single, domiciled alone, employed as RN; PMH  hypereosinophilic syndrome, PE/DVT; PPH depression, patient denies past IPP or outpatient psychiatric treatment, prior trials of Paxil and Zoloft by OBGYN or medically hospitalized.    Per daughter, Hakeem Del Toro [phone]: patient sounded "really down" yesterday so daughter wanted patient to be evaluated. Patient was c/o work getting her down because it is unfair that she doing the work for many people. Daughter denies past or current suicidality. Pt is a 51 yo woman, single, domiciled alone, employed as nurse manager; PMH hypereosinophilic syndrome, PE/DVT; PPH (post-partum) depression, no IPP or outpatient psychiatric treatment, prior trials of Paxil and Zoloft by OBGYN/medical admission, no suicide attempts, no aggression, no legal, no significant substance; referred by [facility] for depression and irritability.    Pt is tearful on evaluation. She says that she has been feeling very anxious, emotional, and snapping at "everyone" for the past 2 months. She has not been able to get a good night's sleep and is taking short naps instead. She is eating less despite having an appetite. She is crying often. She denies any passive or active SIIP. She has punched the wall when she feels overwhelmed with emotion, but denies any aggression towards others. She has not missed any days at work as nurse manager and last went to work yesterday but that she does not think she is not able to focus or get things done at work recently.  Pt is unable to identify any changes/stressors 2 months ago. The major change at the time was that her chemotherapy was put on hold by her hematologist.    She says in the past few years she has experienced "a lot of losses", including the deaths of her two sisters who were living [place] in 2016. She feels that she has not been able to properly grieve and is now overwhelmed with feelings of sadness, especially because she did not get to say her goodbyes.     She also struggled distressing physical symptoms including rashes, facial "disfigurement", and teeth falling out for almost a year before eventually being diagnosed with hypereosinophilic syndrome. She says during that time she had to seek medical treatment over and over, and was dismissed without receiving proper treatment. She says she had to tell doctors that she "can't live like this" before she could finally receive proper diagnostic workup. She says she had felt hopeless at the time but denied actually wanting to die.    She says she has had several periods of depression since her first diagnosis of PPD 25 years ago. She last felt well about 2 years ago.    Pt denies AH/VH/paranoid ideation/referential ideation. She denies past/current persistent elevated mood.    Per daughter, Hakeem Del Toro [phone] who lives in [place] but speaks with patient every other day, patient sounded "really down" yesterday. Patient c/o work getting her down because it is unfair that she doing the work of many people. Daughter wanted patient to be evaluated because pt did not sound like herself. Daughter denies patient was ever admitted psychiatrically. Daughter denies patient ever expressing suicidality or attempting self harm/suicide. Daughter had no acute safety concerns and agreed to patient being referred to outpatient treatment. Pt is a 49 yo woman, single, domiciled alone, employed as nurse manager; PMH hypereosinophilic syndrome, PE/DVT; PPH (post-partum) depression, no IPP or outpatient psychiatric treatment, prior trials of Paxil and Zoloft by OBGYN/medical admission - followed by [facility], no suicide attempts, no aggression, no legal, no significant substance; referred by [facility] for depression and irritability.    Pt is tearful on evaluation. She says that she has been feeling very anxious, emotional, and snapping at "everyone" for the past 2 months. She has not been able to get a good night's sleep and is taking short naps instead. She is eating less despite having an appetite. She is crying often. She denies any passive or active SIIP. She has punched the wall when she feels overwhelmed with emotion, but denies any aggression towards others. She has not missed any days at work as nurse manager and last went to work yesterday but that she does not think she is not able to focus or get things done at work recently.  Pt is unable to identify any changes/stressors 2 months ago. The major change at the time was that her chemotherapy was put on hold by her hematologist.    She says in the past few years she has experienced "a lot of losses", including the deaths of her two sisters who were living overseas in 2016. She feels that she has not been able to properly grieve and is now overwhelmed with feelings of sadness, especially because she did not get to say her goodbyes.     She also struggled distressing physical symptoms including rashes, facial "disfigurement", and teeth falling out for almost a year before eventually being diagnosed with hypereosinophilic syndrome. She says during that time she had to seek medical treatment over and over, and was dismissed without receiving proper treatment. She says she had to tell doctors that she "can't live like this" before she could finally receive proper diagnostic workup. She says she had felt hopeless at the time but denied actually wanting to die.    She says she has had several periods of depression since her first diagnosis of PPD 25 years ago. She last felt well about 2 years ago.    Pt denies AH/VH/paranoid ideation/referential ideation. She denies past/current persistent elevated mood.    Per daughter, Hakeemnatalie Millsdonavan [phone] who lives in [place] but speaks with patient every other day, patient sounded "really down" yesterday. Patient c/o work getting her down because it is unfair that she doing the work of many people. Daughter wanted patient to be evaluated because pt did not sound like herself. Daughter denies patient was ever admitted psychiatrically. Daughter denies patient ever expressing suicidality or attempting self harm/suicide. Daughter had no acute safety concerns and agreed to patient being referred to outpatient treatment.

## 2021-03-04 NOTE — ED PROVIDER NOTE - OBJECTIVE STATEMENT
50 year old female history of hypereosinophilic syndrome, PE, depression presents for depression.  Patient sent over by her hematologist due to endorsing depression, crying, decreased po intake, anxiety, insomnia.  Patient reports that she had blood work at her doctor today (CBC / BMP results with patient and reviewed by writer, no concerning findings), but patient sent for psych evaluation.  Patient also had chemo last 2 months ago, but states condition has been improving and her chemo is on hold.  Patient also reports that she had an episode 2 weeks ago of a hair clump that came out of her head.  Patient denies SI/HI/AH/VH.  Patient denies illicit drugs or ETOH, no physical complaints or concerns except for chronic lower back pain.  Patient denies recent heavy lifting or trauma.  Patient denies numbness or tingling in extremities, denies loss of bladder of bowel function, denies fevers or chills and denies saddle anesthesia.  Is able to ambulate.

## 2021-03-04 NOTE — ED BEHAVIORAL HEALTH ASSESSMENT NOTE - RISK ASSESSMENT
Low Acute Suicide Risk Chronic risk factors: history of depression, single marital status, chronic medical conditions  Acute risk factors: depression, irritability, grief, not in treatment  Protective factors: identified reasons for living, work engagement, supportive network, future orientation, sobriety.  Pt has no history of SA/SIB. The patient is not at acute risk of harm to self/others and does not meet criteria for inpatient admission. She would benefit from and is amenable to outpatient treatment.

## 2021-03-04 NOTE — ED PROVIDER NOTE - PHYSICAL EXAMINATION
GEN:   comfortable, in no apparent distress, AOx3  EYES:   PERRL, extra-occular movements intact  HEENT:   airway patent, moist mucosal membranes, uvula midline  CV:   regular rate and rhythm, normal S1/S2, no murmur  RESP:   clear to auscultation bilaterally, non-labored, speaking in full sentences  ABD:   soft, non tender, no guarding  :   no cva tenderness  MSK:   no musculoskeletal tenderness, 5/5 strength, moving all extremities  SKIN:   dry, intact, no rash  NEURO:   AOx3, no focal weakness or loss of sensation, gait normal, GCS 15  PSYCH: tearful, cooperative.,  Denies SI/HI.  No evidence of hallucinations.

## 2021-03-04 NOTE — ED BEHAVIORAL HEALTH ASSESSMENT NOTE - NSSUICPROTFACT_PSY_ALL_CORE
Identifies reasons for living/Supportive social network of family or friends/Engaged in work or school/Positive therapeutic relationships Responsibility to children, family, or others/Identifies reasons for living/Supportive social network of family or friends/Engaged in work or school/Positive therapeutic relationships

## 2021-03-04 NOTE — ED PROVIDER NOTE - PROGRESS NOTE DETAILS
Patient cleared by psych, nontoxic and medically stable for discharge. Return precautions provided and patient understands to return to the ED for concerning or worsening signs and symptoms. Instructed to follow up with primary care physician and [facility] crisis center and agreeable. Patient's questions answered.

## 2021-03-04 NOTE — ASSESSMENT
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 Rich, dermatologist by biopsy, [id number] fusion negative, JAK2 mutation negative, BCR-ABL mutation negative, with recent skin biopsy suggesting possible lupus as well, steroid dependent. Course complicated by 5/17/17 RLE duplex ultrasound c/w extensive DVT, s/p 6 months of Xarelto (finished 11/2017), now w/ new DVT on L, back on Xarelto \par She started Gleevec on 9/2017, was tapered off steroids 12/4/17 but she gets recurrent rash when she is off steroids so was started on low dose steroids (5mg daily). \par She has new insurance this year with high copays for her medications.\par \par -we have previously discussed changing Gleevec to Hydrea which should have a lower copay but pt does not want to given previous side effects. She is now off Gleevec since 3 weeks ago. She is also not on steroids for 3 weeks. Will monitor eos and rash\par -pt has h/o iron deficiency anemia, s/p injectafer weekly x2 on 3/23 and 3/30/18. Also s/p IV Iron x 2 on 7/17 and 7/24/19. Advised her to increase iron in her diet and cont Iron pills. She is agreeable for IV iron, informed consent obtained. \par -continue AC for recurrent DVT. Pt asked about stopping Xarelto. We discussed that since she has recurrent VTE, staying on AC would be best but if she really wants to stop, we should at least change to ASA with the understanding that risk of recurrence would be higher. Pt understands, will continue Xarelto\par -f/u w/ GYN for menorrhagia\par -referral to psych given for her complaints of feeling very emotional and depressed; after discussion with  and [facility], we decided that pt should be evaluated and will send to ED for evaluation. She previously required a 2 weeks hospital stay on inpatient psych when she had similar symptoms\par -RTC 2 mo

## 2021-03-04 NOTE — ED ADULT NURSE NOTE - OBJECTIVE STATEMENT
Arrives via EMS from cancer center for further evaluation of anxiety and depression.  Denies SI/HI/AH.  Not sleeping well with loss of appetite.  Pt was on anti-depressants.  Pt endorses back pain and cramping.  Patient to . Pt evaluated by MD. Pt waiting for further collateral and family will be here to pick her up.   АЛЕКСАНДР Patricio

## 2021-03-04 NOTE — ED BEHAVIORAL HEALTH ASSESSMENT NOTE - OTHER PAST PSYCHIATRIC HISTORY (INCLUDE DETAILS REGARDING ONSET, COURSE OF ILLNESS, INPATIENT/OUTPATIENT TREATMENT)
PPH (post-partum) depression, no IPP or outpatient psychiatric treatment, prior trials of Paxil and Zoloft by OBGYN/Psych [facility] during medical admission, no suicide attempts/SIB;  Has not been on psychiatric medication for over a year

## 2021-03-04 NOTE — PHYSICAL EXAM
[Fully active, able to carry on all pre-disease performance without restriction] : Status 0 - Fully active, able to carry on all pre-disease performance without restriction [Obese] : obese [Normal] : affect appropriate [de-identified] : very tearful [de-identified] : hyperpigmented rash improved

## 2021-03-04 NOTE — ED BEHAVIORAL HEALTH ASSESSMENT NOTE - DETAILS
Pt denies any SI spoke with patient's daughter who is in agreement with plan; VM left for Kamille SW see  Safety Plan

## 2021-03-04 NOTE — ED PROVIDER NOTE - NSFOLLOWUPINSTRUCTIONS_ED_ALL_ED_FT
Last office visit on 6/22/20.  Refill pending doctor approval.   Follow up with your primary care physician and psychiatrist in 48-72 hours.  You may also see the psychiatrist at [facility]:    [address] [place] [address]  Phone: [phone]      SEEK IMMEDIATE MEDICAL CARE IF YOU HAVE ANY OF THE FOLLOWING SYMPTOMS: thoughts about hurting or killing yourself, thoughts about hurting or killing somebody else, hallucinations or any other worsening or persistent symptoms OR ANY NEW OR CONCERNING SYMPTOMS.

## 2021-03-04 NOTE — HISTORY OF PRESENT ILLNESS
[de-identified] : Gleevec has been restarted in 5/2019 but at 200mg for neutropenia. She reports from past couple months she intentionally missed some Gleevec doses and wants to be tapered off of it. However started having rash and fatigue so now back on Gleevec. On 300mg daily. We have continued her on prednisone 5mg. \par She is s/p Injectafer on 7/17 and 7/24/19. \par \par Since visit in Jan, she has new insurance and it is $375 copay for Gleevec a month. Rash is coming back on face and stomach - it is burning, not itching. She is also off her iron supplements 2/2 high copay. She has leftover Xarelto and Eliquis but otherwise it also has a high copay which she cannot afford. She states that she was breaking the leftover 400mg Gleevec in half and taking it that way.\par  \par Visit on 6/2020, we increased prednisone to 40mg which she took for 3 weeks, then started tapering 30mg for 2 weeks, 20mg for the last 2 weeks - rash has improved \par Hip and knee pain improved but still having ankle pain. Xrays done were unremarkable. \par She bought OTC iron - started on Thurs 10/1/20. \par Saw podiatry - has bone spurs in b/l feet, awaiting special shoes. Now on Baclofen and Naproxen\par Had hematuria which has resolved. Saw Dr. Mendoza PCP on 9/29/20. \par \par She stopped taking Gleevec about 3 weeks ago. She is also now off steroids for the same period of time. \par She reports losing weight since coming off steroids. \par \par Pt very tearful today, reports feeling very emotional and depressed. She states that she is not able to concentrate, very irritable. When asked about suicidal ideations, she states "I don't know" [de-identified] : DAUGHTER ALAN [phone]\par PATIENT CELL [phone]\par PATIENT HOME [phone]\par \par 45 yo F seen in consultation for hypereosinophilic syndrome dx by Dr Wisdom 2017. Seen initially on 17. PT reports improvement in fatigue since admission 17, but still with pain in L elbow, and weakness in flexion of L arm at elbow..ros. PT admits to paraesthesias in 5th digit of L hand, and tenderness at heels in both feet, and chronic cough, for which she takes Guaifenesin.\par \par HYPEREOSINOPHILIA HX:\par - 2016: was in [place] for her Sister's , rash started 2 weeks after returning, claims to have many bug bites from "horseflies" starting at bilateral medial knees. \par - 10/2016: Saw Beatriz Wu ([phone]), md ([facility]), and allergist at [facility], felt that the diagnosis was vasculitis, started on clobetazol, hydrocortizone, to little effect\par - 2016: Saw Dr. Mendoza who continued to treat pain with naproxen, neurontin\par - 2017: Saw Dermatologist Dr. Wisdom\par - 2017: Dr Wisdom 2017 performed biopsy at that time showed changes consistent with hypereosinophilic syndrome vs drug vs arthropod bite like reaction (superficial and deep perivascular and interstitial dermatitis with eosinophils), started on doxycycline, to some effect. \par - 2017: Saw Dr. Inocencio Batres on 17 generalized rash including facial rash and itching, and edema. who performed Punch biopsy of R arm showing interface dermatitis with prominent dermal eosinophils. 2nd punch biopsy of right arm suggestive of lupus erythematosus w/ granular linear signals of igA and IgM and patchy granular signal of C3 at basement membrane. IgG negative. \par - ADM 2017 TO 2017 TO [facility] with diffuse generalized rash. T 98.1, , /85, RR 18, O2 97%. Labs significant for WBC 28.15, eosinophils 97%, Hgb 11.1, MCV 75.6. Received prednisone 40mg & morphine.She was also evaluated by opthamology for complaint of blurry vision in her right eye. Was found to have cotton wool spots off both optic nerves. Had MRI brain which showed microvascular changes.\par - 2017: 17 bone marrow biopsy without evidence of leukemia, no blasts, [id number] negative. \par - 2017: 17 STARTED HYDROXYUREA , 60mg po prednisone daily\par - 2017: 5/3/17 presents with painful rash on hands and feet, exquisitely tender, stopped Hydroxyurea. \par - 2017: 17 found to have swollen R leg, RLE duplex ultrasound c/w extensive DVT, started on xarelto 15mg po bid [facility] \par - 2017: 17 restarted hydrea 500mg po BID with worsening rash.\par - 2017: 17 decreased prednisone from 60mg po daily to 40mg po daily continue 500mg hydrea BID\par - 2017: 17 prednisone at 40mg po daily, hydrea increased to 1000mg in AM and 500mg in PM. \par - Started Gleevec . Tapered off prednisone 17. \par \par Was on on Losartan 50mg for BP control.  that stopped recently by cardiologist.\par She was admitted  for incidental finding of PE on CT A/P and tachycardia. They were unable to obtain CTA of chest 2/2 recent contrast load. Doppler LE showed acute below the knee DVT on left side. She was off Xarelto for 3 weeks prior so was resumed on Xarelto. No SOB, TTE performed which showed no RV strain, CE negative, normal BNP. Patient with persistent tachycardia, seen by cardiology who started on beta blocker. \par \par Started Gleevec . Tapered off prednisone 17. \par Noted to have a new rash around . She reports that this rash is different than her previous rash. She saw derm, was started back on prednisone taper, completed 3-4 days ago. \par We held her Gleevec on 12/15 b/c neutropenia. She also stopped HU. Her counts improved by the following week; we restarted Gleevec 1/5/18. \par Holding Gleevec again since 3/9/18 for neutropenia. \par Her periods have been very heavy b/c of Xarelto. Refused any pills to control her menses. On po iron supplements but unable to take every day because of constipation and hemorrhoids. She received Injectafer 3/23 and 3/30. \par She now holds the Xarelto for 2 days when she gets her menses. \par

## 2021-03-04 NOTE — ED BEHAVIORAL HEALTH ASSESSMENT NOTE - NSACTIVEVENT_PSY_ALL_CORE
Current or pending social isolation/Chronic pain or other acute medical condition/Inadequate social supports

## 2021-03-04 NOTE — ED PROVIDER NOTE - CLINICAL SUMMARY MEDICAL DECISION MAKING FREE TEXT BOX
50 year old female history of hypereosinophilic syndrome, PE, depression presents for worsening depression.  Medical evaluation performed. There is no clinical evidence of intoxication or any acute medical problem requiring immediate intervention. Patient is awaiting psychiatric consultation. Final disposition will be determined by psychiatrist.

## 2021-03-04 NOTE — ED BEHAVIORAL HEALTH ASSESSMENT NOTE - NSPRESENTSXS_PSY_ALL_CORE
Depressed mood/Anhedonia Depressed mood/Anhedonia/Global insomnia/Severe anxiety, agitation or panic

## 2021-03-04 NOTE — ED BEHAVIORAL HEALTH NOTE - BEHAVIORAL HEALTH NOTE
Cinthia Christiansen from [facility] - [phone]    Denita Russ - 1970 was sent from [facility] for psychiatric evaluation. Patient has been crying intermittently, reporting more irritability and has been more agitated with others. Patient also has been looking over her shoulder more. When patient presented like this in the past she was hospitalized. When asked about suicidal ideation she said "I don't know".  Patient was at a f/u appointment for Anemia.   Hx of Paxil use but currently not in treatment (no meds/therapy).     Daughter in [place] would like to be called  Hakeem Del Toro  [phone]

## 2021-03-04 NOTE — ED CLERICAL - NS ED CLERK NOTE PRE-ARRIVAL INFORMATION; ADDITIONAL PRE-ARRIVAL INFORMATION
Called in by Cinthia from [facility], Sent by Dr. Saravia ([phone]) for psych eval, Dr. Casper aware. Pt expressed depression and "unsure of SI" at today's appt at [facility]. Hx of psych hospitalizations. Daughter (Nayana) [phone]

## 2021-03-05 ENCOUNTER — OUTPATIENT (OUTPATIENT)
Dept: OUTPATIENT SERVICES | Facility: HOSPITAL | Age: 51
LOS: 1 days | Discharge: STILL ADMITTED OR ADMINISTRATIVE | End: 2021-03-05
Payer: COMMERCIAL

## 2021-03-05 ENCOUNTER — RX RENEWAL (OUTPATIENT)
Age: 51
End: 2021-03-05

## 2021-03-05 DIAGNOSIS — Z98.890 OTHER SPECIFIED POSTPROCEDURAL STATES: Chronic | ICD-10-CM

## 2021-03-05 DIAGNOSIS — D25.9 LEIOMYOMA OF UTERUS, UNSPECIFIED: Chronic | ICD-10-CM

## 2021-03-05 DIAGNOSIS — N20.0 CALCULUS OF KIDNEY: Chronic | ICD-10-CM

## 2021-03-05 LAB
ALBUMIN SERPL ELPH-MCNC: 3.7 G/DL
ALP BLD-CCNC: 80 U/L
ALT SERPL-CCNC: 7 U/L
ANION GAP SERPL CALC-SCNC: 7 MMOL/L
AST SERPL-CCNC: 14 U/L
BILIRUB SERPL-MCNC: 0.3 MG/DL
BUN SERPL-MCNC: 11 MG/DL
CALCIUM SERPL-MCNC: 8.6 MG/DL
CHLORIDE SERPL-SCNC: 109 MMOL/L
CO2 SERPL-SCNC: 23 MMOL/L
CREAT SERPL-MCNC: 0.94 MG/DL
FERRITIN SERPL-MCNC: 13 NG/ML
GLUCOSE SERPL-MCNC: 97 MG/DL
IRON SATN MFR SERPL: 10 %
IRON SERPL-MCNC: 30 UG/DL
POTASSIUM SERPL-SCNC: 3.8 MMOL/L
PROT SERPL-MCNC: 7.6 G/DL
SODIUM SERPL-SCNC: 139 MMOL/L
TIBC SERPL-MCNC: 296 UG/DL
UIBC SERPL-MCNC: 266 UG/DL

## 2021-03-08 DIAGNOSIS — F33.1 MAJOR DEPRESSIVE DISORDER, RECURRENT, MODERATE: ICD-10-CM

## 2021-03-15 ENCOUNTER — APPOINTMENT (OUTPATIENT)
Dept: INFUSION THERAPY | Facility: HOSPITAL | Age: 51
End: 2021-03-15

## 2021-03-15 ENCOUNTER — NON-APPOINTMENT (OUTPATIENT)
Age: 51
End: 2021-03-15

## 2021-03-15 DIAGNOSIS — D50.9 IRON DEFICIENCY ANEMIA, UNSPECIFIED: ICD-10-CM

## 2021-03-18 ENCOUNTER — NON-APPOINTMENT (OUTPATIENT)
Age: 51
End: 2021-03-18

## 2021-03-22 ENCOUNTER — APPOINTMENT (OUTPATIENT)
Dept: INFUSION THERAPY | Facility: HOSPITAL | Age: 51
End: 2021-03-22

## 2021-03-24 PROCEDURE — 99442: CPT

## 2021-05-08 ENCOUNTER — OUTPATIENT (OUTPATIENT)
Dept: OUTPATIENT SERVICES | Facility: HOSPITAL | Age: 51
LOS: 1 days | Discharge: HOME | End: 2021-05-08

## 2021-05-08 DIAGNOSIS — Z98.890 OTHER SPECIFIED POSTPROCEDURAL STATES: Chronic | ICD-10-CM

## 2021-05-08 DIAGNOSIS — D25.9 LEIOMYOMA OF UTERUS, UNSPECIFIED: Chronic | ICD-10-CM

## 2021-05-08 DIAGNOSIS — N20.0 CALCULUS OF KIDNEY: Chronic | ICD-10-CM

## 2021-05-08 DIAGNOSIS — D72.119 HYPEREOSINOPHILIC SYNDROME [HES], UNSPECIFIED: ICD-10-CM

## 2021-05-10 ENCOUNTER — RESULT REVIEW (OUTPATIENT)
Age: 51
End: 2021-05-10

## 2021-05-10 ENCOUNTER — APPOINTMENT (OUTPATIENT)
Dept: HEMATOLOGY ONCOLOGY | Facility: CLINIC | Age: 51
End: 2021-05-10
Payer: COMMERCIAL

## 2021-05-10 VITALS
TEMPERATURE: 97 F | BODY MASS INDEX: 35.43 KG/M2 | HEIGHT: 66 IN | OXYGEN SATURATION: 99 % | RESPIRATION RATE: 14 BRPM | SYSTOLIC BLOOD PRESSURE: 120 MMHG | WEIGHT: 220.46 LBS | HEART RATE: 63 BPM | DIASTOLIC BLOOD PRESSURE: 75 MMHG

## 2021-05-10 LAB
BASOPHILS # BLD AUTO: 0.08 K/UL — SIGNIFICANT CHANGE UP (ref 0–0.2)
BASOPHILS NFR BLD AUTO: 3 % — HIGH (ref 0–2)
EOSINOPHIL # BLD AUTO: 0.14 K/UL — SIGNIFICANT CHANGE UP (ref 0–0.5)
EOSINOPHIL NFR BLD AUTO: 5 % — SIGNIFICANT CHANGE UP (ref 0–6)
HCT VFR BLD CALC: 35.8 % — SIGNIFICANT CHANGE UP (ref 34.5–45)
HGB BLD-MCNC: 11.2 G/DL — LOW (ref 11.5–15.5)
LYMPHOCYTES # BLD AUTO: 1.52 K/UL — SIGNIFICANT CHANGE UP (ref 1–3.3)
LYMPHOCYTES # BLD AUTO: 56 % — HIGH (ref 13–44)
MCHC RBC-ENTMCNC: 25.9 PG — LOW (ref 27–34)
MCHC RBC-ENTMCNC: 31.3 G/DL — LOW (ref 32–36)
MCV RBC AUTO: 82.9 FL — SIGNIFICANT CHANGE UP (ref 80–100)
MONOCYTES # BLD AUTO: 0.14 K/UL — SIGNIFICANT CHANGE UP (ref 0–0.9)
MONOCYTES NFR BLD AUTO: 5 % — SIGNIFICANT CHANGE UP (ref 2–14)
NEUTROPHILS # BLD AUTO: 0.84 K/UL — LOW (ref 1.8–7.4)
NEUTROPHILS NFR BLD AUTO: 31 % — LOW (ref 43–77)
NRBC # BLD: 0 /100 — SIGNIFICANT CHANGE UP (ref 0–0)
NRBC # BLD: SIGNIFICANT CHANGE UP /100 WBCS (ref 0–0)
PLAT MORPH BLD: NORMAL — SIGNIFICANT CHANGE UP
PLATELET # BLD AUTO: 190 K/UL — SIGNIFICANT CHANGE UP (ref 150–400)
RBC # BLD: 4.32 M/UL — SIGNIFICANT CHANGE UP (ref 3.8–5.2)
RBC # FLD: 18.6 % — HIGH (ref 10.3–14.5)
RBC BLD AUTO: SIGNIFICANT CHANGE UP
WBC # BLD: 2.72 K/UL — LOW (ref 3.8–10.5)
WBC # FLD AUTO: 2.72 K/UL — LOW (ref 3.8–10.5)

## 2021-05-10 PROCEDURE — 99072 ADDL SUPL MATRL&STAF TM PHE: CPT

## 2021-05-10 PROCEDURE — 99214 OFFICE O/P EST MOD 30 MIN: CPT

## 2021-05-10 NOTE — REVIEW OF SYSTEMS
[Fatigue] : fatigue [Recent Change In Weight] : ~T recent weight change [Joint Pain] : joint pain [Skin Rash] : skin rash [Depression] : depression [Negative] : Heme/Lymph

## 2021-05-11 LAB
ALBUMIN SERPL ELPH-MCNC: 3.9 G/DL
ALP BLD-CCNC: 73 U/L
ALT SERPL-CCNC: 8 U/L
ANION GAP SERPL CALC-SCNC: 12 MMOL/L
AST SERPL-CCNC: 15 U/L
BILIRUB SERPL-MCNC: 0.2 MG/DL
BUN SERPL-MCNC: 11 MG/DL
CALCIUM SERPL-MCNC: 9.2 MG/DL
CHLORIDE SERPL-SCNC: 105 MMOL/L
CO2 SERPL-SCNC: 23 MMOL/L
CREAT SERPL-MCNC: 0.85 MG/DL
FERRITIN SERPL-MCNC: 82 NG/ML
GLUCOSE SERPL-MCNC: 103 MG/DL
IRON SATN MFR SERPL: 21 %
IRON SERPL-MCNC: 47 UG/DL
LDH SERPL-CCNC: 139 U/L
POTASSIUM SERPL-SCNC: 4.1 MMOL/L
PROT SERPL-MCNC: 7.7 G/DL
SODIUM SERPL-SCNC: 140 MMOL/L
TIBC SERPL-MCNC: 228 UG/DL
UIBC SERPL-MCNC: 181 UG/DL

## 2021-05-13 NOTE — PHYSICAL EXAM
[Fully active, able to carry on all pre-disease performance without restriction] : Status 0 - Fully active, able to carry on all pre-disease performance without restriction [Obese] : obese [Normal] : affect appropriate [de-identified] : very tearful [de-identified] : hyperpigmented rash improved

## 2021-05-13 NOTE — HISTORY OF PRESENT ILLNESS
[de-identified] : Gleevec has been restarted in 5/2019 but at 200mg for neutropenia. She reports from past couple months she intentionally missed some Gleevec doses and wants to be tapered off of it. However started having rash and fatigue so now back on Gleevec. On 300mg daily. We have continued her on prednisone 5mg. \par She is s/p Injectafer on 7/17 and 7/24/19. \par \par Since visit in Jan, she has new insurance and it is $375 copay for Gleevec a month. Rash is coming back on face and stomach - it is burning, not itching. She is also off her iron supplements 2/2 high copay. She has leftover Xarelto and Eliquis but otherwise it also has a high copay which she cannot afford. She states that she was breaking the leftover 400mg Gleevec in half and taking it that way.\par  \par Visit on 6/2020, we increased prednisone to 40mg which she took for 3 weeks, then started tapering 30mg for 2 weeks, 20mg for the last 2 weeks - rash has improved \par Hip and knee pain improved but still having ankle pain. Xrays done were unremarkable. \par She bought OTC iron - started on Thurs 10/1/20. \par Saw podiatry - has bone spurs in b/l feet, awaiting special shoes. Now on Baclofen and Naproxen\par Had hematuria which has resolved. Saw Dr. Mendoza PCP on 9/29/20. \par \par She stopped taking Gleevec end of Feb 2021. She is also now off steroids for the same period of time. \par She reports losing weight since coming off steroids. \par \par Last visit, she was sent to psych ER for evaluation of depression. \par Started on Zoloft 50mg, reports improvement in mood. 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 Seen initially on 17. PT reports improvement in fatigue since admission 17, but still with pain in L elbow, and weakness in flexion of L arm at elbow..ros. PT admits to paraesthesias in 5th digit of L hand, and tenderness at heels in both feet, and chronic cough, for which she takes Guaifenesin.\par \par HYPEREOSINOPHILIA HX:\par - 2016: was in [place] for her Sister's , rash started 2 weeks after returning, claims to have many bug bites from "horseflies" starting at bilateral medial knees. \par - 10/2016: Saw Beatriz Wu ([phone]), md ([employer]), and allergist at [facility], felt that the diagnosis was vasculitis, started on clobetazol, hydrocortizone, to little effect\par - 2016: Saw Dr. Mendoza who continued to treat pain with naproxen, neurontin\par - 2017: Saw Dermatologist Dr. Wisdom\par - 2017: Dr Wisdom 2017 performed biopsy at that time showed changes consistent with hypereosinophilic syndrome vs drug vs arthropod bite like reaction (superficial and deep perivascular and interstitial dermatitis with eosinophils), started on doxycycline, to some effect. \par - 2017: Saw Dr. Inocencio Batres on 17 generalized rash including facial rash and itching, and edema. who performed Punch biopsy of R arm showing interface dermatitis with prominent dermal eosinophils. 2nd punch biopsy of right arm suggestive of lupus erythematosus w/ granular linear signals of igA and IgM and patchy granular signal of C3 at basement membrane. IgG negative. \par - ADM 2017 TO 2017 TO [facility] with diffuse generalized rash. T 98.1, , /85, RR 18, O2 97%. Labs significant for WBC 28.15, eosinophils 97%, Hgb 11.1, MCV 75.6. Received prednisone 40mg & morphine.She was also evaluated by opthamology for complaint of blurry vision in her right eye. Was found to have cotton wool spots off both optic nerves. Had MRI brain which showed microvascular changes.\par - 2017: 17 bone marrow biopsy without evidence of leukemia, no blasts, [id number] negative. \par - 2017: 17 STARTED HYDROXYUREA , 60mg po prednisone daily\par - 2017: 5/3/17 presents with painful rash on hands and feet, exquisitely tender, stopped Hydroxyurea. \par - 2017: 17 found to have swollen R leg, RLE duplex ultrasound c/w extensive DVT, started on xarelto 15mg po bid from vivo pharmacy at [facility]. \par - 2017: 17 restarted hydrea 500mg po BID with worsening rash.\par - 2017: 17 decreased prednisone from 60mg po daily to 40mg po daily continue 500mg hydrea BID\par - 2017: 17 prednisone at 40mg po daily, hydrea increased to 1000mg in AM and 500mg in PM. \par - Started Gleevec . Tapered off prednisone 17. \par \par Was on on Losartan 50mg for BP control.  that stopped recently by cardiologist.\par She was admitted  for incidental finding of PE on CT A/P and tachycardia. They were unable to obtain CTA of chest 2/2 recent contrast load. Doppler LE showed acute below the knee DVT on left side. She was off Xarelto for 3 weeks prior so was resumed on Xarelto. No SOB, TTE performed which showed no RV strain, CE negative, normal BNP. Patient with persistent tachycardia, seen by cardiology who started on beta blocker. \par \par Started Gleevec . Tapered off prednisone 17. \par Noted to have a new rash around . She reports that this rash is different than her previous rash. She saw derm, was started back on prednisone taper, completed 3-4 days ago. \par We held her Gleevec on 12/15 b/c neutropenia. She also stopped HU. Her counts improved by the following week; we restarted Gleevec 1/5/18. \par Holding Gleevec again since 3/9/18 for neutropenia. \par Her periods have been very heavy b/c of Xarelto. Refused any pills to control her menses. On po iron supplements but unable to take every day because of constipation and hemorrhoids. She received Injectafer 3/23 and 3/30. \par She now holds the Xarelto for 2 days when she gets her menses. \par

## 2021-05-13 NOTE — ASSESSMENT
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 Rich, dermatologist by biopsy, [id number] fusion negative, JAK2 mutation negative, BCR-ABL mutation negative, with recent skin biopsy suggesting possible lupus as well, steroid dependent. Course complicated by 5/17/17 RLE duplex ultrasound c/w extensive DVT, s/p 6 months of Xarelto (finished 11/2017), now w/ new DVT on L, back on Xarelto \par She started Gleevec on 9/2017, was tapered off steroids 12/4/17 but she gets recurrent rash when she is off steroids so was started on low dose steroids (5mg daily). \par She has new insurance this year with high copays for her medications.\par \par -we have previously discussed changing Gleevec to Hydrea which should have a lower copay but pt does not want to given previous side effects. She is now off Gleevec since end of Feb 2021. She is also not on steroids since that time. Will monitor eos and rash -eos today 0.14\par -pt has h/o iron deficiency anemia, s/p injectafer weekly x2 on 3/23 and 3/30/18. Also s/p IV Iron x 2 on 7/17 and 7/24/19. Advised her to increase iron in her diet and cont Iron pills. She is s/p Fereheme weekly x2 on 3/15 and 3/22/21, Hgb improved at 11.2, f/u iron studies\par -continue AC for recurrent DVT. Pt asked about stopping Xarelto. We discussed that since she has recurrent VTE, staying on AC would be best but if she really wants to stop, we should at least change to ASA with the understanding that risk of recurrence would be higher. Pt understands, will continue Xarelto\par -f/u w/ GYN for menorrhagia\par -more neutropenic today, not on Gleevec -will repeat in 1 mo\par -RTC 1 mo

## 2021-06-24 ENCOUNTER — OUTPATIENT (OUTPATIENT)
Dept: OUTPATIENT SERVICES | Facility: HOSPITAL | Age: 51
LOS: 1 days | Discharge: HOME | End: 2021-06-24

## 2021-06-24 DIAGNOSIS — N20.0 CALCULUS OF KIDNEY: Chronic | ICD-10-CM

## 2021-06-24 DIAGNOSIS — D72.119 HYPEREOSINOPHILIC SYNDROME [HES], UNSPECIFIED: ICD-10-CM

## 2021-06-24 DIAGNOSIS — Z98.890 OTHER SPECIFIED POSTPROCEDURAL STATES: Chronic | ICD-10-CM

## 2021-06-24 DIAGNOSIS — D25.9 LEIOMYOMA OF UTERUS, UNSPECIFIED: Chronic | ICD-10-CM

## 2021-06-28 ENCOUNTER — APPOINTMENT (OUTPATIENT)
Dept: HEMATOLOGY ONCOLOGY | Facility: CLINIC | Age: 51
End: 2021-06-28
Payer: COMMERCIAL

## 2021-06-28 ENCOUNTER — RESULT REVIEW (OUTPATIENT)
Age: 51
End: 2021-06-28

## 2021-06-28 VITALS
TEMPERATURE: 96.6 F | WEIGHT: 211.2 LBS | DIASTOLIC BLOOD PRESSURE: 80 MMHG | RESPIRATION RATE: 16 BRPM | BODY MASS INDEX: 33.94 KG/M2 | SYSTOLIC BLOOD PRESSURE: 123 MMHG | HEIGHT: 66 IN | OXYGEN SATURATION: 99 % | HEART RATE: 82 BPM

## 2021-06-28 LAB
BASOPHILS # BLD AUTO: 0.03 K/UL — SIGNIFICANT CHANGE UP (ref 0–0.2)
BASOPHILS NFR BLD AUTO: 1 % — SIGNIFICANT CHANGE UP (ref 0–2)
EOSINOPHIL # BLD AUTO: 0.25 K/UL — SIGNIFICANT CHANGE UP (ref 0–0.5)
EOSINOPHIL NFR BLD AUTO: 9 % — HIGH (ref 0–6)
HCT VFR BLD CALC: 39.5 % — SIGNIFICANT CHANGE UP (ref 34.5–45)
HGB BLD-MCNC: 12.7 G/DL — SIGNIFICANT CHANGE UP (ref 11.5–15.5)
LYMPHOCYTES # BLD AUTO: 1.48 K/UL — SIGNIFICANT CHANGE UP (ref 1–3.3)
LYMPHOCYTES # BLD AUTO: 53 % — HIGH (ref 13–44)
MCHC RBC-ENTMCNC: 26.3 PG — LOW (ref 27–34)
MCHC RBC-ENTMCNC: 32.2 G/DL — SIGNIFICANT CHANGE UP (ref 32–36)
MCV RBC AUTO: 82 FL — SIGNIFICANT CHANGE UP (ref 80–100)
MONOCYTES # BLD AUTO: 0.36 K/UL — SIGNIFICANT CHANGE UP (ref 0–0.9)
MONOCYTES NFR BLD AUTO: 13 % — SIGNIFICANT CHANGE UP (ref 2–14)
MYELOCYTES NFR BLD: 1 % — HIGH (ref 0–0)
NEUTROPHILS # BLD AUTO: 0.64 K/UL — LOW (ref 1.8–7.4)
NEUTROPHILS NFR BLD AUTO: 23 % — LOW (ref 43–77)
NRBC # BLD: 0 /100 — SIGNIFICANT CHANGE UP (ref 0–0)
NRBC # BLD: SIGNIFICANT CHANGE UP /100 WBCS (ref 0–0)
PLAT MORPH BLD: NORMAL — SIGNIFICANT CHANGE UP
PLATELET # BLD AUTO: 231 K/UL — SIGNIFICANT CHANGE UP (ref 150–400)
RBC # BLD: 4.82 M/UL — SIGNIFICANT CHANGE UP (ref 3.8–5.2)
RBC # FLD: 15.2 % — HIGH (ref 10.3–14.5)
RBC BLD AUTO: SIGNIFICANT CHANGE UP
WBC # BLD: 2.8 K/UL — LOW (ref 3.8–10.5)
WBC # FLD AUTO: 2.8 K/UL — LOW (ref 3.8–10.5)

## 2021-06-28 PROCEDURE — 99214 OFFICE O/P EST MOD 30 MIN: CPT

## 2021-06-28 PROCEDURE — 99072 ADDL SUPL MATRL&STAF TM PHE: CPT

## 2021-06-28 RX ORDER — PREDNISONE 20 MG/1
20 TABLET ORAL DAILY
Qty: 30 | Refills: 2 | Status: ACTIVE | COMMUNITY
Start: 2021-06-28 | End: 1900-01-01

## 2021-06-28 NOTE — HISTORY OF PRESENT ILLNESS
[de-identified] : Gleevec has been restarted in 5/2019 but at 200mg for neutropenia. She reports from past couple months she intentionally missed some Gleevec doses and wants to be tapered off of it. However started having rash and fatigue so now back on Gleevec. On 300mg daily. We have continued her on prednisone 5mg. \par She is s/p Injectafer on 7/17 and 7/24/19. \par \par Since visit in Jan, she has new insurance and it is $375 copay for Gleevec a month. Rash is coming back on face and stomach - it is burning, not itching. She is also off her iron supplements 2/2 high copay. She has leftover Xarelto and Eliquis but otherwise it also has a high copay which she cannot afford. She states that she was breaking the leftover 400mg Gleevec in half and taking it that way.\par  \par Visit on 6/2020, we increased prednisone to 40mg which she took for 3 weeks, then started tapering 30mg for 2 weeks, 20mg for the last 2 weeks - rash has improved \par Hip and knee pain improved but still having ankle pain. Xrays done were unremarkable. \par She bought OTC iron - started on Thurs 10/1/20. \par Saw podiatry - has bone spurs in b/l feet, awaiting special shoes. Now on Baclofen and Naproxen\par Had hematuria which has resolved. Saw Dr. Mendoza PCP on 9/29/20. \par \par She stopped taking Gleevec end of Feb 2021. She is also now off steroids for the same period of time. \par She reports losing weight since coming off steroids. \par \par She is s/p Fereheme weekly x2 on 3/15 and 3/22/21\par \par Rash started coming back a couple weeks ago.  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 Seen initially on 17. PT reports improvement in fatigue since admission 17, but still with pain in L elbow, and weakness in flexion of L arm at elbow..ros. PT admits to paraesthesias in 5th digit of L hand, and tenderness at heels in both feet, and chronic cough, for which she takes Guaifenesin.\par \par HYPEREOSINOPHILIA HX:\par - 2016: was in [place] for her Sister's , rash started 2 weeks after returning, claims to have many bug bites from "horseflies" starting at bilateral medial knees. \par - 10/2016: Saw Beatriz Wu ([phone]), md ([employer]), and allergist at [facility], felt that the diagnosis was vasculitis, started on clobetazol, hydrocortizone, to little effect\par - 2016: Saw Dr. Mendoza who continued to treat pain with naproxen, neurontin\par - 2017: Saw Dermatologist Dr. Wisdom\par - 2017: Dr Wisdom 2017 performed biopsy at that time showed changes consistent with hypereosinophilic syndrome vs drug vs arthropod bite like reaction (superficial and deep perivascular and interstitial dermatitis with eosinophils), started on doxycycline, to some effect. \par - 2017: Saw Dr. Inocencio Batres on 17 generalized rash including facial rash and itching, and edema. who performed Punch biopsy of R arm showing interface dermatitis with prominent dermal eosinophils. 2nd punch biopsy of right arm suggestive of lupus erythematosus w/ granular linear signals of igA and IgM and patchy granular signal of C3 at basement membrane. IgG negative. \par - ADM 2017 TO 2017 TO [facility] with diffuse generalized rash. T 98.1, , /85, RR 18, O2 97%. Labs significant for WBC 28.15, eosinophils 97%, Hgb 11.1, MCV 75.6. Received prednisone 40mg & morphine.She was also evaluated by opthamology for complaint of blurry vision in her right eye. Was found to have cotton wool spots off both optic nerves. Had MRI brain which showed microvascular changes.\par - 2017: 17 bone marrow biopsy without evidence of leukemia, no blasts, [id number] negative. \par - 2017: 17 STARTED HYDROXYUREA , 60mg po prednisone daily\par - 2017: 5/3/17 presents with painful rash on hands and feet, exquisitely tender, stopped Hydroxyurea. \par - 2017: 17 found to have swollen R leg, RLE duplex ultrasound c/w extensive DVT, started on xarelto 15mg po bid [place] [facility] [place] [facility] \par - 2017: 17 restarted hydrea 500mg po BID with worsening rash.\par - 2017: 17 decreased prednisone from 60mg po daily to 40mg po daily continue 500mg hydrea BID\par - 2017: 17 prednisone at 40mg po daily, hydrea increased to 1000mg in AM and 500mg in PM. \par - Started Gleevec . Tapered off prednisone 17. \par \par Was on on Losartan 50mg for BP control.  that stopped recently by cardiologist.\par She was admitted  for incidental finding of PE on CT A/P and tachycardia. They were unable to obtain CTA of chest 2/2 recent contrast load. Doppler LE showed acute below the knee DVT on left side. She was off Xarelto for 3 weeks prior so was resumed on Xarelto. No SOB, TTE performed which showed no RV strain, CE negative, normal BNP. Patient with persistent tachycardia, seen by cardiology who started on beta blocker. \par \par Started Gleevec . Tapered off prednisone 17. \par Noted to have a new rash around . She reports that this rash is different than her previous rash. She saw derm, was started back on prednisone taper, completed 3-4 days ago. \par We held her Gleevec on 12/15 b/c neutropenia. She also stopped HU. Her counts improved by the following week; we restarted Gleevec 1/5/18. \par Holding Gleevec again since 3/9/18 for neutropenia. \par Her periods have been very heavy b/c of Xarelto. Refused any pills to control her menses. On po iron supplements but unable to take every day because of constipation and hemorrhoids. She received Injectafer 3/23 and 3/30. \par She now holds the Xarelto for 2 days when she gets her menses. \par

## 2021-06-28 NOTE — ASSESSMENT
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 Rich, dermatologist by biopsy, [id number] fusion negative, JAK2 mutation negative, BCR-ABL mutation negative, with recent skin biopsy suggesting possible lupus as well, steroid dependent. Course complicated by 5/17/17 RLE duplex ultrasound c/w extensive DVT, s/p 6 months of Xarelto (finished 11/2017), now w/ new DVT on L, back on Xarelto \par She started Gleevec on 9/2017, was tapered off steroids 12/4/17 but she gets recurrent rash when she is off steroids so was started on low dose steroids (5mg daily). \par She has new insurance this year with high copays for her medications.\par \par -we have previously discussed changing Gleevec to Hydrea which should have a lower copay but pt does not want to given previous side effects. She is now off Gleevec since end of Feb 2021. She is also not on steroids since that time. Will monitor eos and rash -eos today 0.25 but rash returned. Will restart prednisone 20mg daily. Will also restart PCP PPx\par -pt has h/o iron deficiency anemia, s/p injectafer weekly x2 on 3/23 and 3/30/18. Also s/p IV Iron x 2 on 7/17 and 7/24/19. Advised her to increase iron in her diet and cont Iron pills. She is s/p Fereheme weekly x2 on 3/15 and 3/22/21, Hgb improved\par -continue AC for recurrent DVT. Pt asked about stopping Xarelto. We discussed that since she has recurrent VTE, staying on AC would be best but if she really wants to stop, we should at least change to ASA with the understanding that risk of recurrence would be higher. Pt understands, will continue Xarelto\par -f/u w/ GYN for menorrhagia\par -neutropenic again today, ? related to worsening rash/disease\par -RTC 1 mo

## 2021-06-28 NOTE — PHYSICAL EXAM
[Fully active, able to carry on all pre-disease performance without restriction] : Status 0 - Fully active, able to carry on all pre-disease performance without restriction [Obese] : obese [Normal] : affect appropriate [de-identified] : very tearful [de-identified] : hyperpigmented rash improved

## 2021-07-01 LAB
ALBUMIN SERPL ELPH-MCNC: 4.3 G/DL
ALP BLD-CCNC: 88 U/L
ALT SERPL-CCNC: 10 U/L
ANION GAP SERPL CALC-SCNC: 10 MMOL/L
AST SERPL-CCNC: 15 U/L
BILIRUB SERPL-MCNC: 0.2 MG/DL
BUN SERPL-MCNC: 10 MG/DL
CALCIUM SERPL-MCNC: 9.7 MG/DL
CHLORIDE SERPL-SCNC: 108 MMOL/L
CO2 SERPL-SCNC: 20 MMOL/L
CREAT SERPL-MCNC: 0.87 MG/DL
FERRITIN SERPL-MCNC: 25 NG/ML
GLUCOSE SERPL-MCNC: 89 MG/DL
IRON SATN MFR SERPL: 15 %
IRON SERPL-MCNC: 48 UG/DL
POTASSIUM SERPL-SCNC: 3.9 MMOL/L
PROT SERPL-MCNC: 8.4 G/DL
SODIUM SERPL-SCNC: 138 MMOL/L
TIBC SERPL-MCNC: 315 UG/DL
UIBC SERPL-MCNC: 267 UG/DL

## 2021-07-29 ENCOUNTER — OUTPATIENT (OUTPATIENT)
Dept: OUTPATIENT SERVICES | Facility: HOSPITAL | Age: 51
LOS: 1 days | Discharge: HOME | End: 2021-07-29

## 2021-07-29 DIAGNOSIS — D72.119 HYPEREOSINOPHILIC SYNDROME [HES], UNSPECIFIED: ICD-10-CM

## 2021-07-29 DIAGNOSIS — Z98.890 OTHER SPECIFIED POSTPROCEDURAL STATES: Chronic | ICD-10-CM

## 2021-07-29 DIAGNOSIS — D72.819 DECREASED WHITE BLOOD CELL COUNT, UNSPECIFIED: ICD-10-CM

## 2021-07-29 DIAGNOSIS — D25.9 LEIOMYOMA OF UTERUS, UNSPECIFIED: Chronic | ICD-10-CM

## 2021-07-29 DIAGNOSIS — N20.0 CALCULUS OF KIDNEY: Chronic | ICD-10-CM

## 2021-08-09 ENCOUNTER — RESULT REVIEW (OUTPATIENT)
Age: 51
End: 2021-08-09

## 2021-08-09 ENCOUNTER — APPOINTMENT (OUTPATIENT)
Dept: HEMATOLOGY ONCOLOGY | Facility: CLINIC | Age: 51
End: 2021-08-09
Payer: COMMERCIAL

## 2021-08-09 VITALS
HEART RATE: 117 BPM | BODY MASS INDEX: 33.8 KG/M2 | RESPIRATION RATE: 18 BRPM | DIASTOLIC BLOOD PRESSURE: 86 MMHG | HEIGHT: 66 IN | SYSTOLIC BLOOD PRESSURE: 128 MMHG | TEMPERATURE: 97.7 F | WEIGHT: 210.32 LBS | OXYGEN SATURATION: 95 %

## 2021-08-09 LAB
BASOPHILS # BLD AUTO: 0 K/UL — SIGNIFICANT CHANGE UP (ref 0–0.2)
BASOPHILS NFR BLD AUTO: 0 % — SIGNIFICANT CHANGE UP (ref 0–2)
EOSINOPHIL # BLD AUTO: 0.08 K/UL — SIGNIFICANT CHANGE UP (ref 0–0.5)
EOSINOPHIL NFR BLD AUTO: 2 % — SIGNIFICANT CHANGE UP (ref 0–6)
HCT VFR BLD CALC: 36.3 % — SIGNIFICANT CHANGE UP (ref 34.5–45)
HGB BLD-MCNC: 11.4 G/DL — LOW (ref 11.5–15.5)
LYMPHOCYTES # BLD AUTO: 1.03 K/UL — SIGNIFICANT CHANGE UP (ref 1–3.3)
LYMPHOCYTES # BLD AUTO: 27 % — SIGNIFICANT CHANGE UP (ref 13–44)
MCHC RBC-ENTMCNC: 26.3 PG — LOW (ref 27–34)
MCHC RBC-ENTMCNC: 31.4 G/DL — LOW (ref 32–36)
MCV RBC AUTO: 83.6 FL — SIGNIFICANT CHANGE UP (ref 80–100)
MONOCYTES # BLD AUTO: 0.11 K/UL — SIGNIFICANT CHANGE UP (ref 0–0.9)
MONOCYTES NFR BLD AUTO: 3 % — SIGNIFICANT CHANGE UP (ref 2–14)
NEUTROPHILS # BLD AUTO: 2.59 K/UL — SIGNIFICANT CHANGE UP (ref 1.8–7.4)
NEUTROPHILS NFR BLD AUTO: 68 % — SIGNIFICANT CHANGE UP (ref 43–77)
NRBC # BLD: 0 /100 — SIGNIFICANT CHANGE UP (ref 0–0)
NRBC # BLD: SIGNIFICANT CHANGE UP /100 WBCS (ref 0–0)
PLAT MORPH BLD: NORMAL — SIGNIFICANT CHANGE UP
PLATELET # BLD AUTO: 265 K/UL — SIGNIFICANT CHANGE UP (ref 150–400)
RBC # BLD: 4.34 M/UL — SIGNIFICANT CHANGE UP (ref 3.8–5.2)
RBC # FLD: 15.9 % — HIGH (ref 10.3–14.5)
RBC BLD AUTO: SIGNIFICANT CHANGE UP
WBC # BLD: 3.81 K/UL — SIGNIFICANT CHANGE UP (ref 3.8–10.5)
WBC # FLD AUTO: 3.81 K/UL — SIGNIFICANT CHANGE UP (ref 3.8–10.5)

## 2021-08-09 PROCEDURE — 99214 OFFICE O/P EST MOD 30 MIN: CPT

## 2021-08-09 RX ORDER — DAPSONE 100 MG/1
100 TABLET ORAL DAILY
Qty: 30 | Refills: 5 | Status: ACTIVE | COMMUNITY
Start: 2018-07-23 | End: 1900-01-01

## 2021-08-09 RX ORDER — CHOLECALCIFEROL (VITAMIN D3) 50 MCG
50 MCG TABLET ORAL
Qty: 30 | Refills: 5 | Status: ACTIVE | COMMUNITY
Start: 2017-09-16 | End: 1900-01-01

## 2021-08-11 NOTE — HISTORY OF PRESENT ILLNESS
[de-identified] : Gleevec has been restarted in 5/2019 but at 200mg for neutropenia. She reports from past couple months she intentionally missed some Gleevec doses and wants to be tapered off of it. However started having rash and fatigue so now back on Gleevec. On 300mg daily. We have continued her on prednisone 5mg. \par She is s/p Injectafer on 7/17 and 7/24/19. \par \par Since visit in Jan, she has new insurance and it is $375 copay for Gleevec a month. Rash is coming back on face and stomach - it is burning, not itching. She is also off her iron supplements 2/2 high copay. She has leftover Xarelto and Eliquis but otherwise it also has a high copay which she cannot afford. She states that she was breaking the leftover 400mg Gleevec in half and taking it that way.\par  \par Visit on 6/2020, we increased prednisone to 40mg which she took for 3 weeks, then started tapering 30mg for 2 weeks, 20mg for the last 2 weeks - rash has improved \par Hip and knee pain improved but still having ankle pain. Xrays done were unremarkable. \par She bought OTC iron - started on Thurs 10/1/20. \par Saw podiatry - has bone spurs in b/l feet, awaiting special shoes. Now on Baclofen and Naproxen\par Had hematuria which has resolved. Saw Dr. Mendoza PCP on 9/29/20. \par \par She stopped taking Gleevec end of Feb 2021. She is also now off steroids for the same period of time. \par She reports losing weight since coming off steroids. \par \par She is s/p Fereheme weekly x2 on 3/15 and 3/22/21\par \par Rash started coming back a couple weeks ago. \par We restarted prednisone 20mg last visit on 6/28. It is improving slowly.  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 Seen initially on 17. PT reports improvement in fatigue since admission 17, but still with pain in L elbow, and weakness in flexion of L arm at elbow..ros. PT admits to paraesthesias in 5th digit of L hand, and tenderness at heels in both feet, and chronic cough, for which she takes Guaifenesin.\par \par HYPEREOSINOPHILIA HX:\par - 2016: was in [place] for her Sister's , rash started 2 weeks after returning, claims to have many bug bites from "horseflies" starting at bilateral medial knees. \par - 10/2016: Saw Beatriz Wu ([phone]), md ([employer]), and allergist at [facility], felt that the diagnosis was vasculitis, started on clobetazol, hydrocortizone, to little effect\par - 2016: Saw Dr. Mendoza who continued to treat pain with naproxen, neurontin\par - 2017: Saw Dermatologist Dr. Wisdom\par - 2017: Dr Wisdom 2017 performed biopsy at that time showed changes consistent with hypereosinophilic syndrome vs drug vs arthropod bite like reaction (superficial and deep perivascular and interstitial dermatitis with eosinophils), started on doxycycline, to some effect. \par - 2017: Saw Dr. Inocencio Batres on 17 generalized rash including facial rash and itching, and edema. who performed Punch biopsy of R arm showing interface dermatitis with prominent dermal eosinophils. 2nd punch biopsy of right arm suggestive of lupus erythematosus w/ granular linear signals of igA and IgM and patchy granular signal of C3 at basement membrane. IgG negative. \par - ADM 2017 TO 2017 TO [facility] with diffuse generalized rash. T 98.1, , /85, RR 18, O2 97%. Labs significant for WBC 28.15, eosinophils 97%, Hgb 11.1, MCV 75.6. Received prednisone 40mg & morphine.She was also evaluated by opthamology for complaint of blurry vision in her right eye. Was found to have cotton wool spots off both optic nerves. Had MRI brain which showed microvascular changes.\par - 2017: 17 bone marrow biopsy without evidence of leukemia, no blasts, [id number] negative. \par - 2017: 17 STARTED HYDROXYUREA , 60mg po prednisone daily\par - 2017: 5/3/17 presents with painful rash on hands and feet, exquisitely tender, stopped Hydroxyurea. \par - 2017: 17 found to have swollen R leg, RLE duplex ultrasound c/w extensive DVT, started on xarelto 15mg po bid from vivo pharmacy at [facility] [place]. \par - 2017: 17 restarted hydrea 500mg po BID with worsening rash.\par - 2017: 17 decreased prednisone from 60mg po daily to 40mg po daily continue 500mg hydrea BID\par - 2017: 17 prednisone at 40mg po daily, hydrea increased to 1000mg in AM and 500mg in PM. \par - Started Gleevec . Tapered off prednisone 17. \par \par Was on on Losartan 50mg for BP control.  that stopped recently by cardiologist.\par She was admitted  for incidental finding of PE on CT A/P and tachycardia. They were unable to obtain CTA of chest 2/2 recent contrast load. Doppler LE showed acute below the knee DVT on left side. She was off Xarelto for 3 weeks prior so was resumed on Xarelto. No SOB, TTE performed which showed no RV strain, CE negative, normal BNP. Patient with persistent tachycardia, seen by cardiology who started on beta blocker. \par \par Started Gleevec . Tapered off prednisone 17. \par Noted to have a new rash around . She reports that this rash is different than her previous rash. She saw derm, was started back on prednisone taper, completed 3-4 days ago. \par We held her Gleevec on 12/15 b/c neutropenia. She also stopped HU. Her counts improved by the following week; we restarted Gleevec 1/5/18. \par Holding Gleevec again since 3/9/18 for neutropenia. \par Her periods have been very heavy b/c of Xarelto. Refused any pills to control her menses. On po iron supplements but unable to take every day because of constipation and hemorrhoids. She received Injectafer 3/23 and 3/30. \par She now holds the Xarelto for 2 days when she gets her menses. \par

## 2021-08-11 NOTE — PHYSICAL EXAM
[Fully active, able to carry on all pre-disease performance without restriction] : Status 0 - Fully active, able to carry on all pre-disease performance without restriction [Normal] : well developed, well nourished, in no acute distress [de-identified] : hyperpigmented rash improved

## 2021-08-11 NOTE — ASSESSMENT
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 Rich, dermatologist by biopsy, [id number] fusion negative, JAK2 mutation negative, BCR-ABL mutation negative, with recent skin biopsy suggesting possible lupus as well, steroid dependent. Course complicated by 5/17/17 RLE duplex ultrasound c/w extensive DVT, s/p 6 months of Xarelto (finished 11/2017), now w/ new DVT on L, back on Xarelto \par She started Gleevec on 9/2017, was tapered off steroids 12/4/17 but she gets recurrent rash when she is off steroids so was started on low dose steroids (5mg daily). \par She has new insurance this year with high copays for her medications.\par \par -we have previously discussed changing Gleevec to Hydrea which should have a lower copay but pt does not want to given previous side effects. She is now off Gleevec since end of Feb 2021. She is also not on steroids since that time. Eos still low but rash returned so restarted prednisone 20mg daily on 6/28/21. Also restarted PCP PPx. Start tapering prednisone back to 5mg maintenance\par -pt has h/o iron deficiency anemia, s/p injectafer weekly x2 on 3/23 and 3/30/18. Also s/p IV Iron x 2 on 7/17 and 7/24/19. Advised her to increase iron in her diet and cont Iron pills. She is s/p Fereheme weekly x2 on 3/15 and 3/22/21, Hgb improved\par -continue AC for recurrent DVT. \par -f/u w/ GYN for menorrhagia\par -RTC 1 mo

## 2021-08-12 LAB
ALBUMIN SERPL ELPH-MCNC: 4.2 G/DL
ALP BLD-CCNC: 85 U/L
ALT SERPL-CCNC: 9 U/L
ANION GAP SERPL CALC-SCNC: 11 MMOL/L
AST SERPL-CCNC: 15 U/L
BILIRUB SERPL-MCNC: 0.2 MG/DL
BUN SERPL-MCNC: 8 MG/DL
CALCIUM SERPL-MCNC: 9.5 MG/DL
CHLORIDE SERPL-SCNC: 107 MMOL/L
CO2 SERPL-SCNC: 22 MMOL/L
CREAT SERPL-MCNC: 0.79 MG/DL
FERRITIN SERPL-MCNC: 35 NG/ML
GLUCOSE SERPL-MCNC: 118 MG/DL
IRON SATN MFR SERPL: 33 %
IRON SERPL-MCNC: 103 UG/DL
LDH SERPL-CCNC: 149 U/L
POTASSIUM SERPL-SCNC: 4 MMOL/L
PROT SERPL-MCNC: 8.1 G/DL
SODIUM SERPL-SCNC: 139 MMOL/L
TIBC SERPL-MCNC: 309 UG/DL
UIBC SERPL-MCNC: 206 UG/DL

## 2021-09-17 ENCOUNTER — OUTPATIENT (OUTPATIENT)
Dept: OUTPATIENT SERVICES | Facility: HOSPITAL | Age: 51
LOS: 1 days | Discharge: HOME | End: 2021-09-17

## 2021-09-17 DIAGNOSIS — Z98.890 OTHER SPECIFIED POSTPROCEDURAL STATES: Chronic | ICD-10-CM

## 2021-09-17 DIAGNOSIS — D72.119 HYPEREOSINOPHILIC SYNDROME [HES], UNSPECIFIED: ICD-10-CM

## 2021-09-17 DIAGNOSIS — N20.0 CALCULUS OF KIDNEY: Chronic | ICD-10-CM

## 2021-09-17 DIAGNOSIS — D25.9 LEIOMYOMA OF UTERUS, UNSPECIFIED: Chronic | ICD-10-CM

## 2021-09-20 ENCOUNTER — APPOINTMENT (OUTPATIENT)
Dept: HEMATOLOGY ONCOLOGY | Facility: CLINIC | Age: 51
End: 2021-09-20

## 2022-06-14 ENCOUNTER — OUTPATIENT (OUTPATIENT)
Dept: OUTPATIENT SERVICES | Facility: HOSPITAL | Age: 52
LOS: 1 days | Discharge: HOME | End: 2022-06-14

## 2022-06-14 DIAGNOSIS — D25.9 LEIOMYOMA OF UTERUS, UNSPECIFIED: Chronic | ICD-10-CM

## 2022-06-14 DIAGNOSIS — N20.0 CALCULUS OF KIDNEY: Chronic | ICD-10-CM

## 2022-06-14 DIAGNOSIS — Z98.890 OTHER SPECIFIED POSTPROCEDURAL STATES: Chronic | ICD-10-CM

## 2022-06-14 DIAGNOSIS — D72.119 HYPEREOSINOPHILIC SYNDROME [HES], UNSPECIFIED: ICD-10-CM

## 2022-06-16 ENCOUNTER — RESULT REVIEW (OUTPATIENT)
Age: 52
End: 2022-06-16

## 2022-06-16 ENCOUNTER — APPOINTMENT (OUTPATIENT)
Dept: HEMATOLOGY ONCOLOGY | Facility: CLINIC | Age: 52
End: 2022-06-16
Payer: COMMERCIAL

## 2022-06-16 VITALS
TEMPERATURE: 97.2 F | DIASTOLIC BLOOD PRESSURE: 84 MMHG | BODY MASS INDEX: 33.98 KG/M2 | HEART RATE: 85 BPM | RESPIRATION RATE: 16 BRPM | SYSTOLIC BLOOD PRESSURE: 143 MMHG | OXYGEN SATURATION: 99 % | WEIGHT: 210.54 LBS

## 2022-06-16 DIAGNOSIS — D50.9 IRON DEFICIENCY ANEMIA, UNSPECIFIED: ICD-10-CM

## 2022-06-16 LAB
BASOPHILS # BLD AUTO: 0.03 K/UL — SIGNIFICANT CHANGE UP (ref 0–0.2)
BASOPHILS NFR BLD AUTO: 0.8 % — SIGNIFICANT CHANGE UP (ref 0–2)
DACRYOCYTES BLD QL SMEAR: SLIGHT — SIGNIFICANT CHANGE UP
ELLIPTOCYTES BLD QL SMEAR: SLIGHT — SIGNIFICANT CHANGE UP
EOSINOPHIL # BLD AUTO: 0.17 K/UL — SIGNIFICANT CHANGE UP (ref 0–0.5)
EOSINOPHIL NFR BLD AUTO: 4.6 % — SIGNIFICANT CHANGE UP (ref 0–6)
HCT VFR BLD CALC: 29.4 % — LOW (ref 34.5–45)
HGB BLD-MCNC: 8.8 G/DL — LOW (ref 11.5–15.5)
HYPOCHROMIA BLD QL: SIGNIFICANT CHANGE UP
IMM GRANULOCYTES NFR BLD AUTO: 0.5 % — SIGNIFICANT CHANGE UP (ref 0–1.5)
LYMPHOCYTES # BLD AUTO: 2.11 K/UL — SIGNIFICANT CHANGE UP (ref 1–3.3)
LYMPHOCYTES # BLD AUTO: 56.7 % — HIGH (ref 13–44)
MACROCYTES BLD QL: SLIGHT — SIGNIFICANT CHANGE UP
MCHC RBC-ENTMCNC: 22.1 PG — LOW (ref 27–34)
MCHC RBC-ENTMCNC: 29.9 G/DL — LOW (ref 32–36)
MCV RBC AUTO: 73.7 FL — LOW (ref 80–100)
MONOCYTES # BLD AUTO: 0.24 K/UL — SIGNIFICANT CHANGE UP (ref 0–0.9)
MONOCYTES NFR BLD AUTO: 6.5 % — SIGNIFICANT CHANGE UP (ref 2–14)
NEUTROPHILS # BLD AUTO: 1.15 K/UL — LOW (ref 1.8–7.4)
NEUTROPHILS NFR BLD AUTO: 30.9 % — LOW (ref 43–77)
NRBC # BLD: 0 /100 WBCS — SIGNIFICANT CHANGE UP (ref 0–0)
PLAT MORPH BLD: NORMAL — SIGNIFICANT CHANGE UP
PLATELET # BLD AUTO: 250 K/UL — SIGNIFICANT CHANGE UP (ref 150–400)
RBC # BLD: 3.99 M/UL — SIGNIFICANT CHANGE UP (ref 3.8–5.2)
RBC # FLD: 19.2 % — HIGH (ref 10.3–14.5)
RBC BLD AUTO: ABNORMAL
SCHISTOCYTES BLD QL AUTO: SLIGHT — SIGNIFICANT CHANGE UP
WBC # BLD: 3.72 K/UL — LOW (ref 3.8–10.5)
WBC # FLD AUTO: 3.72 K/UL — LOW (ref 3.8–10.5)

## 2022-06-16 PROCEDURE — 99214 OFFICE O/P EST MOD 30 MIN: CPT

## 2022-06-16 RX ORDER — PREDNISONE 20 MG/1
20 TABLET ORAL DAILY
Qty: 60 | Refills: 1 | Status: ACTIVE | COMMUNITY
Start: 2022-06-16 | End: 1900-01-01

## 2022-06-16 RX ORDER — FERROUS GLUCONATE 324(37.5)
324 (37.5 FE) TABLET ORAL
Qty: 60 | Refills: 1 | Status: ACTIVE | COMMUNITY

## 2022-06-17 PROBLEM — D50.9 IRON DEFICIENCY ANEMIA: Status: ACTIVE | Noted: 2018-03-15

## 2022-06-17 LAB
25(OH)D3 SERPL-MCNC: 26.9 NG/ML
ALBUMIN SERPL ELPH-MCNC: 3.7 G/DL
ALP BLD-CCNC: 80 U/L
ALT SERPL-CCNC: 11 U/L
ANION GAP SERPL CALC-SCNC: 11 MMOL/L
AST SERPL-CCNC: 15 U/L
BILIRUB SERPL-MCNC: 0.2 MG/DL
BUN SERPL-MCNC: 10 MG/DL
CALCIUM SERPL-MCNC: 9.2 MG/DL
CHLORIDE SERPL-SCNC: 107 MMOL/L
CO2 SERPL-SCNC: 23 MMOL/L
CREAT SERPL-MCNC: 0.78 MG/DL
EGFR: 91 ML/MIN/1.73M2
FERRITIN SERPL-MCNC: 24 NG/ML
GLUCOSE SERPL-MCNC: 84 MG/DL
IRON SATN MFR SERPL: 9 %
IRON SERPL-MCNC: 27 UG/DL
POTASSIUM SERPL-SCNC: 3.7 MMOL/L
PROT SERPL-MCNC: 7.7 G/DL
SODIUM SERPL-SCNC: 140 MMOL/L
TIBC SERPL-MCNC: 317 UG/DL
UIBC SERPL-MCNC: 289 UG/DL

## 2022-06-17 NOTE — ASSESSMENT
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 Rich, dermatologist by biopsy, [id number] fusion negative, JAK2 mutation negative, BCR-ABL mutation negative, with recent skin biopsy suggesting possible lupus as well, steroid dependent. Course complicated by 5/17/17 RLE duplex ultrasound c/w extensive DVT, s/p 6 months of Xarelto (finished 11/2017), now w/ new DVT on L, back on Xarelto \par She started Gleevec on 9/2017, was tapered off steroids 12/4/17 but she gets recurrent rash when she is off steroids so was started on low dose steroids (5mg daily). She is now off Gleevec since end of Feb 2021 secondary to new insurance with high copays for her medications.\par Her insurance lapsed so she was last seen here in 8/2021. Now reports worsening rash again\par \par -she restarted prednisone 5mg on 6/6/22. Today CBC shows eos still low but rash returned so will plan to restarted prednisone 40mg daily. She can self taper to 20mg if rash improves\par -pt has h/o iron deficiency anemia, s/p injectafer weekly x2 on 3/23 and 3/30/18. Also s/p IV Iron x 2 on 7/17 and 7/24/19. Advised her to increase iron in her diet and cont Iron pills. She is s/p Fereheme weekly x2 on 3/15 and 3/22/21. Hgb today is 8.8 -will plan for repeat Fereheme weekly x2 -pt agreeable\par -continue AC for recurrent DVT. \par -f/u w/ GYN for menorrhagia\par -RTC 1 mo

## 2022-06-17 NOTE — PHYSICAL EXAM
[Fully active, able to carry on all pre-disease performance without restriction] : Status 0 - Fully active, able to carry on all pre-disease performance without restriction [Normal] : affect appropriate [de-identified] : mild edema b/l LE [de-identified] : hyperpigmented rash

## 2022-06-17 NOTE — HISTORY OF PRESENT ILLNESS
[de-identified] : Gleevec has been restarted in 5/2019 but at 200mg for neutropenia. She reports from past couple months she intentionally missed some Gleevec doses and wants to be tapered off of it. However started having rash and fatigue so now back on Gleevec. On 300mg daily. We have continued her on prednisone 5mg. \par She is s/p Injectafer on 7/17 and 7/24/19. \par \par Since visit in Jan, she has new insurance and it is $375 copay for Gleevec a month. Rash is coming back on face and stomach - it is burning, not itching. She is also off her iron supplements 2/2 high copay. She has leftover Xarelto and Eliquis but otherwise it also has a high copay which she cannot afford. She states that she was breaking the leftover 400mg Gleevec in half and taking it that way.\par  \par Visit on 6/2020, we increased prednisone to 40mg which she took for 3 weeks, then started tapering 30mg for 2 weeks, 20mg for the last 2 weeks - rash has improved \par Hip and knee pain improved but still having ankle pain. Xrays done were unremarkable. \par She bought OTC iron - started on Thurs 10/1/20. \par Saw podiatry - has bone spurs in b/l feet, awaiting special shoes. Now on Baclofen and Naproxen\par Had hematuria which has resolved. Saw Dr. Mendoza PCP on 9/29/20. \par \par She stopped taking Gleevec end of Feb 2021. She is also now off steroids for the same period of time. \par She reports losing weight since coming off steroids. \par \par She is s/p Fereheme weekly x2 on 3/15 and 3/22/21\par \par Pt was last seen here in 8/2021 -notes that she had a lapse in insurance coverage which is now active as of 6/2022. \par Rash started coming back about a month ago. Restarted prednisone 5mg on 6/6/22.\par She is on Xarelto, vitamin D, iron gluconate and MVI\par She has her menses currently -heavy w/ clots.  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 Seen initially on 17. PT reports improvement in fatigue since admission 17, but still with pain in L elbow, and weakness in flexion of L arm at elbow..ros. PT admits to paraesthesias in 5th digit of L hand, and tenderness at heels in both feet, and chronic cough, for which she takes Guaifenesin.\par \par HYPEREOSINOPHILIA HX:\par - 2016: was in [place] for her Sister's , rash started 2 weeks after returning, claims to have many bug bites from "horseflies" starting at bilateral medial knees. \par - 10/2016: Saw Beatriz Wu ([phone]), md ([employer]), and allergist at [facility] (formerly [facility]), felt that the diagnosis was vasculitis, started on clobetazol, hydrocortizone, to little effect\par - 2016: Saw Dr. Mendoza who continued to treat pain with naproxen, neurontin\par - 2017: Saw Dermatologist Dr. Wisdom\par - 2017: Dr Wisdom 2017 performed biopsy at that time showed changes consistent with hypereosinophilic syndrome vs drug vs arthropod bite like reaction (superficial and deep perivascular and interstitial dermatitis with eosinophils), started on doxycycline, to some effect. \par - 2017: Saw Dr. Inocencio Batres on 17 generalized rash including facial rash and itching, and edema. who performed Punch biopsy of R arm showing interface dermatitis with prominent dermal eosinophils. 2nd punch biopsy of right arm suggestive of lupus erythematosus w/ granular linear signals of igA and IgM and patchy granular signal of C3 at basement membrane. IgG negative. \par - ADM 2017 TO 2017 TO [facility] with diffuse generalized rash. T 98.1, , /85, RR 18, O2 97%. Labs significant for WBC 28.15, eosinophils 97%, Hgb 11.1, MCV 75.6. Received prednisone 40mg & morphine.She was also evaluated by opthamology for complaint of blurry vision in her right eye. Was found to have cotton wool spots off both optic nerves. Had MRI brain which showed microvascular changes.\par - 2017: 17 bone marrow biopsy without evidence of leukemia, no blasts, [id number] negative. \par - 2017: 17 STARTED HYDROXYUREA , 60mg po prednisone daily\par - 2017: 5/3/17 presents with painful rash on hands and feet, exquisitely tender, stopped Hydroxyurea. \par - 2017: 17 found to have swollen R leg, RLE duplex ultrasound c/w extensive DVT, started on xarelto 15mg po bid from [facility] at [facility] [place]. \par - 2017: 17 restarted hydrea 500mg po BID with worsening rash.\par - 2017: 17 decreased prednisone from 60mg po daily to 40mg po daily continue 500mg hydrea BID\par - 2017: 17 prednisone at 40mg po daily, hydrea increased to 1000mg in AM and 500mg in PM. \par - Started Gleevec . Tapered off prednisone 17. \par \par Was on on Losartan 50mg for BP control.  that stopped recently by cardiologist.\par She was admitted  for incidental finding of PE on CT A/P and tachycardia. They were unable to obtain CTA of chest 2/2 recent contrast load. Doppler LE showed acute below the knee DVT on left side. She was off Xarelto for 3 weeks prior so was resumed on Xarelto. No SOB, TTE performed which showed no RV strain, CE negative, normal BNP. Patient with persistent tachycardia, seen by cardiology who started on beta blocker. \par \par Started Gleevec . Tapered off prednisone 17. \par Noted to have a new rash around . She reports that this rash is different than her previous rash. She saw derm, was started back on prednisone taper, completed 3-4 days ago. \par We held her Gleevec on 12/15 b/c neutropenia. She also stopped HU. Her counts improved by the following week; we restarted Gleevec 1/5/18. \par Holding Gleevec again since 3/9/18 for neutropenia. \par Her periods have been very heavy b/c of Xarelto. Refused any pills to control her menses. On po iron supplements but unable to take every day because of constipation and hemorrhoids. She received Injectafer 3/23 and 3/30. \par She now holds the Xarelto for 2 days when she gets her menses. \par

## 2022-06-17 NOTE — REVIEW OF SYSTEMS
[Fatigue] : fatigue [Recent Change In Weight] : ~T recent weight change [Joint Pain] : joint pain [Skin Rash] : skin rash [Depression] : depression [Negative] : Heme/Lymph [Dysmenorrhea/Abn Vaginal Bleeding] : dysmenorrhea/abnormal vaginal bleeding

## 2022-06-24 ENCOUNTER — APPOINTMENT (OUTPATIENT)
Dept: INFUSION THERAPY | Facility: HOSPITAL | Age: 52
End: 2022-06-24

## 2022-07-07 ENCOUNTER — NON-APPOINTMENT (OUTPATIENT)
Age: 52
End: 2022-07-07

## 2022-07-08 ENCOUNTER — APPOINTMENT (OUTPATIENT)
Dept: INFUSION THERAPY | Facility: HOSPITAL | Age: 52
End: 2022-07-08

## 2022-07-08 RX ORDER — HYDROCORTISONE 10 MG/G
1 CREAM TOPICAL TWICE DAILY
Qty: 1 | Refills: 0 | Status: ACTIVE | COMMUNITY
Start: 2022-07-08 | End: 1900-01-01

## 2022-07-08 RX ORDER — NYSTATIN 100000 1/G
100000 POWDER TOPICAL
Qty: 1 | Refills: 2 | Status: ACTIVE | COMMUNITY
Start: 2022-07-08 | End: 1900-01-01

## 2022-07-22 ENCOUNTER — APPOINTMENT (OUTPATIENT)
Dept: HEMATOLOGY ONCOLOGY | Facility: CLINIC | Age: 52
End: 2022-07-22

## 2022-08-16 ENCOUNTER — OUTPATIENT (OUTPATIENT)
Dept: OUTPATIENT SERVICES | Facility: HOSPITAL | Age: 52
LOS: 1 days | Discharge: HOME | End: 2022-08-16

## 2022-08-16 DIAGNOSIS — D25.9 LEIOMYOMA OF UTERUS, UNSPECIFIED: Chronic | ICD-10-CM

## 2022-08-16 DIAGNOSIS — N20.0 CALCULUS OF KIDNEY: Chronic | ICD-10-CM

## 2022-08-16 DIAGNOSIS — Z98.890 OTHER SPECIFIED POSTPROCEDURAL STATES: Chronic | ICD-10-CM

## 2022-08-16 DIAGNOSIS — D72.119 HYPEREOSINOPHILIC SYNDROME [HES], UNSPECIFIED: ICD-10-CM

## 2022-08-18 ENCOUNTER — APPOINTMENT (OUTPATIENT)
Dept: HEMATOLOGY ONCOLOGY | Facility: CLINIC | Age: 52
End: 2022-08-18

## 2022-09-22 ENCOUNTER — APPOINTMENT (OUTPATIENT)
Dept: HEMATOLOGY ONCOLOGY | Facility: CLINIC | Age: 52
End: 2022-09-22

## 2022-09-22 ENCOUNTER — RESULT REVIEW (OUTPATIENT)
Age: 52
End: 2022-09-22

## 2022-09-22 VITALS
TEMPERATURE: 96.9 F | DIASTOLIC BLOOD PRESSURE: 81 MMHG | SYSTOLIC BLOOD PRESSURE: 121 MMHG | BODY MASS INDEX: 34.55 KG/M2 | WEIGHT: 214.05 LBS | OXYGEN SATURATION: 100 % | HEART RATE: 90 BPM | RESPIRATION RATE: 17 BRPM

## 2022-09-22 LAB
BASOPHILS # BLD AUTO: 0.05 K/UL — SIGNIFICANT CHANGE UP (ref 0–0.2)
BASOPHILS NFR BLD AUTO: 2 % — SIGNIFICANT CHANGE UP (ref 0–2)
ELLIPTOCYTES BLD QL SMEAR: SLIGHT — SIGNIFICANT CHANGE UP
EOSINOPHIL # BLD AUTO: 0.05 K/UL — SIGNIFICANT CHANGE UP (ref 0–0.5)
EOSINOPHIL NFR BLD AUTO: 2 % — SIGNIFICANT CHANGE UP (ref 0–6)
HCT VFR BLD CALC: 39.5 % — SIGNIFICANT CHANGE UP (ref 34.5–45)
HGB BLD-MCNC: 12.5 G/DL — SIGNIFICANT CHANGE UP (ref 11.5–15.5)
LYMPHOCYTES # BLD AUTO: 1.79 K/UL — SIGNIFICANT CHANGE UP (ref 1–3.3)
LYMPHOCYTES # BLD AUTO: 69 % — HIGH (ref 13–44)
MCHC RBC-ENTMCNC: 26.9 PG — LOW (ref 27–34)
MCHC RBC-ENTMCNC: 31.6 G/DL — LOW (ref 32–36)
MCV RBC AUTO: 84.9 FL — SIGNIFICANT CHANGE UP (ref 80–100)
MONOCYTES # BLD AUTO: 0.18 K/UL — SIGNIFICANT CHANGE UP (ref 0–0.9)
MONOCYTES NFR BLD AUTO: 7 % — SIGNIFICANT CHANGE UP (ref 2–14)
NEUTROPHILS # BLD AUTO: 0.52 K/UL — LOW (ref 1.8–7.4)
NEUTROPHILS NFR BLD AUTO: 20 % — LOW (ref 43–77)
NRBC # BLD: 0 /100 — SIGNIFICANT CHANGE UP (ref 0–0)
NRBC # BLD: SIGNIFICANT CHANGE UP /100 WBCS (ref 0–0)
PLAT MORPH BLD: NORMAL — SIGNIFICANT CHANGE UP
PLATELET # BLD AUTO: 265 K/UL — SIGNIFICANT CHANGE UP (ref 150–400)
RBC # BLD: 4.65 M/UL — SIGNIFICANT CHANGE UP (ref 3.8–5.2)
RBC # FLD: 13.8 % — SIGNIFICANT CHANGE UP (ref 10.3–14.5)
RBC BLD AUTO: ABNORMAL
WBC # BLD: 2.6 K/UL — LOW (ref 3.8–10.5)
WBC # FLD AUTO: 2.6 K/UL — LOW (ref 3.8–10.5)

## 2022-09-22 PROCEDURE — 99215 OFFICE O/P EST HI 40 MIN: CPT

## 2022-09-22 RX ORDER — PREDNISONE 5 MG/1
5 TABLET ORAL DAILY
Qty: 90 | Refills: 1 | Status: ACTIVE | COMMUNITY
Start: 2021-08-09 | End: 1900-01-01

## 2022-09-22 NOTE — PHYSICAL EXAM
[Fully active, able to carry on all pre-disease performance without restriction] : Status 0 - Fully active, able to carry on all pre-disease performance without restriction [Normal] : affect appropriate [de-identified] : mild edema b/l LE [de-identified] : hyperpigmented rash diffusely, patches under breasts b/l

## 2022-09-22 NOTE — HISTORY OF PRESENT ILLNESS
[de-identified] : Gleevec has been restarted in 5/2019 but at 200mg for neutropenia. She reports from past couple months she intentionally missed some Gleevec doses and wants to be tapered off of it. However started having rash and fatigue so now back on Gleevec. On 300mg daily. We have continued her on prednisone 5mg. \par She is s/p Injectafer on 7/17 and 7/24/19. \par \par Since visit in Jan, she has new insurance and it is $375 copay for Gleevec a month. Rash is coming back on face and stomach - it is burning, not itching. She is also off her iron supplements 2/2 high copay. She has leftover Xarelto and Eliquis but otherwise it also has a high copay which she cannot afford. She states that she was breaking the leftover 400mg Gleevec in half and taking it that way.\par  \par Visit on 6/2020, we increased prednisone to 40mg which she took for 3 weeks, then started tapering 30mg for 2 weeks, 20mg for the last 2 weeks - rash has improved \par Hip and knee pain improved but still having ankle pain. Xrays done were unremarkable. \par She bought OTC iron - started on Thurs 10/1/20. \par Saw podiatry - has bone spurs in b/l feet, awaiting special shoes. Now on Baclofen and Naproxen\par Had hematuria which has resolved. Saw Dr. Mendoza PCP on 9/29/20. \par \par She stopped taking Gleevec end of Feb 2021. She is also now off steroids for the same period of time. \par She reports losing weight since coming off steroids. \par \par She is s/p Fereheme weekly x2 on 3/15 and 3/22/21\par \par Pt was last seen here in 8/2021 -notes that she had a lapse in insurance coverage which is now active as of 6/2022. \par Rash started coming back about a month ago. Restarted prednisone 5mg on 6/6/22. \par She was seen in 6/2022. She is on Xarelto, vitamin D, iron gluconate and MVI\par She has her menses currently -heavy w/ clots. \par s/p Feraheme weekly x2 6/24/22 and 7/8/22. \par We increased her prednisone to 40mg last visit which she said helped initially but it increased her appetite. She has now completed prednisone about a week and a half ago. \zeyad Has appt with derm next month. \par c/o choking sensation which she said was her symptom when she had PE. It has been ongoing for 2-3 weeks but she has not sought medical attention for that.  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 Seen initially on 17. PT reports improvement in fatigue since admission 17, but still with pain in L elbow, and weakness in flexion of L arm at elbow..ros. PT admits to paraesthesias in 5th digit of L hand, and tenderness at heels in both feet, and chronic cough, for which she takes Guaifenesin.\par \par HYPEREOSINOPHILIA HX:\par - 2016: was in [place] for her Sister's , rash started 2 weeks after returning, claims to have many bug bites from "horseflies" starting at bilateral medial knees. \par - 10/2016: Saw Beatriz Wu ([phone]), md ([employer]), and allergist at [facility], felt that the diagnosis was vasculitis, started on clobetazol, hydrocortizone, to little effect\par - 2016: Saw Dr. Mendoza who continued to treat pain with naproxen, neurontin\par - 2017: Saw Dermatologist Dr. Wisdom\par - 2017: Dr Wisdom 2017 performed biopsy at that time showed changes consistent with hypereosinophilic syndrome vs drug vs arthropod bite like reaction (superficial and deep perivascular and interstitial dermatitis with eosinophils), started on doxycycline, to some effect. \par - 2017: Saw Dr. Inocencio Batres on 17 generalized rash including facial rash and itching, and edema. who performed Punch biopsy of R arm showing interface dermatitis with prominent dermal eosinophils. 2nd punch biopsy of right arm suggestive of lupus erythematosus w/ granular linear signals of igA and IgM and patchy granular signal of C3 at basement membrane. IgG negative. \par - ADM 2017 TO 2017 TO [facility] with diffuse generalized rash. T 98.1, , /85, RR 18, O2 97%. Labs significant for WBC 28.15, eosinophils 97%, Hgb 11.1, MCV 75.6. Received prednisone 40mg & morphine.She was also evaluated by opthamology for complaint of blurry vision in her right eye. Was found to have cotton wool spots off both optic nerves. Had MRI brain which showed microvascular changes.\par - 2017: 17 bone marrow biopsy without evidence of leukemia, no blasts, [id number] negative. \par - 2017: 17 STARTED HYDROXYUREA , 60mg po prednisone daily\par - 2017: 5/3/17 presents with painful rash on hands and feet, exquisitely tender, stopped Hydroxyurea. \par - 2017: 17 found to have swollen R leg, RLE duplex ultrasound c/w extensive DVT, started on xarelto 15mg po bid from [facility] at [facility] [place]. \par - 2017: 17 restarted hydrea 500mg po BID with worsening rash.\par - 2017: 17 decreased prednisone from 60mg po daily to 40mg po daily continue 500mg hydrea BID\par - 2017: 17 prednisone at 40mg po daily, hydrea increased to 1000mg in AM and 500mg in PM. \par - Started Gleevec . Tapered off prednisone 17. \par \par Was on on Losartan 50mg for BP control.  that stopped recently by cardiologist.\par She was admitted  for incidental finding of PE on CT A/P and tachycardia. They were unable to obtain CTA of chest 2/2 recent contrast load. Doppler LE showed acute below the knee DVT on left side. She was off Xarelto for 3 weeks prior so was resumed on Xarelto. No SOB, TTE performed which showed no RV strain, CE negative, normal BNP. Patient with persistent tachycardia, seen by cardiology who started on beta blocker. \par \par Started Gleevec . Tapered off prednisone 17. \par Noted to have a new rash around . She reports that this rash is different than her previous rash. She saw derm, was started back on prednisone taper, completed 3-4 days ago. \par We held her Gleevec on 12/15 b/c neutropenia. She also stopped HU. Her counts improved by the following week; we restarted Gleevec 1/5/18. \par Holding Gleevec again since 3/9/18 for neutropenia. \par Her periods have been very heavy b/c of Xarelto. Refused any pills to control her menses. On po iron supplements but unable to take every day because of constipation and hemorrhoids. She received Injectafer 3/23 and 3/30. \par She now holds the Xarelto for 2 days when she gets her menses. \par

## 2022-09-22 NOTE — REVIEW OF SYSTEMS
[Fatigue] : fatigue [Recent Change In Weight] : ~T recent weight change [Dysmenorrhea/Abn Vaginal Bleeding] : dysmenorrhea/abnormal vaginal bleeding [Joint Pain] : joint pain [Skin Rash] : skin rash [Depression] : depression [Negative] : Heme/Lymph

## 2022-09-22 NOTE — ASSESSMENT
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 Rich, dermatologist by biopsy, [id number] fusion negative, JAK2 mutation negative, BCR-ABL mutation negative, with recent skin biopsy suggesting possible lupus as well, steroid dependent. Course complicated by 5/17/17 RLE duplex ultrasound c/w extensive DVT, s/p 6 months of Xarelto (finished 11/2017), now w/ new DVT on L, back on Xarelto \par She started Gleevec on 9/2017, was tapered off steroids 12/4/17 but she gets recurrent rash when she is off steroids so was started on low dose steroids (5mg daily). She is now off Gleevec since end of Feb 2021 secondary to new insurance with high copays for her medications.\par Her insurance lapsed so she was last seen here in 8/2021. Now reports worsening rash again\par \par -she restarted prednisone 5mg on 6/6/22. We increased prednisone to 40mg daily last visit which she has run out of about 1.5 weeks ago. Her rash has returned -she will see derm next month. Will also have her resume her baseline prednisone\par -pt has h/o iron deficiency anemia, s/p injectafer weekly x2 on 3/23 and 3/30/18. Also s/p IV Iron x 2 on 7/17 and 7/24/19. Advised her to increase iron in her diet and cont Iron pills. She is s/p Fereheme weekly x2 on 3/15 and 3/22/21. s/p Feraheme weekly x2 6/24/22 and 7/8/22 - Hgb today is 12.5\par -continue AC for recurrent DVT. Will check CTA given pts complaints\par -f/u w/ GYN for menorrhagia\par -RTC 2 mo

## 2022-09-23 LAB
ALBUMIN SERPL ELPH-MCNC: 4.1 G/DL
ALP BLD-CCNC: 78 U/L
ALT SERPL-CCNC: 11 U/L
ANION GAP SERPL CALC-SCNC: 11 MMOL/L
AST SERPL-CCNC: 14 U/L
BILIRUB SERPL-MCNC: 0.3 MG/DL
BUN SERPL-MCNC: 9 MG/DL
CALCIUM SERPL-MCNC: 9.4 MG/DL
CHLORIDE SERPL-SCNC: 108 MMOL/L
CO2 SERPL-SCNC: 21 MMOL/L
CREAT SERPL-MCNC: 0.84 MG/DL
EGFR: 83 ML/MIN/1.73M2
FERRITIN SERPL-MCNC: 16 NG/ML
GLUCOSE SERPL-MCNC: 94 MG/DL
IRON SATN MFR SERPL: 16 %
IRON SERPL-MCNC: 52 UG/DL
LDH SERPL-CCNC: 165 U/L
POTASSIUM SERPL-SCNC: 4 MMOL/L
PROT SERPL-MCNC: 7.6 G/DL
SODIUM SERPL-SCNC: 140 MMOL/L
TIBC SERPL-MCNC: 323 UG/DL
UIBC SERPL-MCNC: 272 UG/DL

## 2022-09-30 ENCOUNTER — APPOINTMENT (OUTPATIENT)
Dept: ULTRASOUND IMAGING | Facility: CLINIC | Age: 52
End: 2022-09-30

## 2022-09-30 ENCOUNTER — APPOINTMENT (OUTPATIENT)
Dept: MAMMOGRAPHY | Facility: CLINIC | Age: 52
End: 2022-09-30

## 2022-09-30 PROCEDURE — 76642 ULTRASOUND BREAST LIMITED: CPT | Mod: LT

## 2022-09-30 PROCEDURE — 77066 DX MAMMO INCL CAD BI: CPT

## 2022-09-30 PROCEDURE — G0279: CPT

## 2022-10-01 ENCOUNTER — APPOINTMENT (OUTPATIENT)
Dept: CT IMAGING | Facility: IMAGING CENTER | Age: 52
End: 2022-10-01

## 2022-10-01 ENCOUNTER — OUTPATIENT (OUTPATIENT)
Dept: OUTPATIENT SERVICES | Facility: HOSPITAL | Age: 52
LOS: 1 days | End: 2022-10-01
Payer: COMMERCIAL

## 2022-10-01 DIAGNOSIS — D25.9 LEIOMYOMA OF UTERUS, UNSPECIFIED: Chronic | ICD-10-CM

## 2022-10-01 DIAGNOSIS — Z00.8 ENCOUNTER FOR OTHER GENERAL EXAMINATION: ICD-10-CM

## 2022-10-01 DIAGNOSIS — I82.409 ACUTE EMBOLISM AND THROMBOSIS OF UNSPECIFIED DEEP VEINS OF UNSPECIFIED LOWER EXTREMITY: ICD-10-CM

## 2022-10-01 DIAGNOSIS — N20.0 CALCULUS OF KIDNEY: Chronic | ICD-10-CM

## 2022-10-01 DIAGNOSIS — Z98.890 OTHER SPECIFIED POSTPROCEDURAL STATES: Chronic | ICD-10-CM

## 2022-10-01 PROCEDURE — 71275 CT ANGIOGRAPHY CHEST: CPT | Mod: 26

## 2022-10-01 PROCEDURE — 71275 CT ANGIOGRAPHY CHEST: CPT

## 2022-10-04 ENCOUNTER — NON-APPOINTMENT (OUTPATIENT)
Age: 52
End: 2022-10-04

## 2022-10-04 ENCOUNTER — APPOINTMENT (OUTPATIENT)
Dept: OPHTHALMOLOGY | Facility: CLINIC | Age: 52
End: 2022-10-04
Payer: COMMERCIAL

## 2022-10-04 PROCEDURE — 92004 COMPRE OPH EXAM NEW PT 1/>: CPT

## 2022-10-04 PROCEDURE — 92015 DETERMINE REFRACTIVE STATE: CPT

## 2022-10-04 PROCEDURE — 92133 CPTRZD OPH DX IMG PST SGM ON: CPT

## 2022-10-07 ENCOUNTER — APPOINTMENT (OUTPATIENT)
Dept: ULTRASOUND IMAGING | Facility: CLINIC | Age: 52
End: 2022-10-07
Payer: COMMERCIAL

## 2022-10-07 ENCOUNTER — RESULT REVIEW (OUTPATIENT)
Age: 52
End: 2022-10-07

## 2022-10-07 PROCEDURE — 19083 BX BREAST 1ST LESION US IMAG: CPT | Mod: LT

## 2022-10-07 PROCEDURE — 77065 DX MAMMO INCL CAD UNI: CPT | Mod: LT

## 2022-10-07 PROCEDURE — 38505 NEEDLE BIOPSY LYMPH NODES: CPT | Mod: LT

## 2022-10-07 PROCEDURE — A4648: CPT

## 2022-10-07 PROCEDURE — 76942 ECHO GUIDE FOR BIOPSY: CPT | Mod: LT

## 2022-10-19 ENCOUNTER — APPOINTMENT (OUTPATIENT)
Dept: DERMATOLOGY | Facility: CLINIC | Age: 52
End: 2022-10-19

## 2022-10-19 DIAGNOSIS — L30.9 DERMATITIS, UNSPECIFIED: ICD-10-CM

## 2022-10-19 DIAGNOSIS — L30.4 ERYTHEMA INTERTRIGO: ICD-10-CM

## 2022-10-19 PROCEDURE — 99205 OFFICE O/P NEW HI 60 MIN: CPT

## 2022-10-19 RX ORDER — BETAMETHASONE DIPROPIONATE 0.5 MG/G
0.05 CREAM, AUGMENTED TOPICAL
Qty: 1 | Refills: 2 | Status: ACTIVE | COMMUNITY
Start: 2022-10-19 | End: 1900-01-01

## 2022-10-19 NOTE — ASSESSMENT
[FreeTextEntry1] : 1. #Dermatitis, reactive, secondary to severe  hypereosinophilia\par - Clinicopathologic correlation and serologies not consistent with lupus erythematous. \par - START Ketoconazole cream 2%, mix with betamethasone dipropionate augmented 0.05% cream, apply to affected area twice daily. SED.\par - START betamethasone dipropionate augmented 0.05% cream, mix with ketoconazole 2% cream, and apply to affected area twice daily. SED.\par - Email sent to [facility] for appointment with Dr. Rosalba Saravia.\par Continue Pred as managed by Dr. Saravia. Pt has had responses to courses of pred for flares\par \par RTC prn

## 2022-10-19 NOTE — PHYSICAL EXAM
[Alert] : alert [Oriented x 3] : ~L oriented x 3 [Well Nourished] : well nourished [Conjunctiva Non-injected] : conjunctiva non-injected [No Visual Lymphadenopathy] : no visual  lymphadenopathy [No Clubbing] : no clubbing [No Edema] : no edema [No Bromhidrosis] : no bromhidrosis [No Chromhidrosis] : no chromhidrosis [FreeTextEntry3] : Patient was alert, well-nourished, conjunctiva non-injected, no visual lymphadenopathy, no clubbing, no edema, no bromhidrosis, and no chromhidrosis\par Focused skin exam performed with the relevant portions of the exam performed today. \par AAx3, NAD, well-appearing/pleasant.\par Focused examination within normal limits with the exception of:\par \par -hyperpigmented papules and plaques scattered on face, trunk, and extremities\par - few erythematous scaly plaques on hands and face\par - edema of eyelids and lips\par

## 2022-10-19 NOTE — HISTORY OF PRESENT ILLNESS
[FreeTextEntry1] : f/u hypereosinophilia [de-identified] : Pt is a 53 y/o F presenting today for worsening rash. PMH significant for hypereosinophilia for which she follows with Heme/Onc.\par LV: 01/09/2018 with Dr. Batres\par \par Prior hx: Pt w/ unexplained hypereosinophilia and rash since 2016. \par Recent rash at inframammary crease and abdomen reportedly present since LV but pruritus has become intolerable for >1 week. She has been applying OTC Lidocaine and Benadryl w/o relief. \par Saw her primary care yesterday who prescribed Terbinafine 250mg daily.\par Denies fever or chills but reportedly feels "terrible", tired, with changes in vision. Reports that she saw Ophthalmology last week as floaters have returned.\par Per last Heme/Onc visit with Dr. Rosalba Saravia, Pt on 300mg Gleevec daily and Prednisone 5mg. \par Reports that the pruritus is so severe that this presentation is analogous to her hospital admission in April 2017.\par \par Skin bxs show interface, dermal eosinophilia\par DIF showed granular and linear IgA/M and weak patchy C3 at BMZ\par \par

## 2022-10-24 ENCOUNTER — OUTPATIENT (OUTPATIENT)
Dept: OUTPATIENT SERVICES | Facility: HOSPITAL | Age: 52
LOS: 1 days | Discharge: HOME | End: 2022-10-24

## 2022-10-24 DIAGNOSIS — Z98.890 OTHER SPECIFIED POSTPROCEDURAL STATES: Chronic | ICD-10-CM

## 2022-10-24 DIAGNOSIS — N20.0 CALCULUS OF KIDNEY: Chronic | ICD-10-CM

## 2022-10-24 DIAGNOSIS — D25.9 LEIOMYOMA OF UTERUS, UNSPECIFIED: Chronic | ICD-10-CM

## 2022-10-24 DIAGNOSIS — D72.119 HYPEREOSINOPHILIC SYNDROME [HES], UNSPECIFIED: ICD-10-CM

## 2022-10-26 ENCOUNTER — APPOINTMENT (OUTPATIENT)
Dept: HEMATOLOGY ONCOLOGY | Facility: CLINIC | Age: 52
End: 2022-10-26

## 2022-10-26 ENCOUNTER — RESULT REVIEW (OUTPATIENT)
Age: 52
End: 2022-10-26

## 2022-10-26 VITALS
HEIGHT: 65.98 IN | HEART RATE: 106 BPM | BODY MASS INDEX: 34.72 KG/M2 | RESPIRATION RATE: 16 BRPM | SYSTOLIC BLOOD PRESSURE: 113 MMHG | WEIGHT: 216.05 LBS | OXYGEN SATURATION: 95 % | DIASTOLIC BLOOD PRESSURE: 76 MMHG | TEMPERATURE: 97.3 F

## 2022-10-26 DIAGNOSIS — I10 ESSENTIAL (PRIMARY) HYPERTENSION: ICD-10-CM

## 2022-10-26 LAB
BASOPHILS # BLD AUTO: 0.04 K/UL — SIGNIFICANT CHANGE UP (ref 0–0.2)
BASOPHILS NFR BLD AUTO: 1.2 % — SIGNIFICANT CHANGE UP (ref 0–2)
EOSINOPHIL # BLD AUTO: 0.26 K/UL — SIGNIFICANT CHANGE UP (ref 0–0.5)
EOSINOPHIL NFR BLD AUTO: 8 % — HIGH (ref 0–6)
HCT VFR BLD CALC: 43.6 % — SIGNIFICANT CHANGE UP (ref 34.5–45)
HGB BLD-MCNC: 13.5 G/DL — SIGNIFICANT CHANGE UP (ref 11.5–15.5)
IMM GRANULOCYTES NFR BLD AUTO: 0.3 % — SIGNIFICANT CHANGE UP (ref 0–0.9)
LYMPHOCYTES # BLD AUTO: 1.71 K/UL — SIGNIFICANT CHANGE UP (ref 1–3.3)
LYMPHOCYTES # BLD AUTO: 52.6 % — HIGH (ref 13–44)
MCHC RBC-ENTMCNC: 25.7 PG — LOW (ref 27–34)
MCHC RBC-ENTMCNC: 31 G/DL — LOW (ref 32–36)
MCV RBC AUTO: 82.9 FL — SIGNIFICANT CHANGE UP (ref 80–100)
MONOCYTES # BLD AUTO: 0.3 K/UL — SIGNIFICANT CHANGE UP (ref 0–0.9)
MONOCYTES NFR BLD AUTO: 9.2 % — SIGNIFICANT CHANGE UP (ref 2–14)
NEUTROPHILS # BLD AUTO: 0.93 K/UL — LOW (ref 1.8–7.4)
NEUTROPHILS NFR BLD AUTO: 28.7 % — LOW (ref 43–77)
NRBC # BLD: 0 /100 WBCS — SIGNIFICANT CHANGE UP (ref 0–0)
PLATELET # BLD AUTO: 277 K/UL — SIGNIFICANT CHANGE UP (ref 150–400)
RBC # BLD: 5.26 M/UL — HIGH (ref 3.8–5.2)
RBC # FLD: 13 % — SIGNIFICANT CHANGE UP (ref 10.3–14.5)
WBC # BLD: 3.25 K/UL — LOW (ref 3.8–10.5)
WBC # FLD AUTO: 3.25 K/UL — LOW (ref 3.8–10.5)

## 2022-10-26 PROCEDURE — 99214 OFFICE O/P EST MOD 30 MIN: CPT

## 2022-10-26 RX ORDER — VALSARTAN AND HYDROCHLOROTHIAZIDE 320; 12.5 MG/1; MG/1
320-12.5 TABLET, FILM COATED ORAL DAILY
Qty: 30 | Refills: 0 | Status: ACTIVE | COMMUNITY
Start: 2022-10-26

## 2022-10-26 RX ORDER — TERBINAFINE HYDROCHLORIDE 250 MG/1
250 TABLET ORAL DAILY
Qty: 30 | Refills: 0 | Status: ACTIVE | COMMUNITY
Start: 2022-10-26

## 2022-10-26 RX ORDER — AMLODIPINE BESYLATE 5 MG/1
5 TABLET ORAL DAILY
Qty: 30 | Refills: 1 | Status: DISCONTINUED | COMMUNITY
End: 2022-10-26

## 2022-10-26 NOTE — ASSESSMENT
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 Rich, dermatologist by biopsy, [id number] fusion negative, JAK2 mutation negative, BCR-ABL mutation negative, with recent skin biopsy suggesting possible lupus as well, steroid dependent. Course complicated by 5/17/17 RLE duplex ultrasound c/w extensive DVT, s/p 6 months of Xarelto (finished 11/2017), now w/ new DVT on L, back on Xarelto \par She started Gleevec on 9/2017, was tapered off steroids 12/4/17 but she gets recurrent rash when she is off steroids so was started on low dose steroids (5mg daily). She is now off Gleevec since end of Feb 2021 secondary to new insurance with high copays for her medications.\par Her insurance lapsed so she was last seen here in 8/2021. Now reports worsening rash again\par \par -she restarted prednisone 5mg on 6/6/22. We increased prednisone to 40mg daily in Jun which she has run out of about 1.5 weeks ago. Her rash has returned -she saw derm who referred her back here. Interestingly she does not have eosinophilia. We discussed maybe trial of Gleevec again -will send script to [facility]. \par -pt has h/o iron deficiency anemia, s/p injectafer weekly x2 on 3/23 and 3/30/18. Also s/p IV Iron x 2 on 7/17 and 7/24/19. Advised her to increase iron in her diet and cont Iron pills. She is s/p Fereheme weekly x2 on 3/15 and 3/22/21. s/p Feraheme weekly x2 6/24/22 and 7/8/22 - Hgb today is 13.5\par -continue AC for recurrent DVT. Currently on ASA while awaiting Xarelto through free drug program as insurance does not cover\par -f/u w/ GYN for menorrhagia\par -RTC 1 mo

## 2022-10-26 NOTE — HISTORY OF PRESENT ILLNESS
[de-identified] : Gleevec has been restarted in 5/2019 but at 200mg for neutropenia. She reports from past couple months she intentionally missed some Gleevec doses and wants to be tapered off of it. However started having rash and fatigue so now back on Gleevec. On 300mg daily. We have continued her on prednisone 5mg. \par She is s/p Injectafer on 7/17 and 7/24/19. \par \par Since visit in Jan, she has new insurance and it is $375 copay for Gleevec a month. Rash is coming back on face and stomach - it is burning, not itching. She is also off her iron supplements 2/2 high copay. She has leftover Xarelto and Eliquis but otherwise it also has a high copay which she cannot afford. She states that she was breaking the leftover 400mg Gleevec in half and taking it that way.\par  \par Visit on 6/2020, we increased prednisone to 40mg which she took for 3 weeks, then started tapering 30mg for 2 weeks, 20mg for the last 2 weeks - rash has improved \par Hip and knee pain improved but still having ankle pain. Xrays done were unremarkable. \par She bought OTC iron - started on Thurs 10/1/20. \par Saw podiatry - has bone spurs in b/l feet, awaiting special shoes. Now on Baclofen and Naproxen\par Had hematuria which has resolved. Saw Dr. Mendoza PCP on 9/29/20. \par \par She stopped taking Gleevec end of Feb 2021. She is also now off steroids for the same period of time. \par She reports losing weight since coming off steroids. \par \par She is s/p Fereheme weekly x2 on 3/15 and 3/22/21\par \par Pt was last seen here in 8/2021 -notes that she had a lapse in insurance coverage which is now active as of 6/2022. \par Rash started coming back about a month ago. Restarted prednisone 5mg on 6/6/22. \par She was seen in 6/2022. She is on Xarelto, vitamin D, iron gluconate and MVI\par She has her menses currently -heavy w/ clots. \par s/p Feraheme weekly x2 6/24/22 and 7/8/22. \par We increased her prednisone to 40mg last visit which she said helped initially but it increased her appetite. She has now completed prednisone about a week and a half ago. \par \par Pt ran out of Xarelto and insurance not covering. Currently applying for free drug -on ASA at this time while awaiting med.\par She went to see derm -was unable to see Dr. Batres so saw Dr. Tang. 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 Derm prescribed ketoconazole cream with betamethasone. Pt reports that her rash is improving.  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 Seen initially on 17. PT reports improvement in fatigue since admission 17, but still with pain in L elbow, and weakness in flexion of L arm at elbow..ros. PT admits to paraesthesias in 5th digit of L hand, and tenderness at heels in both feet, and chronic cough, for which she takes Guaifenesin.\par \par HYPEREOSINOPHILIA HX:\par - 2016: was in [place] for her Sister's , rash started 2 weeks after returning, claims to have many bug bites from "horseflies" starting at bilateral medial knees. \par - 10/2016: Saw Beatriz Wu ([phone]), md ([facility]), and allergist at [facility], felt that the diagnosis was vasculitis, started on clobetazol, hydrocortizone, to little effect\par - 2016: Saw Dr. Mendoza who continued to treat pain with naproxen, neurontin\par - 2017: Saw Dermatologist Dr. Wisdom\par - 2017: Dr Wisdom 2017 performed biopsy at that time showed changes consistent with hypereosinophilic syndrome vs drug vs arthropod bite like reaction (superficial and deep perivascular and interstitial dermatitis with eosinophils), started on doxycycline, to some effect. \par - 2017: Saw Dr. Inocencio Batres on 17 generalized rash including facial rash and itching, and edema. who performed Punch biopsy of R arm showing interface dermatitis with prominent dermal eosinophils. 2nd punch biopsy of right arm suggestive of lupus erythematosus w/ granular linear signals of igA and IgM and patchy granular signal of C3 at basement membrane. IgG negative. \par - ADM 2017 TO 2017 TO [facility] with diffuse generalized rash. T 98.1, , /85, RR 18, O2 97%. Labs significant for WBC 28.15, eosinophils 97%, Hgb 11.1, MCV 75.6. Received prednisone 40mg & morphine.She was also evaluated by opthamology for complaint of blurry vision in her right eye. Was found to have cotton wool spots off both optic nerves. Had MRI brain which showed microvascular changes.\par - 2017: 17 bone marrow biopsy without evidence of leukemia, no blasts, [id number] negative. \par - 2017: 17 STARTED HYDROXYUREA , 60mg po prednisone daily\par - 2017: 5/3/17 presents with painful rash on hands and feet, exquisitely tender, stopped Hydroxyurea. \par - 2017: 17 found to have swollen R leg, RLE duplex ultrasound c/w extensive DVT, started on xarelto 15mg po bid from [facility] pharmacy at [facility] [place]. \par - 2017: 17 restarted hydrea 500mg po BID with worsening rash.\par - 2017: 17 decreased prednisone from 60mg po daily to 40mg po daily continue 500mg hydrea BID\par - 2017: 17 prednisone at 40mg po daily, hydrea increased to 1000mg in AM and 500mg in PM. \par - Started Gleevec . Tapered off prednisone 17. \par \par Was on on Losartan 50mg for BP control.  that stopped recently by cardiologist.\par She was admitted  for incidental finding of PE on CT A/P and tachycardia. They were unable to obtain CTA of chest 2/2 recent contrast load. Doppler LE showed acute below the knee DVT on left side. She was off Xarelto for 3 weeks prior so was resumed on Xarelto. No SOB, TTE performed which showed no RV strain, CE negative, normal BNP. Patient with persistent tachycardia, seen by cardiology who started on beta blocker. \par \par Started Gleevec . Tapered off prednisone 17. \par Noted to have a new rash around . She reports that this rash is different than her previous rash. She saw derm, was started back on prednisone taper, completed 3-4 days ago. \par We held her Gleevec on 12/15 b/c neutropenia. She also stopped HU. Her counts improved by the following week; we restarted Gleevec 1/5/18. \par Holding Gleevec again since 3/9/18 for neutropenia. \par Her periods have been very heavy b/c of Xarelto. Refused any pills to control her menses. On po iron supplements but unable to take every day because of constipation and hemorrhoids. She received Injectafer 3/23 and 3/30. \par She now holds the Xarelto for 2 days when she gets her menses. \par

## 2022-10-26 NOTE — PHYSICAL EXAM
[Fully active, able to carry on all pre-disease performance without restriction] : Status 0 - Fully active, able to carry on all pre-disease performance without restriction [Normal] : affect appropriate [de-identified] : mild edema b/l LE [de-identified] : hyperpigmented rash diffusely, patches under breasts b/l

## 2022-10-28 LAB
ALBUMIN SERPL ELPH-MCNC: 4.3 G/DL
ALP BLD-CCNC: 104 U/L
ALT SERPL-CCNC: 13 U/L
ANION GAP SERPL CALC-SCNC: 11 MMOL/L
AST SERPL-CCNC: 18 U/L
BILIRUB SERPL-MCNC: 0.3 MG/DL
BUN SERPL-MCNC: 12 MG/DL
CALCIUM SERPL-MCNC: 10.2 MG/DL
CHLORIDE SERPL-SCNC: 104 MMOL/L
CO2 SERPL-SCNC: 25 MMOL/L
CREAT SERPL-MCNC: 0.88 MG/DL
EGFR: 79 ML/MIN/1.73M2
FERRITIN SERPL-MCNC: 27 NG/ML
GLUCOSE SERPL-MCNC: 139 MG/DL
IRON SATN MFR SERPL: 24 %
IRON SERPL-MCNC: 83 UG/DL
LDH SERPL-CCNC: 141 U/L
POTASSIUM SERPL-SCNC: 3.6 MMOL/L
PROT SERPL-MCNC: 8.1 G/DL
SODIUM SERPL-SCNC: 140 MMOL/L
TIBC SERPL-MCNC: 344 UG/DL
UIBC SERPL-MCNC: 261 UG/DL

## 2022-11-09 ENCOUNTER — APPOINTMENT (OUTPATIENT)
Dept: DERMATOLOGY | Facility: CLINIC | Age: 52
End: 2022-11-09

## 2022-11-16 ENCOUNTER — RX RENEWAL (OUTPATIENT)
Age: 52
End: 2022-11-16

## 2022-11-16 RX ORDER — LIDOCAINE AND PRILOCAINE 25; 25 MG/G; MG/G
2.5-2.5 CREAM TOPICAL
Qty: 30 | Refills: 1 | Status: ACTIVE | COMMUNITY
Start: 2017-05-10 | End: 1900-01-01

## 2022-11-28 ENCOUNTER — APPOINTMENT (OUTPATIENT)
Dept: HEMATOLOGY ONCOLOGY | Facility: CLINIC | Age: 52
End: 2022-11-28

## 2022-12-21 ENCOUNTER — OUTPATIENT (OUTPATIENT)
Dept: OUTPATIENT SERVICES | Facility: HOSPITAL | Age: 52
LOS: 1 days | Discharge: HOME | End: 2022-12-21

## 2022-12-21 DIAGNOSIS — Z98.890 OTHER SPECIFIED POSTPROCEDURAL STATES: Chronic | ICD-10-CM

## 2022-12-21 DIAGNOSIS — D25.9 LEIOMYOMA OF UTERUS, UNSPECIFIED: Chronic | ICD-10-CM

## 2022-12-21 DIAGNOSIS — N20.0 CALCULUS OF KIDNEY: Chronic | ICD-10-CM

## 2022-12-21 DIAGNOSIS — D72.119 HYPEREOSINOPHILIC SYNDROME [HES], UNSPECIFIED: ICD-10-CM

## 2022-12-29 ENCOUNTER — APPOINTMENT (OUTPATIENT)
Dept: HEMATOLOGY ONCOLOGY | Facility: CLINIC | Age: 52
End: 2022-12-29

## 2022-12-29 ENCOUNTER — RESULT REVIEW (OUTPATIENT)
Age: 52
End: 2022-12-29

## 2022-12-29 VITALS
DIASTOLIC BLOOD PRESSURE: 80 MMHG | OXYGEN SATURATION: 99 % | WEIGHT: 231.04 LBS | BODY MASS INDEX: 37.13 KG/M2 | RESPIRATION RATE: 16 BRPM | HEIGHT: 65.98 IN | TEMPERATURE: 97.3 F | SYSTOLIC BLOOD PRESSURE: 122 MMHG | HEART RATE: 89 BPM

## 2022-12-29 DIAGNOSIS — D64.9 ANEMIA, UNSPECIFIED: ICD-10-CM

## 2022-12-29 LAB
BASOPHILS # BLD AUTO: 0 K/UL — SIGNIFICANT CHANGE UP (ref 0–0.2)
BASOPHILS NFR BLD AUTO: 0 % — SIGNIFICANT CHANGE UP (ref 0–2)
EOSINOPHIL # BLD AUTO: 0.16 K/UL — SIGNIFICANT CHANGE UP (ref 0–0.5)
EOSINOPHIL NFR BLD AUTO: 5 % — SIGNIFICANT CHANGE UP (ref 0–6)
HCT VFR BLD CALC: 38.8 % — SIGNIFICANT CHANGE UP (ref 34.5–45)
HGB BLD-MCNC: 12.1 G/DL — SIGNIFICANT CHANGE UP (ref 11.5–15.5)
LYMPHOCYTES # BLD AUTO: 1.45 K/UL — SIGNIFICANT CHANGE UP (ref 1–3.3)
LYMPHOCYTES # BLD AUTO: 45 % — HIGH (ref 13–44)
MCHC RBC-ENTMCNC: 25.3 PG — LOW (ref 27–34)
MCHC RBC-ENTMCNC: 31.2 G/DL — LOW (ref 32–36)
MCV RBC AUTO: 81 FL — SIGNIFICANT CHANGE UP (ref 80–100)
MONOCYTES # BLD AUTO: 0.19 K/UL — SIGNIFICANT CHANGE UP (ref 0–0.9)
MONOCYTES NFR BLD AUTO: 6 % — SIGNIFICANT CHANGE UP (ref 2–14)
NEUTROPHILS # BLD AUTO: 1.42 K/UL — LOW (ref 1.8–7.4)
NEUTROPHILS NFR BLD AUTO: 44 % — SIGNIFICANT CHANGE UP (ref 43–77)
NRBC # BLD: 0 /100 — SIGNIFICANT CHANGE UP (ref 0–0)
NRBC # BLD: SIGNIFICANT CHANGE UP /100 WBCS (ref 0–0)
PLAT MORPH BLD: NORMAL — SIGNIFICANT CHANGE UP
PLATELET # BLD AUTO: 218 K/UL — SIGNIFICANT CHANGE UP (ref 150–400)
RBC # BLD: 4.79 M/UL — SIGNIFICANT CHANGE UP (ref 3.8–5.2)
RBC # FLD: 16.8 % — HIGH (ref 10.3–14.5)
RBC BLD AUTO: SIGNIFICANT CHANGE UP
WBC # BLD: 3.23 K/UL — LOW (ref 3.8–10.5)
WBC # FLD AUTO: 3.23 K/UL — LOW (ref 3.8–10.5)

## 2022-12-29 PROCEDURE — 99214 OFFICE O/P EST MOD 30 MIN: CPT

## 2022-12-29 NOTE — PHYSICAL EXAM
[Fully active, able to carry on all pre-disease performance without restriction] : Status 0 - Fully active, able to carry on all pre-disease performance without restriction [Normal] : affect appropriate [de-identified] : mild edema b/l LE [de-identified] : hyperpigmented rash much improved

## 2022-12-29 NOTE — HISTORY OF PRESENT ILLNESS
[de-identified] : Gleevec has been restarted in 5/2019 but at 200mg for neutropenia. She reports from past couple months she intentionally missed some Gleevec doses and wants to be tapered off of it. However started having rash and fatigue so now back on Gleevec. On 300mg daily. We have continued her on prednisone 5mg. \par She is s/p Injectafer on 7/17 and 7/24/19. \par \par Since visit in Jan, she has new insurance and it is $375 copay for Gleevec a month. Rash is coming back on face and stomach - it is burning, not itching. She is also off her iron supplements 2/2 high copay. She has leftover Xarelto and Eliquis but otherwise it also has a high copay which she cannot afford. She states that she was breaking the leftover 400mg Gleevec in half and taking it that way.\par  \par Visit on 6/2020, we increased prednisone to 40mg which she took for 3 weeks, then started tapering 30mg for 2 weeks, 20mg for the last 2 weeks - rash has improved \par Hip and knee pain improved but still having ankle pain. Xrays done were unremarkable. \par She bought OTC iron - started on Thurs 10/1/20. \par Saw podiatry - has bone spurs in b/l feet, awaiting special shoes. Now on Baclofen and Naproxen\par Had hematuria which has resolved. Saw Dr. Mendoza PCP on 9/29/20. \par \par She stopped taking Gleevec end of Feb 2021. She is also now off steroids for the same period of time. \par She reports losing weight since coming off steroids. \par \par She is s/p Fereheme weekly x2 on 3/15 and 3/22/21\par \par Pt was last seen here in 8/2021 -notes that she had a lapse in insurance coverage which is now active as of 6/2022. \par Rash started coming back about a month ago. Restarted prednisone 5mg on 6/6/22. \par She was seen in 6/2022. She is on Xarelto, vitamin D, iron gluconate and MVI\par She has her menses currently -heavy w/ clots. \par s/p Feraheme weekly x2 6/24/22 and 7/8/22. \par We increased her prednisone to 40mg last visit which she said helped initially but it increased her appetite. She has now completed prednisone about a week and a half ago. \par \par Pt ran out of Xarelto and insurance not covering. Currently applying for free drug -on ASA at this time while awaiting med.\par She went to see derm -was unable to see Dr. Batres so saw Dr. Tang. 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 Derm prescribed ketoconazole cream with betamethasone. Pt reports that her rash is improving. \par \par She was on prednisone 40mg for about a week and then started tapering b/c she had weight gain. Took 30 for a week, 20, for a week, 10 for a week and now on 5.\par Rash is significantly improved.\par Gleevec 200mg resumed in Nov. \par Her insurance is changing next year. \par LMP in Aug.  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 Seen initially on 17. PT reports improvement in fatigue since admission 17, but still with pain in L elbow, and weakness in flexion of L arm at elbow..ros. PT admits to paraesthesias in 5th digit of L hand, and tenderness at heels in both feet, and chronic cough, for which she takes Guaifenesin.\par \par HYPEREOSINOPHILIA HX:\par - 2016: was in [place] for her Sister's , rash started 2 weeks after returning, claims to have many bug bites from "horseflies" starting at bilateral medial knees. \par - 10/2016: Saw Beatriz Wu ([phone]), md ([facility]), and allergist at [facility], felt that the diagnosis was vasculitis, started on clobetazol, hydrocortizone, to little effect\par - 2016: Saw Dr. Mendoza who continued to treat pain with naproxen, neurontin\par - 2017: Saw Dermatologist Dr. Wisdom\par - 2017: Dr Wisdom 2017 performed biopsy at that time showed changes consistent with hypereosinophilic syndrome vs drug vs arthropod bite like reaction (superficial and deep perivascular and interstitial dermatitis with eosinophils), started on doxycycline, to some effect. \par - 2017: Saw Dr. Inocencio Batres on 17 generalized rash including facial rash and itching, and edema. who performed Punch biopsy of R arm showing interface dermatitis with prominent dermal eosinophils. 2nd punch biopsy of right arm suggestive of lupus erythematosus w/ granular linear signals of igA and IgM and patchy granular signal of C3 at basement membrane. IgG negative. \par - ADM 2017 TO 2017 TO [facility] with diffuse generalized rash. T 98.1, , /85, RR 18, O2 97%. Labs significant for WBC 28.15, eosinophils 97%, Hgb 11.1, MCV 75.6. Received prednisone 40mg & morphine.She was also evaluated by opthamology for complaint of blurry vision in her right eye. Was found to have cotton wool spots off both optic nerves. Had MRI brain which showed microvascular changes.\par - 2017: 17 bone marrow biopsy without evidence of leukemia, no blasts, [id number] negative. \par - 2017: 17 STARTED HYDROXYUREA , 60mg po prednisone daily\par - 2017: 5/3/17 presents with painful rash on hands and feet, exquisitely tender, stopped Hydroxyurea. \par - 2017: 17 found to have swollen R leg, RLE duplex ultrasound c/w extensive DVT, started on xarelto 15mg po bid from [facility] pharmacy at [facility] [place]. \par - 2017: 17 restarted hydrea 500mg po BID with worsening rash.\par - 2017: 17 decreased prednisone from 60mg po daily to 40mg po daily continue 500mg hydrea BID\par - 2017: 17 prednisone at 40mg po daily, hydrea increased to 1000mg in AM and 500mg in PM. \par - Started Gleevec . Tapered off prednisone 17. \par \par Was on on Losartan 50mg for BP control.  that stopped recently by cardiologist.\par She was admitted  for incidental finding of PE on CT A/P and tachycardia. They were unable to obtain CTA of chest 2/2 recent contrast load. Doppler LE showed acute below the knee DVT on left side. She was off Xarelto for 3 weeks prior so was resumed on Xarelto. No SOB, TTE performed which showed no RV strain, CE negative, normal BNP. Patient with persistent tachycardia, seen by cardiology who started on beta blocker. \par \par Started Gleevec . Tapered off prednisone 17. \par Noted to have a new rash around . She reports that this rash is different than her previous rash. She saw derm, was started back on prednisone taper, completed 3-4 days ago. \par We held her Gleevec on 12/15 b/c neutropenia. She also stopped HU. Her counts improved by the following week; we restarted Gleevec 1/5/18. \par Holding Gleevec again since 3/9/18 for neutropenia. \par Her periods have been very heavy b/c of Xarelto. Refused any pills to control her menses. On po iron supplements but unable to take every day because of constipation and hemorrhoids. She received Injectafer 3/23 and 3/30. \par She now holds the Xarelto for 2 days when she gets her menses. \par

## 2022-12-29 NOTE — ASSESSMENT
[FreeTextEntry1] : 53yo F with hypereosinophilic syndrome diagnosed on 2/2017 by Dr. Rich, dermatologist by biopsy, [id number] fusion negative, JAK2 mutation negative, BCR-ABL mutation negative, with recent skin biopsy suggesting possible lupus as well, steroid dependent. Course complicated by 5/17/17 RLE duplex ultrasound c/w extensive DVT, s/p 6 months of Xarelto (finished 11/2017), now w/ new DVT on L, back on Xarelto \par She started Gleevec on 9/2017, was tapered off steroids 12/4/17 but she gets recurrent rash when she is off steroids so was started on low dose steroids (5mg daily). She is now off Gleevec since end of Feb 2021 secondary to new insurance with high copays for her medications.\par Her insurance lapsed so she was last seen here in 8/2021. Now reports worsening rash again\par \par -she restarted prednisone 5mg on 6/6/22. We increased prednisone to 40mg daily in Jun which she has run out of about 1.5 weeks ago. Her rash has returned -she saw derm who referred her back here. Interestingly she does not have eosinophilia. We restarted Gleevec again -started in Nov. We also increased prednisone to 40mg x1 wk and she has since tapered by 10 weekly, back on 5mg daily\par -pt has h/o iron deficiency anemia, s/p injectafer weekly x2 on 3/23 and 3/30/18. Also s/p IV Iron x 2 on 7/17 and 7/24/19. Advised her to increase iron in her diet and cont Iron pills. She is s/p Fereheme weekly x2 on 3/15 and 3/22/21. s/p Feraheme weekly x2 6/24/22 and 7/8/22 - Hgb today is 12.1\par -continue AC for recurrent DVT. Currently on ASA while awaiting Xarelto through free drug program as insurance does not cover\par -f/u w/ GYN for menorrhagia -LMP in Aug\par -RTC 2 mo

## 2023-01-04 LAB
ALBUMIN SERPL ELPH-MCNC: 3.9 G/DL
ALP BLD-CCNC: 107 U/L
ALT SERPL-CCNC: 19 U/L
ANION GAP SERPL CALC-SCNC: 10 MMOL/L
AST SERPL-CCNC: 21 U/L
BILIRUB SERPL-MCNC: 0.2 MG/DL
BUN SERPL-MCNC: 11 MG/DL
CALCIUM SERPL-MCNC: 9.4 MG/DL
CHLORIDE SERPL-SCNC: 104 MMOL/L
CO2 SERPL-SCNC: 22 MMOL/L
CREAT SERPL-MCNC: 0.8 MG/DL
EGFR: 88 ML/MIN/1.73M2
FERRITIN SERPL-MCNC: 23 NG/ML
GLUCOSE SERPL-MCNC: 92 MG/DL
IRON SATN MFR SERPL: 11 %
IRON SERPL-MCNC: 36 UG/DL
LDH SERPL-CCNC: 149 U/L
POTASSIUM SERPL-SCNC: 4.1 MMOL/L
PROT SERPL-MCNC: 7.8 G/DL
SODIUM SERPL-SCNC: 137 MMOL/L
TIBC SERPL-MCNC: 331 UG/DL
UIBC SERPL-MCNC: 295 UG/DL

## 2023-01-13 NOTE — ED ADULT TRIAGE NOTE - NS ED NURSE DIRECT TO ROOM YN
Your Child's Health  18-Month-Old Visit      Annabella Santamaria  January 13, 2023    Visit Vitals  Pulse 104   Resp 35   Ht 31.4\" (79.8 cm)   Wt 8.644 kg (19 lb 0.9 oz)   .9 cm (46.4\")   BMI 13.59 kg/m²     Weight: 19.06 lbs      YOUR CHILD’S 18 MONTH OLD VISIT       Key points at this age…  Annabella should continue to ride in a properly-installed car seat in the back seat. Correct use of a car seat is always critically important for your child’s safety. For maximum safety, keep the seat rear facing until your child reaches the top height or weight limit allowed by the car seat .        Help your child’s language develop by talking to them with clear, simple words and reading lots of books together. Electronic media (TV, pads, phones, computers) are not recommended at this age.     Take care of those teeth! Help your child brush twice daily with a tiny amount of regular (not baby) toothpaste. Avoid sugary and sticky foods and sweetened drinks like juice and soda. If you give your child juice, limit it to no more than 4 ounces a day of 100% juice.      Nutrition & Healthy Weight:   Healthy eating is a challenge for everyone, especially for busy parents of toddlers who can often be picky and hard to please! But it is important-- nearly 1 in 3 children in our country is overweight or obese which poses serious health risks for them as they get older.      Here are some things to think about at this age:   Water and milk are the healthiest drink choices for your children.      Avoid junk food and sweet things-- fried fast food, bagged snacks, candy, fruit snacks, sugary cereals, juice, Ganesh-Aid, etc. Once your child develops a “sweet tooth” for these things, they will always be asking for them. Too much sugar is bad for their teeth and their overall nutrition and weight. Children this age should have no more than 4 ounces of 100% fruit juice daily. Do not water it down in a bottle or sippy cup that they  can carry around and drink from over an extended period of time; this frequent exposure to the sugars in juice (even if diluted with water) just increases their risk of tooth decay.       Don’t push your children to eat when they do not want to. Offer them small portions at a time; they can have seconds when they eat everything they were offered. Serving large portions may result in eating more than they need (and waste!).      Toddlers often resist new foods, but keep trying! Most will eventually try something new after it is offered several times. (Don’t get into the habit of serving only what they like!)    Even when eating a very well balanced diet, children need some extra vitamin D. A liquid preparation of pure vitamin D (400 or 600 IU) or a multivitamin with iron drop is recommended.  (They are currently too young for a chewable vitamin because they could choke on those.)     DENTAL CARE:   By now you should be brushing your child’s teeth twice daily, especially at bedtime. (It’s okay if they think you are “just helping” them!) Brush with a tiny smear of regular (fluoridated) toothpaste (not baby toothpaste).  Using city water to drink and cook with provides important fluoride to strengthen and protect teeth against cavities. If you have well water instead of a city water supply, however, you should have it tested for fluoride content to determine whether your child might need fluoride supplements.     DEVELOPMENT & BEHAVIOR:  Toddlers will be learning lots of new skills at this age and over the next several months! They should be walking well and often running; most will try to climb stairs (if you let them!). They may help get themselves undressed, scribble, and use a cup and spoon fairly well. They should have at least 6 words, will gesture and point to show someone what they want. They will “pretend” their dolls or stuffed animals are real, and they will hopefully know the name of their favorite book.  Over the next several months, help them learn by pointing to and naming pictures in books as well as their own body parts. Talking, singing and reading together are great ways to advance your baby’s language skills!      The American Academy of Pediatrics (AAP) does not recommend any “screen time” (TV, videos, pads, phones) except for video chatting before age 18 months. If parents of children between 18 months and 2 years want to start some screen time, they should choose high-quality programming (the AAP suggests work created by [employer] or [employer]).  Watching should be limited to very brief time periods (minutes, not hours), and parents should watch these programs with their child to help children understand what they are seeing--children should not be given devices to watch on their own.     Most children are not developmentally ready for potty-training until closer to 3 years old. If your child seems interested, it is fine to get them a potty chair to use (and provide lots of praise when they use it!). Just remember their young body is not likely to be developed enough at this age for them to be fully potty trained at this time.       TEMPER TANTRUMS:  These begin about the time a child starts walking and can continue until they are 3½ years old (or older). Toddlers tantrum because they are frustrated that they can’t say what they want or need or they are angry because they cannot get or have something they want.  The most effective method of dealing with tantrums is to ignore them and wait for it to pass. Once your child is calm, simply direct them to a new activity. Don’t try to “discuss” their tantrum (they don’t get that!) and certainly do not promise them a reward for stopping their crying. Having (and enforcing) consistent rules and giving  your child a lot of positive attention when they are behaving well (“time in”) will promote good behavior and reduce tantrums. When your child is misbehaving  (hitting, biting), brief time-outs (which essentially means ignoring your child for a brief period) work because your child wants your attention more than anything else.     CAR SAFETY: An approved car seat in the back seat is required by [place] law. Your child is safest in a rear-facing convertible car seat right now: keep the seat in a rear-facing position until your child reaches the top height or weight limit allowed by the car seat . (Really! Even if your child is tall and they have to keep their legs bent, they are still safer when rear facing. And kids generally do not mind having their legs bunched up-- that really seems to bother parents more than it bothers kids!)     SAFETY & ACCIDENT PREVENTION:  By this age, your home should be pretty well safety-proofed. Here are some reminders about possible safety risks for curious, active toddlers!    1. Burns:  Keep dangerous items out of reach, including hot liquids, hot foods, and electrical cords on appliances such as irons, toasters, and coffee pots--children can pull on cords and suffer serious burns if they pull something hot down onto themselves. Have a family fire safety plan, including regular smoke detector (and carbon monoxide detector) battery checks, working fire extinguishers, and escape routes.  2. Falls: Keep magallanes on stairs and window latches on upper-story windows.  3. Water Safety:  Children can drown in just a couple inches of water, so never leave a toddler alone in a tub. Also, do not rely on older children to properly supervise the younger ones. Children this young are not developmentally ready for swimming lessons, and life jackets and “swimmies” will NOT protect your child from drowning! When near water, children need constant supervision by an adult who knows how to swim. Permanent pools (in ground and above ground) should be fenced off (and locked), and wading pools should be drained when not in use. Keep large buckets  Yes empty and keep toilet seat lids down and secured with a safety lock if possible.   4.  Poisoning:  Continue to keep all cleaning and chemical products safely stored out of sight and out of reach. (Check for what is under your bathroom sink as well as your kitchen sink.) Keep purses (your own and ones belonging to visitors) out of reach since curious toddlers can quickly find medicines, cigarettes, and small objects to choke on! Keep the original bottles and safety caps for all medicines, including vitamins; do not transfer medicines to non-child-proofed or unlabeled containers. Be especially careful when around older people because they may keep their medicines in clear sight or in non-childproofed containers.   Is this number in your cell phone? Call the Poison Center at 1-221.699.9223 for any known or suspected poisoning.      SMOKING:  Continue to protect your child from cigarette smoke. Exposure to smoke will increase their risk of asthma, ear infections, and respiratory infections (pneumonia). If you smoke and are ready to consider quitting, talk to your doctor. Nicotine replacement products can be very helpful in breaking this tough addiction.     LEAD EXPOSURE:    If you live in [place], your child should be screened for lead now if they were not checked at age 12 months. The [facility] Department recommends screening children three times in the first 3 years of life. If you do not live in [place], talk to your doctor about lead screening if any of the following apply: (1) your child currently (or had previously) lives in or frequently visits a house or building built before 1950 (including , grandparent homes, etc); (2) your child currently (or had previously) lives in or frequently visits a house or building built before 1978 with recent or ongoing renovations; (3) your child has a brother, sister or playmate who has had lead poisoning; (4) your child is enrolled in Medicaid or WIC.  Very rarely, other sources of lead exposure can include herbal remedies or imported items (mini blinds, canned goods). Do an internet search for “[facility] Department Lead” for helpful information about lead risks in [place]. If you have questions about older neighborhoods in [place] that might have lead connecting (or service) pipes, do an internet search for \"[place] lead awareness and drinking water safety\". From this web page, you can search for your address to find out if your home was built with lead service lines. There are also helpful hints regarding water safety on this site.     SUN EXPOSURE:  Keep your child in shade and out of direct sun as much as you can. Protective clothing as well as hats and sunglasses help protect against sunburn and skin damage. When your child is going to be outside, always use a “broad spectrum” sunscreen (which means it protects against both UVA and UVB rays) with an SPF of 15 to 30; apply it to your child’s skin at least 20 to 30 minutes before they go out. Kids this age often like the zinc oxide (or titanium dioxide) products which are good for sensitive spots (nose, cheeks, ears, shoulders); these don’t “rub in” all the way, but kids often like the fun colors they come in!      MEDICATION FOR FEVER OR PAIN:   Acetaminophen liquid (e.g., Tylenol or Tempra) may be given every four hours as needed for pain or fever.  Acetaminophen liquid is less concentrated than the infant dropper bottle type.  Be sure to check which product CONCENTRATION you are using.    INFANT Tylenol/Acetaminophen  Drops (160 mg/5 mL)    Child’s Weight: Dose:  24 - 35 pounds:   160 mg (5.0 mL (1 Teaspoon))    CHILDREN’S Tylenol/Acetaminophen  (160 mg/5 mL)    Child’s Weight: Dose:  24 - 35 pounds:   160 mg (5.0 mL (1 Teaspoon))    CHILDREN'S Ibuprofen (100 mg/5 mL) liquid (for example, Advil or Motrin) may be given every 6 hours as needed for pain or fever.    Child’s Weight: Dose:  24 - 35  pounds:   100 mg (5.0 mL(1Teaspoon))    If Zoee is outside this weight range, call your pediatrician's office for advice      Most Recent Immunizations   Administered Date(s) Administered    DTaP(INFANRIX) 10/14/2022    DTaP/Hep B/IPV 01/12/2022    Hep A, ped/adol, 2 dose 07/13/2022    Hep B, Unspecified Formulation 2021    Hep B, adolescent or pediatric 2021    Hib (PRP-OMP) 10/14/2022    Influenza, quadrivalent, preserve-free 10/14/2022    Measles Mumps Rubella Varicella 07/13/2022    Pneumococcal Conjugate 13 Valent Vacc (Prevnar 13) 10/14/2022    Rotavirus - monovalent 2021    Rotavirus - pentavalent 01/12/2022   Pended Date(s) Pended    Hep A, ped/adol, 2 dose 01/13/2023   Deferred Date(s) Deferred    Hepatitis B Immune Globulin 2021       If Zoee develops any of the following reactions within 72 hours after an immunization, notify your pediatrician by calling the pediatric phone nurse:  A temperature of 105 degrees or above.  More than 3 hours of continuous crying.  A shrill, high-pitched cry.  A pale, limp spell.  A seizure or fainting spell. In this case, you should call 911 or go immediately to the emergency room.    NEXT VISIT:  2 YEARS OF AGE    Thank you for entrusting your care to [facility].      Also, check out “Children’s Health” on the [facility] Blog for updates on timely topics regarding children’s health!

## 2023-01-22 ENCOUNTER — RX RENEWAL (OUTPATIENT)
Age: 53
End: 2023-01-22

## 2023-01-22 RX ORDER — KETOCONAZOLE 20 MG/G
2 CREAM TOPICAL
Qty: 60 | Refills: 1 | Status: ACTIVE | COMMUNITY
Start: 2022-10-19 | End: 1900-01-01

## 2023-02-07 ENCOUNTER — NON-APPOINTMENT (OUTPATIENT)
Age: 53
End: 2023-02-07

## 2023-02-07 ENCOUNTER — APPOINTMENT (OUTPATIENT)
Dept: OPHTHALMOLOGY | Facility: CLINIC | Age: 53
End: 2023-02-07
Payer: COMMERCIAL

## 2023-02-07 PROCEDURE — 76514 ECHO EXAM OF EYE THICKNESS: CPT

## 2023-02-07 PROCEDURE — 92012 INTRM OPH EXAM EST PATIENT: CPT

## 2023-02-07 PROCEDURE — 92020 GONIOSCOPY: CPT

## 2023-02-07 PROCEDURE — 92083 EXTENDED VISUAL FIELD XM: CPT

## 2023-02-22 ENCOUNTER — OUTPATIENT (OUTPATIENT)
Dept: OUTPATIENT SERVICES | Facility: HOSPITAL | Age: 53
LOS: 1 days | Discharge: HOME | End: 2023-02-22

## 2023-02-22 DIAGNOSIS — N20.0 CALCULUS OF KIDNEY: Chronic | ICD-10-CM

## 2023-02-22 DIAGNOSIS — D25.9 LEIOMYOMA OF UTERUS, UNSPECIFIED: Chronic | ICD-10-CM

## 2023-02-22 DIAGNOSIS — D72.119 HYPEREOSINOPHILIC SYNDROME [HES], UNSPECIFIED: ICD-10-CM

## 2023-02-22 DIAGNOSIS — Z98.890 OTHER SPECIFIED POSTPROCEDURAL STATES: Chronic | ICD-10-CM

## 2023-02-23 ENCOUNTER — RESULT REVIEW (OUTPATIENT)
Age: 53
End: 2023-02-23

## 2023-02-23 ENCOUNTER — APPOINTMENT (OUTPATIENT)
Dept: HEMATOLOGY ONCOLOGY | Facility: CLINIC | Age: 53
End: 2023-02-23
Payer: COMMERCIAL

## 2023-02-23 VITALS
DIASTOLIC BLOOD PRESSURE: 76 MMHG | BODY MASS INDEX: 36.67 KG/M2 | HEART RATE: 83 BPM | SYSTOLIC BLOOD PRESSURE: 117 MMHG | RESPIRATION RATE: 16 BRPM | WEIGHT: 227.07 LBS | OXYGEN SATURATION: 97 % | TEMPERATURE: 97.3 F

## 2023-02-23 DIAGNOSIS — D72.119 HYPEREOSINOPHILIC SYNDROME [HES], UNSPECIFIED: ICD-10-CM

## 2023-02-23 DIAGNOSIS — I82.409 ACUTE EMBOLISM AND THROMBOSIS OF UNSPECIFIED DEEP VEINS OF UNSPECIFIED LOWER EXTREMITY: ICD-10-CM

## 2023-02-23 LAB
BASOPHILS # BLD AUTO: 0.08 K/UL — SIGNIFICANT CHANGE UP (ref 0–0.2)
BASOPHILS NFR BLD AUTO: 3 % — HIGH (ref 0–2)
EOSINOPHIL # BLD AUTO: 0.03 K/UL — SIGNIFICANT CHANGE UP (ref 0–0.5)
EOSINOPHIL NFR BLD AUTO: 1 % — SIGNIFICANT CHANGE UP (ref 0–6)
HCT VFR BLD CALC: 38.7 % — SIGNIFICANT CHANGE UP (ref 34.5–45)
HGB BLD-MCNC: 12.3 G/DL — SIGNIFICANT CHANGE UP (ref 11.5–15.5)
LYMPHOCYTES # BLD AUTO: 1.76 K/UL — SIGNIFICANT CHANGE UP (ref 1–3.3)
LYMPHOCYTES # BLD AUTO: 63 % — HIGH (ref 13–44)
MCHC RBC-ENTMCNC: 26.1 PG — LOW (ref 27–34)
MCHC RBC-ENTMCNC: 31.8 G/DL — LOW (ref 32–36)
MCV RBC AUTO: 82.2 FL — SIGNIFICANT CHANGE UP (ref 80–100)
MONOCYTES # BLD AUTO: 0.14 K/UL — SIGNIFICANT CHANGE UP (ref 0–0.9)
MONOCYTES NFR BLD AUTO: 5 % — SIGNIFICANT CHANGE UP (ref 2–14)
MYELOCYTES NFR BLD: 1 % — HIGH (ref 0–0)
NEUTROPHILS # BLD AUTO: 0.75 K/UL — LOW (ref 1.8–7.4)
NEUTROPHILS NFR BLD AUTO: 27 % — LOW (ref 43–77)
NRBC # BLD: 0 /100 — SIGNIFICANT CHANGE UP (ref 0–0)
NRBC # BLD: SIGNIFICANT CHANGE UP /100 WBCS (ref 0–0)
OVALOCYTES BLD QL SMEAR: SLIGHT — SIGNIFICANT CHANGE UP
PLAT MORPH BLD: NORMAL — SIGNIFICANT CHANGE UP
PLATELET # BLD AUTO: 231 K/UL — SIGNIFICANT CHANGE UP (ref 150–400)
POIKILOCYTOSIS BLD QL AUTO: SLIGHT — SIGNIFICANT CHANGE UP
RBC # BLD: 4.71 M/UL — SIGNIFICANT CHANGE UP (ref 3.8–5.2)
RBC # FLD: 14.9 % — HIGH (ref 10.3–14.5)
RBC BLD AUTO: ABNORMAL
WBC # BLD: 2.79 K/UL — LOW (ref 3.8–10.5)
WBC # FLD AUTO: 2.79 K/UL — LOW (ref 3.8–10.5)

## 2023-02-23 PROCEDURE — 99214 OFFICE O/P EST MOD 30 MIN: CPT

## 2023-02-23 RX ORDER — IMATINIB MESYLATE 100 MG/1
100 TABLET, FILM COATED ORAL DAILY
Qty: 60 | Refills: 2 | Status: ACTIVE | COMMUNITY
Start: 2022-10-26 | End: 1900-01-01

## 2023-02-23 RX ORDER — RIVAROXABAN 10 MG/1
10 TABLET, FILM COATED ORAL
Qty: 30 | Refills: 5 | Status: ACTIVE | COMMUNITY
Start: 2018-06-25 | End: 1900-01-01

## 2023-02-23 RX ORDER — APIXABAN 2.5 MG/1
2.5 TABLET, FILM COATED ORAL
Qty: 60 | Refills: 2 | Status: ACTIVE | COMMUNITY
Start: 2022-10-28 | End: 1900-01-01

## 2023-02-23 NOTE — PHYSICAL EXAM
[Fully active, able to carry on all pre-disease performance without restriction] : Status 0 - Fully active, able to carry on all pre-disease performance without restriction [Normal] : affect appropriate [de-identified] : mild edema b/l LE [de-identified] : hyperpigmented rash much improved

## 2023-02-23 NOTE — HISTORY OF PRESENT ILLNESS
[de-identified] : Gleevec has been restarted in 5/2019 but at 200mg for neutropenia. She reports from past couple months she intentionally missed some Gleevec doses and wants to be tapered off of it. However started having rash and fatigue so now back on Gleevec. On 300mg daily. We have continued her on prednisone 5mg. \par She is s/p Injectafer on 7/17 and 7/24/19. \par \par Since visit in Jan, she has new insurance and it is $375 copay for Gleevec a month. Rash is coming back on face and stomach - it is burning, not itching. She is also off her iron supplements 2/2 high copay. She has leftover Xarelto and Eliquis but otherwise it also has a high copay which she cannot afford. She states that she was breaking the leftover 400mg Gleevec in half and taking it that way.\par  \par Visit on 6/2020, we increased prednisone to 40mg which she took for 3 weeks, then started tapering 30mg for 2 weeks, 20mg for the last 2 weeks - rash has improved \par Hip and knee pain improved but still having ankle pain. Xrays done were unremarkable. \par She bought OTC iron - started on Thurs 10/1/20. \par Saw podiatry - has bone spurs in b/l feet, awaiting special shoes. Now on Baclofen and Naproxen\par Had hematuria which has resolved. Saw Dr. Mendoza PCP on 9/29/20. \par \par She stopped taking Gleevec end of Feb 2021. She is also now off steroids for the same period of time. \par She reports losing weight since coming off steroids. \par \par She is s/p Fereheme weekly x2 on 3/15 and 3/22/21\par \par Pt was last seen here in 8/2021 -notes that she had a lapse in insurance coverage which is now active as of 6/2022. \par Rash started coming back about a month ago. Restarted prednisone 5mg on 6/6/22. \par She was seen in 6/2022. She is on Xarelto, vitamin D, iron gluconate and MVI\par She has her menses currently -heavy w/ clots. \par s/p Feraheme weekly x2 6/24/22 and 7/8/22. \par We increased her prednisone to 40mg last visit which she said helped initially but it increased her appetite. She has now completed prednisone about a week and a half ago. \par \par Pt ran out of Xarelto and insurance not covering. Currently applying for free drug -on ASA at this time while awaiting med.\par She went to see derm -was unable to see Dr. Batres so saw Dr. Tang. 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 Derm prescribed ketoconazole cream with betamethasone. Pt reports that her rash is improving. \par \par She was on prednisone 40mg for about a week and then started tapering b/c she had weight gain. Took 30 for a week, 20, for a week, 10 for a week and now on 5.\par Rash is significantly improved.\par Gleevec 200mg resumed in Nov. \par \par Her insurance changed 1/1/23 to [employer]\par \par She started breaking out in rash on face -used creams that derm previously prescribed and it is improving. \par She is off prednisone -ran out and did not call for refill. 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 Seen initially on 17. PT reports improvement in fatigue since admission 17, but still with pain in L elbow, and weakness in flexion of L arm at elbow..ros. PT admits to paraesthesias in 5th digit of L hand, and tenderness at heels in both feet, and chronic cough, for which she takes Guaifenesin.\par \par HYPEREOSINOPHILIA HX:\par - 2016: was in [place] for her Sister's , rash started 2 weeks after returning, claims to have many bug bites from "horseflies" starting at bilateral medial knees. \par - 10/2016: Saw Beatriz Wu [phone]), md ([facility]), and allergist at [facility], felt that the diagnosis was vasculitis, started on clobetazol, hydrocortizone, to little effect\par - 2016: Saw Dr. Mendoza who continued to treat pain with naproxen, neurontin\par - 2017: Saw Dermatologist Dr. Wisdom\par - 2017: Dr Wisdom 2017 performed biopsy at that time showed changes consistent with hypereosinophilic syndrome vs drug vs arthropod bite like reaction (superficial and deep perivascular and interstitial dermatitis with eosinophils), started on doxycycline, to some effect. \par - 2017: Saw Dr. Inocencio Batres on 17 generalized rash including facial rash and itching, and edema. who performed Punch biopsy of R arm showing interface dermatitis with prominent dermal eosinophils. 2nd punch biopsy of right arm suggestive of lupus erythematosus w/ granular linear signals of igA and IgM and patchy granular signal of C3 at basement membrane. IgG negative. \par - ADM 2017 TO 2017 TO [facility] with diffuse generalized rash. T 98.1, , /85, RR 18, O2 97%. Labs significant for WBC 28.15, eosinophils 97%, Hgb 11.1, MCV 75.6. Received prednisone 40mg & morphine.She was also evaluated by opthamology for complaint of blurry vision in her right eye. Was found to have cotton wool spots off both optic nerves. Had MRI brain which showed microvascular changes.\par - 2017: 17 bone marrow biopsy without evidence of leukemia, no blasts, [id number] negative. \par - 2017: 17 STARTED HYDROXYUREA , 60mg po prednisone daily\par - 2017: 5/3/17 presents with painful rash on hands and feet, exquisitely tender, stopped Hydroxyurea. \par - 2017: 17 found to have swollen R leg, RLE duplex ultrasound c/w extensive DVT, [place] on [place]arelto 15mg [place] [facility] [place] [facility] [place] \par - 2017: 17 restarted hydrea 500mg po BID with worsening rash.\par - 2017: 17 decreased prednisone from 60mg po daily to 40mg po daily continue 500mg hydrea BID\par - 2017: 17 prednisone at 40mg po daily, hydrea increased to 1000mg in AM and 500mg in PM. \par - Started Gleevec . Tapered off prednisone 17. \par \par Was on on Losartan 50mg for BP control.  that stopped recently by cardiologist.\par She was admitted  for incidental finding of PE on CT A/P and tachycardia. They were unable to obtain CTA of chest 2/2 recent contrast load. Doppler LE showed acute below the knee DVT on left side. She was off Xarelto for 3 weeks prior so was resumed on Xarelto. No SOB, TTE performed which showed no RV strain, CE negative, normal BNP. Patient with persistent tachycardia, seen by cardiology who started on beta blocker. \par \par Started Gleevec . Tapered off prednisone 17. \par Noted to have a new rash around . She reports that this rash is different than her previous rash. She saw derm, was started back on prednisone taper, completed 3-4 days ago. \par We held her Gleevec on 12/15 b/c neutropenia. She also stopped HU. Her counts improved by the following week; we restarted Gleevec 1/5/18. \par Holding Gleevec again since 3/9/18 for neutropenia. \par Her periods have been very heavy b/c of Xarelto. Refused any pills to control her menses. On po iron supplements but unable to take every day because of constipation and hemorrhoids. She received Injectafer 3/23 and 3/30. \par She now holds the Xarelto for 2 days when she gets her menses. \par

## 2023-02-23 NOTE — ASSESSMENT
[FreeTextEntry1] : 53yo F with hypereosinophilic syndrome diagnosed on 2/2017 by Dr. Rich, dermatologist by biopsy, [id number] fusion negative, JAK2 mutation negative, BCR-ABL mutation negative, with recent skin biopsy suggesting possible lupus as well, steroid dependent. Course complicated by 5/17/17 RLE duplex ultrasound c/w extensive DVT, s/p 6 months of Xarelto (finished 11/2017), now w/ new DVT on L, back on Xarelto \par She started Gleevec on 9/2017, was tapered off steroids 12/4/17 but she gets recurrent rash when she is off steroids so was started on low dose steroids (5mg daily). She is now off Gleevec since end of Feb 2021 secondary to new insurance with high copays for her medications.\par Her insurance lapsed so she was last seen here in 8/2021. Now reports worsening rash again\par \par -she restarted prednisone 5mg on 6/6/22. We increased prednisone to 40mg daily in Jun which she has run out of about 1.5 weeks ago. Her rash has returned -she saw derm who referred her back here. Interestingly she does not have eosinophilia. We restarted Gleevec again -started in Nov. We also increased prednisone to 40mg x1 wk and she has since tapered by 10 weekly, back on 5mg daily. She ran out of 5mg and did not call for refill -was hoping she could come off prednisone and control rash with creams\par -pt has h/o iron deficiency anemia, s/p injectafer weekly x2 on 3/23 and 3/30/18. Also s/p IV Iron x 2 on 7/17 and 7/24/19. Advised her to increase iron in her diet and cont Iron pills. She is s/p Fereheme weekly x2 on 3/15 and 3/22/21. s/p Feraheme weekly x2 6/24/22 and 7/8/22 - Hgb today is 12.3\par -continue AC for recurrent DVT. Currently on ASA while awaiting Xarelto (old insurance does not cover but she has new insurance this year) -sent new script but still expensive, will try Eliquis\par -f/u w/ GYN for menorrhagia -LMP last Fri 2/17/23\par -RTC 2 mo\par

## 2023-02-24 LAB
25(OH)D3 SERPL-MCNC: 26.9 NG/ML
ALBUMIN SERPL ELPH-MCNC: 4.1 G/DL
ALP BLD-CCNC: 101 U/L
ALT SERPL-CCNC: 16 U/L
ANION GAP SERPL CALC-SCNC: 9 MMOL/L
AST SERPL-CCNC: 19 U/L
BILIRUB SERPL-MCNC: 0.2 MG/DL
BUN SERPL-MCNC: 11 MG/DL
CALCIUM SERPL-MCNC: 9.5 MG/DL
CHLORIDE SERPL-SCNC: 106 MMOL/L
CO2 SERPL-SCNC: 25 MMOL/L
CREAT SERPL-MCNC: 0.82 MG/DL
EGFR: 85 ML/MIN/1.73M2
FERRITIN SERPL-MCNC: 25 NG/ML
GLUCOSE SERPL-MCNC: 97 MG/DL
IRON SATN MFR SERPL: 12 %
IRON SERPL-MCNC: 37 UG/DL
LDH SERPL-CCNC: 142 U/L
POTASSIUM SERPL-SCNC: 4.2 MMOL/L
PROT SERPL-MCNC: 8 G/DL
SODIUM SERPL-SCNC: 139 MMOL/L
TIBC SERPL-MCNC: 317 UG/DL
UIBC SERPL-MCNC: 280 UG/DL

## 2023-05-22 ENCOUNTER — APPOINTMENT (OUTPATIENT)
Dept: HEMATOLOGY ONCOLOGY | Facility: CLINIC | Age: 53
End: 2023-05-22

## 2023-08-17 ENCOUNTER — APPOINTMENT (OUTPATIENT)
Dept: OPHTHALMOLOGY | Facility: CLINIC | Age: 53
End: 2023-08-17

## 2024-04-09 ENCOUNTER — APPOINTMENT (OUTPATIENT)
Dept: MAMMOGRAPHY | Facility: CLINIC | Age: 54
End: 2024-04-09
Payer: COMMERCIAL

## 2024-04-09 ENCOUNTER — RESULT REVIEW (OUTPATIENT)
Age: 54
End: 2024-04-09

## 2024-04-09 ENCOUNTER — APPOINTMENT (OUTPATIENT)
Dept: ULTRASOUND IMAGING | Facility: CLINIC | Age: 54
End: 2024-04-09
Payer: COMMERCIAL

## 2024-04-09 PROCEDURE — 76642 ULTRASOUND BREAST LIMITED: CPT | Mod: LT

## 2024-04-09 PROCEDURE — G0279: CPT

## 2024-04-09 PROCEDURE — 77066 DX MAMMO INCL CAD BI: CPT

## 2024-04-16 ENCOUNTER — NON-APPOINTMENT (OUTPATIENT)
Age: 54
End: 2024-04-16

## 2024-04-22 ENCOUNTER — APPOINTMENT (OUTPATIENT)
Dept: SURGICAL ONCOLOGY | Facility: CLINIC | Age: 54
End: 2024-04-22
Payer: COMMERCIAL

## 2024-04-22 ENCOUNTER — RESULT REVIEW (OUTPATIENT)
Age: 54
End: 2024-04-22

## 2024-04-22 VITALS
SYSTOLIC BLOOD PRESSURE: 132 MMHG | WEIGHT: 225 LBS | OXYGEN SATURATION: 97 % | DIASTOLIC BLOOD PRESSURE: 83 MMHG | BODY MASS INDEX: 36.16 KG/M2 | HEART RATE: 85 BPM | HEIGHT: 66 IN | RESPIRATION RATE: 17 BRPM

## 2024-04-22 DIAGNOSIS — R92.8 OTHER ABNORMAL AND INCONCLUSIVE FINDINGS ON DIAGNOSTIC IMAGING OF BREAST: ICD-10-CM

## 2024-04-22 DIAGNOSIS — Z91.89 OTHER SPECIFIED PERSONAL RISK FACTORS, NOT ELSEWHERE CLASSIFIED: ICD-10-CM

## 2024-04-22 PROCEDURE — 99245 OFF/OP CONSLTJ NEW/EST HI 55: CPT

## 2024-04-23 ENCOUNTER — NON-APPOINTMENT (OUTPATIENT)
Age: 54
End: 2024-04-23

## 2024-04-25 ENCOUNTER — RESULT REVIEW (OUTPATIENT)
Age: 54
End: 2024-04-25

## 2024-05-03 ENCOUNTER — NON-APPOINTMENT (OUTPATIENT)
Age: 54
End: 2024-05-03

## 2024-05-08 ENCOUNTER — APPOINTMENT (OUTPATIENT)
Dept: MRI IMAGING | Facility: CLINIC | Age: 54
End: 2024-05-08
Payer: COMMERCIAL

## 2024-05-08 PROCEDURE — A9585: CPT

## 2024-05-08 PROCEDURE — 77049 MRI BREAST C-+ W/CAD BI: CPT

## 2024-05-09 PROBLEM — R92.8 ABNORMAL FINDING ON BREAST IMAGING: Status: ACTIVE | Noted: 2024-05-09

## 2024-05-13 ENCOUNTER — RESULT REVIEW (OUTPATIENT)
Age: 54
End: 2024-05-13

## 2024-05-13 ENCOUNTER — APPOINTMENT (OUTPATIENT)
Dept: ULTRASOUND IMAGING | Facility: CLINIC | Age: 54
End: 2024-05-13
Payer: COMMERCIAL

## 2024-05-13 PROCEDURE — 77065 DX MAMMO INCL CAD UNI: CPT | Mod: LT

## 2024-05-13 PROCEDURE — 19083 BX BREAST 1ST LESION US IMAG: CPT | Mod: LT

## 2024-06-05 ENCOUNTER — RESULT REVIEW (OUTPATIENT)
Age: 54
End: 2024-06-05

## 2024-06-05 ENCOUNTER — APPOINTMENT (OUTPATIENT)
Dept: ULTRASOUND IMAGING | Facility: IMAGING CENTER | Age: 54
End: 2024-06-05
Payer: COMMERCIAL

## 2024-06-05 ENCOUNTER — OUTPATIENT (OUTPATIENT)
Dept: OUTPATIENT SERVICES | Facility: HOSPITAL | Age: 54
LOS: 1 days | End: 2024-06-05
Payer: COMMERCIAL

## 2024-06-05 DIAGNOSIS — D25.9 LEIOMYOMA OF UTERUS, UNSPECIFIED: Chronic | ICD-10-CM

## 2024-06-05 DIAGNOSIS — Z98.890 OTHER SPECIFIED POSTPROCEDURAL STATES: Chronic | ICD-10-CM

## 2024-06-05 DIAGNOSIS — Z91.89 OTHER SPECIFIED PERSONAL RISK FACTORS, NOT ELSEWHERE CLASSIFIED: ICD-10-CM

## 2024-06-05 DIAGNOSIS — N20.0 CALCULUS OF KIDNEY: Chronic | ICD-10-CM

## 2024-06-05 PROCEDURE — 76642 ULTRASOUND BREAST LIMITED: CPT | Mod: 26,RT

## 2024-06-05 PROCEDURE — 76642 ULTRASOUND BREAST LIMITED: CPT

## 2024-06-10 NOTE — ASSESSMENT
[FreeTextEntry1] : 53-year-old lady referred with an enlarging, solid mass of the upper outer quadrant of the left breast. 2022 it demonstrated a fibroadenoma which measured ~5 cm. It is now >6 cm.  Also on her most recent imaging, there is an additional, ipsilateral (left) breast nodule in the retroareolar region.  Her concern about nonspontaneous greenish left nipple discharge was not duplicated at today's exam.  Her Tyrer-Cuzick risk score is 29.0.  Given all the above information I have suggested the following, and the documented sequence: Breast MRI, prescription provided. I have asked her to call me a week after that study to discuss the results.  If unremarkable, she should have a repeat needle biopsy of the enlarging mass to assure it is not a phyllodes tumor, and a needle biopsy of the new lump in the left retroareolar region.  She and I will speak after those biopsies and the results will guide further management.  When she is ready to have the enlarging fibroadenoma removed, it will be an outpatient procedure coordinated with plastic surgery for avoidance of any change in size, shape, or contour of the breast.  Reviewed in detail, all questions answered.  Referring provider was contacted through secure email.   2024: She and I spoke on the phone. Yesterday, May 8, 2024, she had her bilateral breast MRI at [place]. Note was made of the followin.  Known large mass in the upper outer quadrant of the left breast. 2.  Previously seen sonographic nodule in a periareolar location on the left, is also seen on MRI, and warrants sonogram guided core biopsy. 3.  An additional, more peripheral, superior, left breast lesion for which a second look ultrasound and core biopsy has been recommended. 4.  An MRI only abnormality in the medial (3:00) right breast that also requires tissue sampling.  The patient indicated that her procedures are scheduled for May 12. I entered the appropriate prescriptions this evening.   2024: We spoke on the phone. May 13, when she went for her bilateral sonogram guided core biopsies, only the left side was sampled. Apparently due to scheduling confusion, the right side was not sampled, and still needs to be biopsied.  The pathology from the second (wan-areolar) left lesion demonstrates a possible phyllodes tumor which will need to be excised at the time of her excision of the enlarging fibroadenoma in the upper outer quadrant of the same (left) breast.  Prior to scheduling surgery, the right side needs to be sampled, and those results need to be available. Breast imaging schedule was contacted through secure email, and radiologist updated.   2024: Bilateral targeted breast ultrasounds at [facility]: Right breast (9:00), AND left breast (UIQ) failed to duplicate the MRI findings of concern. She needs bilateral MRI core biopsies. I called her but I had to leave a voicemail and message on her home answering machine. I entered a prescription for the recommended bilateral MRI core biopsies.   6/10/2024: Were able to speak on the phone. 1.  She understands the indications and technique for the bilateral MRI core biopsies. I have notified the radiologist, and breast imaging, to call the patient to schedule the procedures. 2.  The enlarging fibroadenoma in the upper outer left breast is increasingly uncomfortable, and she is anxious to have it removed as soon as possible.

## 2024-06-10 NOTE — REVIEW OF SYSTEMS
[FreeTextEntry5] : Hypertension [FreeTextEntry7] : Allergic to sulfa and Splenda [FreeTextEntry9] : History of nephrolithiasis [de-identified] : Hypereosinophilic syndrome [FreeTextEntry1] : Increased risk of breast cancer

## 2024-06-10 NOTE — REASON FOR VISIT
[FreeTextEntry2] : 1.  Enlarging mass left breast (UOQ), 2.  New left breast (retroareolar) hypoechoic mass, 3.  Left nipple discharge

## 2024-06-10 NOTE — HISTORY OF PRESENT ILLNESS
[de-identified] : 53-year-old lady.  Tyrer-Cuzick risk score = 29.0.  Referred by the BREAST  program.  CC: 1.  Enlarging left breast mass (UOQ). Core biopsy in 2022 diagnosed a fibroadenoma. 2.  + Associated nipple discharge (green), since ~2024. Nonspontaneous. 3.  New left breast mass (retroareolar) on recent breast imaging.  She has been aware of the palpable mass in the left breast (UOQ) since ~. Persistent to imaging, and ultimately a core needle biopsy in 2022, which diagnosed a fibroadenoma. This benign diagnosis was concordant. + COIL-SHAPED CLIP.  No other procedures related to either breast.   No personal history of malignancy.  + Hypereosinophilic syndrome. Treated with Gleevec, intermittently, since , whenever symptomatic (itching, ocular problems, rash). Hematology: Dr. Rosalba SARAH. Dermatology: Dr. Inocencio HERRERA.   + Family history of malignancy: Sister: CRC, she passed away at age 60.  Niece (daughter of above Sister with CRC) was diagnosed with breast cancer at age 32. She passed away from brain cancer 36.  A different niece (also a daughter of the above sister with CRC) passed away from metastatic neuroendocrine cancer, from an unknown primary.  Nephew (the son of the sister listed above)  of colorectal cancer at age 36.   Breast imagin2024: Bilateral mammography, with left breast ultrasound: At [place]: BI-RADS 4. 1. EXTREMELY DENSE BREAST TISSUE ON MAMMOGRAPHY. 2.  Left upper outer quadrant mass which measured 5 x 2.5 x 4.8 cm in 2022 now measures 6 x 3.7 x 6.1 cm. Surgical excision recommended. I suggested a repeat needle biopsy to assure that it is not a phyllodes tumor, prior to resection. She understands and agrees. Prescription entered. 2.  Left breast (retroareolar): 1.2 x 0.7 x 1.3 cm circumscribed hypoechoic mass. Core biopsy recommended; prescription entered today.   She has never had a breast MRI, her Tyrer-Cuzick risk score is 29.0. I have suggested a baseline study. She understands and agrees. Prescription entered. This study will be done prior to the above planned core biopsies.   Menarche at 11.  4, para 2, first at 21. Her menstrual cycles are very irregular. No exogenous hormones.   PMD: Dr. Jose NOVA.  + ALLERGIC: Sulfa. Splenda (sweetener).  + Daily XARELTO for DVT and pulmonary embolism (PE). Diagnosed with RLE DVT May 2017.  No pacemaker or defibrillator.  + Hypertension. Controlled with losartan/HCTZ. Cardiology: Dr. Cristo VALADEZ.  + Nabumetone for chronic pain. Prescribed by hematology.  She also sees rheumatology: Dr. Elana TINOCO.  + History of nephrolithiasis. She had ESWL performed at [facility] ~.   She has a history of uterine myomectomy x 2 at [facility] ~. She has a follow-up GYN appointment scheduled May 2024 at [facility].   Baseline colonoscopy is scheduled for May 2024 at [facility].

## 2024-06-10 NOTE — PHYSICAL EXAM
[Normal] : supple, no neck mass and thyroid not enlarged [Normal Neck Lymph Nodes] : normal neck lymph nodes  [Normal Supraclavicular Lymph Nodes] : normal supraclavicular lymph nodes [Normal Axillary Lymph Nodes] : normal axillary lymph nodes [Normal] : normal appearance, no rash, nodules, vesicles, ulcers, erythema [de-identified] : Groins not examined [de-identified] : See diagram

## 2024-06-12 ENCOUNTER — NON-APPOINTMENT (OUTPATIENT)
Age: 54
End: 2024-06-12

## 2024-06-27 ENCOUNTER — RESULT REVIEW (OUTPATIENT)
Age: 54
End: 2024-06-27

## 2024-06-27 ENCOUNTER — OUTPATIENT (OUTPATIENT)
Dept: OUTPATIENT SERVICES | Facility: HOSPITAL | Age: 54
LOS: 1 days | End: 2024-06-27
Payer: COMMERCIAL

## 2024-06-27 ENCOUNTER — APPOINTMENT (OUTPATIENT)
Dept: MRI IMAGING | Facility: IMAGING CENTER | Age: 54
End: 2024-06-27
Payer: COMMERCIAL

## 2024-06-27 DIAGNOSIS — D25.9 LEIOMYOMA OF UTERUS, UNSPECIFIED: Chronic | ICD-10-CM

## 2024-06-27 DIAGNOSIS — Z98.890 OTHER SPECIFIED POSTPROCEDURAL STATES: Chronic | ICD-10-CM

## 2024-06-27 DIAGNOSIS — N20.0 CALCULUS OF KIDNEY: Chronic | ICD-10-CM

## 2024-06-27 DIAGNOSIS — Z00.8 ENCOUNTER FOR OTHER GENERAL EXAMINATION: ICD-10-CM

## 2024-06-27 PROCEDURE — 77065 DX MAMMO INCL CAD UNI: CPT | Mod: 26,RT

## 2024-06-27 PROCEDURE — 19086 BX BREAST ADD LESION MR IMAG: CPT | Mod: LT

## 2024-06-27 PROCEDURE — 19085 BX BREAST 1ST LESION MR IMAG: CPT

## 2024-06-27 PROCEDURE — 77065 DX MAMMO INCL CAD UNI: CPT

## 2024-06-27 PROCEDURE — 19085 BX BREAST 1ST LESION MR IMAG: CPT | Mod: RT

## 2024-06-27 PROCEDURE — A9585: CPT

## 2024-06-27 PROCEDURE — 88305 TISSUE EXAM BY PATHOLOGIST: CPT

## 2024-06-27 PROCEDURE — A4648: CPT

## 2024-06-27 PROCEDURE — 88305 TISSUE EXAM BY PATHOLOGIST: CPT | Mod: 26

## 2024-07-01 LAB — SURGICAL PATHOLOGY STUDY: SIGNIFICANT CHANGE UP

## 2024-07-16 PROBLEM — D24.2 BENIGN PHYLLODES TUMOR OF LEFT BREAST: Status: ACTIVE | Noted: 2024-07-16

## 2024-07-16 PROBLEM — D24.2 FIBROADENOMA OF LEFT BREAST: Status: ACTIVE | Noted: 2024-07-16

## 2024-08-05 ENCOUNTER — NON-APPOINTMENT (OUTPATIENT)
Age: 54
End: 2024-08-05

## 2024-08-05 ENCOUNTER — APPOINTMENT (OUTPATIENT)
Dept: CARDIOLOGY | Facility: CLINIC | Age: 54
End: 2024-08-05

## 2024-08-05 PROBLEM — I26.99 PULMONARY EMBOLISM: Status: ACTIVE | Noted: 2024-08-05

## 2024-08-05 PROBLEM — Z01.818 PRE-OP EVALUATION: Status: ACTIVE | Noted: 2024-08-05

## 2024-08-05 PROBLEM — I82.409 DVT (DEEP VENOUS THROMBOSIS): Status: ACTIVE | Noted: 2024-08-05

## 2024-08-05 PROBLEM — R94.31 ABNORMAL EKG: Status: ACTIVE | Noted: 2024-08-05

## 2024-08-05 PROCEDURE — 93000 ELECTROCARDIOGRAM COMPLETE: CPT

## 2024-08-05 PROCEDURE — 99204 OFFICE O/P NEW MOD 45 MIN: CPT

## 2024-08-05 NOTE — REASON FOR VISIT
[FreeTextEntry1] : The patient carries the dgx of hypereosinophillic syndrome She states she has a remote history of a pulmonary embolism and recurrent DVTs (she does not appear to be on anticoagulants) She is scheduled for breast surgery (removal of fast growing non-cancerous lesions) Her ECG is NSR with PRWP and voltage criteria for LVH

## 2024-08-05 NOTE — ASSESSMENT
[FreeTextEntry1] : The patient is schedule for breast surgery Her ECG is abnormal; an echocardiogram was ordered for further evaluation prior to clearance

## 2024-08-12 ENCOUNTER — APPOINTMENT (OUTPATIENT)
Dept: CARDIOLOGY | Facility: CLINIC | Age: 54
End: 2024-08-12
Payer: COMMERCIAL

## 2024-08-12 PROCEDURE — 93306 TTE W/DOPPLER COMPLETE: CPT

## 2024-08-13 ENCOUNTER — RESULT REVIEW (OUTPATIENT)
Age: 54
End: 2024-08-13

## 2024-08-15 ENCOUNTER — NON-APPOINTMENT (OUTPATIENT)
Age: 54
End: 2024-08-15

## 2024-08-22 ENCOUNTER — TRANSCRIPTION ENCOUNTER (OUTPATIENT)
Age: 54
End: 2024-08-22

## 2024-08-22 NOTE — ASU DISCHARGE PLAN (ADULT/PEDIATRIC) - CARE PROVIDER_API CALL
Kevin Alfaro  Surgery  [address] [place] [id number]  Phone: [phone]  Fax: [phone]  Follow Up Time:

## 2024-08-22 NOTE — ASU PATIENT PROFILE, ADULT - FALL HARM RISK - UNIVERSAL INTERVENTIONS
Bed in lowest position, wheels locked, appropriate side rails in place/Call bell, personal items and telephone in reach/Instruct patient to call for assistance before getting out of bed or chair/Non-slip footwear when patient is out of bed/[place] to call system/Physically safe environment - no spills, clutter or unnecessary equipment/Purposeful Proactive Rounding/Room/bathroom lighting operational, light cord in reach

## 2024-08-22 NOTE — ASU PATIENT PROFILE, ADULT - NSICDXPASTSURGICALHX_GEN_ALL_CORE_FT
PAST SURGICAL HISTORY:  H/O myomectomy     S/P extracorporeal shock wave therapy      PAST SURGICAL HISTORY:  Fibroid uterus s/p fibroid removal    H/O myomectomy     H/O oral surgery wisdom tooth extraction    Renal stone     S/P extracorporeal shock wave therapy

## 2024-08-22 NOTE — ASU PATIENT PROFILE, ADULT - BLOOD AVOIDANCE/RESTRICTIONS, PROFILE
blood avoidance form provided and pt advised to bring on day of surgery/patient concern about blood borne infection

## 2024-08-22 NOTE — ASU PATIENT PROFILE, ADULT - CLICK TO LAUNCH ORM
54M w/ h/o stage IV colon cancer, schizophrenia, HLD, admitted to medicine for Lithium toxicity, now resolved. S/p open ileostomy reversal POD 10, now with bowel function and tolerating a full diet.     History of Stage 4 Colon Cancer s/p reversal loop ileostomy  - ileostomy site dressing changed w/ wet to dry daily   - carb consistent fulldiet    Tachycardia  - lopressor for HR control  - cards eval'd, no further intervention     Hypothyroidism  - lithium induced. Endocrine following -> TSH wnl  - Synthroid 25mcg IV - transition to oral for discharge    DM  - PM Lantus 20 U  - Premeal 18 U -- will decrease to 15 U since D5 IVF d/c'd  - ISS    HLD  -c/w Atorvastatin    Lithium toxicity / tremors / weakness --> resolved + WNL  - lithium held     Schizoaffective disorder    - Psych following  - Clozapine 200, lithium changed to risperdal  - constant obs  - continue medication IV    DVT ppx: Lovenox 40   .

## 2024-08-22 NOTE — ASU PATIENT PROFILE, ADULT - NS TRANSFER DISPOSITION PATIENT BELONGINGS
Palliative Medicine [place]: [phone] [facility]: [phone] Telephone message left for son Lion, to arrange a time to meet with Palliative team, have offered various options, to meet in person or via phone. Will await a call back. with patient

## 2024-08-22 NOTE — ASU PATIENT PROFILE, ADULT - NSICDXPASTMEDICALHX_GEN_ALL_CORE_FT
PAST MEDICAL HISTORY:  Deep vein thrombosis (DVT)     History of chronic pain     HTN (hypertension)     Hypereosinophilic syndrome     Personal history of pulmonary embolism      PAST MEDICAL HISTORY:  Deep vein thrombosis (DVT)     Depression     History of chronic pain     HTN (hypertension)     Hypereosinophilic syndrome chemo    Lumbar herniated disc     Nephrolithiasis     Personal history of pulmonary embolism     Renal stones     Uterine leiomyoma, unspecified location

## 2024-08-22 NOTE — ASU DISCHARGE PLAN (ADULT/PEDIATRIC) - PROCEDURE
Excision of 2 left breast abnormalities, both with preoperative localization's. Excision of 2 left breast abnormalities, both with preoperative localizations

## 2024-08-22 NOTE — ASU DISCHARGE PLAN (ADULT/PEDIATRIC) - ASU DC SPECIAL INSTRUCTIONSFT
Maintain mammary support for 48 hours after getting home.    At that time, may remove garment, surgical tape, and gauze.    Do not remove Steri-Strips.    May shower after above.    After showering, do not need to reapply a dressing.    Should wear mammary support, or own bra, even at night for the next 5 days.    Dr. Alfaro should call with pathology report in approximately 2 weeks, that conversation will determine further management

## 2024-08-22 NOTE — ASU DISCHARGE PLAN (ADULT/PEDIATRIC) - ***IN THE EVENT THAT YOU DEVELOP A COMPLICATION AND YOU ARE UNABLE TO REACH YOUR OWN PHYSICIAN, YOU MAY CONTACT:
Expected Date Of Service: 10/01/2020 Billing Type: Third-Party Bill Performing Laboratory: [id number] Bill For Surgical Tray: no Billing Type: Third-Party Bill Performing Laboratory: [phone] Expected Date Of Service: 08/28/2020 Statement Selected

## 2024-08-23 ENCOUNTER — RESULT REVIEW (OUTPATIENT)
Age: 54
End: 2024-08-23

## 2024-08-23 ENCOUNTER — APPOINTMENT (OUTPATIENT)
Dept: SURGICAL ONCOLOGY | Facility: HOSPITAL | Age: 54
End: 2024-08-23

## 2024-08-23 ENCOUNTER — OUTPATIENT (OUTPATIENT)
Dept: OUTPATIENT SERVICES | Facility: HOSPITAL | Age: 54
LOS: 1 days | End: 2024-08-23
Payer: COMMERCIAL

## 2024-08-23 VITALS
TEMPERATURE: 98 F | OXYGEN SATURATION: 99 % | DIASTOLIC BLOOD PRESSURE: 71 MMHG | RESPIRATION RATE: 14 BRPM | HEIGHT: 65 IN | WEIGHT: 229.28 LBS | SYSTOLIC BLOOD PRESSURE: 123 MMHG | HEART RATE: 69 BPM

## 2024-08-23 VITALS
OXYGEN SATURATION: 98 % | TEMPERATURE: 97 F | HEART RATE: 89 BPM | DIASTOLIC BLOOD PRESSURE: 78 MMHG | RESPIRATION RATE: 17 BRPM | SYSTOLIC BLOOD PRESSURE: 132 MMHG

## 2024-08-23 DIAGNOSIS — Z98.890 OTHER SPECIFIED POSTPROCEDURAL STATES: Chronic | ICD-10-CM

## 2024-08-23 DIAGNOSIS — D24.2 BENIGN NEOPLASM OF LEFT BREAST: ICD-10-CM

## 2024-08-23 DIAGNOSIS — D25.9 LEIOMYOMA OF UTERUS, UNSPECIFIED: Chronic | ICD-10-CM

## 2024-08-23 DIAGNOSIS — N20.0 CALCULUS OF KIDNEY: Chronic | ICD-10-CM

## 2024-08-23 PROBLEM — D72.119: Chronic | Status: INACTIVE | Noted: 2024-08-12 | Resolved: 2024-08-23

## 2024-08-23 PROCEDURE — 19281 PERQ DEVICE BREAST 1ST IMAG: CPT | Mod: LT

## 2024-08-23 PROCEDURE — 19126 EXCISION ADDL BREAST LESION: CPT | Mod: LT

## 2024-08-23 PROCEDURE — 76098 X-RAY EXAM SURGICAL SPECIMEN: CPT

## 2024-08-23 PROCEDURE — 19282 PERQ DEVICE BREAST EA IMAG: CPT | Mod: LT

## 2024-08-23 PROCEDURE — 88307 TISSUE EXAM BY PATHOLOGIST: CPT | Mod: 26

## 2024-08-23 PROCEDURE — 19125 EXCISION BREAST LESION: CPT | Mod: LT

## 2024-08-23 PROCEDURE — 19101 BIOPSY OF BREAST OPEN: CPT | Mod: LT

## 2024-08-23 PROCEDURE — C1889: CPT

## 2024-08-23 PROCEDURE — 88307 TISSUE EXAM BY PATHOLOGIST: CPT

## 2024-08-23 PROCEDURE — 19281 PERQ DEVICE BREAST 1ST IMAG: CPT

## 2024-08-23 PROCEDURE — 19282 PERQ DEVICE BREAST EA IMAG: CPT

## 2024-08-23 PROCEDURE — 76098 X-RAY EXAM SURGICAL SPECIMEN: CPT | Mod: 26

## 2024-08-23 PROCEDURE — C9399: CPT

## 2024-08-23 DEVICE — ARISTA 3GR: Type: IMPLANTABLE DEVICE | Site: LEFT | Status: FUNCTIONAL

## 2024-08-23 RX ORDER — HEPARIN SODIUM,BOVINE 1000/ML
5000 VIAL (ML) INJECTION ONCE
Refills: 0 | Status: COMPLETED | OUTPATIENT
Start: 2024-08-23 | End: 2024-08-23

## 2024-08-23 RX ORDER — ONDANSETRON 2 MG/ML
4 INJECTION, SOLUTION INTRAMUSCULAR; INTRAVENOUS ONCE
Refills: 0 | Status: DISCONTINUED | OUTPATIENT
Start: 2024-08-23 | End: 2024-08-23

## 2024-08-23 RX ORDER — FENTANYL CITRATE 50 UG/ML
25 INJECTION INTRAMUSCULAR; INTRAVENOUS
Refills: 0 | Status: DISCONTINUED | OUTPATIENT
Start: 2024-08-23 | End: 2024-08-23

## 2024-08-23 RX ORDER — RIVAROXABAN 10 MG/1
1 TABLET, FILM COATED ORAL
Refills: 0 | DISCHARGE

## 2024-08-23 RX ADMIN — Medication 5000 UNIT(S): at 13:49

## 2024-08-23 RX ADMIN — Medication 50 MILLILITER(S): at 10:17

## 2024-08-23 NOTE — BRIEF OPERATIVE NOTE - NSICDXBRIEFPROCEDURE_GEN_ALL_CORE_FT
PROCEDURES:  Excision, mass, breast, with needle localization 23-Aug-2024 17:01:51 left, mass x 2 Lillian Olvera

## 2024-09-03 PROBLEM — I10 ESSENTIAL (PRIMARY) HYPERTENSION: Chronic | Status: ACTIVE | Noted: 2024-08-12

## 2024-09-03 PROBLEM — I82.409 ACUTE EMBOLISM AND THROMBOSIS OF UNSPECIFIED DEEP VEINS OF UNSPECIFIED LOWER EXTREMITY: Chronic | Status: ACTIVE | Noted: 2024-08-12

## 2024-09-03 PROBLEM — Z87.898 PERSONAL HISTORY OF OTHER SPECIFIED CONDITIONS: Chronic | Status: ACTIVE | Noted: 2024-08-12

## 2024-09-03 PROBLEM — Z86.711 PERSONAL HISTORY OF PULMONARY EMBOLISM: Chronic | Status: ACTIVE | Noted: 2024-08-12

## 2024-09-03 LAB — SURGICAL PATHOLOGY STUDY: SIGNIFICANT CHANGE UP

## 2024-09-04 ENCOUNTER — NON-APPOINTMENT (OUTPATIENT)
Age: 54
End: 2024-09-04

## 2024-09-04 ENCOUNTER — APPOINTMENT (OUTPATIENT)
Dept: SURGICAL ONCOLOGY | Facility: CLINIC | Age: 54
End: 2024-09-04
Payer: COMMERCIAL

## 2024-09-04 VITALS
DIASTOLIC BLOOD PRESSURE: 78 MMHG | HEIGHT: 66 IN | SYSTOLIC BLOOD PRESSURE: 131 MMHG | OXYGEN SATURATION: 97 % | RESPIRATION RATE: 17 BRPM | HEART RATE: 82 BPM

## 2024-09-04 PROCEDURE — 99215 OFFICE O/P EST HI 40 MIN: CPT | Mod: 24

## 2024-09-04 NOTE — ASSESSMENT
[FreeTextEntry1] : 54-year-old lady.  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  Preoperative diagnoses were an enlarging fibroadenoma in the upper outer quadrant, and possible phyllodes tumor in the central left breast.  Diagnosis reviewed.  For her extent of disease evaluation, I recommended a CT scan of the chest.  For further management I have recommended consultations with medical oncology and radiation therapy. She understands and agrees. Nurse navigator was contacted.  Explained that given the large size of the tumor, and more than a single site in the same breast, that ultimately, she may best be served with a therapeutic mastectomy.  Decisions regarding further treatment will be made after the medical oncology and radiation therapy evaluations.  Reviewed in detail, all questions answered.

## 2024-09-04 NOTE — HISTORY OF PRESENT ILLNESS
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  2024: Excision, with preoperative localization, of: 1.  Left retroareolar possible phyllodes tumor. 2.  Enlarging fibroadenoma in the left breast (UOQ). Surgical pathology: BOTH masses are MALIGNANT PHYLLODES TUMORS: 1.  Left retroareolar: 4.5 cm malignant phyllodes tumor, negative margins. 2.  Left upper outer quadrant: 5.5 cm malignant phyllodes tumor negative margins.   CC: Presently, no signs or symptoms related to either breast.   24: Referred with: 1.  Enlarging left breast mass (UOQ) that was a fibroadenoma on 2022 core biopsy. She had been aware of the breast lump since ~.  2024: Preoperative bilateral breast MRI at [place]: 1.  Known large mass in the left upper outer quadrant noted. 2.  Previously noted left retroareolar nodule confirmed. 3.  Additional, left superior nodule for which core biopsy was recommended. 4.  Medial right breast nodule, core biopsy recommended.  2024: Central left breast core biopsy: Possible phyllodes tumor.  2024: 1.  Left breast (superior) MRI core biopsy: Benign and concordant. 2.  Right breast MRI core biopsy: Also benign and concordant.  2.  New retroareolar left breast mass.  Tyrer-Cuzick risk score = 29.0.  Referred by the BREAST  program.  CC: 1.  Enlarging left breast mass (UOQ). Core biopsy in 2022 diagnosed a fibroadenoma. 2.  + Associated nipple discharge (green), since ~2024. Nonspontaneous. 3.  New left breast mass (retroareolar) on recent breast imaging.  She has been aware of the palpable mass in the left breast (UOQ) since ~2017. Persistent to imaging, and ultimately a core needle biopsy in 2022, which diagnosed a fibroadenoma. This benign diagnosis was concordant. + COIL-SHAPED CLIP.  No other procedures related to either breast.   No personal history of malignancy.  + Hypereosinophilic syndrome. Treated with Gleevec, intermittently, since 2017, whenever symptomatic (itching, ocular problems, rash). Hematology: Dr. Rosalba SARAH. Dermatology: Dr. Inocencio HERRERA.   + Family history of malignancy: Sister: CRC, she passed away at age 60.  Niece (daughter of above Sister with CRC) was diagnosed with breast cancer at age 32. She passed away from brain cancer 36.  A different niece (also a daughter of the above sister with CRC) passed away from metastatic neuroendocrine cancer, from an unknown primary.  Nephew (the son of the sister listed above)  of colorectal cancer at age 36.   Breast imagin2024: Bilateral mammography, with left breast ultrasound: At [place]: BI-RADS 4. 1. EXTREMELY DENSE BREAST TISSUE ON MAMMOGRAPHY. 2.  Left upper outer quadrant mass which measured 5 x 2.5 x 4.8 cm in 2022 now measures 6 x 3.7 x 6.1 cm. Surgical excision recommended. I suggested a repeat needle biopsy to assure that it is not a phyllodes tumor, prior to resection. She understands and agrees. Prescription entered. 2.  Left breast (retroareolar): 1.2 x 0.7 x 1.3 cm circumscribed hypoechoic mass. Core biopsy recommended; prescription entered today.   She has never had a breast MRI, her Tyrer-Cuzick risk score is 29.0. I have suggested a baseline study. She understands and agrees. Prescription entered. This study will be done prior to the above planned core biopsies.   Menarche at 11.  4, para 2, first at 21. Her menstrual cycles are very irregular. No exogenous hormones.   PMD: Dr. Jose NOVA.  + ALLERGIC: Sulfa. Splenda (sweetener).  + Daily XARELTO for DVT and pulmonary embolism (PE). Diagnosed with RLE DVT May 2017.  No pacemaker or defibrillator.  + Hypertension. Controlled with losartan/HCTZ. Cardiology: Dr. Cristo VALADEZ.  + Nabumetone for chronic pain. Prescribed by hematology.  She also sees rheumatology: Dr. Elana TINOCO.  + History of nephrolithiasis. She had ESWL performed at [facility] ~.   She has a history of uterine myomectomy x 2 at [facility] ~. She has a follow-up GYN appointment scheduled May 2024 at [facility].   Baseline colonoscopy is scheduled for May 2024 at [facility].

## 2024-09-04 NOTE — HISTORY OF PRESENT ILLNESS
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  2024: Excision, with preoperative localization, of: 1.  Left retroareolar possible phyllodes tumor. 2.  Enlarging fibroadenoma in the left breast (UOQ). Surgical pathology: BOTH masses are MALIGNANT PHYLLODES TUMORS: 1.  Left retroareolar: 4.5 cm malignant phyllodes tumor, negative margins. 2.  Left upper outer quadrant: 5.5 cm malignant phyllodes tumor negative margins.   CC: Presently, no signs or symptoms related to either breast.   24: Referred with: 1.  Enlarging left breast mass (UOQ) that was a fibroadenoma on 2022 core biopsy. She had been aware of the breast lump since ~.  2024: Preoperative bilateral breast MRI at [place]: 1.  Known large mass in the left upper outer quadrant noted. 2.  Previously noted left retroareolar nodule confirmed. 3.  Additional, left superior nodule for which core biopsy was recommended. 4.  Medial right breast nodule, core biopsy recommended.  2024: Central left breast core biopsy: Possible phyllodes tumor.  2024: 1.  Left breast (superior) MRI core biopsy: Benign and concordant. 2.  Right breast MRI core biopsy: Also benign and concordant.  2.  New retroareolar left breast mass.  Tyrer-Cuzick risk score = 29.0.  Referred by the BREAST  program.  CC: 1.  Enlarging left breast mass (UOQ). Core biopsy in 2022 diagnosed a fibroadenoma. 2.  + Associated nipple discharge (green), since ~2024. Nonspontaneous. 3.  New left breast mass (retroareolar) on recent breast imaging.  She has been aware of the palpable mass in the left breast (UOQ) since ~2017. Persistent to imaging, and ultimately a core needle biopsy in 2022, which diagnosed a fibroadenoma. This benign diagnosis was concordant. + COIL-SHAPED CLIP.  No other procedures related to either breast.   No personal history of malignancy.  + Hypereosinophilic syndrome. Treated with Gleevec, intermittently, since 2017, whenever symptomatic (itching, ocular problems, rash). Hematology: Dr. Rosalba SARAH. Dermatology: Dr. Inocencio HERRERA.   + Family history of malignancy: Sister: CRC, she passed away at age 60.  Niece (daughter of above Sister with CRC) was diagnosed with breast cancer at age 32. She passed away from brain cancer 36.  A different niece (also a daughter of the above sister with CRC) passed away from metastatic neuroendocrine cancer, from an unknown primary.  Nephew (the son of the sister listed above)  of colorectal cancer at age 36.   Breast imagin2024: Bilateral mammography, with left breast ultrasound: At [place]: BI-RADS 4. 1. EXTREMELY DENSE BREAST TISSUE ON MAMMOGRAPHY. 2.  Left upper outer quadrant mass which measured 5 x 2.5 x 4.8 cm in 2022 now measures 6 x 3.7 x 6.1 cm. Surgical excision recommended. I suggested a repeat needle biopsy to assure that it is not a phyllodes tumor, prior to resection. She understands and agrees. Prescription entered. 2.  Left breast (retroareolar): 1.2 x 0.7 x 1.3 cm circumscribed hypoechoic mass. Core biopsy recommended; prescription entered today.   She has never had a breast MRI, her Tyrer-Cuzick risk score is 29.0. I have suggested a baseline study. She understands and agrees. Prescription entered. This study will be done prior to the above planned core biopsies.   Menarche at 11.  4, para 2, first at 21. Her menstrual cycles are very irregular. No exogenous hormones.   PMD: Dr. Jose NOVA.  + ALLERGIC: Sulfa. Splenda (sweetener).  + Daily XARELTO for DVT and pulmonary embolism (PE). Diagnosed with RLE DVT May 2017.  No pacemaker or defibrillator.  + Hypertension. Controlled with losartan/HCTZ. Cardiology: Dr. Cristo VALADEZ.  + Nabumetone for chronic pain. Prescribed by hematology.  She also sees rheumatology: Dr. Elana TINOCO.  + History of nephrolithiasis. She had ESWL performed at [facility] ~.   She has a history of uterine myomectomy x 2 at [facility] ~. She has a follow-up GYN appointment scheduled May 2024 at [facility].   Baseline colonoscopy is scheduled for May 2024 at [facility].

## 2024-09-04 NOTE — REVIEW OF SYSTEMS
[Negative] : Endocrine [FreeTextEntry5] : History of DVT [FreeTextEntry7] : Allergic to sulfa [FreeTextEntry9] : Nephrolithiasis [de-identified] : Chronic pain [FreeTextEntry1] : Malignant phyllodes tumor of the breast

## 2024-09-04 NOTE — REVIEW OF SYSTEMS
[Negative] : Endocrine [FreeTextEntry5] : History of DVT [FreeTextEntry7] : Allergic to sulfa [FreeTextEntry9] : Nephrolithiasis [de-identified] : Chronic pain [FreeTextEntry1] : Malignant phyllodes tumor of the breast

## 2024-09-05 ENCOUNTER — OUTPATIENT (OUTPATIENT)
Dept: OUTPATIENT SERVICES | Facility: HOSPITAL | Age: 54
LOS: 1 days | Discharge: HOME | End: 2024-09-05

## 2024-09-05 ENCOUNTER — NON-APPOINTMENT (OUTPATIENT)
Age: 54
End: 2024-09-05

## 2024-09-05 DIAGNOSIS — Z98.890 OTHER SPECIFIED POSTPROCEDURAL STATES: Chronic | ICD-10-CM

## 2024-09-05 DIAGNOSIS — C50.919 MALIGNANT NEOPLASM OF UNSPECIFIED SITE OF UNSPECIFIED FEMALE BREAST: ICD-10-CM

## 2024-09-05 DIAGNOSIS — D25.9 LEIOMYOMA OF UTERUS, UNSPECIFIED: Chronic | ICD-10-CM

## 2024-09-05 DIAGNOSIS — N20.0 CALCULUS OF KIDNEY: Chronic | ICD-10-CM

## 2024-09-06 ENCOUNTER — APPOINTMENT (OUTPATIENT)
Dept: HEMATOLOGY ONCOLOGY | Facility: CLINIC | Age: 54
End: 2024-09-06
Payer: COMMERCIAL

## 2024-09-06 VITALS
WEIGHT: 232 LBS | RESPIRATION RATE: 16 BRPM | HEART RATE: 74 BPM | OXYGEN SATURATION: 99 % | HEIGHT: 66 IN | BODY MASS INDEX: 37.28 KG/M2 | DIASTOLIC BLOOD PRESSURE: 66 MMHG | SYSTOLIC BLOOD PRESSURE: 105 MMHG | TEMPERATURE: 97.6 F

## 2024-09-06 DIAGNOSIS — C50.912 MALIGNANT NEOPLASM OF UNSPECIFIED SITE OF LEFT FEMALE BREAST: ICD-10-CM

## 2024-09-06 PROCEDURE — 99245 OFF/OP CONSLTJ NEW/EST HI 55: CPT

## 2024-09-06 PROCEDURE — G2211 COMPLEX E/M VISIT ADD ON: CPT | Mod: NC

## 2024-09-07 NOTE — PHYSICAL EXAM
[Fully active, able to carry on all pre-disease performance without restriction] : Status 0 - Fully active, able to carry on all pre-disease performance without restriction [Normal] : affect appropriate [de-identified] : L breast outer quadrant surgical scar with steristrips intact and mild edema over the lateral margin; no palpable axillary LN or calor or erythema

## 2024-09-07 NOTE — HISTORY OF PRESENT ILLNESS
[Disease: _____________________] : Disease: [unfilled] [de-identified] : Age 54: left breast malignant phylloides tumor  Symptomatic: she has had persistent L breast pain. Had L breast mass and breast imaging done 2022 showing left breast indeterminate mass seen at the 2:00 area 6 cm FN measuring up to 4.5 cm and left axillary LN. She had u/s guided biopsy done on 10/2022 which showed benign breast tissue showing fibroadenoma with areas of tubular adenoma and axillary LN with benign lymph node tissue. She had mammogram done on 4/9/2024 which showed biopsy proven fibroadenoma at the L breast 2:00 area but measuring 6 x 3.7 x 6.1 cm with u/s guided biopsy recommended; left retro-areolar mass indeterminate and probably benign cluster of microcysts in the left breast. She had u/s guided biopsy of the L 12:00 breast showing fibroepithelial lesion: phylloides tumor is favored. MRi of the breast done on 4/25/2024 showed biopsy proven benign mass in the left breast 2:00, indeterminate upper inner quadrant left breast mass and indeterminate R breast mass with prominent axillary lymph nodes. She had MRI guided core biopsy of the R 9:00 breast which showed fibrocystic change and left upper inner 10 to 11:00 which showed fibrocystic change and PASH. On 8/23/2024, she underwent lumpectomy with Dr Alfaro. The left central excision showed focal atypical lobular hyperplasia and PASH. The left upper outer quadrant excision showed malignant phylloides tumor 95 mm in greatest dimension with 0.3 mm posterior margin and 0.4 mm anterior margin. The left central excision showed malignant phylloides tumor 45 mm and 3 mm from inferior margin.  [de-identified] : malignant phylloides tumor  [de-identified] : She is present to review adjuvant recommendations. She has FH of breast cancer and brain cancer (niece 30s), colon cancer (sister 60), neuroendocrine cancer (niece), sister with stomach cancer. No family members affected by cancer had any genetic testing to her knowledge. She is healing from her lumpectomies. She has baseline lower back pain due to herniated discs: has been on pain medications and prefers to be on nabemetone. Had tried gabapentin in the past but felt was too strong. Also had PT for lower back pain which helped. 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

## 2024-09-07 NOTE — ASSESSMENT
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 We reviewed risk of recurrence from malignant phylloides tumor with outcomes improved with surgical margins of at least 1 cm for malignant tumor. She will consider additional surgery. She will also be seeing radiation oncology. We reviewed limited data for benefit of adjuvant chemotherapy to lower risk of recurrence from malignant phylloides. We recommended continued surveillance every 6 months for 2 years and subsequently once a year to evaluate for recurrence. Currently will be working with therapist for anxiety surrounding current diagnosis. Questions answered to her satisfaction. Next follow up in 3 months but earlier if any new symptoms.  Genetic testing: we reviewed rationale for genetic testing given phylloides tumor in setting of FH of colon, gastric, brain, and breast cancer. We explained that about 1 out of 10 pts will have positive test and 9 out of 10 will have negative or VUS testing. Only a positive test would affect recommendations for surveillance. We reviewed limitations of testing. Questions answered to her satisfaction. She consented to testing.

## 2024-09-07 NOTE — REVIEW OF SYSTEMS
[Diarrhea: Grade 0] : Diarrhea: Grade 0 [Joint Pain] : joint pain [Negative] : Allergic/Immunologic [Joint Stiffness] : no joint stiffness [Muscle Pain] : no muscle pain [Muscle Weakness] : no muscle weakness

## 2024-09-07 NOTE — ASSESSMENT
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 We reviewed risk of recurrence from malignant phylloides tumor with outcomes improved with surgical margins of at least 1 cm for malignant tumor. She will consider additional surgery. She will also be seeing radiation oncology. We reviewed limited data for benefit of adjuvant chemotherapy to lower risk of recurrence from malignant phylloides. We recommended continued surveillance every 6 months for 2 years and subsequently once a year to evaluate for recurrence. Currently will be working with therapist for anxiety surrounding current diagnosis. Questions answered to her satisfaction. Next follow up in 3 months but earlier if any new symptoms.  Genetic testing: we reviewed rationale for genetic testing given phylloides tumor in setting of FH of colon, gastric, brain, and breast cancer. We explained that about 1 out of 10 pts will have positive test and 9 out of 10 will have negative or VUS testing. Only a positive test would affect recommendations for surveillance. We reviewed limitations of testing. Questions answered to her satisfaction. She consented to testing.

## 2024-09-07 NOTE — HISTORY OF PRESENT ILLNESS
[Disease: _____________________] : Disease: [unfilled] [de-identified] : Age 54: left breast malignant phylloides tumor  Symptomatic: she has had persistent L breast pain. Had L breast mass and breast imaging done 2022 showing left breast indeterminate mass seen at the 2:00 area 6 cm FN measuring up to 4.5 cm and left axillary LN. She had u/s guided biopsy done on 10/2022 which showed benign breast tissue showing fibroadenoma with areas of tubular adenoma and axillary LN with benign lymph node tissue. She had mammogram done on 4/9/2024 which showed biopsy proven fibroadenoma at the L breast 2:00 area but measuring 6 x 3.7 x 6.1 cm with u/s guided biopsy recommended; left retro-areolar mass indeterminate and probably benign cluster of microcysts in the left breast. She had u/s guided biopsy of the L 12:00 breast showing fibroepithelial lesion: phylloides tumor is favored. MRi of the breast done on 4/25/2024 showed biopsy proven benign mass in the left breast 2:00, indeterminate upper inner quadrant left breast mass and indeterminate R breast mass with prominent axillary lymph nodes. She had MRI guided core biopsy of the R 9:00 breast which showed fibrocystic change and left upper inner 10 to 11:00 which showed fibrocystic change and PASH. On 8/23/2024, she underwent lumpectomy with Dr Alfaro. The left central excision showed focal atypical lobular hyperplasia and PASH. The left upper outer quadrant excision showed malignant phylloides tumor 95 mm in greatest dimension with 0.3 mm posterior margin and 0.4 mm anterior margin. The left central excision showed malignant phylloides tumor 45 mm and 3 mm from inferior margin.  [de-identified] : malignant phylloides tumor  [de-identified] : She is present to review adjuvant recommendations. She has FH of breast cancer and brain cancer (niece 30s), colon cancer (sister 60), neuroendocrine cancer (niece), sister with stomach cancer. No family members affected by cancer had any genetic testing to her knowledge. She is healing from her lumpectomies. She has baseline lower back pain due to herniated discs: has been on pain medications and prefers to be on nabemetone. Had tried gabapentin in the past but felt was too strong. Also had PT for lower back pain which helped. 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

## 2024-09-07 NOTE — CONSULT LETTER
[Dear  ___] : Dear  [unfilled], [Consult Letter:] : I had the pleasure of evaluating your patient, [unfilled]. [( Thank you for referring [unfilled] for consultation for _____ )] : Thank you for referring [unfilled] for consultation for [unfilled] [Please see my note below.] : Please see my note below. [Consult Closing:] : Thank you very much for allowing me to participate in the care of this patient.  If you have any questions, please do not hesitate to contact me. [Sincerely,] : Sincerely, [FreeTextEntry2] : [place] [address] [FreeTextEntry3] : Alonzo Huertas MD Attending [facility] [DrLaney  ___] : Dr. CALVO

## 2024-09-07 NOTE — CONSULT LETTER
[Dear  ___] : Dear  [unfilled], [Consult Letter:] : I had the pleasure of evaluating your patient, [unfilled]. [( Thank you for referring [unfilled] for consultation for _____ )] : Thank you for referring [unfilled] for consultation for [unfilled] [Please see my note below.] : Please see my note below. [Consult Closing:] : Thank you very much for allowing me to participate in the care of this patient.  If you have any questions, please do not hesitate to contact me. [Sincerely,] : Sincerely, [FreeTextEntry2] : [place] [address] [FreeTextEntry3] : Alonzo Huertas MD Attending [facility] [DrLaney  ___] : Dr. CALVO

## 2024-09-07 NOTE — REASON FOR VISIT
[Initial Consultation] : an initial consultation [FreeTextEntry2] : Evaluation of L breast malignant phylloides tumor

## 2024-09-08 ENCOUNTER — NON-APPOINTMENT (OUTPATIENT)
Age: 54
End: 2024-09-08

## 2024-09-11 ENCOUNTER — OUTPATIENT (OUTPATIENT)
Dept: OUTPATIENT SERVICES | Facility: HOSPITAL | Age: 54
LOS: 1 days | End: 2024-09-11
Payer: COMMERCIAL

## 2024-09-11 ENCOUNTER — APPOINTMENT (OUTPATIENT)
Dept: CT IMAGING | Facility: IMAGING CENTER | Age: 54
End: 2024-09-11

## 2024-09-11 DIAGNOSIS — Z98.890 OTHER SPECIFIED POSTPROCEDURAL STATES: Chronic | ICD-10-CM

## 2024-09-11 DIAGNOSIS — D25.9 LEIOMYOMA OF UTERUS, UNSPECIFIED: Chronic | ICD-10-CM

## 2024-09-11 DIAGNOSIS — Z00.8 ENCOUNTER FOR OTHER GENERAL EXAMINATION: ICD-10-CM

## 2024-09-11 DIAGNOSIS — N20.0 CALCULUS OF KIDNEY: Chronic | ICD-10-CM

## 2024-09-11 DIAGNOSIS — C50.912 MALIGNANT NEOPLASM OF UNSPECIFIED SITE OF LEFT FEMALE BREAST: ICD-10-CM

## 2024-09-11 PROCEDURE — 71250 CT THORAX DX C-: CPT | Mod: 26

## 2024-09-11 PROCEDURE — 71250 CT THORAX DX C-: CPT

## 2024-09-13 ENCOUNTER — NON-APPOINTMENT (OUTPATIENT)
Age: 54
End: 2024-09-13

## 2024-09-13 ENCOUNTER — APPOINTMENT (OUTPATIENT)
Dept: RADIATION ONCOLOGY | Facility: CLINIC | Age: 54
End: 2024-09-13
Payer: COMMERCIAL

## 2024-09-13 VITALS
OXYGEN SATURATION: 100 % | TEMPERATURE: 96.9 F | DIASTOLIC BLOOD PRESSURE: 77 MMHG | HEART RATE: 88 BPM | SYSTOLIC BLOOD PRESSURE: 138 MMHG | BODY MASS INDEX: 37.45 KG/M2 | WEIGHT: 232 LBS | RESPIRATION RATE: 17 BRPM

## 2024-09-13 DIAGNOSIS — Z84.89 FAMILY HISTORY OF OTHER SPECIFIED CONDITIONS: ICD-10-CM

## 2024-09-13 DIAGNOSIS — Z23 ENCOUNTER FOR IMMUNIZATION: ICD-10-CM

## 2024-09-13 DIAGNOSIS — Z86.018 PERSONAL HISTORY OF OTHER BENIGN NEOPLASM: ICD-10-CM

## 2024-09-13 DIAGNOSIS — Z80.0 FAMILY HISTORY OF MALIGNANT NEOPLASM OF DIGESTIVE ORGANS: ICD-10-CM

## 2024-09-13 DIAGNOSIS — Z92.21 PERSONAL HISTORY OF ANTINEOPLASTIC CHEMOTHERAPY: ICD-10-CM

## 2024-09-13 DIAGNOSIS — M77.9 ENTHESOPATHY, UNSPECIFIED: ICD-10-CM

## 2024-09-13 DIAGNOSIS — U07.1 COVID-19: ICD-10-CM

## 2024-09-13 DIAGNOSIS — Z87.39 PERSONAL HISTORY OF OTHER DISEASES OF THE MUSCULOSKELETAL SYSTEM AND CONNECTIVE TISSUE: ICD-10-CM

## 2024-09-13 DIAGNOSIS — M72.2 PLANTAR FASCIAL FIBROMATOSIS: ICD-10-CM

## 2024-09-13 DIAGNOSIS — Z80.3 FAMILY HISTORY OF MALIGNANT NEOPLASM OF BREAST: ICD-10-CM

## 2024-09-13 PROCEDURE — G2211 COMPLEX E/M VISIT ADD ON: CPT | Mod: NC

## 2024-09-13 PROCEDURE — 99204 OFFICE O/P NEW MOD 45 MIN: CPT

## 2024-09-13 RX ORDER — AMOXICILLIN AND CLAVULANATE POTASSIUM 875; 125 MG/1; MG/1
875-125 TABLET, COATED ORAL
Qty: 14 | Refills: 0 | Status: ACTIVE | COMMUNITY
Start: 2024-09-13 | End: 1900-01-01

## 2024-09-13 NOTE — HISTORY OF PRESENT ILLNESS
[FreeTextEntry1] : Ms. Russ is a 54-year-old female who presents in consultation for consideration of radiation therapy. She is unaccompanied for today's visit.    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 Margins negative, distance to closest margin 0.3 mm.   **History of Hyper-eosinophilia Syndrome diagnosed in 2017, follows with hematology, treated with Gleevec intermittently. Also, history of DVT and PE, on anticoagulation. **  HPI: Ms. Russ complained of persistent left breast pain and palpable area of concern in 2022.   9/30/22 - Bilateral Diagnostic Mammogram and LEFT Breast US: 1. LEFT Breast indeterminant mass seen on mammography and ultrasound at the 2:00 6 cm from the nipple location measuring up to 4.5 cm, in the area of palpable concern. Biopsy recommended. 2. Prominent LEFT axillary lymph nodes. Biopsy of most abnormal appearing lymph node demonstrating focal cortical thickening is recommended. 3. No mammographic or sonographic evidence of malignancy in right breast. BIRADS 4B  10/7/22 - underwent LEFT Breast and LEFT Axilla Biopsies: Pathology -  1. LEFT Breast, 2:00 6 cm FN: Benign breast tissue showing fibroadenoma with areas of tubular adenoma.  2. LEFT Axillary Lymph Node: Benign lymph node tissue.   Ms. Russ complained of continued palpable area of concern in left breast and left nipple discharge since February 2024.   4/9/24 - Bilateral Diagnostic Mammogram and LEFT Breast US: Biopsy-proven benign finding in the left breast, increased in size. Surgical consultation for excision is recommended. Indeterminate left breast mass (retroareolar location). Ultrasound-guided biopsy is recommended. Patient prefers surgical consultation for excision. Probably benign cluster of microcysts in the left breast. Targeted left breast ultrasound in 6 months is recommended to demonstrate stability. Clinical follow-up/surgical consultation for left nipple discharge is recommended as warranted. BIRADS 4A  4/22/24 - saw Dr. Alfaro (surgeon) in consultation for enlarging breast mass, new left retroareolar breast mass, and left nipple discharge. Breast MRI ordered as well as repeat biopsy to check for phyllodes tumor prior to resection.   5/8/24 - Breast MRI: 1. Biopsy-proven benign mass in the left breast (2:00), for which surgical consultation for excision was previously recommended. 2. Indeterminate left breast mass (12:00), as noted on prior ultrasound, for which ultrasound-guided biopsy was previously recommended. 3. Indeterminate upper inner quadrant left breast mass (10-11 o'clock). Targeted left breast ultrasound and ultrasound-guided biopsy is recommended. If no sonographic correlate, MRI guided biopsy may be performed. 4. Indeterminate right breast mass (9:00). Targeted right breast ultrasound and ultrasound-guided biopsy is recommended. If no sonographic correlate, MRI biopsy may be performed. 5. Prominent bilateral axillary lymph nodes, largest left axillary lymph node was previously biopsied yielding benign results, mammographically stable since 2022. Clinical follow-up as warranted is recommended. BIRADS 4A  5/13/24 - underwent LEFT Breast, 12:00 N1 Biopsy- Pathology -  1. Breast, LEFT, 12 o'clock 1 cm from nipple, ultrasound guided core biopsy: Fibroepithelial lesion.  Comment: Phyllodes tumor is favored. Complete characterization should be performed on the excision specimen. Slide Consultation done 8/13/24: Fibroepithelial lesion with cellular stroma (see comment). Comment: The findings on core biopsy are equivocal.  Definitive histologic characterization is deferred to the excisional specimen.  6/5/24 - Breast US: No sonographic correlate for enhancing bilateral masses seen on MRI dated 5/8/2024. Therefore, these areas remain indeterminant and further evaluation with MRI guided core needle biopsy is recommended. BIRADS 2   6/27/24 - underwent RIGHT Breast Biopsy and LEFT Breast Biopsy: Pathology -  1. Breast, RIGHT, 9:00 Biopsy: Fibrocystic change including florid nodular sclerosing adenosis, microcysts and microcalcifications. 2. Breast, LEFT upper inner 10-11:00, Biopsy: Fibrocystic change including sclerosing adenosis, apocrine metaplasia, microcysts, stromal fibrosis and microcalcifications. Pseudoangiomatous stromal hyperplasia (PASH).  Ms. Russ spoke with Dr. Alfaro (surgeon) ... all pathology and results reviewed.  To undergo excision of the enlarging mass in the upper outer quadrant of the left breast, and the possible phyllodes tumor in the left retroareolar region.  8/23/24 - underwent LEFT Breast Surgical Excisions: Pathology -  1. 1.  Breast, LEFT upper outer quadrant, excision: Malignant phyllodes tumor, 95.0 mm in greatest dimension (see comment). The resection margins are negative for tumor; however, the mass is 0.3 mm from the posterior margin and 0.4 mm from the anterior and lateral margins. Fibrocystic changes, usual ductal hyperplasia, sclerosing adenosis, apocrine metaplasia and benign epithelial calcifications. Biopsy site changes with focus consistent with displaced squamous epithelium. 2.  Breast, LEFT central, excision: Malignant phyllodes tumor, 45.0 mm in greatest dimension (see comment). The resection margins are negative for tumor.  The mass is 3.0 mm from the nearest margin (inferior). Fibrocystic changes, usual ductal hyperplasia, sclerosing adenosis, nodular adenosis, apocrine metaplasia and benign epithelial calcifications. Complex sclerosing lesion. Intraductal papilloma. Biopsy site changes. ADDENDUM: Additional findings in LEFT Central Breast: Focal Atypical Lobular hyperplasia. Additional intraductal papillomata. Columnar cell change. Pseudoangiomatous stromal hyperplasia. Focal vessels with medial calcific sclerosis. Diagnosis is otherwise unchanged.  Comment: Both lesions are characterized by leaf-like architecture, infiltrating borders, markedly hypercellular stroma, severe cellular stromal atypia and increased mitotic activity including atypical mitotic figures (up to 20 mitotic figures per high-power field in part 1; up to 10 mitotic figures per high-power field in part 2).  Stromal overgrowth is present in part 1.  The overall findings for both parts 1 and 2 are consistent with malignant phyllodes tumor.  9/4/24 - saw Dr. Alfaro (surgeon) in follow up.  Diagnosis reviewed. For extent of disease evaluation, recommended a CT scan of the chest. For further management, have recommended consultations with medical oncology and radiation therapy. Explained that given the large size of the tumor, and more than a single site in the same breast, that ultimately, she may best be served with a therapeutic mastectomy. Decisions regarding further treatment will be made after the medical oncology and radiation therapy evaluations. Reviewed in detail, all questions answered.  9/6/24 - saw Dr. Huertas ([facility]) in consultation to review adjuvant recommendations. We reviewed risk of recurrence from malignant phyllodes tumor with outcomes improved with surgical margins of at least 1 cm for malignant tumor. She will consider additional surgery. She will also be seeing radiation oncology. We reviewed limited data for benefit of adjuvant chemotherapy to lower risk of recurrence from malignant phyllodes. We recommended continued surveillance every 6 months for 2 years and subsequently once a year to evaluate for recurrence. Next follow up in 3 months but earlier if any new symptoms.  Consented to genetic testing. (FH of colon, gastric, brain, neuroendocrine, and breast cancer).  9/11/24 - CT Chest: Report pending   9/13/24 - presents in consultation for discussion of radiation therapy options.  Ms. Russ is doing alright today.  She expresses some frustration as to diagnosis and that biopsies didn't show it.  She does look forward to next steps in her treatment.  She continues to heal from her surgery, outer edge of incision still healing, she had some drainage the other day, none noted today.  Denies any fevers.  She also mentioned feeling a "lump" under the left armpit this morning. No pain. 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

## 2024-09-13 NOTE — HISTORY OF PRESENT ILLNESS
[FreeTextEntry1] : Ms. Russ is a 54-year-old female who presents in consultation for consideration of radiation therapy. She is unaccompanied for today's visit.    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 Margins negative, distance to closest margin 0.3 mm.   **History of Hyper-eosinophilia Syndrome diagnosed in 2017, follows with hematology, treated with Gleevec intermittently. Also, history of DVT and PE, on anticoagulation. **  HPI: Ms. uRss complained of persistent left breast pain and palpable area of concern in 2022.   9/30/22 - Bilateral Diagnostic Mammogram and LEFT Breast US: 1. LEFT Breast indeterminant mass seen on mammography and ultrasound at the 2:00 6 cm from the nipple location measuring up to 4.5 cm, in the area of palpable concern. Biopsy recommended. 2. Prominent LEFT axillary lymph nodes. Biopsy of most abnormal appearing lymph node demonstrating focal cortical thickening is recommended. 3. No mammographic or sonographic evidence of malignancy in right breast. BIRADS 4B  10/7/22 - underwent LEFT Breast and LEFT Axilla Biopsies: Pathology -  1. LEFT Breast, 2:00 6 cm FN: Benign breast tissue showing fibroadenoma with areas of tubular adenoma.  2. LEFT Axillary Lymph Node: Benign lymph node tissue.   Ms. Russ complained of continued palpable area of concern in left breast and left nipple discharge since February 2024.   4/9/24 - Bilateral Diagnostic Mammogram and LEFT Breast US: Biopsy-proven benign finding in the left breast, increased in size. Surgical consultation for excision is recommended. Indeterminate left breast mass (retroareolar location). Ultrasound-guided biopsy is recommended. Patient prefers surgical consultation for excision. Probably benign cluster of microcysts in the left breast. Targeted left breast ultrasound in 6 months is recommended to demonstrate stability. Clinical follow-up/surgical consultation for left nipple discharge is recommended as warranted. BIRADS 4A  4/22/24 - saw Dr. Alfaro (surgeon) in consultation for enlarging breast mass, new left retroareolar breast mass, and left nipple discharge. Breast MRI ordered as well as repeat biopsy to check for phyllodes tumor prior to resection.   5/8/24 - Breast MRI: 1. Biopsy-proven benign mass in the left breast (2:00), for which surgical consultation for excision was previously recommended. 2. Indeterminate left breast mass (12:00), as noted on prior ultrasound, for which ultrasound-guided biopsy was previously recommended. 3. Indeterminate upper inner quadrant left breast mass (10-11 o'clock). Targeted left breast ultrasound and ultrasound-guided biopsy is recommended. If no sonographic correlate, MRI guided biopsy may be performed. 4. Indeterminate right breast mass (9:00). Targeted right breast ultrasound and ultrasound-guided biopsy is recommended. If no sonographic correlate, MRI biopsy may be performed. 5. Prominent bilateral axillary lymph nodes, largest left axillary lymph node was previously biopsied yielding benign results, mammographically stable since 2022. Clinical follow-up as warranted is recommended. BIRADS 4A  5/13/24 - underwent LEFT Breast, 12:00 N1 Biopsy- Pathology -  1. Breast, LEFT, 12 o'clock 1 cm from nipple, ultrasound guided core biopsy: Fibroepithelial lesion.  Comment: Phyllodes tumor is favored. Complete characterization should be performed on the excision specimen. Slide Consultation done 8/13/24: Fibroepithelial lesion with cellular stroma (see comment). Comment: The findings on core biopsy are equivocal.  Definitive histologic characterization is deferred to the excisional specimen.  6/5/24 - Breast US: No sonographic correlate for enhancing bilateral masses seen on MRI dated 5/8/2024. Therefore, these areas remain indeterminant and further evaluation with MRI guided core needle biopsy is recommended. BIRADS 2   6/27/24 - underwent RIGHT Breast Biopsy and LEFT Breast Biopsy: Pathology -  1. Breast, RIGHT, 9:00 Biopsy: Fibrocystic change including florid nodular sclerosing adenosis, microcysts and microcalcifications. 2. Breast, LEFT upper inner 10-11:00, Biopsy: Fibrocystic change including sclerosing adenosis, apocrine metaplasia, microcysts, stromal fibrosis and microcalcifications. Pseudoangiomatous stromal hyperplasia (PASH).  Ms. Russ spoke with Dr. Alfaro (surgeon) ... all pathology and results reviewed.  To undergo excision of the enlarging mass in the upper outer quadrant of the left breast, and the possible phyllodes tumor in the left retroareolar region.  8/23/24 - underwent LEFT Breast Surgical Excisions: Pathology -  1. 1.  Breast, LEFT upper outer quadrant, excision: Malignant phyllodes tumor, 95.0 mm in greatest dimension (see comment). The resection margins are negative for tumor; however, the mass is 0.3 mm from the posterior margin and 0.4 mm from the anterior and lateral margins. Fibrocystic changes, usual ductal hyperplasia, sclerosing adenosis, apocrine metaplasia and benign epithelial calcifications. Biopsy site changes with focus consistent with displaced squamous epithelium. 2.  Breast, LEFT central, excision: Malignant phyllodes tumor, 45.0 mm in greatest dimension (see comment). The resection margins are negative for tumor.  The mass is 3.0 mm from the nearest margin (inferior). Fibrocystic changes, usual ductal hyperplasia, sclerosing adenosis, nodular adenosis, apocrine metaplasia and benign epithelial calcifications. Complex sclerosing lesion. Intraductal papilloma. Biopsy site changes. ADDENDUM: Additional findings in LEFT Central Breast: Focal Atypical Lobular hyperplasia. Additional intraductal papillomata. Columnar cell change. Pseudoangiomatous stromal hyperplasia. Focal vessels with medial calcific sclerosis. Diagnosis is otherwise unchanged.  Comment: Both lesions are characterized by leaf-like architecture, infiltrating borders, markedly hypercellular stroma, severe cellular stromal atypia and increased mitotic activity including atypical mitotic figures (up to 20 mitotic figures per high-power field in part 1; up to 10 mitotic figures per high-power field in part 2).  Stromal overgrowth is present in part 1.  The overall findings for both parts 1 and 2 are consistent with malignant phyllodes tumor.  9/4/24 - saw Dr. Alfaro (surgeon) in follow up.  Diagnosis reviewed. For extent of disease evaluation, recommended a CT scan of the chest. For further management, have recommended consultations with medical oncology and radiation therapy. Explained that given the large size of the tumor, and more than a single site in the same breast, that ultimately, she may best be served with a therapeutic mastectomy. Decisions regarding further treatment will be made after the medical oncology and radiation therapy evaluations. Reviewed in detail, all questions answered.  9/6/24 - saw Dr. Huertas ([facility]) in consultation to review adjuvant recommendations. We reviewed risk of recurrence from malignant phyllodes tumor with outcomes improved with surgical margins of at least 1 cm for malignant tumor. She will consider additional surgery. She will also be seeing radiation oncology. We reviewed limited data for benefit of adjuvant chemotherapy to lower risk of recurrence from malignant phyllodes. We recommended continued surveillance every 6 months for 2 years and subsequently once a year to evaluate for recurrence. Next follow up in 3 months but earlier if any new symptoms.  Consented to genetic testing. (FH of colon, gastric, brain, neuroendocrine, and breast cancer).  9/11/24 - CT Chest: Report pending   9/13/24 - presents in consultation for discussion of radiation therapy options.  Ms. Russ is doing alright today.  She expresses some frustration as to diagnosis and that biopsies didn't show it.  She does look forward to next steps in her treatment.  She continues to heal from her surgery, outer edge of incision still healing, she had some drainage the other day, none noted today.  Denies any fevers.  She also mentioned feeling a "lump" under the left armpit this morning. No pain. 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

## 2024-09-13 NOTE — HISTORY OF PRESENT ILLNESS
[FreeTextEntry1] : Ms. Russ is a 54-year-old female who presents in consultation for consideration of radiation therapy. She is unaccompanied for today's visit.    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 Margins negative, distance to closest margin 0.3 mm.   **History of Hyper-eosinophilia Syndrome diagnosed in 2017, follows with hematology, treated with Gleevec intermittently. Also, history of DVT and PE, on anticoagulation. **  HPI: Ms. Russ complained of persistent left breast pain and palpable area of concern in 2022.   9/30/22 - Bilateral Diagnostic Mammogram and LEFT Breast US: 1. LEFT Breast indeterminant mass seen on mammography and ultrasound at the 2:00 6 cm from the nipple location measuring up to 4.5 cm, in the area of palpable concern. Biopsy recommended. 2. Prominent LEFT axillary lymph nodes. Biopsy of most abnormal appearing lymph node demonstrating focal cortical thickening is recommended. 3. No mammographic or sonographic evidence of malignancy in right breast. BIRADS 4B  10/7/22 - underwent LEFT Breast and LEFT Axilla Biopsies: Pathology -  1. LEFT Breast, 2:00 6 cm FN: Benign breast tissue showing fibroadenoma with areas of tubular adenoma.  2. LEFT Axillary Lymph Node: Benign lymph node tissue.   Ms. Russ complained of continued palpable area of concern in left breast and left nipple discharge since February 2024.   4/9/24 - Bilateral Diagnostic Mammogram and LEFT Breast US: Biopsy-proven benign finding in the left breast, increased in size. Surgical consultation for excision is recommended. Indeterminate left breast mass (retroareolar location). Ultrasound-guided biopsy is recommended. Patient prefers surgical consultation for excision. Probably benign cluster of microcysts in the left breast. Targeted left breast ultrasound in 6 months is recommended to demonstrate stability. Clinical follow-up/surgical consultation for left nipple discharge is recommended as warranted. BIRADS 4A  4/22/24 - saw Dr. Alfaro (surgeon) in consultation for enlarging breast mass, new left retroareolar breast mass, and left nipple discharge. Breast MRI ordered as well as repeat biopsy to check for phyllodes tumor prior to resection.   5/8/24 - Breast MRI: 1. Biopsy-proven benign mass in the left breast (2:00), for which surgical consultation for excision was previously recommended. 2. Indeterminate left breast mass (12:00), as noted on prior ultrasound, for which ultrasound-guided biopsy was previously recommended. 3. Indeterminate upper inner quadrant left breast mass (10-11 o'clock). Targeted left breast ultrasound and ultrasound-guided biopsy is recommended. If no sonographic correlate, MRI guided biopsy may be performed. 4. Indeterminate right breast mass (9:00). Targeted right breast ultrasound and ultrasound-guided biopsy is recommended. If no sonographic correlate, MRI biopsy may be performed. 5. Prominent bilateral axillary lymph nodes, largest left axillary lymph node was previously biopsied yielding benign results, mammographically stable since 2022. Clinical follow-up as warranted is recommended. BIRADS 4A  5/13/24 - underwent LEFT Breast, 12:00 N1 Biopsy- Pathology -  1. Breast, LEFT, 12 o'clock 1 cm from nipple, ultrasound guided core biopsy: Fibroepithelial lesion.  Comment: Phyllodes tumor is favored. Complete characterization should be performed on the excision specimen. Slide Consultation done 8/13/24: Fibroepithelial lesion with cellular stroma (see comment). Comment: The findings on core biopsy are equivocal.  Definitive histologic characterization is deferred to the excisional specimen.  6/5/24 - Breast US: No sonographic correlate for enhancing bilateral masses seen on MRI dated 5/8/2024. Therefore, these areas remain indeterminant and further evaluation with MRI guided core needle biopsy is recommended. BIRADS 2   6/27/24 - underwent RIGHT Breast Biopsy and LEFT Breast Biopsy: Pathology -  1. Breast, RIGHT, 9:00 Biopsy: Fibrocystic change including florid nodular sclerosing adenosis, microcysts and microcalcifications. 2. Breast, LEFT upper inner 10-11:00, Biopsy: Fibrocystic change including sclerosing adenosis, apocrine metaplasia, microcysts, stromal fibrosis and microcalcifications. Pseudoangiomatous stromal hyperplasia (PASH).  Ms. Russ spoke with Dr. Alfaro (surgeon) ... all pathology and results reviewed.  To undergo excision of the enlarging mass in the upper outer quadrant of the left breast, and the possible phyllodes tumor in the left retroareolar region.  8/23/24 - underwent LEFT Breast Surgical Excisions: Pathology -  1. 1.  Breast, LEFT upper outer quadrant, excision: Malignant phyllodes tumor, 95.0 mm in greatest dimension (see comment). The resection margins are negative for tumor; however, the mass is 0.3 mm from the posterior margin and 0.4 mm from the anterior and lateral margins. Fibrocystic changes, usual ductal hyperplasia, sclerosing adenosis, apocrine metaplasia and benign epithelial calcifications. Biopsy site changes with focus consistent with displaced squamous epithelium. 2.  Breast, LEFT central, excision: Malignant phyllodes tumor, 45.0 mm in greatest dimension (see comment). The resection margins are negative for tumor.  The mass is 3.0 mm from the nearest margin (inferior). Fibrocystic changes, usual ductal hyperplasia, sclerosing adenosis, nodular adenosis, apocrine metaplasia and benign epithelial calcifications. Complex sclerosing lesion. Intraductal papilloma. Biopsy site changes. ADDENDUM: Additional findings in LEFT Central Breast: Focal Atypical Lobular hyperplasia. Additional intraductal papillomata. Columnar cell change. Pseudoangiomatous stromal hyperplasia. Focal vessels with medial calcific sclerosis. Diagnosis is otherwise unchanged.  Comment: Both lesions are characterized by leaf-like architecture, infiltrating borders, markedly hypercellular stroma, severe cellular stromal atypia and increased mitotic activity including atypical mitotic figures (up to 20 mitotic figures per high-power field in part 1; up to 10 mitotic figures per high-power field in part 2).  Stromal overgrowth is present in part 1.  The overall findings for both parts 1 and 2 are consistent with malignant phyllodes tumor.  9/4/24 - saw Dr. Alfaro (surgeon) in follow up.  Diagnosis reviewed. For extent of disease evaluation, recommended a CT scan of the chest. For further management, have recommended consultations with medical oncology and radiation therapy. Explained that given the large size of the tumor, and more than a single site in the same breast, that ultimately, she may best be served with a therapeutic mastectomy. Decisions regarding further treatment will be made after the medical oncology and radiation therapy evaluations. Reviewed in detail, all questions answered.  9/6/24 - saw Dr. Huertas ([facility]) in consultation to review adjuvant recommendations. We reviewed risk of recurrence from malignant phyllodes tumor with outcomes improved with surgical margins of at least 1 cm for malignant tumor. She will consider additional surgery. She will also be seeing radiation oncology. We reviewed limited data for benefit of adjuvant chemotherapy to lower risk of recurrence from malignant phyllodes. We recommended continued surveillance every 6 months for 2 years and subsequently once a year to evaluate for recurrence. Next follow up in 3 months but earlier if any new symptoms.  Consented to genetic testing. (FH of colon, gastric, brain, neuroendocrine, and breast cancer).  9/11/24 - CT Chest: Report pending   9/13/24 - presents in consultation for discussion of radiation therapy options.  Ms. Russ is doing alright today.  She expresses some frustration as to diagnosis and that biopsies didn't show it.  She does look forward to next steps in her treatment.  She continues to heal from her surgery, outer edge of incision still healing, she had some drainage the other day, none noted today.  Denies any fevers.  She also mentioned feeling a "lump" under the left armpit this morning. No pain. 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

## 2024-09-13 NOTE — HISTORY OF PRESENT ILLNESS
[FreeTextEntry1] : Ms. Russ is a 54-year-old female who presents in consultation for consideration of radiation therapy. She is unaccompanied for today's visit.    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 Margins negative, distance to closest margin 0.3 mm.   **History of Hyper-eosinophilia Syndrome diagnosed in 2017, follows with hematology, treated with Gleevec intermittently. Also, history of DVT and PE, on anticoagulation. **  HPI: Ms. Russ complained of persistent left breast pain and palpable area of concern in 2022.   9/30/22 - Bilateral Diagnostic Mammogram and LEFT Breast US: 1. LEFT Breast indeterminant mass seen on mammography and ultrasound at the 2:00 6 cm from the nipple location measuring up to 4.5 cm, in the area of palpable concern. Biopsy recommended. 2. Prominent LEFT axillary lymph nodes. Biopsy of most abnormal appearing lymph node demonstrating focal cortical thickening is recommended. 3. No mammographic or sonographic evidence of malignancy in right breast. BIRADS 4B  10/7/22 - underwent LEFT Breast and LEFT Axilla Biopsies: Pathology -  1. LEFT Breast, 2:00 6 cm FN: Benign breast tissue showing fibroadenoma with areas of tubular adenoma.  2. LEFT Axillary Lymph Node: Benign lymph node tissue.   Ms. Russ complained of continued palpable area of concern in left breast and left nipple discharge since February 2024.   4/9/24 - Bilateral Diagnostic Mammogram and LEFT Breast US: Biopsy-proven benign finding in the left breast, increased in size. Surgical consultation for excision is recommended. Indeterminate left breast mass (retroareolar location). Ultrasound-guided biopsy is recommended. Patient prefers surgical consultation for excision. Probably benign cluster of microcysts in the left breast. Targeted left breast ultrasound in 6 months is recommended to demonstrate stability. Clinical follow-up/surgical consultation for left nipple discharge is recommended as warranted. BIRADS 4A  4/22/24 - saw Dr. Alfaro (surgeon) in consultation for enlarging breast mass, new left retroareolar breast mass, and left nipple discharge. Breast MRI ordered as well as repeat biopsy to check for phyllodes tumor prior to resection.   5/8/24 - Breast MRI: 1. Biopsy-proven benign mass in the left breast (2:00), for which surgical consultation for excision was previously recommended. 2. Indeterminate left breast mass (12:00), as noted on prior ultrasound, for which ultrasound-guided biopsy was previously recommended. 3. Indeterminate upper inner quadrant left breast mass (10-11 o'clock). Targeted left breast ultrasound and ultrasound-guided biopsy is recommended. If no sonographic correlate, MRI guided biopsy may be performed. 4. Indeterminate right breast mass (9:00). Targeted right breast ultrasound and ultrasound-guided biopsy is recommended. If no sonographic correlate, MRI biopsy may be performed. 5. Prominent bilateral axillary lymph nodes, largest left axillary lymph node was previously biopsied yielding benign results, mammographically stable since 2022. Clinical follow-up as warranted is recommended. BIRADS 4A  5/13/24 - underwent LEFT Breast, 12:00 N1 Biopsy- Pathology -  1. Breast, LEFT, 12 o'clock 1 cm from nipple, ultrasound guided core biopsy: Fibroepithelial lesion.  Comment: Phyllodes tumor is favored. Complete characterization should be performed on the excision specimen. Slide Consultation done 8/13/24: Fibroepithelial lesion with cellular stroma (see comment). Comment: The findings on core biopsy are equivocal.  Definitive histologic characterization is deferred to the excisional specimen.  6/5/24 - Breast US: No sonographic correlate for enhancing bilateral masses seen on MRI dated 5/8/2024. Therefore, these areas remain indeterminant and further evaluation with MRI guided core needle biopsy is recommended. BIRADS 2   6/27/24 - underwent RIGHT Breast Biopsy and LEFT Breast Biopsy: Pathology -  1. Breast, RIGHT, 9:00 Biopsy: Fibrocystic change including florid nodular sclerosing adenosis, microcysts and microcalcifications. 2. Breast, LEFT upper inner 10-11:00, Biopsy: Fibrocystic change including sclerosing adenosis, apocrine metaplasia, microcysts, stromal fibrosis and microcalcifications. Pseudoangiomatous stromal hyperplasia (PASH).  Ms. Russ spoke with Dr. Alfaro (surgeon) ... all pathology and results reviewed.  To undergo excision of the enlarging mass in the upper outer quadrant of the left breast, and the possible phyllodes tumor in the left retroareolar region.  8/23/24 - underwent LEFT Breast Surgical Excisions: Pathology -  1. 1.  Breast, LEFT upper outer quadrant, excision: Malignant phyllodes tumor, 95.0 mm in greatest dimension (see comment). The resection margins are negative for tumor; however, the mass is 0.3 mm from the posterior margin and 0.4 mm from the anterior and lateral margins. Fibrocystic changes, usual ductal hyperplasia, sclerosing adenosis, apocrine metaplasia and benign epithelial calcifications. Biopsy site changes with focus consistent with displaced squamous epithelium. 2.  Breast, LEFT central, excision: Malignant phyllodes tumor, 45.0 mm in greatest dimension (see comment). The resection margins are negative for tumor.  The mass is 3.0 mm from the nearest margin (inferior). Fibrocystic changes, usual ductal hyperplasia, sclerosing adenosis, nodular adenosis, apocrine metaplasia and benign epithelial calcifications. Complex sclerosing lesion. Intraductal papilloma. Biopsy site changes. ADDENDUM: Additional findings in LEFT Central Breast: Focal Atypical Lobular hyperplasia. Additional intraductal papillomata. Columnar cell change. Pseudoangiomatous stromal hyperplasia. Focal vessels with medial calcific sclerosis. Diagnosis is otherwise unchanged.  Comment: Both lesions are characterized by leaf-like architecture, infiltrating borders, markedly hypercellular stroma, severe cellular stromal atypia and increased mitotic activity including atypical mitotic figures (up to 20 mitotic figures per high-power field in part 1; up to 10 mitotic figures per high-power field in part 2).  Stromal overgrowth is present in part 1.  The overall findings for both parts 1 and 2 are consistent with malignant phyllodes tumor.  9/4/24 - saw Dr. Alfaro (surgeon) in follow up.  Diagnosis reviewed. For extent of disease evaluation, recommended a CT scan of the chest. For further management, have recommended consultations with medical oncology and radiation therapy. Explained that given the large size of the tumor, and more than a single site in the same breast, that ultimately, she may best be served with a therapeutic mastectomy. Decisions regarding further treatment will be made after the medical oncology and radiation therapy evaluations. Reviewed in detail, all questions answered.  9/6/24 - saw Dr. Huertas ([facility]) in consultation to review adjuvant recommendations. We reviewed risk of recurrence from malignant phyllodes tumor with outcomes improved with surgical margins of at least 1 cm for malignant tumor. She will consider additional surgery. She will also be seeing radiation oncology. We reviewed limited data for benefit of adjuvant chemotherapy to lower risk of recurrence from malignant phyllodes. We recommended continued surveillance every 6 months for 2 years and subsequently once a year to evaluate for recurrence. Next follow up in 3 months but earlier if any new symptoms.  Consented to genetic testing. (FH of colon, gastric, brain, neuroendocrine, and breast cancer).  9/11/24 - CT Chest: Report pending   9/13/24 - presents in consultation for discussion of radiation therapy options.  Ms. Russ is doing alright today.  She expresses some frustration as to diagnosis and that biopsies didn't show it.  She does look forward to next steps in her treatment.  She continues to heal from her surgery, outer edge of incision still healing, she had some drainage the other day, none noted today.  Denies any fevers.  She also mentioned feeling a "lump" under the left armpit this morning. No pain. 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

## 2024-09-13 NOTE — REVIEW OF SYSTEMS
[Patient Intake Form Reviewed] : Patient intake form was reviewed [Cough] : cough [Joint Pain] : joint pain [Negative] : Endocrine [Fever] : no fever [Shortness Of Breath] : no shortness of breath [SOB on Exertion] : no shortness of breath during exertion [Suicidal] : not suicidal [Anxiety] : no anxiety [Depression] : no depression [FreeTextEntry2] : tiredness at times  [FreeTextEntry3] : blurry vision and has floaters at times, prescription glasses  [FreeTextEntry6] : dry cough  [FreeTextEntry9] : chronic lower back, foot and left hip pain  [de-identified] : left breast surgical incision continues to heal  [de-identified] : stress and frustration - seen by  today  [de-identified] : on anticoagulation

## 2024-09-13 NOTE — PHYSICAL EXAM
[Sclera] : the sclera and conjunctiva were normal [Outer Ear] : the ears and nose were normal in appearance [No UE Edema] : there is no upper extremity edema [Normal] : normal skin color and pigmentation and no rash [Obese] : obese [de-identified] : BMI 37 [de-identified] : left breast healing wound, slightly open at the lateral end with some erythema [de-identified] : Small palpable lymph node in left axilla

## 2024-09-13 NOTE — PHYSICAL EXAM
[Sclera] : the sclera and conjunctiva were normal [Outer Ear] : the ears and nose were normal in appearance [No UE Edema] : there is no upper extremity edema [Normal] : normal skin color and pigmentation and no rash [Obese] : obese [de-identified] : BMI 37 [de-identified] : left breast healing wound, slightly open at the lateral end with some erythema [de-identified] : Small palpable lymph node in left axilla

## 2024-09-13 NOTE — VITALS
[Maximal Pain Intensity: 8/10] : 8/10 [Least Pain Intensity: 4/10] : 4/10 [Pain Duration: ___] : Pain duration: [unfilled] [Pain Location: ___] : Pain Location: [unfilled] [Pain Interferes with ADLs] : Pain interferes with activities of daily living. [OTC] : OTC [NSAID/Non-Opioid] : NSAID/Non-Opioid [90: Able to carry normal activity; minor signs or symptoms of disease.] : 90: Able to carry normal activity; minor signs or symptoms of disease.  [ECOG Performance Status: 0 - Fully active, able to carry on all pre-disease performance without restriction] : Performance Status: 0 - Fully active, able to carry on all pre-disease performance without restriction [Date: ____________] : Patient's last distress assessment performed on [unfilled]. [10 - Extreme Distress] : Distress Level: 10 [Referred Patient  to social work for follow-up] : Patient was referred to social work for follow-up [FreeTextEntry7] : seen by , Olu Wang today after her consultation

## 2024-09-13 NOTE — PHYSICAL EXAM
[Sclera] : the sclera and conjunctiva were normal [Outer Ear] : the ears and nose were normal in appearance [No UE Edema] : there is no upper extremity edema [Normal] : normal skin color and pigmentation and no rash [Obese] : obese [de-identified] : BMI 37 [de-identified] : left breast healing wound, slightly open at the lateral end with some erythema [de-identified] : Small palpable lymph node in left axilla

## 2024-09-13 NOTE — REVIEW OF SYSTEMS
[Patient Intake Form Reviewed] : Patient intake form was reviewed [Cough] : cough [Joint Pain] : joint pain [Negative] : Endocrine [Fever] : no fever [Shortness Of Breath] : no shortness of breath [SOB on Exertion] : no shortness of breath during exertion [Suicidal] : not suicidal [Anxiety] : no anxiety [Depression] : no depression [FreeTextEntry2] : tiredness at times  [FreeTextEntry3] : blurry vision and has floaters at times, prescription glasses  [FreeTextEntry6] : dry cough  [FreeTextEntry9] : chronic lower back, foot and left hip pain  [de-identified] : left breast surgical incision continues to heal  [de-identified] : stress and frustration - seen by  today  [de-identified] : on anticoagulation

## 2024-09-13 NOTE — REVIEW OF SYSTEMS
[Patient Intake Form Reviewed] : Patient intake form was reviewed [Cough] : cough [Joint Pain] : joint pain [Negative] : Endocrine [Fever] : no fever [Shortness Of Breath] : no shortness of breath [SOB on Exertion] : no shortness of breath during exertion [Suicidal] : not suicidal [Anxiety] : no anxiety [Depression] : no depression [FreeTextEntry2] : tiredness at times  [FreeTextEntry3] : blurry vision and has floaters at times, prescription glasses  [FreeTextEntry6] : dry cough  [FreeTextEntry9] : chronic lower back, foot and left hip pain  [de-identified] : left breast surgical incision continues to heal  [de-identified] : stress and frustration - seen by  today  [de-identified] : on anticoagulation

## 2024-09-13 NOTE — PHYSICAL EXAM
[Sclera] : the sclera and conjunctiva were normal [Outer Ear] : the ears and nose were normal in appearance [No UE Edema] : there is no upper extremity edema [Normal] : normal skin color and pigmentation and no rash [Obese] : obese [de-identified] : BMI 37 [de-identified] : left breast healing wound, slightly open at the lateral end with some erythema [de-identified] : Small palpable lymph node in left axilla

## 2024-09-13 NOTE — REVIEW OF SYSTEMS
[Patient Intake Form Reviewed] : Patient intake form was reviewed [Cough] : cough [Joint Pain] : joint pain [Negative] : Endocrine [Fever] : no fever [Shortness Of Breath] : no shortness of breath [SOB on Exertion] : no shortness of breath during exertion [Suicidal] : not suicidal [Anxiety] : no anxiety [Depression] : no depression [FreeTextEntry2] : tiredness at times  [FreeTextEntry3] : blurry vision and has floaters at times, prescription glasses  [FreeTextEntry6] : dry cough  [FreeTextEntry9] : chronic lower back, foot and left hip pain  [de-identified] : left breast surgical incision continues to heal  [de-identified] : stress and frustration - seen by  today  [de-identified] : on anticoagulation

## 2024-09-16 ENCOUNTER — NON-APPOINTMENT (OUTPATIENT)
Age: 54
End: 2024-09-16

## 2024-09-16 DIAGNOSIS — C50.912 MALIGNANT NEOPLASM OF UNSPECIFIED SITE OF LEFT FEMALE BREAST: ICD-10-CM

## 2024-09-17 ENCOUNTER — NON-APPOINTMENT (OUTPATIENT)
Age: 54
End: 2024-09-17

## 2024-09-18 ENCOUNTER — NON-APPOINTMENT (OUTPATIENT)
Age: 54
End: 2024-09-18

## 2024-09-26 ENCOUNTER — NON-APPOINTMENT (OUTPATIENT)
Age: 54
End: 2024-09-26

## 2024-10-03 ENCOUNTER — APPOINTMENT (OUTPATIENT)
Dept: PLASTIC SURGERY | Facility: CLINIC | Age: 54
End: 2024-10-03

## 2024-10-03 VITALS
DIASTOLIC BLOOD PRESSURE: 78 MMHG | BODY MASS INDEX: 37.28 KG/M2 | OXYGEN SATURATION: 99 % | TEMPERATURE: 97.9 F | SYSTOLIC BLOOD PRESSURE: 129 MMHG | WEIGHT: 232 LBS | HEIGHT: 66 IN | HEART RATE: 99 BPM

## 2024-10-03 DIAGNOSIS — D24.2 BENIGN NEOPLASM OF LEFT BREAST: ICD-10-CM

## 2024-10-03 DIAGNOSIS — C50.912 MALIGNANT NEOPLASM OF UNSPECIFIED SITE OF LEFT FEMALE BREAST: ICD-10-CM

## 2024-10-03 DIAGNOSIS — Z91.89 OTHER SPECIFIED PERSONAL RISK FACTORS, NOT ELSEWHERE CLASSIFIED: ICD-10-CM

## 2024-10-03 PROCEDURE — 99244 OFF/OP CNSLTJ NEW/EST MOD 40: CPT

## 2024-10-08 NOTE — DISCUSSION/SUMMARY
[TextEntry] : BELLA OLIVARES is a 54 year old F who presents for initial consultation for reconstructive options after planned bilateral. mastectomy.   The patient was counseled about reconstructive options following mastectomy, including autologous, prosthetic, and the options of foregoing reconstruction.  Additionally, she was explained the options regarding the timing of reconstruction, including immediate reconstruction at the time of mastectomy or delayed reconstruction at a later date.   The patient was extensively counseled on the operation and the perioperative recoveries of both autologous and prosthetic reconstructions.  The patient understands the risks with abdominally based microsurgical reconstruction including partial or total flap loss, revisit to the operating room in the wan-operative period, wound dehiscence, mastectomy skin flap necrosis, fat necrosis, abdominal wall morbidity (laxity, bulge, hernia), asymmetry, paresthesia, seroma, hematoma, and infection.  She also understands the risks with implant-based reconstruction including infection, implant malposition, extrusion, deflation, capsular contracture, mastectomy skin flap necrosis, wound dehiscence, asymmetry, seroma, paresthesia, and hematoma.  All questions answered.  - Plan for reconstruction of bilateral mastectomy with placement of tissue expanders, possible alloderm - Our office will coordinate with Dr. Alfaro - Surgical paperwork submitted - Heme clearance

## 2024-10-08 NOTE — HISTORY OF PRESENT ILLNESS
[FreeTextEntry1] : BELLA OLIVARES is a 54 year old F who presents for initial consultation for reconstructive options after planned bilateral. mastectomy.   Patient is accompanied by her daughter.  Patient reports palpating a lump on left breast since ~2017, noted growth (UOQ), s/p core bx (10/2022), path notable for fibroadenoma. Since 02/2024, she reports nipple discharge (green in color), intermittent. b/l mammo 4/9/24 notable for BIRADS 4, extremely dense breast tissue, Left UOQ mass previously 5 x 2.5 x 4.8cm (10/2022) now 6 x 3.7 x 6.1cm, left breast retroareolar 1.2 x 0.7 x 1.3cm circumscribed hypoechoic mass.  She was evaluated by surg/onc Dr. 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 Patient is interested in therapeutic left and prophylactic right mastectomy with reconstruction.  At this time, denies cp, sob, subjective fever, chills, headache, cough, myalgia, malaise, abd pain, n/v/d, decreased appetite, and generalized weakness. Denies other breast surgeries in the past. 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   Current bra size: 38, unknown cup size Most recent mammogram: 4/9/24 PMHx: hypereosinophilic syndrome, RLE DVT (05/2017), PE (2018), chronic pain, HTN, nephrolithiasis PSHx: s/p ESWL (~2009), s/p uterine myomectomy x 2 (2010) Family hx: sister +CRC, niece of aforementioned sister with CRC +breast CA (at 31 y/o, passed away from brain cancer @ 35 y/o); different niece (daughter of aforementioned sister with CRC +metastatic neuroendocrine cancer; nephew (son of aforementioned sister with CRC +CRC (@ 35 y/o) Allergies: sulfa, splenda  Current meds: Gleevec, xarelto, losartan/HCTZ, Nabumetaone,  Social hx: RN at [employer]

## 2024-10-08 NOTE — HISTORY OF PRESENT ILLNESS
[FreeTextEntry1] : BELLA OLIVARES is a 54 year old F who presents for initial consultation for reconstructive options after planned bilateral. mastectomy.   Patient is accompanied by her daughter.  Patient reports palpating a lump on left breast since ~2017, noted growth (UOQ), s/p core bx (10/2022), path notable for fibroadenoma. Since 02/2024, she reports nipple discharge (green in color), intermittent. b/l mammo 4/9/24 notable for BIRADS 4, extremely dense breast tissue, Left UOQ mass previously 5 x 2.5 x 4.8cm (10/2022) now 6 x 3.7 x 6.1cm, left breast retroareolar 1.2 x 0.7 x 1.3cm circumscribed hypoechoic mass.  She was evaluated by surg/onc Dr. 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 Patient is interested in therapeutic left and prophylactic right mastectomy with reconstruction.  At this time, denies cp, sob, subjective fever, chills, headache, cough, myalgia, malaise, abd pain, n/v/d, decreased appetite, and generalized weakness. Denies other breast surgeries in the past. 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   Current bra size: 38, unknown cup size Most recent mammogram: 4/9/24 PMHx: hypereosinophilic syndrome, RLE DVT (05/2017), PE (2018), chronic pain, HTN, nephrolithiasis PSHx: s/p ESWL (~2009), s/p uterine myomectomy x 2 (2010) Family hx: sister +CRC, niece of aforementioned sister with CRC +breast CA (at 31 y/o, passed away from brain cancer @ 37 y/o); different niece (daughter of aforementioned sister with CRC +metastatic neuroendocrine cancer; nephew (son of aforementioned sister with CRC +CRC (@ 37 y/o) Allergies: sulfa, splenda  Current meds: Gleevec, xarelto, losartan/HCTZ, Nabumetaone,  Social hx: RN at [employer]

## 2024-10-08 NOTE — REASON FOR VISIT
[Consultation] : a consultation visit [Family Member] : family member [FreeTextEntry1] : Dr. Brown Beg

## 2024-10-08 NOTE — PHYSICAL EXAM
[TextEntry] : Left breast: incision to superior aspect of areola healing well, soft, no palpable mass or lump, no regional adenopathy Bilateral breasts-  Measurement in cm: Sternal Notch to Nipple: R - 27.5  L - 26 IMF to Nipple: R - 10 L - 11.5 Base Width: R - 17 L - 17  Constitutional: NAD, well-nourished HENT: Normocephalic, atraumatic, PERRL, non-icteric sclerae, neck supple, trachea midline PULM: LSCTAB, no wheezing or rhonchi CV: RRR, no murmur, rubs, or gallops Abd: Soft, NT, ND, +BS, no palpable masses or bulge MSK: DIETRICH Skin: No rash noted Neuro: A&O x 3, no FND Psych: Mood is appropriate

## 2024-10-08 NOTE — CONSULT LETTER
[Dear  ___] : Dear  [unfilled], [Consult Letter:] : I had the pleasure of evaluating your patient, [unfilled]. [Please see my note below.] : Please see my note below. [Consult Closing:] : Thank you very much for allowing me to participate in the care of this patient.  If you have any questions, please do not hesitate to contact me. [Sincerely,] : Sincerely, [( Thank you for referring [unfilled] for consultation for _____ )] : Thank you for referring [unfilled] for consultation for [unfilled] [FreeTextEntry3] : David Hoff MD

## 2024-10-08 NOTE — HISTORY OF PRESENT ILLNESS
[FreeTextEntry1] : BELLA OLIVARES is a 54 year old F who presents for initial consultation for reconstructive options after planned bilateral. mastectomy.   Patient is accompanied by her daughter.  Patient reports palpating a lump on left breast since ~2017, noted growth (UOQ), s/p core bx (10/2022), path notable for fibroadenoma. Since 02/2024, she reports nipple discharge (green in color), intermittent. b/l mammo 4/9/24 notable for BIRADS 4, extremely dense breast tissue, Left UOQ mass previously 5 x 2.5 x 4.8cm (10/2022) now 6 x 3.7 x 6.1cm, left breast retroareolar 1.2 x 0.7 x 1.3cm circumscribed hypoechoic mass.  She was evaluated by surg/onc Dr. 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 Patient is interested in therapeutic left and prophylactic right mastectomy with reconstruction.  At this time, denies cp, sob, subjective fever, chills, headache, cough, myalgia, malaise, abd pain, n/v/d, decreased appetite, and generalized weakness. Denies other breast surgeries in the past. 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   Current bra size: 38, unknown cup size Most recent mammogram: 4/9/24 PMHx: hypereosinophilic syndrome, RLE DVT (05/2017), PE (2018), chronic pain, HTN, nephrolithiasis PSHx: s/p ESWL (~2009), s/p uterine myomectomy x 2 (2010) Family hx: sister +CRC, niece of aforementioned sister with CRC +breast CA (at 33 y/o, passed away from brain cancer @ 35 y/o); different niece (daughter of aforementioned sister with CRC +metastatic neuroendocrine cancer; nephew (son of aforementioned sister with CRC +CRC (@ 35 y/o) Allergies: sulfa, splenda  Current meds: Gleevec, xarelto, losartan/HCTZ, Nabumetaone,  Social hx: RN at [employer]

## 2024-10-23 ENCOUNTER — APPOINTMENT (OUTPATIENT)
Dept: SURGICAL ONCOLOGY | Facility: HOSPITAL | Age: 54
End: 2024-10-23

## 2024-10-23 ENCOUNTER — APPOINTMENT (OUTPATIENT)
Dept: PLASTIC SURGERY | Facility: HOSPITAL | Age: 54
End: 2024-10-23

## 2024-10-23 ENCOUNTER — TRANSCRIPTION ENCOUNTER (OUTPATIENT)
Age: 54
End: 2024-10-23

## 2024-10-23 ENCOUNTER — RESULT REVIEW (OUTPATIENT)
Age: 54
End: 2024-10-23

## 2024-10-23 ENCOUNTER — OUTPATIENT (OUTPATIENT)
Dept: OUTPATIENT SERVICES | Facility: HOSPITAL | Age: 54
LOS: 1 days | End: 2024-10-23
Payer: COMMERCIAL

## 2024-10-23 DIAGNOSIS — N20.0 CALCULUS OF KIDNEY: Chronic | ICD-10-CM

## 2024-10-23 DIAGNOSIS — Z98.890 OTHER SPECIFIED POSTPROCEDURAL STATES: Chronic | ICD-10-CM

## 2024-10-23 PROCEDURE — 88307 TISSUE EXAM BY PATHOLOGIST: CPT | Mod: 26

## 2024-10-23 PROCEDURE — 19357 TISS XPNDR PLMT BRST RCNSTJ: CPT | Mod: 50

## 2024-10-23 PROCEDURE — 13101 CMPLX RPR TRUNK 2.6-7.5 CM: CPT

## 2024-10-23 PROCEDURE — 13102 CMPLX RPR TRUNK ADDL 5CM/<: CPT

## 2024-10-23 PROCEDURE — 15777 ACELLULAR DERM MATRIX IMPLT: CPT | Mod: 50

## 2024-10-23 PROCEDURE — 19303 MAST SIMPLE COMPLETE: CPT | Mod: 50,58

## 2024-10-23 DEVICE — XPND TISS DERMASPAN LOW POLE TEX MH 600-720CC SMOOTH: Type: IMPLANTABLE DEVICE | Status: FUNCTIONAL

## 2024-10-24 ENCOUNTER — TRANSCRIPTION ENCOUNTER (OUTPATIENT)
Age: 54
End: 2024-10-24

## 2024-10-24 PROCEDURE — 88307 TISSUE EXAM BY PATHOLOGIST: CPT

## 2024-10-24 PROCEDURE — 15777 ACELLULAR DERM MATRIX IMPLT: CPT

## 2024-10-24 PROCEDURE — 99261: CPT

## 2024-10-24 PROCEDURE — C1789: CPT

## 2024-10-24 PROCEDURE — 19357 TISS XPNDR PLMT BRST RCNSTJ: CPT | Mod: 50

## 2024-10-24 PROCEDURE — 19303 MAST SIMPLE COMPLETE: CPT | Mod: 50

## 2024-10-24 PROCEDURE — 80048 BASIC METABOLIC PNL TOTAL CA: CPT

## 2024-10-24 PROCEDURE — 99024 POSTOP FOLLOW-UP VISIT: CPT

## 2024-10-24 PROCEDURE — 85027 COMPLETE CBC AUTOMATED: CPT

## 2024-10-24 PROCEDURE — 36415 COLL VENOUS BLD VENIPUNCTURE: CPT

## 2024-10-25 DIAGNOSIS — C50.912 MALIGNANT NEOPLASM OF UNSPECIFIED SITE OF LEFT FEMALE BREAST: ICD-10-CM

## 2024-10-28 ENCOUNTER — APPOINTMENT (OUTPATIENT)
Dept: ULTRASOUND IMAGING | Facility: CLINIC | Age: 54
End: 2024-10-28

## 2024-10-28 PROBLEM — Z98.890 POSTOPERATIVE STATE: Status: ACTIVE | Noted: 2024-10-28

## 2024-10-28 PROCEDURE — 93971 EXTREMITY STUDY: CPT | Mod: LT

## 2024-10-29 ENCOUNTER — APPOINTMENT (OUTPATIENT)
Dept: PLASTIC SURGERY | Facility: CLINIC | Age: 54
End: 2024-10-29
Payer: COMMERCIAL

## 2024-10-29 VITALS
HEART RATE: 68 BPM | BODY MASS INDEX: 37.28 KG/M2 | DIASTOLIC BLOOD PRESSURE: 66 MMHG | SYSTOLIC BLOOD PRESSURE: 101 MMHG | TEMPERATURE: 98 F | WEIGHT: 232 LBS | HEIGHT: 66 IN | OXYGEN SATURATION: 98 %

## 2024-10-29 DIAGNOSIS — D24.2 BENIGN NEOPLASM OF LEFT BREAST: ICD-10-CM

## 2024-10-29 PROCEDURE — 99024 POSTOP FOLLOW-UP VISIT: CPT

## 2024-11-05 ENCOUNTER — APPOINTMENT (OUTPATIENT)
Dept: PLASTIC SURGERY | Facility: CLINIC | Age: 54
End: 2024-11-05
Payer: COMMERCIAL

## 2024-11-05 VITALS
HEIGHT: 66 IN | OXYGEN SATURATION: 98 % | TEMPERATURE: 97.9 F | BODY MASS INDEX: 37.28 KG/M2 | DIASTOLIC BLOOD PRESSURE: 78 MMHG | WEIGHT: 232 LBS | SYSTOLIC BLOOD PRESSURE: 115 MMHG | HEART RATE: 82 BPM

## 2024-11-05 PROCEDURE — 99024 POSTOP FOLLOW-UP VISIT: CPT

## 2024-11-05 NOTE — ED BEHAVIORAL HEALTH ASSESSMENT NOTE - PSYCHIATRIC ISSUES AND PLAN (INCLUDE STANDING AND PRN MEDICATION)
CALL WITH ANY QUESTIONS   INCREASE THE LASIX TO 40 MG DAILY   CALL NEXT WEEK TO REVIEW SYMPTOMS     
Consider restarting Zoloft. Ativan 1mg q 6 hours PRN for agitation

## 2024-11-14 ENCOUNTER — APPOINTMENT (OUTPATIENT)
Dept: PLASTIC SURGERY | Facility: CLINIC | Age: 54
End: 2024-11-14

## 2024-11-14 DIAGNOSIS — C50.912 MALIGNANT NEOPLASM OF UNSPECIFIED SITE OF LEFT FEMALE BREAST: ICD-10-CM

## 2024-11-14 DIAGNOSIS — Z98.890 OTHER SPECIFIED POSTPROCEDURAL STATES: ICD-10-CM

## 2024-11-14 PROCEDURE — 99024 POSTOP FOLLOW-UP VISIT: CPT

## 2024-11-19 ENCOUNTER — APPOINTMENT (OUTPATIENT)
Dept: PLASTIC SURGERY | Facility: CLINIC | Age: 54
End: 2024-11-19
Payer: COMMERCIAL

## 2024-11-19 VITALS
TEMPERATURE: 96.8 F | OXYGEN SATURATION: 100 % | DIASTOLIC BLOOD PRESSURE: 85 MMHG | HEART RATE: 90 BPM | SYSTOLIC BLOOD PRESSURE: 121 MMHG | BODY MASS INDEX: 37.28 KG/M2 | HEIGHT: 66 IN | WEIGHT: 232 LBS

## 2024-11-19 PROCEDURE — 99024 POSTOP FOLLOW-UP VISIT: CPT

## 2024-11-20 ENCOUNTER — APPOINTMENT (OUTPATIENT)
Dept: RADIATION ONCOLOGY | Facility: CLINIC | Age: 54
End: 2024-11-20
Payer: COMMERCIAL

## 2024-11-20 VITALS
HEIGHT: 66 IN | WEIGHT: 234.79 LBS | DIASTOLIC BLOOD PRESSURE: 69 MMHG | TEMPERATURE: 97.7 F | OXYGEN SATURATION: 99 % | BODY MASS INDEX: 37.73 KG/M2 | HEART RATE: 89 BPM | RESPIRATION RATE: 18 BRPM | SYSTOLIC BLOOD PRESSURE: 102 MMHG

## 2024-11-20 PROCEDURE — 99214 OFFICE O/P EST MOD 30 MIN: CPT

## 2024-11-26 ENCOUNTER — APPOINTMENT (OUTPATIENT)
Dept: PLASTIC SURGERY | Facility: CLINIC | Age: 54
End: 2024-11-26
Payer: COMMERCIAL

## 2024-11-26 VITALS
TEMPERATURE: 98 F | HEIGHT: 66 IN | DIASTOLIC BLOOD PRESSURE: 84 MMHG | BODY MASS INDEX: 37.61 KG/M2 | SYSTOLIC BLOOD PRESSURE: 130 MMHG | WEIGHT: 234 LBS | OXYGEN SATURATION: 97 % | HEART RATE: 100 BPM

## 2024-11-26 PROBLEM — T81.30XA WOUND DEHISCENCE: Status: ACTIVE | Noted: 2024-11-26

## 2024-11-26 PROCEDURE — 99024 POSTOP FOLLOW-UP VISIT: CPT

## 2024-11-26 RX ORDER — CLINDAMYCIN HYDROCHLORIDE 300 MG/1
300 CAPSULE ORAL EVERY 6 HOURS
Qty: 28 | Refills: 0 | Status: ACTIVE | COMMUNITY
Start: 2024-11-26 | End: 1900-01-01

## 2024-12-05 ENCOUNTER — OUTPATIENT (OUTPATIENT)
Dept: OUTPATIENT SERVICES | Facility: HOSPITAL | Age: 54
LOS: 1 days | Discharge: HOME | End: 2024-12-05

## 2024-12-05 ENCOUNTER — APPOINTMENT (OUTPATIENT)
Dept: PLASTIC SURGERY | Facility: CLINIC | Age: 54
End: 2024-12-05
Payer: COMMERCIAL

## 2024-12-05 VITALS
SYSTOLIC BLOOD PRESSURE: 138 MMHG | OXYGEN SATURATION: 98 % | BODY MASS INDEX: 37.61 KG/M2 | HEIGHT: 66 IN | DIASTOLIC BLOOD PRESSURE: 85 MMHG | HEART RATE: 112 BPM | WEIGHT: 234 LBS | RESPIRATION RATE: 16 BRPM | TEMPERATURE: 97.8 F

## 2024-12-05 DIAGNOSIS — N20.0 CALCULUS OF KIDNEY: Chronic | ICD-10-CM

## 2024-12-05 DIAGNOSIS — Z98.890 OTHER SPECIFIED POSTPROCEDURAL STATES: Chronic | ICD-10-CM

## 2024-12-05 DIAGNOSIS — C50.919 MALIGNANT NEOPLASM OF UNSPECIFIED SITE OF UNSPECIFIED FEMALE BREAST: ICD-10-CM

## 2024-12-05 DIAGNOSIS — D25.9 LEIOMYOMA OF UTERUS, UNSPECIFIED: Chronic | ICD-10-CM

## 2024-12-05 DIAGNOSIS — T81.30XA DISRUPTION OF WOUND, UNSPECIFIED, INITIAL ENCOUNTER: ICD-10-CM

## 2024-12-05 PROCEDURE — 99024 POSTOP FOLLOW-UP VISIT: CPT

## 2024-12-09 ENCOUNTER — APPOINTMENT (OUTPATIENT)
Dept: HEMATOLOGY ONCOLOGY | Facility: CLINIC | Age: 54
End: 2024-12-09
Payer: COMMERCIAL

## 2024-12-09 VITALS
SYSTOLIC BLOOD PRESSURE: 114 MMHG | TEMPERATURE: 97.5 F | DIASTOLIC BLOOD PRESSURE: 72 MMHG | RESPIRATION RATE: 16 BRPM | HEART RATE: 116 BPM | BODY MASS INDEX: 38.07 KG/M2 | OXYGEN SATURATION: 98 % | WEIGHT: 235.89 LBS

## 2024-12-09 DIAGNOSIS — R79.89 OTHER SPECIFIED ABNORMAL FINDINGS OF BLOOD CHEMISTRY: ICD-10-CM

## 2024-12-09 DIAGNOSIS — R05.8 OTHER SPECIFIED COUGH: ICD-10-CM

## 2024-12-09 PROCEDURE — G2211 COMPLEX E/M VISIT ADD ON: CPT | Mod: NC

## 2024-12-09 PROCEDURE — 99215 OFFICE O/P EST HI 40 MIN: CPT

## 2024-12-09 RX ORDER — ERGOCALCIFEROL 1.25 MG/1
1.25 MG CAPSULE ORAL
Qty: 12 | Refills: 0 | Status: ACTIVE | COMMUNITY
Start: 2024-12-09 | End: 1900-01-01

## 2024-12-10 ENCOUNTER — NON-APPOINTMENT (OUTPATIENT)
Age: 54
End: 2024-12-10

## 2024-12-12 ENCOUNTER — APPOINTMENT (OUTPATIENT)
Dept: PLASTIC SURGERY | Facility: CLINIC | Age: 54
End: 2024-12-12
Payer: COMMERCIAL

## 2024-12-12 VITALS
BODY MASS INDEX: 37.77 KG/M2 | TEMPERATURE: 98 F | OXYGEN SATURATION: 98 % | WEIGHT: 235 LBS | HEART RATE: 102 BPM | SYSTOLIC BLOOD PRESSURE: 105 MMHG | DIASTOLIC BLOOD PRESSURE: 73 MMHG | HEIGHT: 66 IN

## 2024-12-12 DIAGNOSIS — C50.912 MALIGNANT NEOPLASM OF UNSPECIFIED SITE OF LEFT FEMALE BREAST: ICD-10-CM

## 2024-12-12 DIAGNOSIS — Z98.890 OTHER SPECIFIED POSTPROCEDURAL STATES: ICD-10-CM

## 2024-12-12 PROCEDURE — 99024 POSTOP FOLLOW-UP VISIT: CPT

## 2024-12-24 ENCOUNTER — APPOINTMENT (OUTPATIENT)
Dept: PLASTIC SURGERY | Facility: CLINIC | Age: 54
End: 2024-12-24
Payer: COMMERCIAL

## 2024-12-24 VITALS
HEIGHT: 66 IN | WEIGHT: 235 LBS | BODY MASS INDEX: 37.77 KG/M2 | TEMPERATURE: 98.1 F | HEART RATE: 122 BPM | OXYGEN SATURATION: 99 % | DIASTOLIC BLOOD PRESSURE: 80 MMHG | SYSTOLIC BLOOD PRESSURE: 121 MMHG

## 2024-12-24 PROCEDURE — 99024 POSTOP FOLLOW-UP VISIT: CPT

## 2024-12-27 ENCOUNTER — NON-APPOINTMENT (OUTPATIENT)
Age: 54
End: 2024-12-27

## 2024-12-27 ENCOUNTER — APPOINTMENT (OUTPATIENT)
Dept: CT IMAGING | Facility: IMAGING CENTER | Age: 54
End: 2024-12-27
Payer: COMMERCIAL

## 2024-12-27 ENCOUNTER — OUTPATIENT (OUTPATIENT)
Dept: OUTPATIENT SERVICES | Facility: HOSPITAL | Age: 54
LOS: 1 days | End: 2024-12-27
Payer: COMMERCIAL

## 2024-12-27 DIAGNOSIS — Z98.890 OTHER SPECIFIED POSTPROCEDURAL STATES: Chronic | ICD-10-CM

## 2024-12-27 DIAGNOSIS — D25.9 LEIOMYOMA OF UTERUS, UNSPECIFIED: Chronic | ICD-10-CM

## 2024-12-27 DIAGNOSIS — Z00.8 ENCOUNTER FOR OTHER GENERAL EXAMINATION: ICD-10-CM

## 2024-12-27 DIAGNOSIS — N20.0 CALCULUS OF KIDNEY: Chronic | ICD-10-CM

## 2024-12-27 DIAGNOSIS — C50.912 MALIGNANT NEOPLASM OF UNSPECIFIED SITE OF LEFT FEMALE BREAST: ICD-10-CM

## 2024-12-27 PROCEDURE — 71250 CT THORAX DX C-: CPT | Mod: 26

## 2024-12-27 PROCEDURE — 71250 CT THORAX DX C-: CPT

## 2025-01-01 ENCOUNTER — NON-APPOINTMENT (OUTPATIENT)
Age: 55
End: 2025-01-01

## 2025-01-02 ENCOUNTER — APPOINTMENT (OUTPATIENT)
Dept: PLASTIC SURGERY | Facility: CLINIC | Age: 55
End: 2025-01-02
Payer: COMMERCIAL

## 2025-01-02 VITALS
WEIGHT: 239 LBS | BODY MASS INDEX: 38.41 KG/M2 | HEART RATE: 92 BPM | TEMPERATURE: 98 F | SYSTOLIC BLOOD PRESSURE: 132 MMHG | RESPIRATION RATE: 16 BRPM | HEIGHT: 66 IN | DIASTOLIC BLOOD PRESSURE: 83 MMHG | OXYGEN SATURATION: 98 %

## 2025-01-02 DIAGNOSIS — C50.912 MALIGNANT NEOPLASM OF UNSPECIFIED SITE OF LEFT FEMALE BREAST: ICD-10-CM

## 2025-01-02 PROCEDURE — 99024 POSTOP FOLLOW-UP VISIT: CPT

## 2025-01-09 ENCOUNTER — APPOINTMENT (OUTPATIENT)
Dept: PLASTIC SURGERY | Facility: CLINIC | Age: 55
End: 2025-01-09
Payer: COMMERCIAL

## 2025-01-09 VITALS
TEMPERATURE: 97.3 F | HEART RATE: 94 BPM | DIASTOLIC BLOOD PRESSURE: 77 MMHG | WEIGHT: 239 LBS | SYSTOLIC BLOOD PRESSURE: 117 MMHG | BODY MASS INDEX: 38.41 KG/M2 | OXYGEN SATURATION: 98 % | HEIGHT: 66 IN

## 2025-01-09 PROCEDURE — 99024 POSTOP FOLLOW-UP VISIT: CPT

## 2025-01-10 ENCOUNTER — APPOINTMENT (OUTPATIENT)
Dept: NEUROLOGY | Facility: CLINIC | Age: 55
End: 2025-01-10

## 2025-01-16 ENCOUNTER — APPOINTMENT (OUTPATIENT)
Dept: PLASTIC SURGERY | Facility: CLINIC | Age: 55
End: 2025-01-16
Payer: COMMERCIAL

## 2025-01-16 VITALS
WEIGHT: 239 LBS | SYSTOLIC BLOOD PRESSURE: 124 MMHG | RESPIRATION RATE: 16 BRPM | BODY MASS INDEX: 38.41 KG/M2 | TEMPERATURE: 98.4 F | OXYGEN SATURATION: 98 % | HEART RATE: 93 BPM | HEIGHT: 66 IN | DIASTOLIC BLOOD PRESSURE: 79 MMHG

## 2025-01-16 DIAGNOSIS — Z98.890 OTHER SPECIFIED POSTPROCEDURAL STATES: ICD-10-CM

## 2025-01-16 PROCEDURE — 99024 POSTOP FOLLOW-UP VISIT: CPT

## 2025-03-06 ENCOUNTER — APPOINTMENT (OUTPATIENT)
Dept: PLASTIC SURGERY | Facility: CLINIC | Age: 55
End: 2025-03-06

## 2025-03-06 PROBLEM — Z98.890 HISTORY OF BREAST SURGERY: Status: ACTIVE | Noted: 2025-03-06

## 2025-03-06 PROCEDURE — 99213 OFFICE O/P EST LOW 20 MIN: CPT

## 2025-03-07 RX ORDER — CLINDAMYCIN HYDROCHLORIDE 150 MG/1
150 CAPSULE ORAL EVERY 6 HOURS
Qty: 56 | Refills: 0 | Status: ACTIVE | COMMUNITY
Start: 2025-03-07 | End: 1900-01-01

## 2025-03-07 RX ORDER — CLINDAMYCIN HYDROCHLORIDE 150 MG/1
150 CAPSULE ORAL EVERY 6 HOURS
Qty: 56 | Refills: 0 | Status: DISCONTINUED | COMMUNITY
Start: 2025-03-06 | End: 2025-03-07

## 2025-03-13 ENCOUNTER — APPOINTMENT (OUTPATIENT)
Dept: PLASTIC SURGERY | Facility: CLINIC | Age: 55
End: 2025-03-13

## 2025-03-13 VITALS
SYSTOLIC BLOOD PRESSURE: 138 MMHG | BODY MASS INDEX: 38.41 KG/M2 | HEART RATE: 83 BPM | DIASTOLIC BLOOD PRESSURE: 82 MMHG | HEIGHT: 66 IN | TEMPERATURE: 98 F | OXYGEN SATURATION: 98 % | WEIGHT: 239 LBS

## 2025-03-13 DIAGNOSIS — Z98.890 OTHER SPECIFIED POSTPROCEDURAL STATES: ICD-10-CM

## 2025-03-13 DIAGNOSIS — T81.30XA DISRUPTION OF WOUND, UNSPECIFIED, INITIAL ENCOUNTER: ICD-10-CM

## 2025-03-13 PROCEDURE — 99213 OFFICE O/P EST LOW 20 MIN: CPT

## 2025-03-13 RX ORDER — FEXOFENADINE HYDROCHLORIDE AND PSEUDOEPHEDRINE HYDROCHLORIDE 180; 240 MG/1; MG/1
180-240 TABLET, FILM COATED, EXTENDED RELEASE ORAL DAILY
Qty: 30 | Refills: 2 | Status: ACTIVE | COMMUNITY
Start: 2025-03-13 | End: 1900-01-01

## 2025-03-17 ENCOUNTER — APPOINTMENT (OUTPATIENT)
Dept: PLASTIC SURGERY | Facility: AMBULATORY SURGERY CENTER | Age: 55
End: 2025-03-17

## 2025-03-20 ENCOUNTER — APPOINTMENT (OUTPATIENT)
Dept: PLASTIC SURGERY | Facility: CLINIC | Age: 55
End: 2025-03-20

## 2025-03-20 VITALS
WEIGHT: 238 LBS | HEART RATE: 96 BPM | SYSTOLIC BLOOD PRESSURE: 125 MMHG | OXYGEN SATURATION: 100 % | DIASTOLIC BLOOD PRESSURE: 81 MMHG | BODY MASS INDEX: 38.25 KG/M2 | HEIGHT: 66 IN | TEMPERATURE: 97.6 F

## 2025-03-20 PROCEDURE — 99213 OFFICE O/P EST LOW 20 MIN: CPT

## 2025-03-27 ENCOUNTER — APPOINTMENT (OUTPATIENT)
Dept: PLASTIC SURGERY | Facility: CLINIC | Age: 55
End: 2025-03-27

## 2025-03-27 VITALS
HEART RATE: 83 BPM | BODY MASS INDEX: 38.25 KG/M2 | WEIGHT: 238 LBS | TEMPERATURE: 97.5 F | SYSTOLIC BLOOD PRESSURE: 105 MMHG | OXYGEN SATURATION: 94 % | DIASTOLIC BLOOD PRESSURE: 75 MMHG | HEIGHT: 66 IN

## 2025-03-27 DIAGNOSIS — Z98.890 OTHER SPECIFIED POSTPROCEDURAL STATES: ICD-10-CM

## 2025-03-27 DIAGNOSIS — Z91.89 OTHER SPECIFIED PERSONAL RISK FACTORS, NOT ELSEWHERE CLASSIFIED: ICD-10-CM

## 2025-03-27 PROCEDURE — 99213 OFFICE O/P EST LOW 20 MIN: CPT

## 2025-03-28 NOTE — DIETITIAN INITIAL EVALUATION ADULT. - POUNDS LOST/GAINED
Right anterior lower neck with bandage CDI without bleeding or complication.  Discharge instructions given to pt, pt without questions or concerns 59

## 2025-04-03 ENCOUNTER — APPOINTMENT (OUTPATIENT)
Dept: PLASTIC SURGERY | Facility: CLINIC | Age: 55
End: 2025-04-03

## 2025-04-03 VITALS
BODY MASS INDEX: 38.25 KG/M2 | HEART RATE: 83 BPM | DIASTOLIC BLOOD PRESSURE: 81 MMHG | OXYGEN SATURATION: 99 % | TEMPERATURE: 97.5 F | HEIGHT: 66 IN | WEIGHT: 238 LBS | SYSTOLIC BLOOD PRESSURE: 135 MMHG

## 2025-04-03 DIAGNOSIS — T81.30XA DISRUPTION OF WOUND, UNSPECIFIED, INITIAL ENCOUNTER: ICD-10-CM

## 2025-04-03 DIAGNOSIS — Z98.890 OTHER SPECIFIED POSTPROCEDURAL STATES: ICD-10-CM

## 2025-04-03 PROCEDURE — 99213 OFFICE O/P EST LOW 20 MIN: CPT

## 2025-04-03 RX ORDER — CLINDAMYCIN HYDROCHLORIDE 150 MG/1
150 CAPSULE ORAL EVERY 6 HOURS
Qty: 56 | Refills: 0 | Status: ACTIVE | COMMUNITY
Start: 2025-04-03 | End: 1900-01-01

## 2025-04-09 LAB — CORE LAB BIOPSY: NORMAL

## 2025-04-10 ENCOUNTER — APPOINTMENT (OUTPATIENT)
Dept: PLASTIC SURGERY | Facility: CLINIC | Age: 55
End: 2025-04-10
Payer: COMMERCIAL

## 2025-04-10 VITALS
BODY MASS INDEX: 38.25 KG/M2 | WEIGHT: 238 LBS | HEIGHT: 66 IN | TEMPERATURE: 97.8 F | SYSTOLIC BLOOD PRESSURE: 116 MMHG | DIASTOLIC BLOOD PRESSURE: 73 MMHG | OXYGEN SATURATION: 99 % | HEART RATE: 90 BPM

## 2025-04-10 DIAGNOSIS — Z90.13 ACQUIRED ABSENCE OF BILATERAL BREASTS AND NIPPLES: ICD-10-CM

## 2025-04-10 PROCEDURE — 99212 OFFICE O/P EST SF 10 MIN: CPT

## 2025-04-17 ENCOUNTER — APPOINTMENT (OUTPATIENT)
Dept: PLASTIC SURGERY | Facility: CLINIC | Age: 55
End: 2025-04-17

## 2025-04-17 VITALS
BODY MASS INDEX: 36.96 KG/M2 | TEMPERATURE: 98.1 F | DIASTOLIC BLOOD PRESSURE: 82 MMHG | HEART RATE: 79 BPM | HEIGHT: 66 IN | SYSTOLIC BLOOD PRESSURE: 131 MMHG | OXYGEN SATURATION: 98 % | WEIGHT: 230 LBS

## 2025-04-17 DIAGNOSIS — Z98.890 OTHER SPECIFIED POSTPROCEDURAL STATES: ICD-10-CM

## 2025-04-17 DIAGNOSIS — Z91.89 OTHER SPECIFIED PERSONAL RISK FACTORS, NOT ELSEWHERE CLASSIFIED: ICD-10-CM

## 2025-04-17 PROCEDURE — 99213 OFFICE O/P EST LOW 20 MIN: CPT

## 2025-04-24 ENCOUNTER — APPOINTMENT (OUTPATIENT)
Dept: PLASTIC SURGERY | Facility: CLINIC | Age: 55
End: 2025-04-24

## 2025-04-24 VITALS
SYSTOLIC BLOOD PRESSURE: 108 MMHG | WEIGHT: 230 LBS | HEIGHT: 66 IN | HEART RATE: 98 BPM | TEMPERATURE: 97.6 F | DIASTOLIC BLOOD PRESSURE: 75 MMHG | OXYGEN SATURATION: 97 % | BODY MASS INDEX: 36.96 KG/M2

## 2025-04-24 DIAGNOSIS — T81.30XA DISRUPTION OF WOUND, UNSPECIFIED, INITIAL ENCOUNTER: ICD-10-CM

## 2025-04-24 DIAGNOSIS — Z98.890 OTHER SPECIFIED POSTPROCEDURAL STATES: ICD-10-CM

## 2025-04-24 DIAGNOSIS — Z90.13 ACQUIRED ABSENCE OF BILATERAL BREASTS AND NIPPLES: ICD-10-CM

## 2025-04-24 PROCEDURE — 99213 OFFICE O/P EST LOW 20 MIN: CPT

## 2025-04-28 ENCOUNTER — OUTPATIENT (OUTPATIENT)
Dept: OUTPATIENT SERVICES | Facility: HOSPITAL | Age: 55
LOS: 1 days | End: 2025-04-28
Payer: COMMERCIAL

## 2025-04-28 VITALS
WEIGHT: 229.72 LBS | RESPIRATION RATE: 18 BRPM | SYSTOLIC BLOOD PRESSURE: 104 MMHG | HEART RATE: 80 BPM | DIASTOLIC BLOOD PRESSURE: 65 MMHG | OXYGEN SATURATION: 98 % | HEIGHT: 65 IN

## 2025-04-28 DIAGNOSIS — Z98.890 OTHER SPECIFIED POSTPROCEDURAL STATES: Chronic | ICD-10-CM

## 2025-04-28 DIAGNOSIS — Z01.818 ENCOUNTER FOR OTHER PREPROCEDURAL EXAMINATION: ICD-10-CM

## 2025-04-28 DIAGNOSIS — C50.912 MALIGNANT NEOPLASM OF UNSPECIFIED SITE OF LEFT FEMALE BREAST: ICD-10-CM

## 2025-04-28 DIAGNOSIS — N20.0 CALCULUS OF KIDNEY: Chronic | ICD-10-CM

## 2025-04-28 DIAGNOSIS — Z90.13 ACQUIRED ABSENCE OF BILATERAL BREASTS AND NIPPLES: Chronic | ICD-10-CM

## 2025-04-28 DIAGNOSIS — D25.9 LEIOMYOMA OF UTERUS, UNSPECIFIED: Chronic | ICD-10-CM

## 2025-04-28 LAB
ANION GAP SERPL CALC-SCNC: 7 MMOL/L — SIGNIFICANT CHANGE UP (ref 5–17)
BASOPHILS # BLD AUTO: 0.02 K/UL — SIGNIFICANT CHANGE UP (ref 0–0.2)
BASOPHILS NFR BLD AUTO: 0.5 % — SIGNIFICANT CHANGE UP (ref 0–2)
BLD GP AB SCN SERPL QL: SIGNIFICANT CHANGE UP
BUN SERPL-MCNC: 8 MG/DL — SIGNIFICANT CHANGE UP (ref 7–23)
CALCIUM SERPL-MCNC: 9.1 MG/DL — SIGNIFICANT CHANGE UP (ref 8.4–10.5)
CHLORIDE SERPL-SCNC: 105 MMOL/L — SIGNIFICANT CHANGE UP (ref 96–108)
CO2 SERPL-SCNC: 27 MMOL/L — SIGNIFICANT CHANGE UP (ref 22–31)
CREAT SERPL-MCNC: 1.04 MG/DL — SIGNIFICANT CHANGE UP (ref 0.5–1.3)
EGFR: 63 ML/MIN/1.73M2 — SIGNIFICANT CHANGE UP
EGFR: 63 ML/MIN/1.73M2 — SIGNIFICANT CHANGE UP
EOSINOPHIL # BLD AUTO: 0.1 K/UL — SIGNIFICANT CHANGE UP (ref 0–0.5)
EOSINOPHIL NFR BLD AUTO: 2.7 % — SIGNIFICANT CHANGE UP (ref 0–6)
GLUCOSE SERPL-MCNC: 109 MG/DL — HIGH (ref 70–99)
HCT VFR BLD CALC: 38 % — SIGNIFICANT CHANGE UP (ref 34.5–45)
HGB BLD-MCNC: 12.2 G/DL — SIGNIFICANT CHANGE UP (ref 11.5–15.5)
IMM GRANULOCYTES NFR BLD AUTO: 0.3 % — SIGNIFICANT CHANGE UP (ref 0–0.9)
LYMPHOCYTES # BLD AUTO: 2.01 K/UL — SIGNIFICANT CHANGE UP (ref 1–3.3)
LYMPHOCYTES # BLD AUTO: 53.9 % — HIGH (ref 13–44)
MCHC RBC-ENTMCNC: 25.7 PG — LOW (ref 27–34)
MCHC RBC-ENTMCNC: 32.1 G/DL — SIGNIFICANT CHANGE UP (ref 32–36)
MCV RBC AUTO: 80 FL — SIGNIFICANT CHANGE UP (ref 80–100)
MONOCYTES # BLD AUTO: 0.21 K/UL — SIGNIFICANT CHANGE UP (ref 0–0.9)
MONOCYTES NFR BLD AUTO: 5.6 % — SIGNIFICANT CHANGE UP (ref 2–14)
NEUTROPHILS # BLD AUTO: 1.38 K/UL — LOW (ref 1.8–7.4)
NEUTROPHILS NFR BLD AUTO: 37 % — LOW (ref 43–77)
NRBC BLD AUTO-RTO: 0 /100 WBCS — SIGNIFICANT CHANGE UP (ref 0–0)
PLATELET # BLD AUTO: 255 K/UL — SIGNIFICANT CHANGE UP (ref 150–400)
POTASSIUM SERPL-MCNC: 3.1 MMOL/L — LOW (ref 3.5–5.3)
POTASSIUM SERPL-SCNC: 3.1 MMOL/L — LOW (ref 3.5–5.3)
RBC # BLD: 4.75 M/UL — SIGNIFICANT CHANGE UP (ref 3.8–5.2)
RBC # FLD: 15.5 % — HIGH (ref 10.3–14.5)
SODIUM SERPL-SCNC: 139 MMOL/L — SIGNIFICANT CHANGE UP (ref 135–145)
WBC # BLD: 3.73 K/UL — LOW (ref 3.8–10.5)
WBC # FLD AUTO: 3.73 K/UL — LOW (ref 3.8–10.5)

## 2025-04-28 PROCEDURE — 86900 BLOOD TYPING SEROLOGIC ABO: CPT

## 2025-04-28 PROCEDURE — 86850 RBC ANTIBODY SCREEN: CPT

## 2025-04-28 PROCEDURE — 86901 BLOOD TYPING SEROLOGIC RH(D): CPT

## 2025-04-28 PROCEDURE — 85025 COMPLETE CBC W/AUTO DIFF WBC: CPT

## 2025-04-28 PROCEDURE — 93010 ELECTROCARDIOGRAM REPORT: CPT | Mod: NC

## 2025-04-28 PROCEDURE — 36415 COLL VENOUS BLD VENIPUNCTURE: CPT

## 2025-04-28 PROCEDURE — 93005 ELECTROCARDIOGRAM TRACING: CPT

## 2025-04-28 PROCEDURE — G0463: CPT

## 2025-04-28 PROCEDURE — 80048 BASIC METABOLIC PNL TOTAL CA: CPT

## 2025-04-28 RX ORDER — SODIUM CHLORIDE 9 G/1000ML
1000 INJECTION, SOLUTION INTRAVENOUS
Refills: 0 | Status: DISCONTINUED | OUTPATIENT
Start: 2025-04-30 | End: 2025-05-14

## 2025-04-28 NOTE — H&P PST ADULT - NSICDXFAMILYHX_GEN_ALL_CORE_FT
FAMILY HISTORY:  Family history of aneurysm, Mother-  at 45, Sister-  at 72    Sibling  Still living? No  Family history of cerebrovascular accident (CVA), Age at diagnosis: Age Unknown  Family history of colon cancer, Age at diagnosis: Age Unknown  FH: stomach cancer, Age at diagnosis: Age Unknown

## 2025-04-28 NOTE — H&P PST ADULT - HEMATOLOGY/LYMPHATICS COMMENTS
hx DVT and PE - on Xarelto-  To stay on per Dr Alfaro- sent email for clarification,  Will get heme clearance per plastics

## 2025-04-28 NOTE — H&P PST ADULT - NSANTHOSAYNRD_GEN_A_CORE
neck 15.5 inches/No. DARVIN screening performed.  STOP BANG Legend: 0-2 = LOW Risk; 3-4 = INTERMEDIATE Risk; 5-8 = HIGH Risk

## 2025-04-28 NOTE — H&P PST ADULT - PROBLEM SELECTOR PLAN 1
Patient provided with pre-operative instructions and verbalized understanding.  Patient will be NPO on day of surgery. Patient will stop NSAIDs, aspirin, herbal supplements or vitamins 1 week prior to surgery.  Chlorhexidine wash provided with instructions, verbalized understanding.        Pending medical clearance as per surgeon.    as per pt, will continue her xarelto as per previous surgery instructions given by Dr. Alfaro

## 2025-04-28 NOTE — H&P PST ADULT - HISTORY OF PRESENT ILLNESS
Patient is a 54  year old F with PMHx of HTN, h/o DVT and PE in 2017 (while on chemo, now on Xarelto, negative heme w/u), HES (s/p chemo, last dose 4/2024), and chronic back pain, who presents [email or web address] for revision of reconstructed breast with Dr. Hoff scheduled for 04/30/2025. States her left breast expander keeps popping open from her left breast causing occasional oozing, has been treated with PO abx, not recently, therefore opting for upcoming surgery. Denies any acute symptoms at this time. Patient otherwise feels well overall.

## 2025-04-28 NOTE — H&P PST ADULT - COMMENTS
Full bottom dentures  Denies bleeding or clotting disorders  Denies loose teeth/caps pt refused oral/tympanic temperature

## 2025-04-28 NOTE — H&P PST ADULT - NSICDXPASTMEDICALHX_GEN_ALL_CORE_FT
PAST MEDICAL HISTORY:  Deep vein thrombosis (DVT)     Depression     History of chronic pain     HTN (hypertension)     Hypereosinophilic syndrome chemo    Lumbar herniated disc     Malignant neoplasm of left female breast     Nephrolithiasis     Personal history of pulmonary embolism     Renal stones     Uterine leiomyoma, unspecified location

## 2025-04-28 NOTE — H&P PST ADULT - NSICDXPASTSURGICALHX_GEN_ALL_CORE_FT
PAST SURGICAL HISTORY:  Fibroid uterus s/p fibroid removal    H/O myomectomy     H/O oral surgery wisdom tooth extraction    History of bilateral mastectomy     History of breast reconstruction     History of lumpectomy of left breast bilareral breast biopsies, possible markers    Renal stone     S/P extracorporeal shock wave therapy

## 2025-04-28 NOTE — H&P PST ADULT - CONSTITUTIONAL
Please mail result letter to patient. Thank you.    Your mammogram is negative for breast cancer .Breasts looks denser on mammogram, meaning minor abnormalities can be missed on routine screening mammogram .For this reason I have ordered a breast ultrasound at [facility] . Please call and make appointment and follow up as recommended.  Take care.
normal/well-groomed/no distress

## 2025-04-30 ENCOUNTER — RESULT REVIEW (OUTPATIENT)
Age: 55
End: 2025-04-30

## 2025-04-30 ENCOUNTER — APPOINTMENT (OUTPATIENT)
Dept: PLASTIC SURGERY | Facility: HOSPITAL | Age: 55
End: 2025-04-30

## 2025-04-30 ENCOUNTER — OUTPATIENT (OUTPATIENT)
Dept: OUTPATIENT SERVICES | Facility: HOSPITAL | Age: 55
LOS: 1 days | End: 2025-04-30
Payer: COMMERCIAL

## 2025-04-30 ENCOUNTER — TRANSCRIPTION ENCOUNTER (OUTPATIENT)
Age: 55
End: 2025-04-30

## 2025-04-30 VITALS
HEART RATE: 81 BPM | SYSTOLIC BLOOD PRESSURE: 117 MMHG | HEIGHT: 65 IN | WEIGHT: 229.72 LBS | DIASTOLIC BLOOD PRESSURE: 75 MMHG | TEMPERATURE: 97 F | OXYGEN SATURATION: 97 % | RESPIRATION RATE: 14 BRPM

## 2025-04-30 VITALS
RESPIRATION RATE: 14 BRPM | HEART RATE: 58 BPM | SYSTOLIC BLOOD PRESSURE: 115 MMHG | DIASTOLIC BLOOD PRESSURE: 72 MMHG | TEMPERATURE: 98 F | OXYGEN SATURATION: 98 %

## 2025-04-30 DIAGNOSIS — Z98.890 OTHER SPECIFIED POSTPROCEDURAL STATES: Chronic | ICD-10-CM

## 2025-04-30 DIAGNOSIS — N20.0 CALCULUS OF KIDNEY: Chronic | ICD-10-CM

## 2025-04-30 DIAGNOSIS — Z90.13 ACQUIRED ABSENCE OF BILATERAL BREASTS AND NIPPLES: Chronic | ICD-10-CM

## 2025-04-30 DIAGNOSIS — C50.912 MALIGNANT NEOPLASM OF UNSPECIFIED SITE OF LEFT FEMALE BREAST: ICD-10-CM

## 2025-04-30 DIAGNOSIS — D25.9 LEIOMYOMA OF UTERUS, UNSPECIFIED: Chronic | ICD-10-CM

## 2025-04-30 LAB
POTASSIUM SERPL-MCNC: 4.1 MMOL/L — SIGNIFICANT CHANGE UP (ref 3.5–5.3)
POTASSIUM SERPL-SCNC: 4.1 MMOL/L — SIGNIFICANT CHANGE UP (ref 3.5–5.3)

## 2025-04-30 PROCEDURE — 36415 COLL VENOUS BLD VENIPUNCTURE: CPT

## 2025-04-30 PROCEDURE — 19357 TISS XPNDR PLMT BRST RCNSTJ: CPT | Mod: LT,59

## 2025-04-30 PROCEDURE — 87075 CULTR BACTERIA EXCEPT BLOOD: CPT

## 2025-04-30 PROCEDURE — 19380 REVJ RECONSTRUCTED BREAST: CPT | Mod: LT

## 2025-04-30 PROCEDURE — 84132 ASSAY OF SERUM POTASSIUM: CPT

## 2025-04-30 PROCEDURE — 88304 TISSUE EXAM BY PATHOLOGIST: CPT | Mod: 26

## 2025-04-30 PROCEDURE — 11970 RPLCMT TISS XPNDR PERM IMPLT: CPT | Mod: LT

## 2025-04-30 PROCEDURE — C1789: CPT

## 2025-04-30 PROCEDURE — 88304 TISSUE EXAM BY PATHOLOGIST: CPT

## 2025-04-30 PROCEDURE — 87070 CULTURE OTHR SPECIMN AEROBIC: CPT

## 2025-04-30 DEVICE — XPND TISS DERMASPAN LOW POLE TEX MH 600-720CC SMOOTH: Type: IMPLANTABLE DEVICE | Status: FUNCTIONAL

## 2025-04-30 RX ORDER — OXYCODONE HYDROCHLORIDE 30 MG/1
1 TABLET ORAL
Qty: 10 | Refills: 0
Start: 2025-04-30 | End: 2025-05-02

## 2025-04-30 RX ORDER — SODIUM CHLORIDE 9 G/1000ML
1000 INJECTION, SOLUTION INTRAVENOUS
Refills: 0 | Status: DISCONTINUED | OUTPATIENT
Start: 2025-04-30 | End: 2025-04-30

## 2025-04-30 RX ORDER — POTASSIUM BICARBONATE/CIT AC 25 MEQ
1 TABLET, EFFERVESCENT ORAL
Refills: 0 | DISCHARGE

## 2025-04-30 RX ORDER — CLINDAMYCIN PHOSPHATE 150 MG/ML
1 VIAL (ML) INJECTION
Qty: 28 | Refills: 0
Start: 2025-04-30 | End: 2025-05-06

## 2025-04-30 RX ORDER — GABAPENTIN 400 MG/1
1 CAPSULE ORAL
Refills: 0 | DISCHARGE

## 2025-04-30 RX ORDER — ONDANSETRON HCL/PF 4 MG/2 ML
4 VIAL (ML) INJECTION ONCE
Refills: 0 | Status: DISCONTINUED | OUTPATIENT
Start: 2025-04-30 | End: 2025-04-30

## 2025-04-30 RX ORDER — HYDROMORPHONE/SOD CHLOR,ISO/PF 2 MG/10 ML
0.5 SYRINGE (ML) INJECTION
Refills: 0 | Status: DISCONTINUED | OUTPATIENT
Start: 2025-04-30 | End: 2025-04-30

## 2025-04-30 RX ADMIN — Medication 0.5 MILLIGRAM(S): at 16:43

## 2025-04-30 RX ADMIN — SODIUM CHLORIDE 50 MILLILITER(S): 9 INJECTION, SOLUTION INTRAVENOUS at 12:04

## 2025-04-30 RX ADMIN — Medication 0.5 MILLIGRAM(S): at 16:58

## 2025-04-30 NOTE — ASU DISCHARGE PLAN (ADULT/PEDIATRIC) - NS MD DC FALL RISK RISK
For information on Fall & Injury Prevention, visit: [email or web address] OR  [email or web address] OR  https://www.cdc.gov/steadi/patient.html

## 2025-04-30 NOTE — ASU DISCHARGE PLAN (ADULT/PEDIATRIC) - NURSING INSTRUCTIONS
All discharge information reviewed with patient including pain, safety, medication and follow up care . Pt acknowledges understanding of discharge instructions. Drink plenty of fluids.  Pt instructed on care of JULIA drain.

## 2025-04-30 NOTE — ASU PATIENT PROFILE, ADULT - FALL HARM RISK - PT AGE POPULATION HIDDEN
11/8/21, 1:41 PM EST    Patient goals/plan/ treatment preferences discussed by  and . Patient goals/plan/ treatment preferences reviewed with patient/ family. Patient/ family verbalize understanding of discharge plan and are in agreement with goal/plan/treatment preferences. Understanding was demonstrated using the teach back method. AVS provided by RN at time of discharge, which includes all necessary medical information pertaining to the patients current course of illness, treatment, post-discharge goals of care, and treatment preferences. Services After Discharge  Services At/After Discharge: In ambulance, Nursing Services, [facility], Skilled Therapy ([address] [place] [address])   [place] Letter  IMM Letter given to Patient/Family/Significant other/Guardian/POA/by[de-identified] staff  IMM Letter date given[de-identified] 11/06/21  IMM Letter time given[de-identified] 1219       CESARIO notified Kirit Alert at [facility] of discharge today. Pt will return [address] under her medicare. CESARIO notified pt and daughter, Sofi Danielle. LACP for transportation at 3:30 pm.  CESARIO faxed AVS.  RN Nahomy is aware. Adult

## 2025-04-30 NOTE — ASU PATIENT PROFILE, ADULT - PATIENT'S SEXUAL ORIENTATION
known asthmatic. Cough since Tuesday. Denies fever. c/o vomiting X 2 last night with diarrhea X 2. Lungs clear. No retractions
Withheld/Decline to Answer

## 2025-04-30 NOTE — ASU DISCHARGE PLAN (ADULT/PEDIATRIC) - ASU DC SPECIAL INSTRUCTIONSFT
WOUND CARE:  Please keep incisions clean and dry. Please do not Scrub or rub incisions. Do not use lotion or powder on incisions.   Leave all dressings in place until follow up appointment. Keep surgical bra on until cleared.     BATHING: You may shower and/or sponge bathe starting tomorrow from the waist down. Please do not submerge wound underwater. Do not get chest wet.     ACTIVITY: No heavy lifting or straining. No lifting greater than 5 punds. No lifting arms above shoulder level. Otherwise, you may return to your usual level of physical activity. If you are taking narcotic pain medication DO NOT drive a car, operate machinery or make important decisions.    DIET: Return to your usual diet.    NOTIFY YOUR SURGEON IF YOU HAVE: any bleeding that does not stop, any pus draining from your wound(s), any fever (over 100.4 F) persistent nausea/vomiting, or if your pain is not controlled on your discharge pain medications, unable to urinate.    Take all medications as prescribed.     Please follow up with Dr. Hoff within x1 week after discharge from the hospital. You may call [phone] to schedule an appointment. WOUND CARE:  Please keep incisions clean and dry. Please do not Scrub or rub incisions. Do not use lotion or powder on incisions.   Leave all dressings in place until follow up appointment. Keep surgical bra on until cleared.     BATHING: You may shower and/or sponge bathe starting tomorrow from the waist down. Please do not submerge wound underwater. Do not get chest wet.     ACTIVITY: No heavy lifting or straining. No lifting greater than 5 punds. No lifting arms above shoulder level. Otherwise, you may return to your usual level of physical activity. If you are taking narcotic pain medication DO NOT drive a car, operate machinery or make important decisions.    DIET: Return to your usual diet.    NOTIFY YOUR SURGEON IF YOU HAVE: any bleeding that does not stop, any pus draining from your wound(s), any fever (over 100.4 F) persistent nausea/vomiting, or if your pain is not controlled on your discharge pain medications, unable to urinate.    Take all medications as prescribed. Take antibiotics as prescribed for one week. Take prescription pain medication only as needed. Otherwise take over the counter tylenol for pain.     Please follow up with Dr. Hoff within x1 week after discharge from the hospital. You may call [phone] to schedule an appointment.

## 2025-04-30 NOTE — ASU DISCHARGE PLAN (ADULT/PEDIATRIC) - CARE PROVIDER_API CALL
David Hoff)  Plastic Surgery  [address] [place] [address], [address]  [place] [address]  Phone: [phone]  Fax: [phone]  Follow Up Time: 1 week

## 2025-04-30 NOTE — ASU PREOP CHECKLIST - SELECT TESTS ORDERED
stat potassium, UCG negative 4/30/25/UCG/Results in MD note stat repeat potassium 4.1, UCG negative 4/30/25/UCG/Results in MD note

## 2025-04-30 NOTE — ASU DISCHARGE PLAN (ADULT/PEDIATRIC) - FINANCIAL ASSISTANCE
[facility] provides services at a reduced cost to those who are determined to be eligible through [facility]’s financial assistance program. Information regarding [facility]’s financial assistance program can be found by going to [email or web address] or by calling [phone].

## 2025-04-30 NOTE — ASU PREOP CHECKLIST - BLOOD AVAILABLE
Continued Stay Note  [facility]     Patient Name: Amirah Godoy  MRN: [id number]  Today's Date: 1/10/2018    Admit Date: 1/5/2018          Discharge Plan       01/10/18 [id number]    Case Management/Social Work Plan    Plan Home-    Patient/Family In Agreement With Plan other (see comments)    Additional Comments CCP spoke with Myra FARR about Xolair medication it is restricted (for outpatient only) here in the hospital but per [facility] Pharmacist, MD can fill out Non-formulary form to see if patient can recieve dose while inpt. Myra stated she would notify Dr. Dobson. Pt does not have insurance and CCP will need to check private pay costs for Outpt infusions on Xolair. Await prescription and dosage. CCP to follow. Ryan HYLTON/CCP              Discharge Codes     None            Fátima Katz, RN     yes

## 2025-05-01 ENCOUNTER — APPOINTMENT (OUTPATIENT)
Dept: PLASTIC SURGERY | Facility: CLINIC | Age: 55
End: 2025-05-01

## 2025-05-05 LAB
CULTURE RESULTS: SIGNIFICANT CHANGE UP
SPECIMEN SOURCE: SIGNIFICANT CHANGE UP
SURGICAL PATHOLOGY STUDY: SIGNIFICANT CHANGE UP

## 2025-05-08 ENCOUNTER — APPOINTMENT (OUTPATIENT)
Dept: PLASTIC SURGERY | Facility: CLINIC | Age: 55
End: 2025-05-08

## 2025-05-08 VITALS
BODY MASS INDEX: 36.96 KG/M2 | SYSTOLIC BLOOD PRESSURE: 133 MMHG | WEIGHT: 230 LBS | HEART RATE: 87 BPM | DIASTOLIC BLOOD PRESSURE: 80 MMHG | TEMPERATURE: 97.3 F | OXYGEN SATURATION: 99 % | HEIGHT: 66 IN

## 2025-05-08 PROBLEM — C50.912 MALIGNANT NEOPLASM OF UNSPECIFIED SITE OF LEFT FEMALE BREAST: Chronic | Status: ACTIVE | Noted: 2025-04-28

## 2025-05-15 ENCOUNTER — APPOINTMENT (OUTPATIENT)
Dept: PLASTIC SURGERY | Facility: CLINIC | Age: 55
End: 2025-05-15

## 2025-05-15 VITALS
SYSTOLIC BLOOD PRESSURE: 109 MMHG | WEIGHT: 230 LBS | HEIGHT: 66 IN | DIASTOLIC BLOOD PRESSURE: 74 MMHG | TEMPERATURE: 97.9 F | RESPIRATION RATE: 16 BRPM | HEART RATE: 101 BPM | OXYGEN SATURATION: 99 % | BODY MASS INDEX: 36.96 KG/M2

## 2025-05-22 ENCOUNTER — APPOINTMENT (OUTPATIENT)
Dept: PLASTIC SURGERY | Facility: CLINIC | Age: 55
End: 2025-05-22

## 2025-05-22 VITALS
SYSTOLIC BLOOD PRESSURE: 107 MMHG | OXYGEN SATURATION: 99 % | HEART RATE: 87 BPM | BODY MASS INDEX: 36.96 KG/M2 | WEIGHT: 230 LBS | TEMPERATURE: 98 F | HEIGHT: 66 IN | DIASTOLIC BLOOD PRESSURE: 72 MMHG

## 2025-05-29 ENCOUNTER — APPOINTMENT (OUTPATIENT)
Dept: PLASTIC SURGERY | Facility: CLINIC | Age: 55
End: 2025-05-29
Payer: COMMERCIAL

## 2025-05-29 VITALS
HEIGHT: 66 IN | BODY MASS INDEX: 36.96 KG/M2 | SYSTOLIC BLOOD PRESSURE: 137 MMHG | HEART RATE: 99 BPM | DIASTOLIC BLOOD PRESSURE: 84 MMHG | WEIGHT: 230 LBS | TEMPERATURE: 97.3 F | OXYGEN SATURATION: 98 %

## 2025-05-29 DIAGNOSIS — Z90.13 ACQUIRED ABSENCE OF BILATERAL BREASTS AND NIPPLES: ICD-10-CM

## 2025-05-29 PROCEDURE — 99024 POSTOP FOLLOW-UP VISIT: CPT

## 2025-05-30 PROBLEM — Z90.13 S/P BILATERAL MASTECTOMY: Status: ACTIVE | Noted: 2025-05-30

## 2025-06-05 ENCOUNTER — APPOINTMENT (OUTPATIENT)
Dept: PLASTIC SURGERY | Facility: CLINIC | Age: 55
End: 2025-06-05

## 2025-06-05 VITALS
WEIGHT: 230 LBS | SYSTOLIC BLOOD PRESSURE: 131 MMHG | HEIGHT: 66 IN | DIASTOLIC BLOOD PRESSURE: 86 MMHG | HEART RATE: 91 BPM | OXYGEN SATURATION: 99 % | RESPIRATION RATE: 18 BRPM | BODY MASS INDEX: 36.96 KG/M2

## 2025-06-12 ENCOUNTER — APPOINTMENT (OUTPATIENT)
Dept: PLASTIC SURGERY | Facility: CLINIC | Age: 55
End: 2025-06-12

## 2025-06-13 ENCOUNTER — OUTPATIENT (OUTPATIENT)
Dept: OUTPATIENT SERVICES | Facility: HOSPITAL | Age: 55
LOS: 1 days | Discharge: HOME | End: 2025-06-13

## 2025-06-13 DIAGNOSIS — Z90.13 ACQUIRED ABSENCE OF BILATERAL BREASTS AND NIPPLES: Chronic | ICD-10-CM

## 2025-06-13 DIAGNOSIS — C50.919 MALIGNANT NEOPLASM OF UNSPECIFIED SITE OF UNSPECIFIED FEMALE BREAST: ICD-10-CM

## 2025-06-13 DIAGNOSIS — N20.0 CALCULUS OF KIDNEY: Chronic | ICD-10-CM

## 2025-06-13 DIAGNOSIS — Z98.890 OTHER SPECIFIED POSTPROCEDURAL STATES: Chronic | ICD-10-CM

## 2025-06-13 DIAGNOSIS — D25.9 LEIOMYOMA OF UTERUS, UNSPECIFIED: Chronic | ICD-10-CM

## 2025-06-16 ENCOUNTER — APPOINTMENT (OUTPATIENT)
Dept: HEMATOLOGY ONCOLOGY | Facility: CLINIC | Age: 55
End: 2025-06-16
Payer: COMMERCIAL

## 2025-06-16 VITALS
HEART RATE: 70 BPM | DIASTOLIC BLOOD PRESSURE: 72 MMHG | TEMPERATURE: 97.3 F | BODY MASS INDEX: 38.09 KG/M2 | OXYGEN SATURATION: 98 % | SYSTOLIC BLOOD PRESSURE: 104 MMHG | WEIGHT: 236 LBS | RESPIRATION RATE: 16 BRPM

## 2025-06-16 PROBLEM — R05.8 DRY COUGH: Status: RESOLVED | Noted: 2024-12-09 | Resolved: 2025-06-16

## 2025-06-16 PROBLEM — R07.89 CHEST WALL PAIN: Status: ACTIVE | Noted: 2025-06-16

## 2025-06-16 PROCEDURE — G2211 COMPLEX E/M VISIT ADD ON: CPT | Mod: NC

## 2025-06-16 PROCEDURE — 99213 OFFICE O/P EST LOW 20 MIN: CPT

## 2025-06-16 RX ORDER — POTASSIUM CHLORIDE 1500 MG/1
20 TABLET, EXTENDED RELEASE ORAL
Refills: 0 | Status: ACTIVE | COMMUNITY
Start: 2025-06-16

## 2025-06-19 ENCOUNTER — APPOINTMENT (OUTPATIENT)
Dept: PLASTIC SURGERY | Facility: CLINIC | Age: 55
End: 2025-06-19

## 2025-06-19 VITALS
HEIGHT: 66 IN | WEIGHT: 236 LBS | DIASTOLIC BLOOD PRESSURE: 67 MMHG | TEMPERATURE: 97.2 F | HEART RATE: 92 BPM | SYSTOLIC BLOOD PRESSURE: 104 MMHG | OXYGEN SATURATION: 98 % | BODY MASS INDEX: 37.93 KG/M2

## 2025-06-19 PROCEDURE — 99024 POSTOP FOLLOW-UP VISIT: CPT

## 2025-06-26 ENCOUNTER — APPOINTMENT (OUTPATIENT)
Dept: PLASTIC SURGERY | Facility: CLINIC | Age: 55
End: 2025-06-26

## 2025-06-26 VITALS
WEIGHT: 236 LBS | TEMPERATURE: 98.1 F | BODY MASS INDEX: 37.93 KG/M2 | SYSTOLIC BLOOD PRESSURE: 122 MMHG | OXYGEN SATURATION: 97 % | HEART RATE: 91 BPM | DIASTOLIC BLOOD PRESSURE: 77 MMHG | HEIGHT: 66 IN

## 2025-07-03 ENCOUNTER — APPOINTMENT (OUTPATIENT)
Dept: PLASTIC SURGERY | Facility: CLINIC | Age: 55
End: 2025-07-03

## 2025-07-03 ENCOUNTER — APPOINTMENT (OUTPATIENT)
Dept: CT IMAGING | Facility: IMAGING CENTER | Age: 55
End: 2025-07-03

## 2025-07-03 ENCOUNTER — OUTPATIENT (OUTPATIENT)
Dept: OUTPATIENT SERVICES | Facility: HOSPITAL | Age: 55
LOS: 1 days | End: 2025-07-03
Payer: COMMERCIAL

## 2025-07-03 VITALS
BODY MASS INDEX: 37.93 KG/M2 | HEART RATE: 89 BPM | SYSTOLIC BLOOD PRESSURE: 127 MMHG | DIASTOLIC BLOOD PRESSURE: 76 MMHG | HEIGHT: 66 IN | OXYGEN SATURATION: 97 % | TEMPERATURE: 98.1 F | WEIGHT: 236 LBS

## 2025-07-03 DIAGNOSIS — N20.0 CALCULUS OF KIDNEY: Chronic | ICD-10-CM

## 2025-07-03 DIAGNOSIS — R07.89 OTHER CHEST PAIN: ICD-10-CM

## 2025-07-03 DIAGNOSIS — Z98.890 OTHER SPECIFIED POSTPROCEDURAL STATES: Chronic | ICD-10-CM

## 2025-07-03 DIAGNOSIS — C50.912 MALIGNANT NEOPLASM OF UNSPECIFIED SITE OF LEFT FEMALE BREAST: ICD-10-CM

## 2025-07-03 PROCEDURE — 71250 CT THORAX DX C-: CPT | Mod: 26

## 2025-07-03 PROCEDURE — 71250 CT THORAX DX C-: CPT

## 2025-07-10 ENCOUNTER — APPOINTMENT (OUTPATIENT)
Dept: PLASTIC SURGERY | Facility: CLINIC | Age: 55
End: 2025-07-10
Payer: COMMERCIAL

## 2025-07-10 VITALS
HEART RATE: 82 BPM | OXYGEN SATURATION: 96 % | HEIGHT: 66 IN | RESPIRATION RATE: 16 BRPM | DIASTOLIC BLOOD PRESSURE: 78 MMHG | TEMPERATURE: 98.1 F | SYSTOLIC BLOOD PRESSURE: 119 MMHG | WEIGHT: 236 LBS | BODY MASS INDEX: 37.93 KG/M2

## 2025-07-10 PROCEDURE — 99024 POSTOP FOLLOW-UP VISIT: CPT

## 2025-07-14 ENCOUNTER — NON-APPOINTMENT (OUTPATIENT)
Age: 55
End: 2025-07-14

## 2025-07-17 ENCOUNTER — APPOINTMENT (OUTPATIENT)
Dept: PLASTIC SURGERY | Facility: CLINIC | Age: 55
End: 2025-07-17
Payer: COMMERCIAL

## 2025-07-17 VITALS
HEIGHT: 66 IN | OXYGEN SATURATION: 97 % | HEART RATE: 99 BPM | WEIGHT: 236 LBS | DIASTOLIC BLOOD PRESSURE: 81 MMHG | BODY MASS INDEX: 37.93 KG/M2 | SYSTOLIC BLOOD PRESSURE: 129 MMHG | TEMPERATURE: 97.3 F

## 2025-07-17 PROCEDURE — 99024 POSTOP FOLLOW-UP VISIT: CPT

## 2025-07-17 RX ORDER — LIDOCAINE AND PRILOCAINE 25; 25 MG/G; MG/G
2.5-2.5 CREAM TOPICAL
Qty: 1 | Refills: 0 | Status: ACTIVE | COMMUNITY
Start: 2025-07-17 | End: 1900-01-01

## 2025-07-17 NOTE — DISCHARGE NOTE ADULT - NS MD DC PLAN IMMU FLU PROVIDE INFO
Normal thyromental distance, normal neck range of motion, and good mouth opening.
Risks/benefits discussed with patient or patient surrogate

## 2025-07-24 ENCOUNTER — APPOINTMENT (OUTPATIENT)
Dept: PLASTIC SURGERY | Facility: CLINIC | Age: 55
End: 2025-07-24
Payer: COMMERCIAL

## 2025-07-24 VITALS
BODY MASS INDEX: 37.93 KG/M2 | DIASTOLIC BLOOD PRESSURE: 89 MMHG | HEIGHT: 66 IN | OXYGEN SATURATION: 97 % | TEMPERATURE: 98 F | HEART RATE: 112 BPM | WEIGHT: 236 LBS | SYSTOLIC BLOOD PRESSURE: 150 MMHG

## 2025-07-24 DIAGNOSIS — Z98.890 OTHER SPECIFIED POSTPROCEDURAL STATES: ICD-10-CM

## 2025-07-24 PROCEDURE — 99024 POSTOP FOLLOW-UP VISIT: CPT

## 2025-07-31 ENCOUNTER — APPOINTMENT (OUTPATIENT)
Dept: PLASTIC SURGERY | Facility: CLINIC | Age: 55
End: 2025-07-31
Payer: COMMERCIAL

## 2025-07-31 PROCEDURE — 99213 OFFICE O/P EST LOW 20 MIN: CPT

## 2025-08-04 ENCOUNTER — APPOINTMENT (OUTPATIENT)
Dept: SURGICAL ONCOLOGY | Facility: CLINIC | Age: 55
End: 2025-08-04
Payer: COMMERCIAL

## 2025-08-04 ENCOUNTER — NON-APPOINTMENT (OUTPATIENT)
Age: 55
End: 2025-08-04

## 2025-08-04 VITALS
HEIGHT: 66 IN | OXYGEN SATURATION: 97 % | RESPIRATION RATE: 16 BRPM | SYSTOLIC BLOOD PRESSURE: 121 MMHG | TEMPERATURE: 97.1 F | BODY MASS INDEX: 37.93 KG/M2 | DIASTOLIC BLOOD PRESSURE: 74 MMHG | HEART RATE: 94 BPM | WEIGHT: 236 LBS

## 2025-08-04 DIAGNOSIS — C50.912 MALIGNANT NEOPLASM OF UNSPECIFIED SITE OF LEFT FEMALE BREAST: ICD-10-CM

## 2025-08-04 PROCEDURE — 99214 OFFICE O/P EST MOD 30 MIN: CPT

## 2025-08-07 ENCOUNTER — APPOINTMENT (OUTPATIENT)
Dept: PLASTIC SURGERY | Facility: CLINIC | Age: 55
End: 2025-08-07

## 2025-08-07 VITALS
WEIGHT: 236 LBS | DIASTOLIC BLOOD PRESSURE: 77 MMHG | HEIGHT: 66 IN | SYSTOLIC BLOOD PRESSURE: 120 MMHG | RESPIRATION RATE: 16 BRPM | HEART RATE: 95 BPM | OXYGEN SATURATION: 97 % | BODY MASS INDEX: 37.93 KG/M2 | TEMPERATURE: 97.5 F

## 2025-08-07 DIAGNOSIS — R23.4 CHANGES IN SKIN TEXTURE: ICD-10-CM

## 2025-08-07 DIAGNOSIS — Z90.13 ACQUIRED ABSENCE OF BILATERAL BREASTS AND NIPPLES: ICD-10-CM

## 2025-08-07 PROCEDURE — 99213 OFFICE O/P EST LOW 20 MIN: CPT

## 2025-08-08 PROBLEM — R23.4 SCAB: Status: ACTIVE | Noted: 2025-08-08

## 2025-08-11 RX ORDER — CLINDAMYCIN HYDROCHLORIDE 300 MG/1
300 CAPSULE ORAL EVERY 6 HOURS
Qty: 28 | Refills: 0 | Status: ACTIVE | COMMUNITY
Start: 2025-08-11 | End: 1900-01-01

## 2025-08-13 ENCOUNTER — OUTPATIENT (OUTPATIENT)
Dept: OUTPATIENT SERVICES | Facility: HOSPITAL | Age: 55
LOS: 1 days | End: 2025-08-13
Payer: COMMERCIAL

## 2025-08-13 VITALS
SYSTOLIC BLOOD PRESSURE: 124 MMHG | DIASTOLIC BLOOD PRESSURE: 74 MMHG | HEIGHT: 67 IN | RESPIRATION RATE: 16 BRPM | WEIGHT: 237 LBS | TEMPERATURE: 98 F | HEART RATE: 92 BPM | OXYGEN SATURATION: 98 %

## 2025-08-13 DIAGNOSIS — Z98.890 OTHER SPECIFIED POSTPROCEDURAL STATES: Chronic | ICD-10-CM

## 2025-08-13 DIAGNOSIS — N20.0 CALCULUS OF KIDNEY: Chronic | ICD-10-CM

## 2025-08-13 DIAGNOSIS — D25.9 LEIOMYOMA OF UTERUS, UNSPECIFIED: Chronic | ICD-10-CM

## 2025-08-13 DIAGNOSIS — T81.30XA DISRUPTION OF WOUND, UNSPECIFIED, INITIAL ENCOUNTER: ICD-10-CM

## 2025-08-13 DIAGNOSIS — I10 ESSENTIAL (PRIMARY) HYPERTENSION: ICD-10-CM

## 2025-08-13 DIAGNOSIS — I82.409 ACUTE EMBOLISM AND THROMBOSIS OF UNSPECIFIED DEEP VEINS OF UNSPECIFIED LOWER EXTREMITY: ICD-10-CM

## 2025-08-13 DIAGNOSIS — Z90.13 ACQUIRED ABSENCE OF BILATERAL BREASTS AND NIPPLES: ICD-10-CM

## 2025-08-13 DIAGNOSIS — Z01.818 ENCOUNTER FOR OTHER PREPROCEDURAL EXAMINATION: ICD-10-CM

## 2025-08-13 DIAGNOSIS — Z90.13 ACQUIRED ABSENCE OF BILATERAL BREASTS AND NIPPLES: Chronic | ICD-10-CM

## 2025-08-13 LAB
ANION GAP SERPL CALC-SCNC: 9 MMOL/L — SIGNIFICANT CHANGE UP (ref 5–17)
BASOPHILS # BLD AUTO: 0.03 K/UL — SIGNIFICANT CHANGE UP (ref 0–0.2)
BASOPHILS NFR BLD AUTO: 0.8 % — SIGNIFICANT CHANGE UP (ref 0–2)
BUN SERPL-MCNC: 6 MG/DL — LOW (ref 7–23)
CALCIUM SERPL-MCNC: 8.9 MG/DL — SIGNIFICANT CHANGE UP (ref 8.4–10.5)
CHLORIDE SERPL-SCNC: 108 MMOL/L — SIGNIFICANT CHANGE UP (ref 96–108)
CO2 SERPL-SCNC: 23 MMOL/L — SIGNIFICANT CHANGE UP (ref 22–31)
CREAT SERPL-MCNC: 0.88 MG/DL — SIGNIFICANT CHANGE UP (ref 0.5–1.3)
EGFR: 77 ML/MIN/1.73M2 — SIGNIFICANT CHANGE UP
EGFR: 77 ML/MIN/1.73M2 — SIGNIFICANT CHANGE UP
EOSINOPHIL # BLD AUTO: 0.09 K/UL — SIGNIFICANT CHANGE UP (ref 0–0.5)
EOSINOPHIL NFR BLD AUTO: 2.4 % — SIGNIFICANT CHANGE UP (ref 0–6)
GLUCOSE SERPL-MCNC: 104 MG/DL — HIGH (ref 70–99)
HCT VFR BLD CALC: 36.9 % — SIGNIFICANT CHANGE UP (ref 34.5–45)
HGB BLD-MCNC: 11.7 G/DL — SIGNIFICANT CHANGE UP (ref 11.5–15.5)
IMM GRANULOCYTES NFR BLD AUTO: 0.3 % — SIGNIFICANT CHANGE UP (ref 0–0.9)
LYMPHOCYTES # BLD AUTO: 1.85 K/UL — SIGNIFICANT CHANGE UP (ref 1–3.3)
LYMPHOCYTES # BLD AUTO: 49.5 % — HIGH (ref 13–44)
MCHC RBC-ENTMCNC: 26.7 PG — LOW (ref 27–34)
MCHC RBC-ENTMCNC: 31.7 G/DL — LOW (ref 32–36)
MCV RBC AUTO: 84.1 FL — SIGNIFICANT CHANGE UP (ref 80–100)
MONOCYTES # BLD AUTO: 0.31 K/UL — SIGNIFICANT CHANGE UP (ref 0–0.9)
MONOCYTES NFR BLD AUTO: 8.3 % — SIGNIFICANT CHANGE UP (ref 2–14)
NEUTROPHILS # BLD AUTO: 1.45 K/UL — LOW (ref 1.8–7.4)
NEUTROPHILS NFR BLD AUTO: 38.7 % — LOW (ref 43–77)
NRBC BLD AUTO-RTO: 0 /100 WBCS — SIGNIFICANT CHANGE UP (ref 0–0)
PLATELET # BLD AUTO: 227 K/UL — SIGNIFICANT CHANGE UP (ref 150–400)
POTASSIUM SERPL-MCNC: 3.8 MMOL/L — SIGNIFICANT CHANGE UP (ref 3.5–5.3)
POTASSIUM SERPL-SCNC: 3.8 MMOL/L — SIGNIFICANT CHANGE UP (ref 3.5–5.3)
RBC # BLD: 4.39 M/UL — SIGNIFICANT CHANGE UP (ref 3.8–5.2)
RBC # FLD: 15.7 % — HIGH (ref 10.3–14.5)
SODIUM SERPL-SCNC: 140 MMOL/L — SIGNIFICANT CHANGE UP (ref 135–145)
WBC # BLD: 3.74 K/UL — LOW (ref 3.8–10.5)
WBC # FLD AUTO: 3.74 K/UL — LOW (ref 3.8–10.5)

## 2025-08-13 PROCEDURE — 85025 COMPLETE CBC W/AUTO DIFF WBC: CPT

## 2025-08-13 PROCEDURE — G0463: CPT

## 2025-08-13 PROCEDURE — 36415 COLL VENOUS BLD VENIPUNCTURE: CPT

## 2025-08-13 PROCEDURE — 80048 BASIC METABOLIC PNL TOTAL CA: CPT

## 2025-08-13 RX ORDER — POTASSIUM BICARBONATE/CIT AC 25 MEQ
1 TABLET, EFFERVESCENT ORAL
Refills: 0 | DISCHARGE

## 2025-08-14 ENCOUNTER — APPOINTMENT (OUTPATIENT)
Dept: PLASTIC SURGERY | Facility: CLINIC | Age: 55
End: 2025-08-14

## 2025-08-14 VITALS
SYSTOLIC BLOOD PRESSURE: 122 MMHG | OXYGEN SATURATION: 98 % | HEIGHT: 66 IN | HEART RATE: 76 BPM | WEIGHT: 237 LBS | BODY MASS INDEX: 38.09 KG/M2 | DIASTOLIC BLOOD PRESSURE: 79 MMHG | TEMPERATURE: 97.6 F

## 2025-08-14 DIAGNOSIS — T81.30XA DISRUPTION OF WOUND, UNSPECIFIED, INITIAL ENCOUNTER: ICD-10-CM

## 2025-08-14 DIAGNOSIS — Z98.890 OTHER SPECIFIED POSTPROCEDURAL STATES: ICD-10-CM

## 2025-08-14 PROCEDURE — 99213 OFFICE O/P EST LOW 20 MIN: CPT

## 2025-08-15 ENCOUNTER — TRANSCRIPTION ENCOUNTER (OUTPATIENT)
Age: 55
End: 2025-08-15

## 2025-08-15 ENCOUNTER — APPOINTMENT (OUTPATIENT)
Dept: PLASTIC SURGERY | Facility: HOSPITAL | Age: 55
End: 2025-08-15

## 2025-08-15 ENCOUNTER — OUTPATIENT (OUTPATIENT)
Dept: OUTPATIENT SERVICES | Facility: HOSPITAL | Age: 55
LOS: 1 days | End: 2025-08-15
Payer: COMMERCIAL

## 2025-08-15 VITALS
OXYGEN SATURATION: 96 % | RESPIRATION RATE: 14 BRPM | DIASTOLIC BLOOD PRESSURE: 83 MMHG | TEMPERATURE: 98 F | SYSTOLIC BLOOD PRESSURE: 117 MMHG | HEART RATE: 72 BPM

## 2025-08-15 VITALS
HEART RATE: 68 BPM | OXYGEN SATURATION: 98 % | TEMPERATURE: 98 F | SYSTOLIC BLOOD PRESSURE: 109 MMHG | HEIGHT: 67 IN | WEIGHT: 237 LBS | DIASTOLIC BLOOD PRESSURE: 70 MMHG | RESPIRATION RATE: 15 BRPM

## 2025-08-15 DIAGNOSIS — Z98.890 OTHER SPECIFIED POSTPROCEDURAL STATES: Chronic | ICD-10-CM

## 2025-08-15 DIAGNOSIS — Z90.13 ACQUIRED ABSENCE OF BILATERAL BREASTS AND NIPPLES: Chronic | ICD-10-CM

## 2025-08-15 DIAGNOSIS — Z90.13 ACQUIRED ABSENCE OF BILATERAL BREASTS AND NIPPLES: ICD-10-CM

## 2025-08-15 DIAGNOSIS — D25.9 LEIOMYOMA OF UTERUS, UNSPECIFIED: Chronic | ICD-10-CM

## 2025-08-15 DIAGNOSIS — N20.0 CALCULUS OF KIDNEY: Chronic | ICD-10-CM

## 2025-08-15 DIAGNOSIS — T81.30XA DISRUPTION OF WOUND, UNSPECIFIED, INITIAL ENCOUNTER: ICD-10-CM

## 2025-08-15 PROCEDURE — 87205 SMEAR GRAM STAIN: CPT

## 2025-08-15 PROCEDURE — 19380 REVJ RECONSTRUCTED BREAST: CPT | Mod: LT

## 2025-08-15 PROCEDURE — 11971 RMVL TIS XPNDR WO INSJ IMPLT: CPT | Mod: LT

## 2025-08-15 PROCEDURE — 87070 CULTURE OTHR SPECIMN AEROBIC: CPT

## 2025-08-15 PROCEDURE — 88304 TISSUE EXAM BY PATHOLOGIST: CPT | Mod: 26

## 2025-08-15 PROCEDURE — 87075 CULTR BACTERIA EXCEPT BLOOD: CPT

## 2025-08-15 PROCEDURE — 19370 REVJ PERI-IMPLT CAPSULE BRST: CPT | Mod: LT

## 2025-08-15 RX ORDER — HYDROMORPHONE/SOD CHLOR,ISO/PF 2 MG/10 ML
1 SYRINGE (ML) INJECTION ONCE
Refills: 0 | Status: DISCONTINUED | OUTPATIENT
Start: 2025-08-15 | End: 2025-08-15

## 2025-08-15 RX ORDER — SODIUM CHLORIDE 9 G/1000ML
1000 INJECTION, SOLUTION INTRAVENOUS
Refills: 0 | Status: DISCONTINUED | OUTPATIENT
Start: 2025-08-15 | End: 2025-08-15

## 2025-08-15 RX ORDER — HYDROMORPHONE/SOD CHLOR,ISO/PF 2 MG/10 ML
0.5 SYRINGE (ML) INJECTION
Refills: 0 | Status: DISCONTINUED | OUTPATIENT
Start: 2025-08-15 | End: 2025-08-15

## 2025-08-15 RX ORDER — SODIUM CHLORIDE 9 G/1000ML
1000 INJECTION, SOLUTION INTRAVENOUS
Refills: 0 | Status: ACTIVE | OUTPATIENT
Start: 2025-08-15 | End: 2026-07-14

## 2025-08-15 RX ORDER — ONDANSETRON HCL/PF 4 MG/2 ML
4 VIAL (ML) INJECTION ONCE
Refills: 0 | Status: DISCONTINUED | OUTPATIENT
Start: 2025-08-15 | End: 2025-08-15

## 2025-08-16 LAB
GRAM STN FLD: SIGNIFICANT CHANGE UP
SPECIMEN SOURCE: SIGNIFICANT CHANGE UP

## 2025-08-18 ENCOUNTER — NON-APPOINTMENT (OUTPATIENT)
Age: 55
End: 2025-08-18

## 2025-08-20 LAB
CULTURE RESULTS: SIGNIFICANT CHANGE UP
SPECIMEN SOURCE: SIGNIFICANT CHANGE UP

## 2025-08-21 ENCOUNTER — APPOINTMENT (OUTPATIENT)
Dept: PLASTIC SURGERY | Facility: CLINIC | Age: 55
End: 2025-08-21
Payer: COMMERCIAL

## 2025-08-21 VITALS
DIASTOLIC BLOOD PRESSURE: 78 MMHG | HEIGHT: 66 IN | TEMPERATURE: 98 F | BODY MASS INDEX: 38.09 KG/M2 | OXYGEN SATURATION: 99 % | SYSTOLIC BLOOD PRESSURE: 111 MMHG | HEART RATE: 87 BPM | WEIGHT: 237 LBS

## 2025-08-21 DIAGNOSIS — Z90.13 ACQUIRED ABSENCE OF BILATERAL BREASTS AND NIPPLES: ICD-10-CM

## 2025-08-21 PROCEDURE — 99024 POSTOP FOLLOW-UP VISIT: CPT

## 2025-08-26 LAB — SURGICAL PATHOLOGY STUDY: SIGNIFICANT CHANGE UP

## 2025-08-28 ENCOUNTER — APPOINTMENT (OUTPATIENT)
Dept: PLASTIC SURGERY | Facility: CLINIC | Age: 55
End: 2025-08-28
Payer: COMMERCIAL

## 2025-08-28 VITALS
WEIGHT: 237 LBS | DIASTOLIC BLOOD PRESSURE: 75 MMHG | SYSTOLIC BLOOD PRESSURE: 109 MMHG | HEART RATE: 88 BPM | TEMPERATURE: 97.8 F | BODY MASS INDEX: 38.09 KG/M2 | OXYGEN SATURATION: 99 % | HEIGHT: 66 IN

## 2025-08-28 DIAGNOSIS — Z98.890 OTHER SPECIFIED POSTPROCEDURAL STATES: ICD-10-CM

## 2025-08-28 PROCEDURE — 99024 POSTOP FOLLOW-UP VISIT: CPT

## 2025-08-28 RX ORDER — CEFADROXIL 500 MG/1
500 CAPSULE ORAL TWICE DAILY
Qty: 10 | Refills: 0 | Status: COMPLETED | COMMUNITY
Start: 2025-08-28 | End: 2025-09-02

## 2025-09-02 ENCOUNTER — APPOINTMENT (OUTPATIENT)
Dept: PLASTIC SURGERY | Facility: CLINIC | Age: 55
End: 2025-09-02

## 2025-09-11 ENCOUNTER — APPOINTMENT (OUTPATIENT)
Dept: PLASTIC SURGERY | Facility: CLINIC | Age: 55
End: 2025-09-11
Payer: COMMERCIAL

## 2025-09-11 VITALS
HEIGHT: 66 IN | HEART RATE: 92 BPM | SYSTOLIC BLOOD PRESSURE: 116 MMHG | BODY MASS INDEX: 38.09 KG/M2 | WEIGHT: 237 LBS | DIASTOLIC BLOOD PRESSURE: 78 MMHG | TEMPERATURE: 97.5 F | OXYGEN SATURATION: 98 %

## 2025-09-11 PROCEDURE — 99024 POSTOP FOLLOW-UP VISIT: CPT

## (undated) DEVICE — DRAIN JACKSON PRATT 7MM FLAT FULL NO TROCAR

## (undated) DEVICE — DRAIN RESERVOIR FOR JACKSON PRATT 100CC CARDINAL

## (undated) DEVICE — Device

## (undated) DEVICE — DRSG COMBINE 5X9"

## (undated) DEVICE — DRSG MAMMARY SUPPORT MED SIZE 3

## (undated) DEVICE — SOLIDIFIER CANN EXPRESS 3K

## (undated) DEVICE — WARMING BLANKET LOWER ADULT

## (undated) DEVICE — DRSG CURITY GAUZE SPONGE 4 X 4" 12-PLY

## (undated) DEVICE — DRAPE IOBAN 10" X 8"

## (undated) DEVICE — SPECIMEN CONTAINER 100ML

## (undated) DEVICE — DRSG DERMABOND MINI

## (undated) DEVICE — GLV 7 PROTEXIS (WHITE)

## (undated) DEVICE — NDL SAFETY BUTTERFLY 21G X 3/4

## (undated) DEVICE — SYR LUER LOK 10CC

## (undated) DEVICE — POSITIONER STRAP ARMBOARD VELCRO TS-30

## (undated) DEVICE — STAPLER SKIN VISI-STAT 35 WIDE

## (undated) DEVICE — SUT POLYSORB 2-0 30" GS-21 UNDYED

## (undated) DEVICE — SUT SOFSILK 2-0 18" C-23

## (undated) DEVICE — SOL IRR POUR NS 0.9% 500ML

## (undated) DEVICE — DRSG STERISTRIPS 0.5 X 4"

## (undated) DEVICE — DRSG COMBINE 5 X 9"

## (undated) DEVICE — ASEPTIC TRANSFER SYSTEM

## (undated) DEVICE — ELCTR STRYKER NEPTUNE SMOKE EVACUATION PENCIL (GREEN)

## (undated) DEVICE — VISITEC 4X4

## (undated) DEVICE — GOWN TRIMAX LG

## (undated) DEVICE — PACK MINOR

## (undated) DEVICE — DRSG XEROFORM 5 X 9"

## (undated) DEVICE — POSITIONER FOAM HEAD CRADLE (PINK)

## (undated) DEVICE — BLOOD SEPARATOR MAGELLAN AUTOLOGOUS

## (undated) DEVICE — DRSG MAMMARY SUPPORT LG SIZE 4

## (undated) DEVICE — SUT MONOCRYL 4-0 27" PS-2 UNDYED

## (undated) DEVICE — DRAPE INSTRUMENT POUCH 6.75" X 11"

## (undated) DEVICE — VENODYNE/SCD SLEEVE CALF LARGE

## (undated) DEVICE — SUT POLYSORB 2-0 30" V-20 UNDYED

## (undated) DEVICE — SUCTION YANKAUER NO CONTROL VENT

## (undated) DEVICE — FOLEY HOLDER STATLOCK 2 WAY ADULT

## (undated) DEVICE — LAP PAD 18 X 18"

## (undated) DEVICE — VENODYNE/SCD SLEEVE CALF MEDIUM

## (undated) DEVICE — DRSG TEGADERM 1.75X1.75"

## (undated) DEVICE — DRAPE TOWEL BLUE 17" X 24"

## (undated) DEVICE — SYR LUER LOK 50CC

## (undated) DEVICE — SYR ASEPTO

## (undated) DEVICE — GAMMA SLEEVE DISPOSABLE

## (undated) DEVICE — DRAPE 1/2 SHEET 40X57"

## (undated) DEVICE — SUT MONOCRYL 3-0 27" PS-2 UNDYED

## (undated) DEVICE — BLADE SCALPEL SAFETYLOCK #15

## (undated) DEVICE — DRSG TELFA 3 X 8

## (undated) DEVICE — DRAPE 3/4 SHEET W REINFORCEMENT 56X77"

## (undated) DEVICE — SOL INJ NS 0.9% 1000ML

## (undated) DEVICE — GLV 7.5 PROTEXIS (CREAM) NEU-THERA

## (undated) DEVICE — SUT BIOSYN 4-0 18" P-12

## (undated) DEVICE — CANISTER SUCTION LID GUARD 3000CC

## (undated) DEVICE — SUT MONOCRYL 3-0 18" PS-2 UNDYED

## (undated) DEVICE — DRSG MAMMARY SUPPORT MED SIZE 2

## (undated) DEVICE — DRAIN JACKSON PRATT 10MM FLAT FULL NO TROCAR

## (undated) DEVICE — PREP CHLORAPREP HI-LITE ORANGE 26ML

## (undated) DEVICE — TUBING TRUWAVE PRESSURE MALE/FEMALE 72"

## (undated) DEVICE — GLV 7.5 PROTEXIS (WHITE)

## (undated) DEVICE — SOL IRR POUR H2O 250ML

## (undated) DEVICE — DRAPE CHEST BREAST 106" X 122"

## (undated) DEVICE — GLV 8 PROTEXIS (WHITE)

## (undated) DEVICE — POSITIONER FOAM EGG CRATE ULNAR 2PCS (PINK)

## (undated) DEVICE — DRAIN BLAKE 15FR BARD CHANNEL

## (undated) DEVICE — SPONGE PEANUT AUTO COUNT

## (undated) DEVICE — DRAPE LIGHT HANDLE COVER (BLUE)

## (undated) DEVICE — GLV 8.5 PROTEXIS (WHITE)

## (undated) DEVICE — MEDICATION LABELS W MARKER

## (undated) DEVICE — MARKING PEN W RULER

## (undated) DEVICE — SUT POLYSORB 3-0 30" V-20 UNDYED

## (undated) DEVICE — DRSG TEGADERM 6"X8"

## (undated) DEVICE — NDL COUNTER FOAM AND MAGNET 40-70

## (undated) DEVICE — LIGASURE SMALL JAW

## (undated) DEVICE — SUT ETHILON 2-0 18" FS

## (undated) DEVICE — DRAPE MAGNETIC INSTRUMENT MEDIUM

## (undated) DEVICE — DRSG OPSITE 13.75 X 4"

## (undated) DEVICE — FOLEY TRAY 16FR 5CC LTX UMETER CLOSED

## (undated) DEVICE — GLV 6.5 PROTEXIS (WHITE)

## (undated) DEVICE — BLADE SCALPEL SAFETYLOCK #10

## (undated) DEVICE — TUBING SUCTION 20FT